# Patient Record
Sex: MALE | Race: WHITE | NOT HISPANIC OR LATINO | Employment: OTHER | URBAN - METROPOLITAN AREA
[De-identification: names, ages, dates, MRNs, and addresses within clinical notes are randomized per-mention and may not be internally consistent; named-entity substitution may affect disease eponyms.]

---

## 2017-02-07 ENCOUNTER — OFFICE VISIT - RIVER FALLS (OUTPATIENT)
Dept: FAMILY MEDICINE | Facility: CLINIC | Age: 72
End: 2017-02-07

## 2017-02-07 ASSESSMENT — MIFFLIN-ST. JEOR: SCORE: 1663.4

## 2017-08-31 ENCOUNTER — OFFICE VISIT - RIVER FALLS (OUTPATIENT)
Dept: FAMILY MEDICINE | Facility: CLINIC | Age: 72
End: 2017-08-31

## 2017-08-31 ASSESSMENT — MIFFLIN-ST. JEOR: SCORE: 1622.58

## 2017-09-06 ENCOUNTER — AMBULATORY - RIVER FALLS (OUTPATIENT)
Dept: FAMILY MEDICINE | Facility: CLINIC | Age: 72
End: 2017-09-06

## 2017-09-07 LAB — HBA1C MFR BLD: 6.4 %

## 2017-09-12 ENCOUNTER — OFFICE VISIT - RIVER FALLS (OUTPATIENT)
Dept: FAMILY MEDICINE | Facility: CLINIC | Age: 72
End: 2017-09-12

## 2017-09-12 ASSESSMENT — MIFFLIN-ST. JEOR: SCORE: 1602.62

## 2017-12-01 ENCOUNTER — OFFICE VISIT - RIVER FALLS (OUTPATIENT)
Dept: FAMILY MEDICINE | Facility: CLINIC | Age: 72
End: 2017-12-01

## 2017-12-01 ASSESSMENT — MIFFLIN-ST. JEOR: SCORE: 1615.32

## 2018-01-02 ENCOUNTER — OFFICE VISIT - RIVER FALLS (OUTPATIENT)
Dept: FAMILY MEDICINE | Facility: CLINIC | Age: 73
End: 2018-01-02

## 2018-01-02 ASSESSMENT — MIFFLIN-ST. JEOR: SCORE: 1608.97

## 2018-03-19 ENCOUNTER — OFFICE VISIT - RIVER FALLS (OUTPATIENT)
Dept: FAMILY MEDICINE | Facility: CLINIC | Age: 73
End: 2018-03-19

## 2018-03-19 ASSESSMENT — MIFFLIN-ST. JEOR: SCORE: 1615.32

## 2018-03-27 LAB
CHOLEST SERPL-MCNC: 109 MG/DL
CREAT SERPL-MCNC: 0.8 MG/DL
HBA1C MFR BLD: 6.6 %
HDLC SERPL-MCNC: 42 MG/DL
LDLC SERPL CALC-MCNC: 44 MG/DL
TRIGL SERPL-MCNC: 114 MG/DL

## 2018-09-26 ENCOUNTER — OFFICE VISIT - RIVER FALLS (OUTPATIENT)
Dept: FAMILY MEDICINE | Facility: CLINIC | Age: 73
End: 2018-09-26

## 2018-09-26 ASSESSMENT — MIFFLIN-ST. JEOR: SCORE: 1570.87

## 2018-09-27 LAB — HBA1C MFR BLD: 6.1 %

## 2019-01-24 ENCOUNTER — OFFICE VISIT - RIVER FALLS (OUTPATIENT)
Dept: FAMILY MEDICINE | Facility: CLINIC | Age: 74
End: 2019-01-24

## 2019-01-24 ASSESSMENT — MIFFLIN-ST. JEOR: SCORE: 1563.61

## 2019-02-26 ENCOUNTER — OFFICE VISIT - RIVER FALLS (OUTPATIENT)
Dept: FAMILY MEDICINE | Facility: CLINIC | Age: 74
End: 2019-02-26

## 2019-02-26 ASSESSMENT — MIFFLIN-ST. JEOR: SCORE: 1554.54

## 2019-02-27 LAB
A/G RATIO - HISTORICAL: 1.8 (ref 1–2.5)
ALBUMIN SERPL-MCNC: 4 GM/DL (ref 3.6–5.1)
ALP SERPL-CCNC: 71 UNIT/L (ref 40–115)
ALT SERPL W P-5'-P-CCNC: 71 UNIT/L (ref 9–46)
AST SERPL W P-5'-P-CCNC: 52 UNIT/L (ref 10–35)
BILIRUB SERPL-MCNC: 0.6 MG/DL (ref 0.2–1.2)
BUN SERPL-MCNC: 10 MG/DL (ref 7–25)
BUN/CREAT RATIO - HISTORICAL: ABNORMAL (ref 6–22)
CALCIUM SERPL-MCNC: 9.6 MG/DL (ref 8.6–10.3)
CHLORIDE BLD-SCNC: 102 MMOL/L (ref 98–110)
CO2 SERPL-SCNC: 34 MMOL/L (ref 20–32)
CREAT SERPL-MCNC: 0.82 MG/DL (ref 0.7–1.18)
EGFRCR SERPLBLD CKD-EPI 2021: 87 ML/MIN/1.73M2
ERYTHROCYTE [DISTWIDTH] IN BLOOD BY AUTOMATED COUNT: 12.9 % (ref 11–15)
GLOBULIN: 2.2 (ref 1.9–3.7)
GLUCOSE BLD-MCNC: 113 MG/DL (ref 65–99)
HCT VFR BLD AUTO: 36.9 % (ref 38.5–50)
HGB BLD-MCNC: 12.7 GM/DL (ref 13.2–17.1)
LIPASE SERPL-CCNC: 171 UNIT/L (ref 7–60)
MCH RBC QN AUTO: 29.7 PG (ref 27–33)
MCHC RBC AUTO-ENTMCNC: 34.4 GM/DL (ref 32–36)
MCV RBC AUTO: 86.2 FL (ref 80–100)
PLATELET # BLD AUTO: 217 10*3/UL (ref 140–400)
PMV BLD: 9.8 FL (ref 7.5–12.5)
POTASSIUM BLD-SCNC: 3.7 MMOL/L (ref 3.5–5.3)
PROT SERPL-MCNC: 6.2 GM/DL (ref 6.1–8.1)
RBC # BLD AUTO: 4.28 10*6/UL (ref 4.2–5.8)
SODIUM SERPL-SCNC: 142 MMOL/L (ref 135–146)
WBC # BLD AUTO: 6.7 10*3/UL (ref 3.8–10.8)

## 2019-03-07 ENCOUNTER — OFFICE VISIT - RIVER FALLS (OUTPATIENT)
Dept: FAMILY MEDICINE | Facility: CLINIC | Age: 74
End: 2019-03-07

## 2019-03-07 ASSESSMENT — MIFFLIN-ST. JEOR: SCORE: 1574.49

## 2019-03-08 LAB
A/G RATIO - HISTORICAL: 2.2 (ref 1–2.5)
ALBUMIN SERPL-MCNC: 4.2 GM/DL (ref 3.6–5.1)
ALP SERPL-CCNC: 66 UNIT/L (ref 40–115)
ALT SERPL W P-5'-P-CCNC: 25 UNIT/L (ref 9–46)
AST SERPL W P-5'-P-CCNC: 20 UNIT/L (ref 10–35)
BILIRUB SERPL-MCNC: 0.7 MG/DL (ref 0.2–1.2)
BUN SERPL-MCNC: 13 MG/DL (ref 7–25)
BUN/CREAT RATIO - HISTORICAL: ABNORMAL (ref 6–22)
CALCIUM SERPL-MCNC: 9.5 MG/DL (ref 8.6–10.3)
CHLORIDE BLD-SCNC: 103 MMOL/L (ref 98–110)
CO2 SERPL-SCNC: 30 MMOL/L (ref 20–32)
CREAT SERPL-MCNC: 0.88 MG/DL (ref 0.7–1.18)
EGFRCR SERPLBLD CKD-EPI 2021: 85 ML/MIN/1.73M2
GLOBULIN: 1.9 (ref 1.9–3.7)
GLUCOSE BLD-MCNC: 151 MG/DL (ref 65–99)
LIPASE SERPL-CCNC: 46 UNIT/L (ref 7–60)
POTASSIUM BLD-SCNC: 3.8 MMOL/L (ref 3.5–5.3)
PROT SERPL-MCNC: 6.1 GM/DL (ref 6.1–8.1)
PSA SERPL-MCNC: 1.2 NG/ML
SODIUM SERPL-SCNC: 139 MMOL/L (ref 135–146)

## 2019-04-19 ENCOUNTER — AMBULATORY - RIVER FALLS (OUTPATIENT)
Dept: FAMILY MEDICINE | Facility: CLINIC | Age: 74
End: 2019-04-19

## 2019-04-20 LAB
CHOLEST SERPL-MCNC: 120 MG/DL
CHOLEST/HDLC SERPL: 2.6 {RATIO}
ERYTHROCYTE [DISTWIDTH] IN BLOOD BY AUTOMATED COUNT: 13.1 % (ref 11–15)
HBA1C MFR BLD: 6.2 %
HCT VFR BLD AUTO: 38.4 % (ref 38.5–50)
HDLC SERPL-MCNC: 46 MG/DL
HGB BLD-MCNC: 13.3 GM/DL (ref 13.2–17.1)
LDLC SERPL CALC-MCNC: 60 MG/DL
MCH RBC QN AUTO: 29.8 PG (ref 27–33)
MCHC RBC AUTO-ENTMCNC: 34.6 GM/DL (ref 32–36)
MCV RBC AUTO: 85.9 FL (ref 80–100)
MICROALBUMIN UR-MCNC: 1 MG/DL
NONHDLC SERPL-MCNC: 74 MG/DL
PLATELET # BLD AUTO: 168 10*3/UL (ref 140–400)
PMV BLD: 9.8 FL (ref 7.5–12.5)
RBC # BLD AUTO: 4.47 10*6/UL (ref 4.2–5.8)
TRIGL SERPL-MCNC: 48 MG/DL
WBC # BLD AUTO: 5 10*3/UL (ref 3.8–10.8)

## 2019-04-26 ENCOUNTER — OFFICE VISIT - RIVER FALLS (OUTPATIENT)
Dept: FAMILY MEDICINE | Facility: CLINIC | Age: 74
End: 2019-04-26

## 2019-04-26 ASSESSMENT — MIFFLIN-ST. JEOR: SCORE: 1574.49

## 2019-06-24 ENCOUNTER — COMMUNICATION - RIVER FALLS (OUTPATIENT)
Dept: FAMILY MEDICINE | Facility: CLINIC | Age: 74
End: 2019-06-24

## 2019-06-28 ENCOUNTER — OFFICE VISIT - RIVER FALLS (OUTPATIENT)
Dept: FAMILY MEDICINE | Facility: CLINIC | Age: 74
End: 2019-06-28

## 2019-06-28 ASSESSMENT — MIFFLIN-ST. JEOR: SCORE: 1531.86

## 2019-07-03 ENCOUNTER — COMMUNICATION - RIVER FALLS (OUTPATIENT)
Dept: FAMILY MEDICINE | Facility: CLINIC | Age: 74
End: 2019-07-03

## 2019-08-10 ENCOUNTER — OFFICE VISIT - RIVER FALLS (OUTPATIENT)
Dept: FAMILY MEDICINE | Facility: CLINIC | Age: 74
End: 2019-08-10

## 2019-10-11 ENCOUNTER — OFFICE VISIT - RIVER FALLS (OUTPATIENT)
Dept: FAMILY MEDICINE | Facility: CLINIC | Age: 74
End: 2019-10-11

## 2019-10-11 ASSESSMENT — MIFFLIN-ST. JEOR: SCORE: 1536.39

## 2019-10-12 LAB — HBA1C MFR BLD: 6 %

## 2019-10-14 ENCOUNTER — COMMUNICATION - RIVER FALLS (OUTPATIENT)
Dept: FAMILY MEDICINE | Facility: CLINIC | Age: 74
End: 2019-10-14

## 2020-01-30 ENCOUNTER — OFFICE VISIT - RIVER FALLS (OUTPATIENT)
Dept: FAMILY MEDICINE | Facility: CLINIC | Age: 75
End: 2020-01-30

## 2020-01-30 ASSESSMENT — MIFFLIN-ST. JEOR: SCORE: 1550

## 2020-03-24 ENCOUNTER — OFFICE VISIT - RIVER FALLS (OUTPATIENT)
Dept: FAMILY MEDICINE | Facility: CLINIC | Age: 75
End: 2020-03-24

## 2020-05-21 ENCOUNTER — AMBULATORY - RIVER FALLS (OUTPATIENT)
Dept: FAMILY MEDICINE | Facility: CLINIC | Age: 75
End: 2020-05-21

## 2020-05-21 ASSESSMENT — MIFFLIN-ST. JEOR: SCORE: 1578.12

## 2020-05-22 ENCOUNTER — COMMUNICATION - RIVER FALLS (OUTPATIENT)
Dept: FAMILY MEDICINE | Facility: CLINIC | Age: 75
End: 2020-05-22

## 2020-05-22 LAB
A/G RATIO - HISTORICAL: 2.2 (ref 1–2.5)
ALBUMIN SERPL-MCNC: 4.4 GM/DL (ref 3.6–5.1)
ALP SERPL-CCNC: 70 UNIT/L (ref 35–144)
ALT SERPL W P-5'-P-CCNC: 13 UNIT/L (ref 9–46)
AST SERPL W P-5'-P-CCNC: 18 UNIT/L (ref 10–35)
BILIRUB SERPL-MCNC: 0.7 MG/DL (ref 0.2–1.2)
BUN SERPL-MCNC: 15 MG/DL (ref 7–25)
BUN/CREAT RATIO - HISTORICAL: NORMAL (ref 6–22)
CALCIUM SERPL-MCNC: 9.4 MG/DL (ref 8.6–10.3)
CHLORIDE BLD-SCNC: 103 MMOL/L (ref 98–110)
CHOLEST SERPL-MCNC: 117 MG/DL
CHOLEST/HDLC SERPL: 2.6 {RATIO}
CO2 SERPL-SCNC: 28 MMOL/L (ref 20–32)
CREAT SERPL-MCNC: 0.86 MG/DL (ref 0.7–1.18)
EGFRCR SERPLBLD CKD-EPI 2021: 85 ML/MIN/1.73M2
ERYTHROCYTE [DISTWIDTH] IN BLOOD BY AUTOMATED COUNT: 12.6 % (ref 11–15)
GLOBULIN: 2 (ref 1.9–3.7)
GLUCOSE BLD-MCNC: 93 MG/DL (ref 65–99)
HBA1C MFR BLD: 6 %
HCT VFR BLD AUTO: 39.4 % (ref 38.5–50)
HDLC SERPL-MCNC: 45 MG/DL
HGB BLD-MCNC: 13.1 GM/DL (ref 13.2–17.1)
LDLC SERPL CALC-MCNC: 57 MG/DL
MCH RBC QN AUTO: 29.5 PG (ref 27–33)
MCHC RBC AUTO-ENTMCNC: 33.2 GM/DL (ref 32–36)
MCV RBC AUTO: 88.7 FL (ref 80–100)
MICROALBUMIN UR-MCNC: 0.8 MG/DL
NONHDLC SERPL-MCNC: 72 MG/DL
PLATELET # BLD AUTO: 183 10*3/UL (ref 140–400)
PMV BLD: 10.1 FL (ref 7.5–12.5)
POTASSIUM BLD-SCNC: 4.3 MMOL/L (ref 3.5–5.3)
PROT SERPL-MCNC: 6.4 GM/DL (ref 6.1–8.1)
PSA SERPL-MCNC: 1.2 NG/ML
RBC # BLD AUTO: 4.44 10*6/UL (ref 4.2–5.8)
SODIUM SERPL-SCNC: 140 MMOL/L (ref 135–146)
TRIGL SERPL-MCNC: 70 MG/DL
WBC # BLD AUTO: 7.5 10*3/UL (ref 3.8–10.8)

## 2020-05-28 ENCOUNTER — OFFICE VISIT - RIVER FALLS (OUTPATIENT)
Dept: FAMILY MEDICINE | Facility: CLINIC | Age: 75
End: 2020-05-28

## 2020-09-25 ENCOUNTER — COMMUNICATION - RIVER FALLS (OUTPATIENT)
Dept: FAMILY MEDICINE | Facility: CLINIC | Age: 75
End: 2020-09-25

## 2020-10-01 ENCOUNTER — COMMUNICATION - RIVER FALLS (OUTPATIENT)
Dept: FAMILY MEDICINE | Facility: CLINIC | Age: 75
End: 2020-10-01

## 2020-10-06 ENCOUNTER — AMBULATORY - RIVER FALLS (OUTPATIENT)
Dept: FAMILY MEDICINE | Facility: CLINIC | Age: 75
End: 2020-10-06

## 2020-10-23 ENCOUNTER — OFFICE VISIT - RIVER FALLS (OUTPATIENT)
Dept: FAMILY MEDICINE | Facility: CLINIC | Age: 75
End: 2020-10-23

## 2020-10-23 ENCOUNTER — TRANSFERRED RECORDS (OUTPATIENT)
Dept: MULTI SPECIALTY CLINIC | Facility: CLINIC | Age: 75
End: 2020-10-23

## 2020-10-24 LAB — HBA1C MFR BLD: 6.3 %

## 2020-10-25 ENCOUNTER — COMMUNICATION - RIVER FALLS (OUTPATIENT)
Dept: FAMILY MEDICINE | Facility: CLINIC | Age: 75
End: 2020-10-25

## 2020-11-27 ENCOUNTER — OFFICE VISIT - RIVER FALLS (OUTPATIENT)
Dept: FAMILY MEDICINE | Facility: CLINIC | Age: 75
End: 2020-11-27

## 2020-11-27 ENCOUNTER — AMBULATORY - RIVER FALLS (OUTPATIENT)
Dept: FAMILY MEDICINE | Facility: CLINIC | Age: 75
End: 2020-11-27

## 2020-11-30 LAB — SARS-COV-2 RNA SPEC QL NAA+PROBE: NOT DETECTED

## 2021-01-28 ENCOUNTER — COMMUNICATION - RIVER FALLS (OUTPATIENT)
Dept: FAMILY MEDICINE | Facility: CLINIC | Age: 76
End: 2021-01-28
Payer: COMMERCIAL

## 2021-02-02 ENCOUNTER — RECORDS - HEALTHEAST (OUTPATIENT)
Dept: ADMINISTRATIVE | Facility: OTHER | Age: 76
End: 2021-02-02

## 2021-02-02 ENCOUNTER — OFFICE VISIT - HEALTHEAST (OUTPATIENT)
Dept: CARDIOLOGY | Facility: CLINIC | Age: 76
End: 2021-02-02

## 2021-02-02 DIAGNOSIS — I10 BENIGN ESSENTIAL HYPERTENSION: ICD-10-CM

## 2021-02-02 DIAGNOSIS — I25.118 CORONARY ARTERY DISEASE OF NATIVE ARTERY OF NATIVE HEART WITH STABLE ANGINA PECTORIS (H): ICD-10-CM

## 2021-02-02 DIAGNOSIS — I35.0 NONRHEUMATIC AORTIC VALVE STENOSIS: ICD-10-CM

## 2021-02-02 DIAGNOSIS — Z95.1 S/P CABG (CORONARY ARTERY BYPASS GRAFT): ICD-10-CM

## 2021-02-02 ASSESSMENT — MIFFLIN-ST. JEOR: SCORE: 1643.45

## 2021-02-11 ENCOUNTER — HOSPITAL ENCOUNTER (OUTPATIENT)
Dept: CARDIOLOGY | Facility: CLINIC | Age: 76
Discharge: HOME | End: 2021-02-11
Attending: INTERNAL MEDICINE

## 2021-02-11 DIAGNOSIS — I35.0 NONRHEUMATIC AORTIC VALVE STENOSIS: ICD-10-CM

## 2021-02-11 DIAGNOSIS — I25.118 CORONARY ARTERY DISEASE OF NATIVE ARTERY OF NATIVE HEART WITH STABLE ANGINA PECTORIS (H): ICD-10-CM

## 2021-02-11 LAB
AORTIC ROOT: 3.3 CM
AORTIC VALVE MEAN VELOCITY: 228 CM/S
ASCENDING AORTA: 3.3 CM
AV DIMENSIONLESS INDEX VTI: 0.4
AV MEAN GRADIENT: 24 MMHG
AV PEAK GRADIENT: 38.4 MMHG
AV VALVE AREA: 1.5 CM2
AV VELOCITY RATIO: 0.4
BSA FOR ECHO PROCEDURE: 2.12 M2
CV BLOOD PRESSURE: ABNORMAL MMHG
CV ECHO HEIGHT: 68 IN
CV ECHO WEIGHT: 207 LBS
DOP CALC AO PEAK VEL: 310 CM/S
DOP CALC AO VTI: 67.3 CM
DOP CALC LVOT AREA: 3.8 CM2
DOP CALC LVOT DIAMETER: 2.2 CM
DOP CALC LVOT PEAK VEL: 116 CM/S
DOP CALC LVOT STROKE VOLUME: 100.7 CM3
DOP CALCLVOT PEAK VEL VTI: 26.5 CM
EJECTION FRACTION: 70 % (ref 55–75)
FRACTIONAL SHORTENING: 39.8 % (ref 28–44)
INTERVENTRICULAR SEPTUM IN END DIASTOLE: 1.1 CM (ref 0.6–1)
IVS/PW RATIO: 1
LA AREA 1: 24.9 CM2
LA AREA 2: 26.8 CM2
LEFT ATRIUM LENGTH: 6.2 CM
LEFT ATRIUM SIZE: 4.6 CM
LEFT ATRIUM VOLUME INDEX: 43.2 ML/M2
LEFT ATRIUM VOLUME: 91.5 ML
LEFT VENTRICLE CARDIAC INDEX: 3.1 L/MIN/M2
LEFT VENTRICLE CARDIAC OUTPUT: 6.6 L/MIN
LEFT VENTRICLE DIASTOLIC VOLUME INDEX: 37.3 CM3/M2 (ref 34–74)
LEFT VENTRICLE DIASTOLIC VOLUME: 79.1 CM3 (ref 62–150)
LEFT VENTRICLE HEART RATE: 66 BPM
LEFT VENTRICLE MASS INDEX: 91.9 G/M2
LEFT VENTRICLE SYSTOLIC VOLUME INDEX: 11.3 CM3/M2 (ref 11–31)
LEFT VENTRICLE SYSTOLIC VOLUME: 23.9 CM3 (ref 21–61)
LEFT VENTRICULAR INTERNAL DIMENSION IN DIASTOLE: 4.85 CM (ref 4.2–5.8)
LEFT VENTRICULAR INTERNAL DIMENSION IN SYSTOLE: 2.92 CM (ref 2.5–4)
LEFT VENTRICULAR MASS: 194.7 G
LEFT VENTRICULAR OUTFLOW TRACT MEAN GRADIENT: 3 MMHG
LEFT VENTRICULAR OUTFLOW TRACT MEAN VELOCITY: 86.8 CM/S
LEFT VENTRICULAR OUTFLOW TRACT PEAK GRADIENT: 5 MMHG
LEFT VENTRICULAR POSTERIOR WALL IN END DIASTOLE: 1.08 CM (ref 0.6–1)
LV STROKE VOLUME INDEX: 47.5 ML/M2
MITRAL VALVE DECELERATION SLOPE: 2060 MM/S2
MITRAL VALVE E/A RATIO: 0.9
MITRAL VALVE PRESSURE HALF-TIME: 81 MS
MV AVERAGE E/E' RATIO: 5.3 CM/S
MV DECELERATION TIME: 278 MS
MV E'TISSUE VEL-LAT: 14.2 CM/S
MV E'TISSUE VEL-MED: 7.45 CM/S
MV LATERAL E/E' RATIO: 4
MV MEDIAL E/E' RATIO: 7.7
MV PEAK A VELOCITY: 61.6 CM/S
MV PEAK E VELOCITY: 57.2 CM/S
MV VALVE AREA PRESSURE 1/2 METHOD: 2.7 CM2
NUC REST DIASTOLIC VOLUME INDEX: 3312 LBS
NUC REST SYSTOLIC VOLUME INDEX: 68 IN
TRICUSPID VALVE ANULAR PLANE SYSTOLIC EXCURSION: 1.8 CM

## 2021-02-11 ASSESSMENT — MIFFLIN-ST. JEOR: SCORE: 1643.45

## 2021-02-15 ENCOUNTER — COMMUNICATION - HEALTHEAST (OUTPATIENT)
Dept: CARDIOLOGY | Facility: CLINIC | Age: 76
End: 2021-02-15

## 2021-02-15 DIAGNOSIS — I25.10 CAD (CORONARY ARTERY DISEASE): ICD-10-CM

## 2021-02-18 ENCOUNTER — COMMUNICATION - HEALTHEAST (OUTPATIENT)
Dept: CARDIOLOGY | Facility: CLINIC | Age: 76
End: 2021-02-18

## 2021-02-18 DIAGNOSIS — I25.118 CORONARY ARTERY DISEASE OF NATIVE ARTERY OF NATIVE HEART WITH STABLE ANGINA PECTORIS (H): ICD-10-CM

## 2021-02-18 DIAGNOSIS — Z95.1 S/P CABG (CORONARY ARTERY BYPASS GRAFT): ICD-10-CM

## 2021-02-25 ENCOUNTER — RECORDS - HEALTHEAST (OUTPATIENT)
Dept: ADMINISTRATIVE | Facility: OTHER | Age: 76
End: 2021-02-25

## 2021-02-25 ENCOUNTER — AMBULATORY - RIVER FALLS (OUTPATIENT)
Dept: FAMILY MEDICINE | Facility: CLINIC | Age: 76
End: 2021-02-25
Payer: COMMERCIAL

## 2021-02-26 ENCOUNTER — SURGERY - HEALTHEAST (OUTPATIENT)
Dept: CARDIOLOGY | Facility: CLINIC | Age: 76
End: 2021-02-26

## 2021-02-26 DIAGNOSIS — I25.118 CORONARY ARTERY DISEASE OF NATIVE ARTERY OF NATIVE HEART WITH STABLE ANGINA PECTORIS (H): ICD-10-CM

## 2021-03-01 ENCOUNTER — SURGERY - HEALTHEAST (OUTPATIENT)
Dept: CARDIOLOGY | Facility: HOSPITAL | Age: 76
End: 2021-03-01

## 2021-03-01 LAB — SARS-COV-2 RNA RESP QL NAA+PROBE: NEGATIVE

## 2021-03-01 ASSESSMENT — MIFFLIN-ST. JEOR: SCORE: 1603.08

## 2021-03-09 ENCOUNTER — OFFICE VISIT - RIVER FALLS (OUTPATIENT)
Dept: FAMILY MEDICINE | Facility: CLINIC | Age: 76
End: 2021-03-09
Payer: COMMERCIAL

## 2021-03-10 LAB
APTT PPP: 27 S (ref 23–32)
BASOPHILS # BLD MANUAL: 225 10*3/UL (ref 0–200)
BASOPHILS NFR BLD MANUAL: 3 %
DAT POLY-SP REAG RBC QL: NEGATIVE
EOSINOPHIL # BLD MANUAL: 450 10*3/UL (ref 15–500)
EOSINOPHIL NFR BLD MANUAL: 6 %
ERYTHROCYTE [DISTWIDTH] IN BLOOD BY AUTOMATED COUNT: 14.3 % (ref 11–15)
FERRITIN SERPL-MCNC: 8 NG/ML (ref 24–380)
HAPTOGLOB SERPL-MCNC: 338 MG/DL (ref 43–212)
HCT VFR BLD AUTO: 29.6 % (ref 38.5–50)
HGB BLD-MCNC: 9.1 GM/DL (ref 13.2–17.1)
INR PPP: 1
IRON SATURATION: 8 (ref 20–48)
IRON SERPL-MCNC: 28 MCG/DL (ref 50–180)
LDH SERPL L TO P-CCNC: 140 UNIT/L (ref 120–250)
LYMPHOCYTES # BLD MANUAL: 675 10*3/UL (ref 850–3900)
LYMPHOCYTES NFR BLD MANUAL: 9 %
MCH RBC QN AUTO: 22.9 PG (ref 27–33)
MCHC RBC AUTO-ENTMCNC: 30.7 GM/DL (ref 32–36)
MCV RBC AUTO: 74.6 FL (ref 80–100)
MONOCYTES # BLD MANUAL: 375 10*3/UL (ref 200–950)
MONOCYTES NFR BLD MANUAL: 5 %
NEUTROPHILS # BLD MANUAL: 5775 10*3/UL (ref 1500–7800)
NEUTROPHILS NFR BLD MANUAL: 77 %
PLATELET # BLD AUTO: 295 10*3/UL (ref 140–400)
PMV BLD: 10.1 FL (ref 7.5–12.5)
PROTHROMBIN TIME: 10.6 S (ref 9–11.5)
RBC # BLD AUTO: 3.97 10*6/UL (ref 4.2–5.8)
TIBC - QUEST: 354 (ref 250–425)
WBC # BLD AUTO: 7.5 10*3/UL (ref 3.8–10.8)

## 2021-03-16 ENCOUNTER — AMBULATORY - HEALTHEAST (OUTPATIENT)
Dept: OTHER | Facility: CLINIC | Age: 76
End: 2021-03-16

## 2021-03-16 ENCOUNTER — DOCUMENTATION ONLY (OUTPATIENT)
Dept: OTHER | Facility: CLINIC | Age: 76
End: 2021-03-16

## 2021-03-17 ENCOUNTER — COMMUNICATION - RIVER FALLS (OUTPATIENT)
Dept: FAMILY MEDICINE | Facility: CLINIC | Age: 76
End: 2021-03-17
Payer: COMMERCIAL

## 2021-03-19 ENCOUNTER — OFFICE VISIT - RIVER FALLS (OUTPATIENT)
Dept: FAMILY MEDICINE | Facility: CLINIC | Age: 76
End: 2021-03-19
Payer: COMMERCIAL

## 2021-03-26 ENCOUNTER — RECORDS - HEALTHEAST (OUTPATIENT)
Dept: ADMINISTRATIVE | Facility: OTHER | Age: 76
End: 2021-03-26

## 2021-03-26 ENCOUNTER — COMMUNICATION - RIVER FALLS (OUTPATIENT)
Dept: FAMILY MEDICINE | Facility: CLINIC | Age: 76
End: 2021-03-26
Payer: COMMERCIAL

## 2021-03-26 ENCOUNTER — OFFICE VISIT - RIVER FALLS (OUTPATIENT)
Dept: FAMILY MEDICINE | Facility: CLINIC | Age: 76
End: 2021-03-26
Payer: COMMERCIAL

## 2021-03-31 ENCOUNTER — COMMUNICATION - RIVER FALLS (OUTPATIENT)
Dept: FAMILY MEDICINE | Facility: CLINIC | Age: 76
End: 2021-03-31
Payer: COMMERCIAL

## 2021-04-01 ENCOUNTER — COMMUNICATION - RIVER FALLS (OUTPATIENT)
Dept: FAMILY MEDICINE | Facility: CLINIC | Age: 76
End: 2021-04-01
Payer: COMMERCIAL

## 2021-04-02 ENCOUNTER — RECORDS - HEALTHEAST (OUTPATIENT)
Dept: ADMINISTRATIVE | Facility: OTHER | Age: 76
End: 2021-04-02

## 2021-04-02 ENCOUNTER — OFFICE VISIT - RIVER FALLS (OUTPATIENT)
Dept: FAMILY MEDICINE | Facility: CLINIC | Age: 76
End: 2021-04-02
Payer: COMMERCIAL

## 2021-04-06 ENCOUNTER — RECORDS - HEALTHEAST (OUTPATIENT)
Dept: RADIOLOGY | Facility: CLINIC | Age: 76
End: 2021-04-06

## 2021-04-08 ENCOUNTER — OFFICE VISIT - RIVER FALLS (OUTPATIENT)
Dept: FAMILY MEDICINE | Facility: CLINIC | Age: 76
End: 2021-04-08
Payer: COMMERCIAL

## 2021-04-09 LAB
HCT VFR BLD AUTO: 29.6 %
HGB BLD-MCNC: 9.2 G/DL
PLATELET # BLD AUTO: 335 X10^3/UL
WBC # BLD AUTO: 8.88 X10^3/UL

## 2021-04-12 ENCOUNTER — COMMUNICATION - RIVER FALLS (OUTPATIENT)
Dept: FAMILY MEDICINE | Facility: CLINIC | Age: 76
End: 2021-04-12
Payer: COMMERCIAL

## 2021-04-14 ENCOUNTER — COMMUNICATION - RIVER FALLS (OUTPATIENT)
Dept: FAMILY MEDICINE | Facility: CLINIC | Age: 76
End: 2021-04-14
Payer: COMMERCIAL

## 2021-04-19 ENCOUNTER — OFFICE VISIT - RIVER FALLS (OUTPATIENT)
Dept: FAMILY MEDICINE | Facility: CLINIC | Age: 76
End: 2021-04-19
Payer: COMMERCIAL

## 2021-04-29 ENCOUNTER — COMMUNICATION - RIVER FALLS (OUTPATIENT)
Dept: FAMILY MEDICINE | Facility: CLINIC | Age: 76
End: 2021-04-29
Payer: COMMERCIAL

## 2021-05-06 ENCOUNTER — OFFICE VISIT - RIVER FALLS (OUTPATIENT)
Dept: FAMILY MEDICINE | Facility: CLINIC | Age: 76
End: 2021-05-06
Payer: COMMERCIAL

## 2021-05-06 LAB
% MYELOCYTES: ABNORMAL %
ABSOLUTE BLASTS: ABNORMAL
ABSOLUTE METAMYELOCYTES: ABNORMAL
ABSOLUTE MYELOCYTES: ABNORMAL
ABSOLUTE PROMYELOCYTES: ABNORMAL
BASOPHILS # BLD MANUAL: 20 10*3/UL (ref 0–200)
BASOPHILS NFR BLD MANUAL: 0.1 %
BLASTS (HISTORICAL): ABNORMAL %
EOSINOPHIL # BLD MANUAL: 39 10*3/UL (ref 15–500)
EOSINOPHIL NFR BLD MANUAL: 0.2 %
ERYTHROCYTE [DISTWIDTH] IN BLOOD BY AUTOMATED COUNT: 23.1 % (ref 11–15)
HCT VFR BLD AUTO: 22 % (ref 38.5–50)
HGB BLD-MCNC: 7 GM/DL (ref 13.2–17.1)
LYMPHOCYTES # BLD MANUAL: 588 10*3/UL (ref 850–3900)
LYMPHOCYTES NFR BLD MANUAL: 3 %
Lab: ABNORMAL
Lab: ABNORMAL %
Lab: ABNORMAL (ref 0–750)
MCH RBC QN AUTO: 23 PG (ref 27–33)
MCHC RBC AUTO-ENTMCNC: 31.8 GM/DL (ref 32–36)
MCV RBC AUTO: 72.4 FL (ref 80–100)
METAMYELOCYTES (HISTORICAL): ABNORMAL %
MONOCYTES # BLD MANUAL: 804 10*3/UL (ref 200–950)
MONOCYTES NFR BLD MANUAL: 4.1 %
NEUTROPHILS # BLD MANUAL: ABNORMAL 10*3/UL (ref 1500–7800)
NEUTROPHILS NFR BLD MANUAL: 92.6 %
NRBC # BLD AUTO: ABNORMAL 10*3/UL
PLATELET # BLD AUTO: 288 10*3/UL (ref 140–400)
PMV BLD: 9.6 FL (ref 7.5–12.5)
PROMYELOCYTES: ABNORMAL %
RBC # BLD AUTO: 3.04 10*6/UL (ref 4.2–5.8)
VARIANT LYMPHS BLD QL SMEAR: ABNORMAL % (ref 0–10)
WBC # BLD AUTO: 19.6 10*3/UL (ref 3.8–10.8)

## 2021-05-14 ENCOUNTER — OFFICE VISIT - RIVER FALLS (OUTPATIENT)
Dept: FAMILY MEDICINE | Facility: CLINIC | Age: 76
End: 2021-05-14
Payer: COMMERCIAL

## 2021-05-19 ENCOUNTER — OFFICE VISIT - RIVER FALLS (OUTPATIENT)
Dept: FAMILY MEDICINE | Facility: CLINIC | Age: 76
End: 2021-05-19
Payer: COMMERCIAL

## 2021-05-20 LAB
BASOPHILS # BLD MANUAL: 9 10*3/UL (ref 0–200)
BASOPHILS NFR BLD MANUAL: 0.1 %
EOSINOPHIL # BLD MANUAL: 0 10*3/UL (ref 15–500)
EOSINOPHIL NFR BLD MANUAL: 0 %
ERYTHROCYTE [DISTWIDTH] IN BLOOD BY AUTOMATED COUNT: 18.7 % (ref 11–15)
HCT VFR BLD AUTO: 27.9 % (ref 38.5–50)
HGB BLD-MCNC: 8.7 GM/DL (ref 13.2–17.1)
LYMPHOCYTES # BLD MANUAL: 322 10*3/UL (ref 850–3900)
LYMPHOCYTES NFR BLD MANUAL: 3.5 %
MCH RBC QN AUTO: 24.6 PG (ref 27–33)
MCHC RBC AUTO-ENTMCNC: 31.2 GM/DL (ref 32–36)
MCV RBC AUTO: 79 FL (ref 80–100)
MONOCYTES # BLD MANUAL: 193 10*3/UL (ref 200–950)
MONOCYTES NFR BLD MANUAL: 2.1 %
NEUTROPHILS # BLD MANUAL: 8676 10*3/UL (ref 1500–7800)
NEUTROPHILS NFR BLD MANUAL: 94.3 %
PLATELET # BLD AUTO: 307 10*3/UL (ref 140–400)
PMV BLD: 10.1 FL (ref 7.5–12.5)
RBC # BLD AUTO: 3.53 10*6/UL (ref 4.2–5.8)
WBC # BLD AUTO: 9.2 10*3/UL (ref 3.8–10.8)

## 2021-05-21 ENCOUNTER — COMMUNICATION - RIVER FALLS (OUTPATIENT)
Dept: FAMILY MEDICINE | Facility: CLINIC | Age: 76
End: 2021-05-21
Payer: COMMERCIAL

## 2021-06-04 ENCOUNTER — COMMUNICATION - RIVER FALLS (OUTPATIENT)
Dept: FAMILY MEDICINE | Facility: CLINIC | Age: 76
End: 2021-06-04
Payer: COMMERCIAL

## 2021-06-04 ENCOUNTER — OFFICE VISIT - RIVER FALLS (OUTPATIENT)
Dept: FAMILY MEDICINE | Facility: CLINIC | Age: 76
End: 2021-06-04
Payer: COMMERCIAL

## 2021-06-05 VITALS — WEIGHT: 198.1 LBS | HEIGHT: 68 IN | BODY MASS INDEX: 30.02 KG/M2

## 2021-06-05 VITALS
BODY MASS INDEX: 31.37 KG/M2 | SYSTOLIC BLOOD PRESSURE: 134 MMHG | DIASTOLIC BLOOD PRESSURE: 62 MMHG | WEIGHT: 207 LBS | HEIGHT: 68 IN | HEART RATE: 72 BPM | RESPIRATION RATE: 14 BRPM

## 2021-06-05 VITALS — BODY MASS INDEX: 31.37 KG/M2 | HEIGHT: 68 IN | WEIGHT: 207 LBS

## 2021-06-08 ENCOUNTER — OFFICE VISIT - RIVER FALLS (OUTPATIENT)
Dept: FAMILY MEDICINE | Facility: CLINIC | Age: 76
End: 2021-06-08
Payer: COMMERCIAL

## 2021-06-09 LAB
BASOPHILS # BLD MANUAL: 8 10*3/UL (ref 0–200)
BASOPHILS NFR BLD MANUAL: 0.1 %
EOSINOPHIL # BLD MANUAL: 32 10*3/UL (ref 15–500)
EOSINOPHIL NFR BLD MANUAL: 0.4 %
ERYTHROCYTE [DISTWIDTH] IN BLOOD BY AUTOMATED COUNT: 21.8 % (ref 11–15)
HCT VFR BLD AUTO: 27.2 % (ref 38.5–50)
HGB BLD-MCNC: 8.4 GM/DL (ref 13.2–17.1)
LYMPHOCYTES # BLD MANUAL: 411 10*3/UL (ref 850–3900)
LYMPHOCYTES NFR BLD MANUAL: 5.2 %
MCH RBC QN AUTO: 23.3 PG (ref 27–33)
MCHC RBC AUTO-ENTMCNC: 30.9 GM/DL (ref 32–36)
MCV RBC AUTO: 75.6 FL (ref 80–100)
MONOCYTES # BLD MANUAL: 277 10*3/UL (ref 200–950)
MONOCYTES NFR BLD MANUAL: 3.5 %
NEUTROPHILS # BLD MANUAL: 7173 10*3/UL (ref 1500–7800)
NEUTROPHILS NFR BLD MANUAL: 90.8 %
PLATELET # BLD AUTO: 246 10*3/UL (ref 140–400)
PMV BLD: 8.9 FL (ref 7.5–12.5)
RBC # BLD AUTO: 3.6 10*6/UL (ref 4.2–5.8)
WBC # BLD AUTO: 7.9 10*3/UL (ref 3.8–10.8)

## 2021-06-15 NOTE — TELEPHONE ENCOUNTER
Prashant Clifton  [address] [place] [id number]  [phone] (home)     Procedure cardiologist:  Dr. Vieira or Dr. Ambrocio  PCP:  Dexter Silva MD  H&P completed by:  2/2/21, Dr. Arndt  Admit date 3/1/21  Arrival time:  0630  Anticoagulation: None  CPAP: Sleep Apnea noted in chart, denies use of CPAP  Previous PCI: 2002/2003 @ [facility]   Bypass Grafts: Yes  Renal Issues: No  Diabetic?: Yes  Device?: No  Type:  n/a    Angiogram Teaching    Reason for Visit:  Telephone call to discuss pre-procedure education in preparation for: Cors poss PCI    Procedure Prep:  Primary Cardiologist note dated: 2/2/21, Dr. Arndt  EKG results obtained, dated: am of procedure  Hemogram results obtained: am of procedure  Basic Metabolic Panel results obtained: am of procedure  Lipid Profile results obtained: am of procedure  COVID-19 test results obtained: 2/23/21, @ [facility] in [place], PENDING    Patient Education  Patient states understanding of procedure and agrees to proceed.    Pre-procedure instructions  Patient instructed to be NPO after midnight.  Patient instructed to shower the evening before or the morning of the procedure.  Patient instructed to arrange for transportation home following procedure from a responsible family member of friend. No driving for at least 24 hours.  Patient instructed to have a responsible adult with them for 24 hours post-procedure.  Post-procedure follow up process.  Conscious sedation discussed.    Pre-procedure medication instructions  Patient instructed on antiplatelet medication.  Continue medications as scheduled, with a small amount of water on the day of the procedure unless indicated.  Patient instructed to take 324 mg of Aspirin am of procedure: Yes  Other medication: instructed to HOLD Metformin a.m. of the procedure.    *PATIENTS RECORDS AVAILABLE IN [employer] UNLESS OTHERWISE INDICATED*      Patient Active Problem List   Diagnosis     Nonrheumatic aortic valve  stenosis     Coronary artery disease of native artery of native heart with stable angina pectoris (H)     S/P CABG (coronary artery bypass graft)     Benign essential hypertension       Current Outpatient Medications   Medication Sig Dispense Refill     aspirin 81 MG EC tablet Take 81 mg by mouth daily.       atorvastatin (LIPITOR) 40 MG tablet Take 40 mg by mouth at bedtime.       doxazosin (CARDURA) 2 MG tablet Take 2 mg by mouth daily.        FLUoxetine (PROZAC) 40 MG capsule Take 40 mg by mouth daily.        isosorbide mononitrate (IMDUR) 60 MG 24 hr tablet Take 1 tablet (60 mg total) by mouth daily. 90 tablet 2     lisinopriL (PRINIVIL,ZESTRIL) 10 MG tablet Take 10 mg by mouth daily.       metFORMIN (GLUCOPHAGE) 500 MG tablet Take 500 mg by mouth 2 (two) times a day with meals.       nitroglycerin (NITROSTAT) 0.4 MG SL tablet Place 0.4 mg under the tongue as needed for chest pain.       No current facility-administered medications for this visit.        No Known Allergies      Kate Crum RN

## 2021-06-15 NOTE — TELEPHONE ENCOUNTER
----- Message from Shukri Arndt MD sent at 2/18/2021  9:48 AM CST -----  Kate,    Echocardiogram shows mild to moderate aortic stenosis.  How is he doing on the isosorbide mononitrate?  If he still having symptoms, I would have a low threshold for coronary/graft, possible PCI.    Thanks,    Shukri      Results discussed with patient. He notes no improvement in symptoms after addition of Imdur. Patient agrees that it is time to move forward with Cors poss PCI.   Scheduling process reviewed.  Case request placed. -ejb

## 2021-06-16 PROBLEM — I10 BENIGN ESSENTIAL HYPERTENSION: Status: ACTIVE | Noted: 2021-02-02

## 2021-06-16 PROBLEM — Z95.1 S/P CABG (CORONARY ARTERY BYPASS GRAFT): Status: ACTIVE | Noted: 2021-02-02

## 2021-06-16 PROBLEM — H90.3 SENSORINEURAL HEARING LOSS (SNHL) OF BOTH EARS: Status: ACTIVE | Noted: 2021-04-05

## 2021-06-16 PROBLEM — E78.5 OTHER AND UNSPECIFIED HYPERLIPIDEMIA: Status: ACTIVE | Noted: 2021-04-05

## 2021-06-16 PROBLEM — E11.9 TYPE 2 DIABETES MELLITUS WITHOUT COMPLICATION (H): Status: ACTIVE | Noted: 2021-04-05

## 2021-06-16 PROBLEM — I25.118 CORONARY ARTERY DISEASE OF NATIVE ARTERY OF NATIVE HEART WITH STABLE ANGINA PECTORIS (H): Status: ACTIVE | Noted: 2021-02-02

## 2021-06-16 PROBLEM — I35.0 NONRHEUMATIC AORTIC VALVE STENOSIS: Status: ACTIVE | Noted: 2021-02-02

## 2021-06-17 ENCOUNTER — OFFICE VISIT - RIVER FALLS (OUTPATIENT)
Dept: FAMILY MEDICINE | Facility: CLINIC | Age: 76
End: 2021-06-17
Payer: COMMERCIAL

## 2021-06-18 NOTE — PATIENT INSTRUCTIONS - HE
Patient Instructions by Shukri Arndt MD at 2/2/2021  1:50 PM     Author: Shukri Arndt MD Service: -- Author Type: Physician    Filed: 2/2/2021  2:35 PM Encounter Date: 2/2/2021 Status: Addendum    : Shukri Arndt MD (Physician)    Related Notes: Original Note by Shukri Arndt MD (Physician) filed at 2/2/2021  2:33 PM                     Prashant Clifton,    It was a pleasure to see you today at the [facility].     My recommendations after this visit include:    1.  We will obtain an echocardiogram to evaluate the aortic valve.  There was moderate narrowing of the aortic valve 1 year ago and this could be the cause of your increased symptoms.    2.  Please increase your isosorbide mononitrate to 60 mg a day.  That will be 2 tablets.    3.  We will call you with the results of the echocardiogram and further recommendations.        Shukri Arndt

## 2021-06-22 ENCOUNTER — OFFICE VISIT - RIVER FALLS (OUTPATIENT)
Dept: FAMILY MEDICINE | Facility: CLINIC | Age: 76
End: 2021-06-22
Payer: COMMERCIAL

## 2021-06-30 NOTE — PROGRESS NOTES
Progress Notes by Shukri Arndt MD at 2/2/2021  1:50 PM     Author: Shukri Arndt MD Service: -- Author Type: Physician    Filed: 2/2/2021  2:42 PM Encounter Date: 2/2/2021 Status: Signed    : Shukri Arndt MD (Physician)               Assessment/Recommendations   Patient with known coronary artery disease, type 2 diabetes, hypertension, and aortic stenosis.  He has symptoms over the last couple of months of a burning sensation in his chest when he overexerts himself.  He does not get this with walking but if he climbs stairs or pushes it particularly in the cold weather he will get this burning discomfort.  Rest will take it away and nitroglycerin will help as well.  He has never had it at rest.    I am concerned that he has either worsening coronary artery disease, worsening aortic stenosis or possibly both.  We would like to start with an echocardiogram to make sure he does not have severe aortic stenosis.  Either way, I believe he will need an angiogram and we will see what the results of the echo are and likely set that up shortly thereafter.    I have asked him to increase his isosorbide mononitrate to 60 mg a day to see if this would be of any benefit from a symptom standpoint and I have also asked him to back off a little bit on his exercise so that he is not bringing on this burning discomfort.    He has been counseled as well to come into the hospital if he has discomfort which is not promptly relieved with nitroglycerin or occurs at rest.    Thank you for allowing us to participate in his care.       History of Present Illness/Subjective    Mr. Prashant Clifton is a 75 y.o. male with known coronary artery disease with bypass surgery in 2002 by Dr. Dex Ernandez.  Patient has had a small heart attack thereafter and he has had 2 or 3 episodes where he has had worsening symptoms and had stents placed.  The procedures were done at .  In the last 2 to 3 months he has noticed  a burning discomfort in his mid chest.  The discomfort comes on predictably if he climbs a couple of flights of stairs, or pushes it a little harder while he is walking, oral walks out in cold weather.  If he rests and stops the discomfort goes away promptly.  Sometimes he will take a nitroglycerin if the discomfort is more prominent and this will help take the discomfort away in 2 minutes.  He never gets this discomfort at rest.  He also feels a little short of breath when he gets this discomfort.  It is right in the middle of his chest and does not radiate and is not severe but moderate in nature.  He denies orthopnea, paroxysmal nocturnal dyspnea but does have peripheral edema which he judges to be mild and stable.  He has not had any palpitations, syncope or near syncopal episodes.    An echocardiogram 1 year ago showed normal left ventricular systolic function but moderate aortic stenosis with a mean gradient of 30 mmHg.  This was done just over 1 year ago.    The patient is a type II diabetic, quit smoking 46 years ago, takes our atorvastatin for his cholesterol.  His father had a fatal cerebrovascular accident at age 64.  The patient has been treated for hypertension.    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  He is  and his spouse is with him at the time of our evaluation.  They have a daughter and his son neither of whom have coronary artery disease or any heart problems.  The patient was [place] in [place] [facility], spent some time in [place] but moved out to [facility] when they had small children.    Below is the last cardiology note from 1 year ago.    1. Coronary artery disease status post 3-vessel bypass surgery in 2003 with a LIMA to the LAD, a vein graft to the OM and a vein graft to the diagonal. The vein graft to the diagonal subsequently occluded. His last angiogram was in 2017 and this showed a patent LIMA to the LAD,  patent vein graft to the OM, but severe stenosis of the distal RCA at the bifurcation leading to placement of Synergy drug-eluting stents there with a mini-crush technique.       Physical Examination Review of Systems   Vitals:    02/02/21 1353   BP: 134/62   Pulse: 72   Resp: 14     Body mass index is 31.47 kg/m .  Wt Readings from Last 3 Encounters:   02/02/21 207 lb (93.9 kg)     General Appearance:   Alert, cooperative and in no acute distress.   ENT/Mouth: Patient wearing a mask.      EYES:  no scleral icterus, normal conjunctivae   Neck: JVP normal. No Hepatojugular reflux. Thyroid not visualized.   Chest/Lungs:   Lungs are clear to auscultation, equal chest wall expansion.   Cardiovascular:   S1, S2 with 3/6 systolic murmur , no clicks or rubs. Brachial, radial  pulses are intact and symetric.  Murmur radiates to the carotids bilaterally.  Good carotid pulsations bilaterally.   Abdomen:  Nontender. BS+.    Extremities: No cyanosis, clubbing in bilateral pretibial mildly pitting edema   Skin: no xanthelasma, warm.    Neurologic: normal arm movement bilateral, no tremors     Psychiatric: Appropriate affect.      General: Weight Gain  Eyes: WNL  Ears/Nose/Throat: WNL  Lungs: Shortness of Breath  Heart: Chest Pain, Shortness of Breath with activity  Stomach: WNL  Bladder: Frequent Urination at Night  Muscle/Joints: WNL  Skin: WNL  Nervous System: Dizziness, Loss of Balance  Mental Health: Depression     Blood: WNL       Medical History  Surgical History Family History Social History   No past medical history on file. No past surgical history on file. No family history on file. Social History     Socioeconomic History   ? Marital status:      Spouse name: Not on file   ? Number of children: Not on file   ? Years of education: Not on file   ? Highest education level: Not on file   Occupational History   ? Not on file   Social Needs   ? Financial resource strain: Not on file   ? Food insecurity     Worry: Not  on file     Inability: Not on file   ? Transportation needs     Medical: Not on file     Non-medical: Not on file   Tobacco Use   ? Smoking status: Former Smoker   ? Smokeless tobacco: Never Used   Substance and Sexual Activity   ? Alcohol use: Not on file   ? Drug use: Never   ? Sexual activity: Not on file   Lifestyle   ? Physical activity     Days per week: Not on file     Minutes per session: Not on file   ? Stress: Not on file   Relationships   ? Social connections     Talks on phone: Not on file     Gets together: Not on file     Attends Anabaptist service: Not on file     Active member of club or organization: Not on file     Attends meetings of clubs or organizations: Not on file     Relationship status: Not on file   ? Intimate partner violence     Fear of current or ex partner: Not on file     Emotionally abused: Not on file     Physically abused: Not on file     Forced sexual activity: Not on file   Other Topics Concern   ? Not on file   Social History Narrative   ? Not on file          Medications  Allergies   Current Outpatient Medications   Medication Sig Dispense Refill   ? aspirin 81 MG EC tablet Take 81 mg by mouth daily.     ? atorvastatin (LIPITOR) 40 MG tablet Take 40 mg by mouth at bedtime.     ? doxazosin (CARDURA) 2 MG tablet Take 2 mg by mouth daily.      ? FLUoxetine (PROZAC) 40 MG capsule Take 40 mg by mouth daily.      ? isosorbide mononitrate (IMDUR) 30 MG 24 hr tablet Take 30 mg by mouth daily.     ? lisinopriL (PRINIVIL,ZESTRIL) 10 MG tablet Take 10 mg by mouth daily.     ? metFORMIN (GLUCOPHAGE) 500 MG tablet Take 500 mg by mouth 2 (two) times a day with meals.     ? nitroglycerin (NITROSTAT) 0.4 MG SL tablet Place 0.4 mg under the tongue as needed for chest pain.       No current facility-administered medications for this visit.     No Known Allergies      Lab Results    Chemistry/lipid CBC Cardiac Enzymes/BNP/TSH/INR   No results found for: CHOL, HDL, LDLCALC, TRIG, CREATININE, BUN,  K, NA, CL, CO2 No results found for: WBC, HGB, HCT, MCV, PLT No results found for: CKTOTAL, CKMB, CKMBINDEX, TROPONINI, BNP, TSH, INR

## 2021-07-08 ENCOUNTER — OFFICE VISIT - RIVER FALLS (OUTPATIENT)
Dept: FAMILY MEDICINE | Facility: CLINIC | Age: 76
End: 2021-07-08
Payer: COMMERCIAL

## 2021-07-09 ENCOUNTER — COMMUNICATION - RIVER FALLS (OUTPATIENT)
Dept: FAMILY MEDICINE | Facility: CLINIC | Age: 76
End: 2021-07-09
Payer: COMMERCIAL

## 2021-07-09 LAB
BUN SERPL-MCNC: 13 MG/DL (ref 7–25)
BUN/CREAT RATIO - HISTORICAL: ABNORMAL (ref 6–22)
CALCIUM SERPL-MCNC: 8.9 MG/DL (ref 8.6–10.3)
CHLORIDE BLD-SCNC: 103 MMOL/L (ref 98–110)
CO2 SERPL-SCNC: 29 MMOL/L (ref 20–32)
CREAT SERPL-MCNC: 0.73 MG/DL (ref 0.7–1.18)
EGFRCR SERPLBLD CKD-EPI 2021: 90 ML/MIN/1.73M2
GLUCOSE BLD-MCNC: 109 MG/DL (ref 65–99)
HCT VFR BLD AUTO: 26.1 %
HGB BLD-MCNC: 7.7 G/DL
PLATELET # BLD AUTO: 233 X10^3/UL
POTASSIUM BLD-SCNC: 4 MMOL/L (ref 3.5–5.3)
SODIUM SERPL-SCNC: 140 MMOL/L (ref 135–146)
WBC # BLD AUTO: 8.47 X10^3/UL

## 2021-07-13 ENCOUNTER — AMBULATORY - RIVER FALLS (OUTPATIENT)
Dept: FAMILY MEDICINE | Facility: CLINIC | Age: 76
End: 2021-07-13
Payer: COMMERCIAL

## 2021-07-15 LAB
HCT VFR BLD AUTO: 26.4 %
HGB BLD-MCNC: 7.7 G/DL
PLATELET # BLD AUTO: 270 X10^3/UL
WBC # BLD AUTO: 8.07 X10^3/UL

## 2021-07-16 ENCOUNTER — COMMUNICATION - RIVER FALLS (OUTPATIENT)
Dept: FAMILY MEDICINE | Facility: CLINIC | Age: 76
End: 2021-07-16
Payer: COMMERCIAL

## 2021-07-20 ENCOUNTER — OFFICE VISIT - RIVER FALLS (OUTPATIENT)
Dept: FAMILY MEDICINE | Facility: CLINIC | Age: 76
End: 2021-07-20
Payer: COMMERCIAL

## 2021-07-21 ENCOUNTER — COMMUNICATION - RIVER FALLS (OUTPATIENT)
Dept: FAMILY MEDICINE | Facility: CLINIC | Age: 76
End: 2021-07-21
Payer: COMMERCIAL

## 2021-07-21 LAB
CHOLEST SERPL-MCNC: 103 MG/DL
CHOLEST/HDLC SERPL: 3.8 {RATIO}
CREAT UR-MCNC: 157 MG/DL (ref 20–320)
HBA1C MFR BLD: 5.5 %
HDLC SERPL-MCNC: 27 MG/DL
LDLC SERPL CALC-MCNC: 60 MG/DL
MICROALBUMIN UR-MCNC: 1.5 MG/DL
MICROALBUMIN/CREAT UR: 10 MG/G{CREAT}
NONHDLC SERPL-MCNC: 76 MG/DL
TRIGL SERPL-MCNC: 81 MG/DL

## 2021-07-22 LAB — HGB BLD-MCNC: 8 G/DL

## 2021-08-20 ENCOUNTER — OFFICE VISIT - RIVER FALLS (OUTPATIENT)
Dept: FAMILY MEDICINE | Facility: CLINIC | Age: 76
End: 2021-08-20
Payer: COMMERCIAL

## 2021-09-03 ENCOUNTER — COMMUNICATION - RIVER FALLS (OUTPATIENT)
Dept: FAMILY MEDICINE | Facility: CLINIC | Age: 76
End: 2021-09-03
Payer: COMMERCIAL

## 2021-09-07 ENCOUNTER — AMBULATORY - RIVER FALLS (OUTPATIENT)
Dept: FAMILY MEDICINE | Facility: CLINIC | Age: 76
End: 2021-09-07
Payer: COMMERCIAL

## 2021-09-08 LAB — FECAL FAT, QUALITATIVE: NORMAL

## 2021-09-09 ENCOUNTER — COMMUNICATION - RIVER FALLS (OUTPATIENT)
Dept: FAMILY MEDICINE | Facility: CLINIC | Age: 76
End: 2021-09-09
Payer: COMMERCIAL

## 2021-09-17 ENCOUNTER — COMMUNICATION - RIVER FALLS (OUTPATIENT)
Dept: FAMILY MEDICINE | Facility: CLINIC | Age: 76
End: 2021-09-17
Payer: COMMERCIAL

## 2021-09-22 ENCOUNTER — COMMUNICATION - RIVER FALLS (OUTPATIENT)
Dept: FAMILY MEDICINE | Facility: CLINIC | Age: 76
End: 2021-09-22
Payer: COMMERCIAL

## 2021-09-24 ENCOUNTER — OFFICE VISIT - RIVER FALLS (OUTPATIENT)
Dept: FAMILY MEDICINE | Facility: CLINIC | Age: 76
End: 2021-09-24
Payer: COMMERCIAL

## 2021-09-25 ENCOUNTER — COMMUNICATION - RIVER FALLS (OUTPATIENT)
Dept: FAMILY MEDICINE | Facility: CLINIC | Age: 76
End: 2021-09-25
Payer: COMMERCIAL

## 2021-09-25 LAB
BASOPHILS # BLD MANUAL: 8 10*3/UL (ref 0–200)
BASOPHILS NFR BLD MANUAL: 0.1 %
EOSINOPHIL # BLD MANUAL: 8 10*3/UL (ref 15–500)
EOSINOPHIL NFR BLD MANUAL: 0.1 %
ERYTHROCYTE [DISTWIDTH] IN BLOOD BY AUTOMATED COUNT: 25.9 % (ref 11–15)
FERRITIN SERPL-MCNC: 52 NG/ML (ref 24–380)
HCT VFR BLD AUTO: 34.3 % (ref 38.5–50)
HGB BLD-MCNC: 10.1 GM/DL (ref 13.2–17.1)
LYMPHOCYTES # BLD MANUAL: 304 10*3/UL (ref 850–3900)
LYMPHOCYTES NFR BLD MANUAL: 3.9 %
MCH RBC QN AUTO: 23 PG (ref 27–33)
MCHC RBC AUTO-ENTMCNC: 29.4 GM/DL (ref 32–36)
MCV RBC AUTO: 78 FL (ref 80–100)
MONOCYTES # BLD MANUAL: 109 10*3/UL (ref 200–950)
MONOCYTES NFR BLD MANUAL: 1.4 %
NEUTROPHILS # BLD MANUAL: 7371 10*3/UL (ref 1500–7800)
NEUTROPHILS NFR BLD MANUAL: 94.5 %
PLATELET # BLD AUTO: 158 10*3/UL (ref 140–400)
PMV BLD: ABNORMAL FL (ref 7.5–12.5)
RBC # BLD AUTO: 4.4 10*6/UL (ref 4.2–5.8)
WBC # BLD AUTO: 7.8 10*3/UL (ref 3.8–10.8)

## 2021-10-26 ENCOUNTER — COMMUNICATION - RIVER FALLS (OUTPATIENT)
Dept: FAMILY MEDICINE | Facility: CLINIC | Age: 76
End: 2021-10-26
Payer: COMMERCIAL

## 2021-10-26 ENCOUNTER — OFFICE VISIT - RIVER FALLS (OUTPATIENT)
Dept: FAMILY MEDICINE | Facility: CLINIC | Age: 76
End: 2021-10-26
Payer: COMMERCIAL

## 2021-10-26 ENCOUNTER — AMBULATORY - RIVER FALLS (OUTPATIENT)
Dept: FAMILY MEDICINE | Facility: CLINIC | Age: 76
End: 2021-10-26
Payer: COMMERCIAL

## 2021-10-26 ENCOUNTER — LAB REQUISITION (OUTPATIENT)
Dept: LAB | Facility: CLINIC | Age: 76
End: 2021-10-26
Payer: MEDICARE

## 2021-10-26 DIAGNOSIS — U07.1 COVID-19: ICD-10-CM

## 2021-10-26 PROCEDURE — U0005 INFEC AGEN DETEC AMPLI PROBE: HCPCS | Mod: ORL | Performed by: PHYSICIAN ASSISTANT

## 2021-10-27 LAB — SARS-COV-2 RNA RESP QL NAA+PROBE: POSITIVE

## 2021-10-28 ENCOUNTER — COMMUNICATION - RIVER FALLS (OUTPATIENT)
Dept: FAMILY MEDICINE | Facility: CLINIC | Age: 76
End: 2021-10-28
Payer: COMMERCIAL

## 2021-10-28 LAB — SARS-COV-2 RNA RESP QL NAA+PROBE: POSITIVE

## 2021-11-03 ENCOUNTER — COMMUNICATION - RIVER FALLS (OUTPATIENT)
Dept: FAMILY MEDICINE | Facility: CLINIC | Age: 76
End: 2021-11-03
Payer: COMMERCIAL

## 2021-11-10 ENCOUNTER — AMBULATORY - RIVER FALLS (OUTPATIENT)
Dept: FAMILY MEDICINE | Facility: CLINIC | Age: 76
End: 2021-11-10
Payer: COMMERCIAL

## 2021-11-11 ENCOUNTER — COMMUNICATION - RIVER FALLS (OUTPATIENT)
Dept: FAMILY MEDICINE | Facility: CLINIC | Age: 76
End: 2021-11-11
Payer: COMMERCIAL

## 2021-11-11 LAB
FERRITIN SERPL-MCNC: 40 NG/ML (ref 24–380)
HGB BLD-MCNC: 10.1 GM/DL (ref 13.2–17.1)

## 2021-11-16 ENCOUNTER — COMMUNICATION - RIVER FALLS (OUTPATIENT)
Dept: FAMILY MEDICINE | Facility: CLINIC | Age: 76
End: 2021-11-16
Payer: COMMERCIAL

## 2021-11-17 ENCOUNTER — OFFICE VISIT - RIVER FALLS (OUTPATIENT)
Dept: FAMILY MEDICINE | Facility: CLINIC | Age: 76
End: 2021-11-17
Payer: COMMERCIAL

## 2022-02-04 ENCOUNTER — COMMUNICATION - RIVER FALLS (OUTPATIENT)
Dept: FAMILY MEDICINE | Facility: CLINIC | Age: 77
End: 2022-02-04
Payer: COMMERCIAL

## 2022-02-11 VITALS
WEIGHT: 196.4 LBS | SYSTOLIC BLOOD PRESSURE: 108 MMHG | OXYGEN SATURATION: 94 % | TEMPERATURE: 97.2 F | BODY MASS INDEX: 30.83 KG/M2 | HEIGHT: 67 IN | HEART RATE: 68 BPM | HEIGHT: 67 IN | HEART RATE: 72 BPM | DIASTOLIC BLOOD PRESSURE: 68 MMHG | WEIGHT: 196.4 LBS | SYSTOLIC BLOOD PRESSURE: 126 MMHG | SYSTOLIC BLOOD PRESSURE: 112 MMHG | HEART RATE: 70 BPM | BODY MASS INDEX: 30.13 KG/M2 | DIASTOLIC BLOOD PRESSURE: 60 MMHG | TEMPERATURE: 95.9 F | HEIGHT: 67 IN | WEIGHT: 192 LBS | BODY MASS INDEX: 30.83 KG/M2 | DIASTOLIC BLOOD PRESSURE: 60 MMHG

## 2022-02-11 VITALS
DIASTOLIC BLOOD PRESSURE: 60 MMHG | WEIGHT: 216 LBS | TEMPERATURE: 97.2 F | HEART RATE: 56 BPM | HEIGHT: 67 IN | BODY MASS INDEX: 33.9 KG/M2 | SYSTOLIC BLOOD PRESSURE: 128 MMHG

## 2022-02-12 VITALS
SYSTOLIC BLOOD PRESSURE: 128 MMHG | HEART RATE: 55 BPM | SYSTOLIC BLOOD PRESSURE: 132 MMHG | DIASTOLIC BLOOD PRESSURE: 64 MMHG | HEART RATE: 64 BPM | BODY MASS INDEX: 29.35 KG/M2 | TEMPERATURE: 97.2 F | WEIGHT: 187.4 LBS | BODY MASS INDEX: 29.35 KG/M2 | HEIGHT: 67 IN | DIASTOLIC BLOOD PRESSURE: 70 MMHG | OXYGEN SATURATION: 96 % | TEMPERATURE: 97.9 F | WEIGHT: 187 LBS

## 2022-02-12 VITALS
BODY MASS INDEX: 29.51 KG/M2 | SYSTOLIC BLOOD PRESSURE: 128 MMHG | TEMPERATURE: 97.6 F | WEIGHT: 188 LBS | HEIGHT: 67 IN | HEART RATE: 72 BPM | DIASTOLIC BLOOD PRESSURE: 74 MMHG

## 2022-02-12 VITALS
SYSTOLIC BLOOD PRESSURE: 120 MMHG | SYSTOLIC BLOOD PRESSURE: 118 MMHG | SYSTOLIC BLOOD PRESSURE: 96 MMHG | OXYGEN SATURATION: 90 % | SYSTOLIC BLOOD PRESSURE: 138 MMHG | OXYGEN SATURATION: 86 % | DIASTOLIC BLOOD PRESSURE: 58 MMHG | RESPIRATION RATE: 20 BRPM | RESPIRATION RATE: 20 BRPM | DIASTOLIC BLOOD PRESSURE: 60 MMHG | BODY MASS INDEX: 30.41 KG/M2 | HEART RATE: 83 BPM | DIASTOLIC BLOOD PRESSURE: 50 MMHG | TEMPERATURE: 98.3 F | SYSTOLIC BLOOD PRESSURE: 116 MMHG | HEART RATE: 73 BPM | TEMPERATURE: 98.8 F | OXYGEN SATURATION: 92 % | OXYGEN SATURATION: 88 % | WEIGHT: 198 LBS | BODY MASS INDEX: 28.13 KG/M2 | BODY MASS INDEX: 30.11 KG/M2 | TEMPERATURE: 99.1 F | OXYGEN SATURATION: 95 % | DIASTOLIC BLOOD PRESSURE: 56 MMHG | TEMPERATURE: 99.1 F | HEART RATE: 92 BPM | DIASTOLIC BLOOD PRESSURE: 52 MMHG | WEIGHT: 200 LBS | SYSTOLIC BLOOD PRESSURE: 116 MMHG | RESPIRATION RATE: 20 BRPM | WEIGHT: 185 LBS | DIASTOLIC BLOOD PRESSURE: 62 MMHG | HEART RATE: 84 BPM | TEMPERATURE: 99 F | HEART RATE: 98 BPM | HEART RATE: 10 BPM | TEMPERATURE: 98.6 F | RESPIRATION RATE: 20 BRPM | BODY MASS INDEX: 27.52 KG/M2 | OXYGEN SATURATION: 89 % | WEIGHT: 181 LBS

## 2022-02-12 VITALS
DIASTOLIC BLOOD PRESSURE: 68 MMHG | HEART RATE: 80 BPM | HEART RATE: 80 BPM | SYSTOLIC BLOOD PRESSURE: 132 MMHG | SYSTOLIC BLOOD PRESSURE: 128 MMHG | WEIGHT: 178 LBS | WEIGHT: 180 LBS | BODY MASS INDEX: 27.06 KG/M2 | BODY MASS INDEX: 27.37 KG/M2 | SYSTOLIC BLOOD PRESSURE: 126 MMHG | OXYGEN SATURATION: 96 % | OXYGEN SATURATION: 97 % | OXYGEN SATURATION: 93 % | DIASTOLIC BLOOD PRESSURE: 52 MMHG | DIASTOLIC BLOOD PRESSURE: 58 MMHG | HEART RATE: 62 BPM | TEMPERATURE: 98.3 F | HEART RATE: 83 BPM | DIASTOLIC BLOOD PRESSURE: 68 MMHG | HEART RATE: 98 BPM | SYSTOLIC BLOOD PRESSURE: 110 MMHG | OXYGEN SATURATION: 97 % | DIASTOLIC BLOOD PRESSURE: 64 MMHG | SYSTOLIC BLOOD PRESSURE: 134 MMHG

## 2022-02-12 VITALS
WEIGHT: 195.6 LBS | SYSTOLIC BLOOD PRESSURE: 108 MMHG | BODY MASS INDEX: 30.7 KG/M2 | HEART RATE: 64 BPM | HEIGHT: 67 IN | DIASTOLIC BLOOD PRESSURE: 60 MMHG

## 2022-02-12 VITALS
TEMPERATURE: 98.2 F | SYSTOLIC BLOOD PRESSURE: 124 MMHG | DIASTOLIC BLOOD PRESSURE: 58 MMHG | BODY MASS INDEX: 26.99 KG/M2 | WEIGHT: 177.5 LBS | OXYGEN SATURATION: 92 % | HEART RATE: 75 BPM

## 2022-02-12 VITALS
HEIGHT: 67 IN | HEART RATE: 67 BPM | SYSTOLIC BLOOD PRESSURE: 138 MMHG | BODY MASS INDEX: 30.89 KG/M2 | WEIGHT: 197.2 LBS | DIASTOLIC BLOOD PRESSURE: 75 MMHG | HEIGHT: 67 IN | BODY MASS INDEX: 30.95 KG/M2 | OXYGEN SATURATION: 95 %

## 2022-02-12 VITALS
BODY MASS INDEX: 32.02 KG/M2 | HEART RATE: 77 BPM | OXYGEN SATURATION: 96 % | HEIGHT: 67 IN | SYSTOLIC BLOOD PRESSURE: 128 MMHG | WEIGHT: 204 LBS | DIASTOLIC BLOOD PRESSURE: 64 MMHG

## 2022-02-12 VITALS
WEIGHT: 205.4 LBS | SYSTOLIC BLOOD PRESSURE: 134 MMHG | WEIGHT: 202.6 LBS | TEMPERATURE: 97.1 F | HEIGHT: 67 IN | DIASTOLIC BLOOD PRESSURE: 68 MMHG | DIASTOLIC BLOOD PRESSURE: 66 MMHG | HEART RATE: 60 BPM | SYSTOLIC BLOOD PRESSURE: 120 MMHG | HEART RATE: 64 BPM | BODY MASS INDEX: 32.24 KG/M2 | TEMPERATURE: 98.4 F | BODY MASS INDEX: 31.8 KG/M2 | HEIGHT: 67 IN

## 2022-02-12 VITALS
TEMPERATURE: 97.7 F | DIASTOLIC BLOOD PRESSURE: 80 MMHG | BODY MASS INDEX: 31.48 KG/M2 | SYSTOLIC BLOOD PRESSURE: 145 MMHG | HEART RATE: 61 BPM | WEIGHT: 201 LBS

## 2022-02-12 VITALS
HEIGHT: 67 IN | HEART RATE: 80 BPM | BODY MASS INDEX: 32.24 KG/M2 | DIASTOLIC BLOOD PRESSURE: 52 MMHG | WEIGHT: 205.4 LBS | TEMPERATURE: 97.3 F | SYSTOLIC BLOOD PRESSURE: 100 MMHG

## 2022-02-12 VITALS
HEIGHT: 67 IN | DIASTOLIC BLOOD PRESSURE: 68 MMHG | WEIGHT: 191 LBS | HEART RATE: 67 BPM | SYSTOLIC BLOOD PRESSURE: 119 MMHG | BODY MASS INDEX: 29.98 KG/M2

## 2022-02-12 VITALS
SYSTOLIC BLOOD PRESSURE: 132 MMHG | DIASTOLIC BLOOD PRESSURE: 60 MMHG | BODY MASS INDEX: 26.78 KG/M2 | HEART RATE: 96 BPM | WEIGHT: 176.1 LBS

## 2022-02-12 VITALS
DIASTOLIC BLOOD PRESSURE: 60 MMHG | BODY MASS INDEX: 30.45 KG/M2 | SYSTOLIC BLOOD PRESSURE: 110 MMHG | WEIGHT: 194 LBS | HEART RATE: 66 BPM | HEIGHT: 67 IN

## 2022-02-12 VITALS
DIASTOLIC BLOOD PRESSURE: 81 MMHG | WEIGHT: 207 LBS | HEIGHT: 67 IN | BODY MASS INDEX: 32.49 KG/M2 | SYSTOLIC BLOOD PRESSURE: 129 MMHG | HEART RATE: 59 BPM

## 2022-02-15 ENCOUNTER — OFFICE VISIT - RIVER FALLS (OUTPATIENT)
Dept: FAMILY MEDICINE | Facility: CLINIC | Age: 77
End: 2022-02-15
Payer: COMMERCIAL

## 2022-02-16 NOTE — NURSING NOTE
Comprehensive Intake Entered On:  4/8/2021 1:31 PM CDT    Performed On:  4/8/2021 1:20 PM CDT by Margo Carrasco CMA               Summary   Chief Complaint :   Follow up pneumonia. Dizziness worsening.    Advance Directive :   Yes   Menstrual Status :   N/A   Weight Estimated :   198 lb(Converted to: 198 lb 0 oz, 90 kg)    Height/Length Estimated :   67 in(Converted to: 5 ft 7 in, 170.18 cm)    Body Mass Index Estimated :   31.01 kg/m2   BSA Estimated :   2.06 m2   Systolic Blood Pressure :   96 mmHg   Diastolic Blood Pressure :   50 mmHg (LOW)    Mean Arterial Pressure :   65 mmHg   Peripheral Pulse Rate :   73 bpm   BP Site :   Right arm   BP Method :   Manual   Temperature Tympanic :   99.0 DegF(Converted to: 37.2 DegC)    Oxygen Saturation :   90 % (LOW)    Margo Carrasco CMA - 4/8/2021 1:20 PM CDT   Health Status   Allergies Verified? :   Yes   Medication History Verified? :   Yes   Immunizations Current :   Yes   Medical History Verified? :   Yes   Pre-Visit Planning Status :   Completed   Tobacco Use? :   Former smoker   Margo Carrasco CMA - 4/8/2021 1:20 PM CDT   Consents   Consent for Immunization Exchange :   Consent Granted   Consent for Immunizations to Providers :   Consent Granted   Margo Carrasco CMA - 4/8/2021 1:20 PM CDT   Meds / Allergies   (As Of: 4/8/2021 1:31:39 PM CDT)   Allergies (Active)   No Known Medication Allergies  Estimated Onset Date:   Unspecified ; Created By:   Deana French CMA; Reaction Status:   Active ; Category:   Drug ; Substance:   No Known Medication Allergies ; Type:   Allergy ; Updated By:   Deana French CMA; Reviewed Date:   4/8/2021 1:31 PM CDT        Medication List   (As Of: 4/8/2021 1:31:39 PM CDT)   Prescription/Discharge Order    acetaminophen-hydrocodone  :   acetaminophen-hydrocodone ; Status:   Prescribed ; Ordered As Mnemonic:   Norco 5 mg-325 mg oral tablet ; Simple Display Line:   1 tab(s), PO, q4hr, PRN: for pain, 24 tab(s), 0 Refill(s) ; Ordering Provider:    Dexter Silva MD; Catalog Code:   acetaminophen-hydrocodone ; Order Dt/Tm:   3/24/2020 1:06:36 PM CDT          atorvastatin  :   atorvastatin ; Status:   Prescribed ; Ordered As Mnemonic:   atorvastatin 40 mg oral tablet ; Simple Display Line:   See Instructions, TAKE 1 TABLET BY MOUTH  DAILY, 90 tab(s), 3 Refill(s) ; Ordering Provider:   Dexter Silva MD; Catalog Code:   atorvastatin ; Order Dt/Tm:   5/28/2020 9:41:31 AM CDT          atorvastatin  :   atorvastatin ; Status:   Prescribed ; Ordered As Mnemonic:   atorvastatin 40 mg oral tablet ; Simple Display Line:   1 tab(s), Oral, daily, 90 tab(s), 0 Refill(s) ; Ordering Provider:   Dexter Silva MD; Catalog Code:   atorvastatin ; Order Dt/Tm:   3/9/2020 8:21:22 AM CDT          doxazosin  :   doxazosin ; Status:   Prescribed ; Ordered As Mnemonic:   doxazosin 2 mg oral tablet ; Simple Display Line:   See Instructions, TAKE 1 TABLET BY MOUTH  DAILY, 90 tab(s), 3 Refill(s) ; Ordering Provider:   Dexter Silva MD; Catalog Code:   doxazosin ; Order Dt/Tm:   5/28/2020 9:41:32 AM CDT          doxazosin  :   doxazosin ; Status:   Prescribed ; Ordered As Mnemonic:   doxazosin 2 mg oral tablet ; Simple Display Line:   1 tab(s), Oral, daily, 90 tab(s), 0 Refill(s) ; Ordering Provider:   Dexter Silva MD; Catalog Code:   doxazosin ; Order Dt/Tm:   3/9/2020 8:21:47 AM CDT          FLUoxetine  :   FLUoxetine ; Status:   Prescribed ; Ordered As Mnemonic:   FLUoxetine 40 mg oral capsule ; Simple Display Line:   See Instructions, TAKE 1 CAPSULE BY MOUTH  DAILY, 90 cap(s), 3 Refill(s) ; Ordering Provider:   Dexter Silva MD; Catalog Code:   FLUoxetine ; Order Dt/Tm:   5/28/2020 9:41:33 AM CDT          FLUoxetine  :   FLUoxetine ; Status:   Prescribed ; Ordered As Mnemonic:   FLUoxetine 40 mg oral capsule ; Simple Display Line:   1 cap(s), Oral, daily, 90 cap(s), 0 Refill(s) ; Ordering Provider:   Dexter Silva MD; Catalog  Code:   FLUoxetine ; Order Dt/Tm:   3/9/2020 8:22:03 AM CDT          isosorbide mononitrate  :   isosorbide mononitrate ; Status:   Prescribed ; Ordered As Mnemonic:   isosorbide mononitrate 30 mg oral tablet, extended release ; Simple Display Line:   2 tab(s), Oral, qam, 180 tab(s), 3 Refill(s) ; Ordering Provider:   Dexter Silva MD; Catalog Code:   isosorbide mononitrate ; Order Dt/Tm:   5/28/2020 9:43:22 AM CDT          lisinopril  :   lisinopril ; Status:   Prescribed ; Ordered As Mnemonic:   lisinopril 10 mg oral tablet ; Simple Display Line:   10 mg, 1 tab(s), Oral, daily, 90 tab(s), 3 Refill(s) ; Ordering Provider:   Dexter Silva MD; Catalog Code:   lisinopril ; Order Dt/Tm:   10/23/2020 9:21:34 AM CDT          metFORMIN  :   metFORMIN ; Status:   Prescribed ; Ordered As Mnemonic:   MetFORMIN (Eqv-Glucophage XR) 500 mg oral tablet, extended release ; Simple Display Line:   See Instructions, TAKE 2 TABLETS BY MOUTH  TWICE DAILY, 360 tab(s), 3 Refill(s) ; Ordering Provider:   eDxter Silva MD; Catalog Code:   metFORMIN ; Order Dt/Tm:   5/28/2020 9:41:34 AM CDT          Miscellaneous Prescription  :   Miscellaneous Prescription ; Status:   Prescribed ; Ordered As Mnemonic:   FREESTYLE LITE      JUAN C ; Simple Display Line:   1 EA, id, daily, 50 EA ; Ordering Provider:   Dexter Silva MD; Catalog Code:   Miscellaneous Prescription ; Order Dt/Tm:   4/9/2010 10:09:35 AM CDT          Miscellaneous Rx Supply  :   Miscellaneous Rx Supply ; Status:   Prescribed ; Ordered As Mnemonic:   CPAP 13 ; Simple Display Line:   See Instructions, Use every night. Have a download in the sleep center in one month., 1 EA ; Ordering Provider:   Michel Fields MD; Catalog Code:   Miscellaneous Rx Supply ; Order Dt/Tm:   1/17/2014 10:40:19 AM CST          nitroglycerin  :   nitroglycerin ; Status:   Prescribed ; Ordered As Mnemonic:   nitroglycerin 0.4 mg sublingual tablet ; Simple Display Line:   0.4  mg, 1 tab(s), SL, q5min, If chest pain not relieved in 5 minutes after first dose, seek immediate medical attention, PRN: for chest pain, 100 tab(s), 3 Refill(s) ; Ordering Provider:   Dexter Silva MD; Catalog Code:   nitroglycerin ; Order Dt/Tm:   10/23/2020 9:05:52 AM CDT            Home Meds    ascorbic acid  :   ascorbic acid ; Status:   Documented ; Ordered As Mnemonic:   Vitamin C ; Simple Display Line:   daily, 0 Refill(s) ; Catalog Code:   ascorbic acid ; Order Dt/Tm:   10/23/2020 8:48:26 AM CDT          aspirin  :   aspirin ; Status:   Documented ; Ordered As Mnemonic:   aspirin ; Simple Display Line:   81mg tab po daily, 0 Refill(s) ; Catalog Code:   aspirin ; Order Dt/Tm:   3/19/2021 2:43:15 PM CDT          Miscellaneous Prescription  :   Miscellaneous Prescription ; Status:   Documented ; Ordered As Mnemonic:   Iron ; Simple Display Line:   325mg daily, 0 Refill(s) ; Catalog Code:   Miscellaneous Prescription ; Order Dt/Tm:   3/26/2021 10:27:51 AM CDT          Miscellaneous Prescription  :   Miscellaneous Prescription ; Status:   Documented ; Ordered As Mnemonic:   Oxygen Air Delivery System ; Simple Display Line:   2 Liters. Length of need: 3 months, 0 Refill(s) ; Catalog Code:   Miscellaneous Prescription ; Order Dt/Tm:   3/26/2021 9:39:48 AM CDT          omega-3 polyunsaturated fatty acids  :   omega-3 polyunsaturated fatty acids ; Status:   Documented ; Ordered As Mnemonic:   Fish Oil oral capsule ; Simple Display Line:   po, daily, 0 Refill(s) ; Catalog Code:   omega-3 polyunsaturated fatty acids ; Order Dt/Tm:   7/19/2016 8:35:56 AM CDT            ID Risk Screen   Recent Travel History :   No recent travel   Family Member Travel History :   No recent travel   Other Exposure to Infectious Disease :   Unknown   COVID-19 Testing Status :   No positive COVID-19 test   Margo Carrasco CMA - 4/8/2021 1:20 PM CDT

## 2022-02-16 NOTE — NURSING NOTE
Comprehensive Intake Entered On:  1/30/2020 10:48 AM CST    Performed On:  1/30/2020 10:44 AM CST by Margo Lisa               Summary   Chief Complaint :   Echo f/u.    Advance Directive :   Yes   Menstrual Status :   N/A   Weight Measured :   191 lb(Converted to: 191 lb 0 oz, 86.64 kg)    Height Measured :   67 in(Converted to: 5 ft 7 in, 170.18 cm)    Body Mass Index :   29.91 kg/m2 (HI)    Body Surface Area :   2.02 m2   Systolic Blood Pressure :   119 mmHg   Diastolic Blood Pressure :   68 mmHg   Mean Arterial Pressure :   85 mmHg   Peripheral Pulse Rate :   67 bpm   Margo Lisa - 1/30/2020 10:44 AM CST   Health Status   Allergies Verified? :   Yes   Medication History Verified? :   Yes   Immunizations Current :   Yes   Medical History Verified? :   Yes   Pre-Visit Planning Status :   Completed   Tobacco Use? :   Former smoker   Margo Lisa - 1/30/2020 10:44 AM CST   Meds / Allergies   (As Of: 1/30/2020 10:48:03 AM CST)   Allergies (Active)   No Known Medication Allergies  Estimated Onset Date:   Unspecified ; Created By:   Deana French CMA; Reaction Status:   Active ; Category:   Drug ; Substance:   No Known Medication Allergies ; Type:   Allergy ; Updated By:   Deana French CMA; Reviewed Date:   1/30/2020 10:47 AM CST        Medication List   (As Of: 1/30/2020 10:48:03 AM CST)   Prescription/Discharge Order    Miscellaneous Prescription  :   Miscellaneous Prescription ; Status:   Prescribed ; Ordered As Mnemonic:   FREESTYLE LITE      JUAN C ; Simple Display Line:   1 EA, id, daily, 50 EA ; Ordering Provider:   Shira BURK [place]; Catalog Code:   Miscellaneous Prescription ; Order Dt/Tm:   4/9/2010 10:09:35 AM CDT          Miscellaneous Rx Supply  :   Miscellaneous Rx Supply ; Status:   Prescribed ; Ordered As Mnemonic:   CPAP 13 ; Simple Display Line:   See Instructions, Use every night. Have a download in the sleep center in one month., 1 EA ; Ordering Provider:   Diamond  Michel BURK; Catalog Code:   Miscellaneous Rx Supply ; Order Dt/Tm:   1/17/2014 10:40:19 AM CST          nitroglycerin  :   nitroglycerin ; Status:   Prescribed ; Ordered As Mnemonic:   nitroglycerin 0.4 mg sublingual tablet ; Simple Display Line:   0.4 mg, 1 tab(s), SL, q5min, If chest pain not relieved in 5 minutes after first dose, seek immediate medical attention, PRN: for chest pain, 100 tab(s), 3 Refill(s) ; Ordering Provider:   Dexter Silva MD; Catalog Code:   nitroglycerin ; Order Dt/Tm:   3/11/2019 7:55:41 AM CDT          metFORMIN  :   metFORMIN ; Status:   Prescribed ; Ordered As Mnemonic:   metFORMIN 500 mg oral tablet, extended release ; Simple Display Line:   1,000 mg, 2 tab(s), po, bid, 360 tab(s), 3 Refill(s) ; Ordering Provider:   Dexter Silva MD; Catalog Code:   metFORMIN ; Order Dt/Tm:   3/11/2019 7:56:18 AM CDT          doxazosin  :   doxazosin ; Status:   Prescribed ; Ordered As Mnemonic:   doxazosin 2 mg oral tablet ; Simple Display Line:   2 mg, 1 tab(s), po, daily, 90 tab(s), 3 Refill(s) ; Ordering Provider:   Dexter Silva MD; Catalog Code:   doxazosin ; Order Dt/Tm:   3/11/2019 7:56:39 AM CDT          FLUoxetine  :   FLUoxetine ; Status:   Prescribed ; Ordered As Mnemonic:   FLUoxetine 40 mg oral capsule ; Simple Display Line:   40 mg, 1 cap(s), po, daily, 90 cap(s), 3 Refill(s) ; Ordering Provider:   Dexter Silva MD; Catalog Code:   FLUoxetine ; Order Dt/Tm:   3/11/2019 7:57:08 AM CDT          isosorbide mononitrate  :   isosorbide mononitrate ; Status:   Prescribed ; Ordered As Mnemonic:   isosorbide mononitrate 30 mg oral tablet, extended release ; Simple Display Line:   60 mg, 2 tab(s), po, qam, for 90 day(s), 180 tab(s), 3 Refill(s) ; Ordering Provider:   Dexter Silva MD; Catalog Code:   isosorbide mononitrate ; Order Dt/Tm:   3/11/2019 7:57:32 AM CDT          atorvastatin  :   atorvastatin ; Status:   Prescribed ; Ordered As Mnemonic:    atorvastatin 40 mg oral tablet ; Simple Display Line:   40 mg, 1 tab(s), po, daily, 90 tab(s), 3 Refill(s) ; Ordering Provider:   Dexter Silva MD; Catalog Code:   atorvastatin ; Order Dt/Tm:   3/11/2019 7:57:53 AM CDT          acetaminophen-hydrocodone  :   acetaminophen-hydrocodone ; Status:   Prescribed ; Ordered As Mnemonic:   Norco 5 mg-325 mg oral tablet ; Simple Display Line:   1 tab(s), PO, q4hr, PRN: for pain, 24 tab(s), 0 Refill(s) ; Ordering Provider:   Noah Bernal PA-C; Catalog Code:   acetaminophen-hydrocodone ; Order Dt/Tm:   3/11/2019 8:02:30 AM CDT          lisinopril  :   lisinopril ; Status:   Prescribed ; Ordered As Mnemonic:   lisinopril 5 mg oral tablet ; Simple Display Line:   5 mg, 1 tab(s), Oral, daily, 90 tab(s), 3 Refill(s) ; Ordering Provider:   Dexter Silva MD; Catalog Code:   lisinopril ; Order Dt/Tm:   6/28/2019 9:42:08 AM CDT            Home Meds    omega-3 polyunsaturated fatty acids  :   omega-3 polyunsaturated fatty acids ; Status:   Documented ; Ordered As Mnemonic:   Fish Oil oral capsule ; Simple Display Line:   po, daily, 0 Refill(s) ; Catalog Code:   omega-3 polyunsaturated fatty acids ; Order Dt/Tm:   7/19/2016 8:35:56 AM CDT

## 2022-02-16 NOTE — NURSING NOTE
Seen for COVID testing at [facility] per [facility]    Fax Result to: _    O2 Sat = _ 95%  (Children under 12 do not require O2 sat)    Specimen sent to:  _ [facility]    PUI form faxed to _ [facility].

## 2022-02-16 NOTE — NURSING NOTE
Comprehensive Intake Entered On:  5/19/2021 1:14 PM CDT    Performed On:  5/19/2021 1:03 PM CDT by Margo Carrasco CMA               Summary   Chief Complaint :   Follow up anemia and pneumonia.   Advance Directive :   Yes   Menstrual Status :   N/A   Weight Measured :   178 lb(Converted to: 178 lb 0 oz, 80.739 kg)    Height/Length Estimated :   67 in(Converted to: 5 ft 7 in, 170.18 cm)    Systolic Blood Pressure :   132 mmHg (HI)    Diastolic Blood Pressure :   68 mmHg   Mean Arterial Pressure :   89 mmHg   Peripheral Pulse Rate :   98 bpm   BP Site :   Right arm   BP Method :   Manual   Oxygen Saturation :   96 %   Margo Carrasco CMA - 5/19/2021 1:03 PM CDT   Health Status   Allergies Verified? :   Yes   Medication History Verified? :   Yes   Immunizations Current :   Yes   Medical History Verified? :   Yes   Pre-Visit Planning Status :   Completed   Tobacco Use? :   Former smoker   Margo Carrasco CMA - 5/19/2021 1:03 PM CDT   Consents   Consent for Immunization Exchange :   Consent Granted   Consent for Immunizations to Providers :   Consent Granted   Margo Carrasco CMA - 5/19/2021 1:03 PM CDT   Meds / Allergies   (As Of: 5/19/2021 1:14:22 PM CDT)   Allergies (Active)   No Known Medication Allergies  Estimated Onset Date:   Unspecified ; Created By:   Deana French CMA; Reaction Status:   Active ; Category:   Drug ; Substance:   No Known Medication Allergies ; Type:   Allergy ; Updated By:   Deana French CMA; Reviewed Date:   5/19/2021 1:09 PM CDT        Medication List   (As Of: 5/19/2021 1:14:22 PM CDT)   Prescription/Discharge Order    acetaminophen-hydrocodone  :   acetaminophen-hydrocodone ; Status:   Prescribed ; Ordered As Mnemonic:   Norco 5 mg-325 mg oral tablet ; Simple Display Line:   1 tab(s), PO, q4hr, PRN: for pain, 24 tab(s), 0 Refill(s) ; Ordering Provider:   Dexter Silva MD; Catalog Code:   acetaminophen-hydrocodone ; Order Dt/Tm:   3/24/2020 1:06:36 PM CDT          apixaban  :   apixaban  ; Status:   Prescribed ; Ordered As Mnemonic:   Eliquis 5 mg oral tablet ; Simple Display Line:   5 mg, 1 tab(s), Oral, bid, for 90 day(s), 180 tab(s), 0 Refill(s) ; Ordering Provider:   Dexter Silva MD; Catalog Code:   apixaban ; Order Dt/Tm:   5/14/2021 10:50:13 AM CDT          atorvastatin  :   atorvastatin ; Status:   Prescribed ; Ordered As Mnemonic:   atorvastatin 40 mg oral tablet ; Simple Display Line:   40 mg, 1 tab(s), Oral, daily, 90 tab(s), 0 Refill(s) ; Ordering Provider:   Dexter Silva MD; Catalog Code:   atorvastatin ; Order Dt/Tm:   4/29/2021 7:07:58 AM CDT          doxazosin  :   doxazosin ; Status:   Prescribed ; Ordered As Mnemonic:   doxazosin 2 mg oral tablet ; Simple Display Line:   2 mg, 1 tab(s), Oral, daily, 90 tab(s), 0 Refill(s) ; Ordering Provider:   Dexter Silva MD; Catalog Code:   doxazosin ; Order Dt/Tm:   4/29/2021 7:08:22 AM CDT          FLUoxetine  :   FLUoxetine ; Status:   Prescribed ; Ordered As Mnemonic:   FLUoxetine 40 mg oral capsule ; Simple Display Line:   40 mg, 1 cap(s), Oral, daily, 90 cap(s), 0 Refill(s) ; Ordering Provider:   Dexter Silva MD; Catalog Code:   FLUoxetine ; Order Dt/Tm:   4/29/2021 7:08:50 AM CDT          isosorbide mononitrate  :   isosorbide mononitrate ; Status:   Prescribed ; Ordered As Mnemonic:   isosorbide mononitrate 30 mg oral tablet, extended release ; Simple Display Line:   2 tab(s), Oral, qam, 180 tab(s), 3 Refill(s) ; Ordering Provider:   Dexter Silva MD; Catalog Code:   isosorbide mononitrate ; Order Dt/Tm:   5/28/2020 9:43:22 AM CDT          lisinopril  :   lisinopril ; Status:   Prescribed ; Ordered As Mnemonic:   lisinopril 10 mg oral tablet ; Simple Display Line:   10 mg, 1 tab(s), Oral, daily, 90 tab(s), 3 Refill(s) ; Ordering Provider:   Dexter Silva MD; Catalog Code:   lisinopril ; Order Dt/Tm:   10/23/2020 9:21:34 AM CDT          metFORMIN  :   metFORMIN ; Status:   Prescribed ;  Ordered As Mnemonic:   MetFORMIN (Eqv-Glucophage XR) 500 mg oral tablet, extended release ; Simple Display Line:   1,000 mg, 2 tab(s), Oral, bid, 360 tab(s), 0 Refill(s) ; Ordering Provider:   Dexter Silva MD; Catalog Code:   metFORMIN ; Order Dt/Tm:   4/29/2021 7:09:14 AM CDT          Miscellaneous Prescription  :   Miscellaneous Prescription ; Status:   Prescribed ; Ordered As Mnemonic:   FREESTYLE LITE      JUAN C ; Simple Display Line:   1 EA, id, daily, 50 EA ; Ordering Provider:   Dexter Silva MD; Catalog Code:   Miscellaneous Prescription ; Order Dt/Tm:   4/9/2010 10:09:35 AM CDT          Miscellaneous Rx Supply  :   Miscellaneous Rx Supply ; Status:   Prescribed ; Ordered As Mnemonic:   CPAP 13 ; Simple Display Line:   See Instructions, Use every night. Have a download in the sleep center in one month., 1 EA ; Ordering Provider:   Michel Fields MD; Catalog Code:   Miscellaneous Rx Supply ; Order Dt/Tm:   1/17/2014 10:40:19 AM CST          nitroglycerin  :   nitroglycerin ; Status:   Prescribed ; Ordered As Mnemonic:   nitroglycerin 0.4 mg sublingual tablet ; Simple Display Line:   0.4 mg, 1 tab(s), SL, q5min, If chest pain not relieved in 5 minutes after first dose, seek immediate medical attention, PRN: for chest pain, 100 tab(s), 3 Refill(s) ; Ordering Provider:   Dexter Silva MD; Catalog Code:   nitroglycerin ; Order Dt/Tm:   10/23/2020 9:05:52 AM CDT          predniSONE  :   predniSONE ; Status:   Prescribed ; Ordered As Mnemonic:   predniSONE 10 mg oral tablet ; Simple Display Line:   30 mg, 3 tab(s), Oral, daily, for 21 day(s), 63 tab(s), 0 Refill(s) ; Ordering Provider:   Dexter Silva MD; Catalog Code:   predniSONE ; Order Dt/Tm:   5/14/2021 10:52:32 AM CDT            Home Meds    ascorbic acid  :   ascorbic acid ; Status:   Documented ; Ordered As Mnemonic:   Vitamin C ; Simple Display Line:   daily, 0 Refill(s) ; Catalog Code:   ascorbic acid ; Order Dt/Tm:    10/23/2020 8:48:26 AM CDT          aspirin  :   aspirin ; Status:   Documented ; Ordered As Mnemonic:   aspirin ; Simple Display Line:   81mg tab po daily, 0 Refill(s) ; Catalog Code:   aspirin ; Order Dt/Tm:   3/19/2021 2:43:15 PM CDT          fluconazole  :   fluconazole ; Status:   Documented ; Ordered As Mnemonic:   fluconazole 200 mg oral tablet ; Simple Display Line:   200 mg, 1 tab(s), Oral, daily, 0 Refill(s) ; Catalog Code:   fluconazole ; Order Dt/Tm:   5/14/2021 10:23:42 AM CDT          insulin isophane (NPH)  :   insulin isophane (NPH) ; Status:   Documented ; Ordered As Mnemonic:   insulin isophane (NPH) 100 units/mL human recombinant subcutaneous suspension ; Simple Display Line:   30 units, Subcutaneous, daily, 0 Refill(s) ; Catalog Code:   insulin isophane (NPH) ; Order Dt/Tm:   5/14/2021 10:24:40 AM CDT          Miscellaneous Prescription  :   Miscellaneous Prescription ; Status:   Documented ; Ordered As Mnemonic:   Iron ; Simple Display Line:   325mg daily, 0 Refill(s) ; Catalog Code:   Miscellaneous Prescription ; Order Dt/Tm:   3/26/2021 10:27:51 AM CDT          Miscellaneous Prescription  :   Miscellaneous Prescription ; Status:   Documented ; Ordered As Mnemonic:   Oxygen Air Delivery System ; Simple Display Line:   2 Liters. Length of need: 3 months, 0 Refill(s) ; Catalog Code:   Miscellaneous Prescription ; Order Dt/Tm:   3/26/2021 9:39:48 AM CDT          omega-3 polyunsaturated fatty acids  :   omega-3 polyunsaturated fatty acids ; Status:   Documented ; Ordered As Mnemonic:   Fish Oil oral capsule ; Simple Display Line:   po, daily, 0 Refill(s) ; Catalog Code:   omega-3 polyunsaturated fatty acids ; Order Dt/Tm:   7/19/2016 8:35:56 AM CDT          pantoprazole  :   pantoprazole ; Status:   Documented ; Ordered As Mnemonic:   pantoprazole 40 mg oral delayed release tablet ; Simple Display Line:   40 mg, 1 tab(s), Oral, daily, 90 tab(s), 0 Refill(s) ; Catalog Code:   pantoprazole ; Order  Dt/Tm:   5/14/2021 10:23:42 AM CDT          predniSONE  :   predniSONE ; Status:   Documented ; Ordered As Mnemonic:   predniSONE 20 mg oral tablet ; Simple Display Line:   40 mg, 2 tab(s), 0 Refill(s) ; Catalog Code:   predniSONE ; Order Dt/Tm:   5/6/2021 9:51:12 AM CDT          sulfamethoxazole-trimethoprim  :   sulfamethoxazole-trimethoprim ; Status:   Documented ; Ordered As Mnemonic:   sulfamethoxazole-trimethoprim 400 mg-80 mg oral tablet ; Simple Display Line:   80 mg, Oral, daily, 0 Refill(s) ; Catalog Code:   sulfamethoxazole-trimethoprim ; Order Dt/Tm:   5/6/2021 9:50:14 AM CDT            ID Risk Screen   Recent Travel History :   No recent travel   Family Member Travel History :   No recent travel   Other Exposure to Infectious Disease :   Unknown   COVID-19 Testing Status :   No positive COVID-19 test   Margo Carrasco CMA - 5/19/2021 1:03 PM CDT

## 2022-02-16 NOTE — TELEPHONE ENCOUNTER
---------------------  From: Shira BURK [place]   To: PIOTR CORREIA    Sent: 7/9/2021 10:21:58 AM CDT  Subject: General Message     These are acceptable, please keep scheduled follow up appointments.    Lucio Silva MD      Results:  Date Result Name Ind Value Ref Range   7/8/2021 3:30 PM Sodium Level  140 mmol/L (135 - 146)   7/8/2021 3:30 PM Potassium Level  4.0 mmol/L (3.5 - 5.3)   7/8/2021 3:30 PM Chloride Level  103 mmol/L (98 - 110)   7/8/2021 3:30 PM CO2 Level  29 mmol/L (20 - 32)   7/8/2021 3:30 PM Glucose Level ((H)) 109 mg/dL (65 - 99)   7/8/2021 3:30 PM BUN  13 mg/dL (7 - 25)   7/8/2021 3:30 PM Creatinine Level  0.73 mg/dL (0.70 - 1.18)   7/8/2021 3:30 PM BUN/Creat Ratio  NOT APPLICABLE (6 - 22)   7/8/2021 3:30 PM eGFR  90 mL/min/1.73m2 (> OR = 60 - )   7/8/2021 3:30 PM eGFR African American  104 mL/min/1.73m2 (> OR = 60 - )   7/8/2021 3:30 PM Calcium Level  8.9 mg/dL (8.6 - 10.3)

## 2022-02-16 NOTE — NURSING NOTE
Medicare Visit Entered On:  10/11/2019 9:24 AM CDT    Performed On:  10/11/2019 9:08 AM CDT by Rubi Wallace MA               Summary   Chief Complaint :   AWV   Advance Directive :   Yes   Menstrual Status :   N/A   Weight Measured :   188 lb(Converted to: 188 lb 0 oz, 85.28 kg)    Height Measured :   67 in(Converted to: 5 ft 7 in, 170.18 cm)    Body Mass Index :   29.44 kg/m2 (HI)    Body Surface Area :   2.01 m2   Systolic Blood Pressure :   128 mmHg   Diastolic Blood Pressure :   74 mmHg   Mean Arterial Pressure :   92 mmHg   Peripheral Pulse Rate :   72 bpm   BP Site :   Left arm   Pulse Site :   Radial artery   BP Method :   Manual   HR Method :   Manual   Temperature Tympanic :   97.6 DegF(Converted to: 36.4 DegC)  (LOW)    Rubi Wallace MA - 10/11/2019 9:08 AM CDT   Health Status   Allergies Verified? :   Yes   Medication History Verified? :   Yes   Immunizations Current :   Yes   Medical History Verified? :   Yes   Pre-Visit Planning Status :   Completed   Tobacco Use? :   Former smoker   Rubi Wallace MA - 10/11/2019 9:08 AM CDT   Consents   Consent for Immunization Exchange :   Consent Granted   Consent for Immunizations to Providers :   Consent Granted   Rubi Wallace MA - 10/11/2019 9:08 AM CDT   Past Medical History   Past Medical History   (As Of: 10/11/2019 9:24:55 AM CDT)   Hyperlipemia  Name of Problem:   Hyperlipemia ; Recorder:   Vane Aly; Confirmation:   Confirmed ; Classification:   Medical ; Code:   965891929 ; Contributor System:   PowerChart ; Last Updated:   1/5/2016 11:47 AM CST ; Life Cycle Date:   3/23/2010 ; Life Cycle Status:   Active ; Vocabulary:   SNOMED CT          DM Type 2 (Diabetes Mellitus, Type 2)  Name of Problem:   DM Type 2 (Diabetes Mellitus, Type 2) ; Recorder:   Vane Aly; Confirmation:   Confirmed ; Classification:   Medical ; Code:   250.00 ; Contributor System:   PowerChart ; Last Updated:   2/28/2014 7:26 PM CST ; Life Cycle Date:   3/23/2010 ; Life  Cycle Status:   Active ; Vocabulary:   ICD-9-CM          Coronary Artery Disease  Name of Problem:   Coronary Artery Disease ; Recorder:   Vane Aly; Confirmation:   Confirmed ; Classification:   Medical ; Code:   414.00 ; Contributor System:   PowerChart ; Last Updated:   2/28/2014 7:26 PM CST ; Life Cycle Date:   3/23/2010 ; Life Cycle Status:   Active ; Vocabulary:   ICD-9-CM          BPH (benign prostatic hyperplasia)  Name of Problem:   BPH (benign prostatic hyperplasia) ; Recorder:   Vane Aly; Confirmation:   Confirmed ; Classification:   Medical ; Code:   326408584 ; Contributor System:   PowerChart ; Last Updated:   3/2/2015 11:37 AM CST ; Life Cycle Date:   3/23/2010 ; Life Cycle Status:   Active ; Vocabulary:   SNOMED CT          Depression, Recurrent  Name of Problem:   Depression, Recurrent ; Recorder:   Vane Aly; Confirmation:   Confirmed ; Classification:   Medical ; Code:   398361283 ; Contributor System:   PowerChart ; Last Updated:   1/5/2016 11:47 AM CST ; Life Cycle Date:   3/23/2010 ; Life Cycle Status:   Active ; Vocabulary:   SNOMED CT          ACUTE MYOCARDIAL INFARCTION  Name of Problem:   ACUTE MYOCARDIAL INFARCTION ; Recorder:   Amrita Cardenas CMA; Confirmation:   Confirmed ; Classification:   Medical ; Code:   410 ; Contributor System:   PowerChart ; Last Updated:   2/28/2014 7:26 PM CST ; Life Cycle Date:   2007 ; Life Cycle Status:   Resolved ; Vocabulary:   ICD-9-CM ; Resolved Date:    2007 ; Resolved Age:   61 years          Sleep Apnea  Name of Problem:   Sleep Apnea ; Onset Date:   6/28/2005 ; Recorder:   Isabella Delgado; Confirmation:   Confirmed ; Classification:   Medical ; Code:   780.57 ; Contributor System:   PowerChart ; Last Updated:   2/28/2014 7:26 PM CST ; Life Cycle Date:   12/9/2010 ; Life Cycle Status:   Active ; Vocabulary:   ICD-9-CM ; Comments:      10/18/2013 3:10 PM - Michel Fields MD  AHI 7.7 and REM AHI 22 in 2005 11/25/2013 1:54 PM - Diamond BURK,  Michel  On 11/10/2013 AHI 18.3 with oximetry susanne 77%. Good/optimal CPAP titration to 12 with 6.5 minutes supine REM          Obese  Name of Problem:   Obese ; Recorder:   SYSTEM; Confirmation:   Probable ; Classification:   Medical ; Code:   278.00 ; Contributor System:   PowerChart ; Last Updated:   9/28/2018 2:13 PM CDT ; Life Cycle Status:   Active ; Vocabulary:   ICD-9-CM          Back pain, chronic  Name of Problem:   Back pain, chronic ; Recorder:   Dexter Silva MD; Confirmation:   Confirmed ; Classification:   Medical ; Code:   955320689 ; Contributor System:   PowerChart ; Last Updated:   9/28/2018 2:11 PM CDT ; Life Cycle Status:   Active ; Responsible Provider:   Dexter Silva MD; Vocabulary:   SNOMED CT          Hypertension  Name of Problem:   Hypertension ; Recorder:   Gisele Arevalo; Confirmation:   Confirmed ; Classification:   Medical ; Code:   401.9 ; Contributor System:   PowerChart ; Last Updated:   2/28/2014 7:26 PM CST ; Life Cycle Date:   10/16/2013 ; Life Cycle Status:   Active ; Vocabulary:   ICD-9-CM            Meds / Allergies   (As Of: 10/11/2019 9:24:55 AM CDT)   Allergies (Active)   No Known Medication Allergies  Estimated Onset Date:   Unspecified ; Created By:   Deana French CMA; Reaction Status:   Active ; Category:   Drug ; Substance:   No Known Medication Allergies ; Type:   Allergy ; Updated By:   Deana French CMA; Reviewed Date:   6/28/2019 9:44 AM CDT        Medication List   (As Of: 10/11/2019 9:24:55 AM CDT)   Prescription/Discharge Order    lisinopril  :   lisinopril ; Status:   Prescribed ; Ordered As Mnemonic:   lisinopril 5 mg oral tablet ; Simple Display Line:   5 mg, 1 tab(s), Oral, daily, 90 tab(s), 3 Refill(s) ; Ordering Provider:   Dexter Silva MD; Catalog Code:   lisinopril ; Order Dt/Tm:   6/28/2019 9:42:08 AM CDT          acetaminophen-hydrocodone  :   acetaminophen-hydrocodone ; Status:   Prescribed ; Ordered As Mnemonic:   Norco 5 mg-325  mg oral tablet ; Simple Display Line:   1 tab(s), PO, q4hr, PRN: for pain, 24 tab(s), 0 Refill(s) ; Ordering Provider:   Noah Bernal PA-C; Catalog Code:   acetaminophen-hydrocodone ; Order Dt/Tm:   3/11/2019 8:02:30 AM CDT          atorvastatin  :   atorvastatin ; Status:   Prescribed ; Ordered As Mnemonic:   atorvastatin 40 mg oral tablet ; Simple Display Line:   40 mg, 1 tab(s), po, daily, 90 tab(s), 3 Refill(s) ; Ordering Provider:   Dexter Silva MD; Catalog Code:   atorvastatin ; Order Dt/Tm:   3/11/2019 7:57:53 AM CDT          isosorbide mononitrate  :   isosorbide mononitrate ; Status:   Prescribed ; Ordered As Mnemonic:   isosorbide mononitrate 30 mg oral tablet, extended release ; Simple Display Line:   60 mg, 2 tab(s), po, qam, for 90 day(s), 180 tab(s), 3 Refill(s) ; Ordering Provider:   Dexter Silva MD; Catalog Code:   isosorbide mononitrate ; Order Dt/Tm:   3/11/2019 7:57:32 AM CDT          FLUoxetine  :   FLUoxetine ; Status:   Prescribed ; Ordered As Mnemonic:   FLUoxetine 40 mg oral capsule ; Simple Display Line:   40 mg, 1 cap(s), po, daily, 90 cap(s), 3 Refill(s) ; Ordering Provider:   Dexter Silva MD; Catalog Code:   FLUoxetine ; Order Dt/Tm:   3/11/2019 7:57:08 AM CDT          doxazosin  :   doxazosin ; Status:   Prescribed ; Ordered As Mnemonic:   doxazosin 2 mg oral tablet ; Simple Display Line:   2 mg, 1 tab(s), po, daily, 90 tab(s), 3 Refill(s) ; Ordering Provider:   Dexter Silva MD; Catalog Code:   doxazosin ; Order Dt/Tm:   3/11/2019 7:56:39 AM CDT          metFORMIN  :   metFORMIN ; Status:   Prescribed ; Ordered As Mnemonic:   metFORMIN 500 mg oral tablet, extended release ; Simple Display Line:   1,000 mg, 2 tab(s), po, bid, 360 tab(s), 3 Refill(s) ; Ordering Provider:   Dexter Silva MD; Catalog Code:   metFORMIN ; Order Dt/Tm:   3/11/2019 7:56:18 AM CDT          nitroglycerin  :   nitroglycerin ; Status:   Prescribed ; Ordered As Mnemonic:    nitroglycerin 0.4 mg sublingual tablet ; Simple Display Line:   0.4 mg, 1 tab(s), SL, q5min, If chest pain not relieved in 5 minutes after first dose, seek immediate medical attention, PRN: for chest pain, 100 tab(s), 3 Refill(s) ; Ordering Provider:   Dexter Silva MD; Catalog Code:   nitroglycerin ; Order Dt/Tm:   3/11/2019 7:55:41 AM CDT          Miscellaneous Rx Supply  :   Miscellaneous Rx Supply ; Status:   Prescribed ; Ordered As Mnemonic:   CPAP 13 ; Simple Display Line:   See Instructions, Use every night. Have a download in the sleep center in one month., 1 EA ; Ordering Provider:   Michel Fields MD; Catalog Code:   Miscellaneous Rx Supply ; Order Dt/Tm:   1/17/2014 10:40:19 AM CST          Miscellaneous Prescription  :   Miscellaneous Prescription ; Status:   Prescribed ; Ordered As Mnemonic:   FREESTYLE LITE      JUAN C ; Simple Display Line:   1 EA, id, daily, 50 EA ; Ordering Provider:   Dexter Silva MD; Catalog Code:   Miscellaneous Prescription ; Order Dt/Tm:   4/9/2010 10:09:35 AM CDT            Home Meds    aspirin  :   aspirin ; Status:   Processing ; Ordered As Mnemonic:   aspirin 81 mg oral delayed release tablet ; Action Display:   Complete ; Catalog Code:   aspirin ; Order Dt/Tm:   10/11/2019 9:23:32 AM CDT          omega-3 polyunsaturated fatty acids  :   omega-3 polyunsaturated fatty acids ; Status:   Documented ; Ordered As Mnemonic:   Fish Oil oral capsule ; Simple Display Line:   po, daily, 0 Refill(s) ; Catalog Code:   omega-3 polyunsaturated fatty acids ; Order Dt/Tm:   7/19/2016 8:35:56 AM CDT            Geriatric Depression Screening   Geriatric Depression Satisfied Life :   Yes   Geriatric Depression Dropped Activities :   No   Geriatric Depression Life Empty :   No   Geriatric Depression Bored :   No   Geriatric Depression Good Spirits :   Yes   Geriatric Depression Afraid Bad Things :   No   Geriatric Depression Feel Happy :   Yes   Geriatric Depression Feel  Helpless :   No   Geriatric Depression Prefer to Stay Home :   No   Geriatric Depression Memory Problems :   No   Geriatric Depression Wonderful Be Alive :   Yes   Geriatric Depression Feel Worthless :   No   Geriatric Depression Situation Hopeless :   No   Geriatric Depression Others Better Off :   No   Geriatric Depression Full of Energy :   Yes   Geriatric Depression Total Score :   0    Rubi Wallace MA - 10/11/2019 9:08 AM CDT   Hearing and Vision Screening   Hearing Screen Comments :   Hearing aids    Visual Acuity Evaluated Left Eye :   20/25   Visual Acuity Evaluated Right Eye :   20/25   Vision Test Type :   Automated   Rubi Wallace MA - 10/11/2019 9:08 AM CDT   Home Safety Screen   Emergency Numbers Kept/Updated :   Yes   Aware of Smoking Dangers :   Yes   Smoke Alarms/Fire Extinguisher Available :   Yes   Household Members Fire Safety Knowledge :   Yes   Floor Rugs Removed or Fastened :   Yes   Mats in Bathtub/Shower :   Yes   Stairway Rails or Banisters :   Yes   Outdoor Clutter Safety :   Yes   Indoor Clutter Safety :   Yes   Electrical Cord Safety :   Yes   Rubi Wallace MA - 10/11/2019 9:08 AM CDT   Valdez Fall Risk   History of Fall in Last 3 Months Valdez :   No   Rubi Wallace MA - 10/11/2019 9:08 AM CDT   Functional Assessment   Focused Functional Assessment Grid   Bathing :   Independent (2)   Dressing :   Independent (2)   Toileting :   Independent (2)   Transferring Bed or Chair :   Independent (2)   Feeding :   Independent (2)   Rubi Wallace MA - 10/11/2019 9:08 AM CDT   Capable of Shopping :   Yes   Capable of Walking :   Yes   Capable of Housekeeping :   Yes   Capable of Managing Medications :   Yes   Capable of Handling Finances :   Yes   Rubi Wallace MA - 10/11/2019 9:08 AM CDT

## 2022-02-16 NOTE — PROGRESS NOTES
Patient:   PIOTR CORREIA            MRN: [id number]            FIN: [id number]               Age:   75 years     Sex:  Male     :  1945   Associated Diagnoses:   Anemia   Author:   Dexter Silva MD      Chief Complaint   Fatigue, lethargy, anemia      Interval History   Anemia   The course is not improving.  The effect on daily activities is change in activity level.        Review of Systems   Constitutional:  Fatigue.    Respiratory:  Negative.    Cardiovascular:  Negative.       Health Status   Allergies:    Allergic Reactions (Selected)  No Known Medication Allergies   Medications:  (Selected)   Prescriptions  Prescribed  CPAP 13: CPAP 13, See Instructions, Instructions: Use every night. Have a download in the sleep center in one month., Supply, # 1 EA, 0 Refill(s), Type: Maintenance  FLUoxetine 40 mg oral capsule: = 1 cap(s) ( 40 mg ), Oral, daily, # 90 cap(s), 0 Refill(s), Type: Maintenance, Pharmacy: [facility], 1 cap(s) Oral daily, 67, in, 20 8:57:00 CDT, Height Measured, 198, lb, 21 9:34:00 CDT, Weight Measured  FREESTYLE LITE      JUAN C: 1 EA, id, daily, # 50 EA, 11 Refill(s), [id number]  MetFORMIN (Eqv-Glucophage XR) 500 mg oral tablet, extended release: = 2 tab(s) ( 1,000 mg ), Oral, bid, # 360 tab(s), 0 Refill(s), Type: Maintenance, [facility], 2 tab(s) Oral bid, 67, in, 20 8:57:00 CDT, Height Measured, 198, lb, 21 9:34:00 CDT, Weight Measured  Norco 5 mg-325 mg oral tablet: 1 tab(s), PO, q4hr, PRN: for pain, # 24 tab(s), 0 Refill(s), Type: Maintenance, Pharmacy: [facility], 1 tab(s) Oral q4 hrs,PRN:for pain  Pen Needles: Pen Needles, See Instructions, Instructions: Use as directed with insulin, Supply, # 100 EA, 0 Refill(s), Type: Maintenance, Pharmacy: [facility] #[id number], Use as directed with insulin, 67, in, 20 8:57:00 CDT, Height Measured, 180, lb, 0...  atorvastatin 40 mg oral tablet: =  1 tab(s) ( 40 mg ), Oral, daily, # 90 tab(s), 0 Refill(s), Type: Maintenance, Pharmacy: [facility], 1 tab(s) Oral daily, 67, in, 05/28/20 8:57:00 CDT, Height Measured, 198, lb, 04/02/21 9:34:00 CDT, Weight Measured  doxazosin 2 mg oral tablet: = 1 tab(s) ( 2 mg ), Oral, daily, # 90 tab(s), 0 Refill(s), Type: Maintenance, [facility]: [facility], 1 tab(s) Oral daily, 67, in, 05/28/20 8:57:00 CDT, Height Measured, 198, lb, 04/02/21 9:34:00 CDT, Weight Measured  insulin isophane (NPH) 100 units/mL human recombinant subcutaneous suspension: ( 4 units ), Subcutaneous, daily, # 3 pen_needle, 1 Refill(s), Type: Maintenance, Pharmacy: [facility] #[id number], 4 units Subcutaneous daily, 67, in, 05/28/20 8:57:00 CDT, Height Measured, 180, lb, 07/08/21 14:57:00 CDT, Weight Measured  isosorbide mononitrate 30 mg oral tablet, extended release: = 2 tab(s), Oral, qam, # 180 tab(s), 3 Refill(s), Type: Maintenance, Pharmacy: [facility], Due for appt in April, 2 tab(s) Oral qam, 67, in, 05/28/20 8:57:00 CDT, Height Measured, 197.2, lb, 05/21/20 9:22:00 CDT, Weight Measured  nitroglycerin 0.4 mg sublingual tablet: = 1 tab(s) ( 0.4 mg ), SL, q5min, Instructions: If chest pain not relieved in 5 minutes after first dose, seek immediate medical attention, PRN: for chest pain, # 100 tab(s), 3 Refill(s), Type: Maintenance, Pharmacy: [facility], This is a 3...  Documented Medications  Documented  Blood Builder: Blood Builder, Instructions: 1 tab po daily, 0 Refill(s), Type: Maintenance  Eliquis 5 mg oral tablet: ( 5 mg ), Oral, bid, # 60 tab(s), 0 Refill(s), Type: Maintenance  Fish Oil oral capsule: po, daily, 0 Refill(s), Type: Maintenance  Iron 100 Plus oral tablet: See Instructions, Instructions: 65mg bid Oral daily, 0 Refill(s), Type: Maintenance  Iron: Iron, Instructions: 325mg daily, 0 Refill(s), Type: Maintenance  Vitamin C: daily, 0 Refill(s), Type: Maintenance  amoxicillin 500 mg oral  capsule: See Instructions, Instructions: Take 4 caps 1 hour prior to the procedure, 0 Refill(s), Type: Maintenance  aspirin: Instructions: 81mg tab po daily, 0 Refill(s), Type: Maintenance  pantoprazole 40 mg oral delayed release tablet: See Instructions, Instructions: 0.5 tab(s) Oral daily, 0 Refill(s), Type: Maintenance   Problem list:    All Problems (Selected)  Obstructive sleep apnea (adult) (pediatric) / SNOMED CT 794432749 / Confirmed  Back pain, chronic / SNOMED CT 645102022 / Confirmed  Type 2 diabetes mellitus without complications / SNOMED CT 247441177 / Confirmed  Obesity, unspecified / SNOMED CT 4199930014 / Probable  History of DVT in adulthood / SNOMED CT 9629419799 / Confirmed  Atherosclerotic heart disease of native coronary artery without angina pectoris / SNOMED CT 5126064710 / Confirmed  Depression, Recurrent / SNOMED CT 606368437 / Confirmed  BPH (benign prostatic hyperplasia) / SNOMED CT 111895598 / Confirmed  Hyperlipemia / SNOMED CT 308324382 / Confirmed  Anemia / SNOMED CT 269328247 / Confirmed  Essential (primary) hypertension / SNOMED CT 70632543 / Confirmed      Histories   Past Medical History:    Active  Obstructive sleep apnea (adult) (pediatric) (SNOMED CT 966964180): Onset on 6/28/2005 at 59 years.  Comments:  10/18/2013 CDT 3:10 PM CDT Michel Tubbs MD  AHI 7.7 and REM AHI 22 in 2005 11/25/2013 CST 1:54 PM CST Michel Tubbs MD  On 11/10/2013 AHI 18.3 with oximetry susanne 77%. Good/optimal CPAP titration to 12 with 6.5 minutes supine REM  Hyperlipemia (SNOMED CT 371238878)  Type 2 diabetes mellitus without complications (SNOMED CT 897311023)  Atherosclerotic heart disease of native coronary artery without angina pectoris (SNOMED CT 8760873644)  BPH (benign prostatic hyperplasia) (SNOMED CT 442972535)  Depression, Recurrent (SNOMED CT 889288746)  Obesity, unspecified (SNOMED CT 7072984531)  Back pain, chronic (SNOMED CT 254194616)  Essential (primary) hypertension (SNOMED CT  17612777)  Resolved  Inpatient stay (SNOMED CT 574592563): Onset on 3/20/2021 at 75 years.  Resolved on 3/22/2021 at 75 years.  Comments:  3/25/2021 CDT 10:53 AM CDT - Isabella Delgado  [facility], [place] - Pneumonia of both lungs due to infectious organism.  ACUTE MYOCARDIAL INFARCTION (ICD-9-):  Resolved in  at 61 years.   Family History:    Hypertension  Father ()  Heart disease  Father ()  Alcohol abuse  Mother ()  Stroke  Father ()  Liver disease  Mother ()     Procedure history:    Angiogram (SNOMED CT 036759219) in 2017 at 72 Years.  Colonoscopy (SNOMED CT 059677403) performed by Shukri Arndt on 2013 at 68 Years.  Comments:  2013 1:13 PM CST - Leonora WILLS, Germaine  Sedation: MAC  Diverticulosis in the sigmoid colon, otherwise normal.  Repeat in 10 years.  Angioplasty (SNOMED CT 6374914) in the month of 2007 at 61 Years.  CABG - Coronary artery bypass graft (SNOMED CT 660395238) in the month of 2004 at 58 Years.  Colonoscopy (SNOMED CT 984117310) performed by Aashish Connolly MD on 2002 at 56 Years.  Comments:  2010 7:05 AM CST - Isabella Delgado  Repeat in 10 years.  ORIF right hand in  at 56 Years.  Flexible sigmoidoscopy (SNOMED CT 83216885) performed by Castillo Anaya MD on 1995 at 50 Years.  Comments:  10/16/2013 9:17 AM CDT - Gisele Arevalo  Negative.  ORIF left shoulder in  at 30 Years.   Social History:        Electronic Cigarette/Vaping Assessment            Electronic Cigarette Use: Former use, quit more than 90 days ago.      Alcohol Assessment: Past            Quit       Tobacco Assessment: Past            Quit             Former smoker, quit more than 30 days ago      Substance Abuse Assessment: Denies Substance Abuse            Never      Employment and Education Assessment            Retired, Work/School description: .  Highest education level: High school.      Home and Environment  Assessment            Marital status: .  Spouse/Partner name: Brianda.  Living situation: Home/Independent.               Injuries/Abuse/Neglect in household: No.  Feels unsafe at home: No.  Family/Friends available for support:               Yes.      Nutrition and Health Assessment            Type of diet: Regular.  Wants to lose weight: Yes.  Sleeping concerns: No.  Feels highly stressed: No.      Exercise and Physical Activity Assessment: Occasional exercise            Exercise frequency: 1-2 times/week.  Exercise type: Walking.      Sexual Assessment            Sexually active: No.  Identifies as male, History of STD: No.  History of sexual abuse: No.      Other Assessment            Hearing impairment.        Physical Examination   Vital Signs Stable per flow sheet .   Respiratory:  Lungs are clear to auscultation.    Cardiovascular:  Normal rate, Regular rhythm.       Review / Management   Results review:  Hgb 8.0.       Impression and Plan   Diagnosis     Anemia (SWG31-BA D64.9).     Course:  Not improving.    Orders     Not improving as expected.  Will be seeing hematology at [facility]..

## 2022-02-16 NOTE — TELEPHONE ENCOUNTER
---------------------  From: Francoise Goldberg CMA (Phone Messages Pool ([id number]_[facility]))   To: [place] Message Pool ([id number]);     Sent: 4/1/2021 9:51:54 AM CDT  Subject: FW: Turner           ---------------------  From: JIMENEZ CORREIA on behalf of PIOTR CORREIA  To: [facility]  Sent: 03/31/2021 07:31 p.m. CDT  Subject: Turner  Can Dr Pizarro address the ongoing pneumonia symptoms as well as the anemia?---------------------  From: Janae Zhang ([facility] Message Pool ([id number]))   To: Michel Fields MD;     Sent: 4/1/2021 10:06:06 AM CDT  Subject: FRANSISCO: Turner---------------------  From: Michel Fields MD   To: [facility] Message Pool ([id number]_[place] [facility] [place]);     Sent: 4/1/2021 11:06:00 AM CDT  Subject: RE: Turner     Sure, if given enough time.---------------------  From: Janae Zhang (MEETii[facility] Message Pool ([id number]_[facility]))   To: PIOTR CORREIA    Sent: 4/1/2021 11:09:33 AM CDT  Subject: FW: Bill

## 2022-02-16 NOTE — NURSING NOTE
Comprehensive Intake Entered On:  5/21/2020 9:23 AM CDT    Performed On:  5/21/2020 9:22 AM CDT by Schoenike , Andrea               Summary   Chief Complaint :   Lab draw and vitals   Advance Directive :   Yes   Menstrual Status :   N/A   Weight Measured :   197.2 lb(Converted to: 197 lb 3 oz, 89.45 kg)    Height Measured :   67 in(Converted to: 5 ft 7 in, 170.18 cm)    Body Mass Index :   30.88 kg/m2 (HI)    Body Surface Area :   2.05 m2   Systolic Blood Pressure :   138 mmHg   Diastolic Blood Pressure :   75 mmHg   Mean Arterial Pressure :   96 mmHg   Peripheral Pulse Rate :   67 bpm   BP Site :   Left arm   Pulse Site :   Radial artery   BP Method :   Electronic   HR Method :   Electronic   Oxygen Saturation :   95 %   Schoenike , Andrea - 5/21/2020 9:22 AM CDT   ID Risk Screen   Recent Travel History :   No recent travel   Family Member Travel History :   No recent travel   Other Exposure to Infectious Disease :   Unknown   Schoenike , Andrea - 5/21/2020 9:22 AM CDT

## 2022-02-16 NOTE — PROGRESS NOTES
Patient:   PIOTR CORREIA            MRN: [id number]            FIN: [id number]               Age:   74 years     Sex:  Male     :  1945   Associated Diagnoses:   None   Author:   Dexter Silva MD       -   Today's date:  2020 4:35:00 PM .    FAX [phone]       -   To whom it may concern:        This patient is currently under my care.     Please excuse him/ her from work, until further notice for medical reasons.  This includes the [employer].  I do expect him to be able to return but date not set yet..  Follow up as needed   Please contact me if you have any questions or concerns.      -   Sincerely,             Dexter Silva MD  [address]  [address] [phone]

## 2022-02-16 NOTE — TELEPHONE ENCOUNTER
---------------------  From: Kathe Stack CMA (Phone Messages Pool ([id number]_[facility]))   To: T Message Pool ([id number]_[facility]);     Sent: 9/25/2020 4:19:06 PM CDT  Subject: COMSUMERMESSAGE: Volunteer Excuse           ---------------------  From: JIMENEZ CORREIA on behalf of PIOTR CORREIA  To: [facility]  Sent: 09/25/2020 04:15 p.m. CDT  Subject: Volunteer Excuse  Chrisry I wasn t  clear. The organization is [employer] 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 He has since been asked to refrain from participating because of his At-Risk health status. [employer] is now saying he can come back to work with them. Considering the virus around here, Turner doesn t think he should return yet. By sending your opinion that he is still at risk due to his age, heart, diabetes, etc., [employer] will not drop him from their program.    His contact, Dillon, would appreciate your note before October 1. If possible, please fax it to him at:  [employer], Attention Dillon. Fax [phone]---------------------  From: Margo Carrasco CMA (T Message Pool ([id number]))   To: Dexter Silva MD;     Sent: 9/25/2020 4:22:53 PM CDT  Subject: FW: COMSUMERMESSAGE: Volunteer Excuse---------------------  From: Dexter Silva MD   To: Front Row Message Pool ([id number]_[facility]);     Sent: 9/25/2020 4:37:41 PM CDT  Subject: RE: COMSUMERMESSAGE: Volunteer Excuse     Kimber faxed---------------------  From: Margo Carrasco CMA ([facility] Message Pool ([id number]_[facility]))   To: PIOTR CORREIA    Sent: 9/25/2020 5:01:51 PM CDT  Subject: FW: COMSUMERMESSAGE: Volunteer Excuse

## 2022-02-16 NOTE — PROGRESS NOTES
Patient:   PIOTR CORREIA            MRN: [id number]            FIN: [id number]               Age:   75 years     Sex:  Male     :  1945   Associated Diagnoses:   Right middle lobe pneumonia   Author:   Dexter Silva MD      Chief Complaint   2021 9:34 AM CDT     Follow up pneumonia.     Chief complaint and symptoms noted above confirmed with patient.      History of Present Illness             The patient presents for follow-up evaluation of pneumonia symptom(s).  The quality of the patient's pneumonia since the last visit is described as being unchanged from previous visit.  The symptom(s) of pneumonia are constant.  Exacerbating factors consist of none.  Relieving factors consist of medication, oxygen and rest.  Associated symptoms consist of chest pain.        Review of Systems   Constitutional:  Weakness, Fatigue, No fever, No chills, No sweats.    Respiratory:  Cough.    Cardiovascular:  Negative.       Health Status   Allergies:    Allergic Reactions (Selected)  No Known Medication Allergies   Medications:  (Selected)   Prescriptions  Prescribed  CPAP 13: CPAP 13, See Instructions, Instructions: Use every night. Have a download in the sleep center in one month., Supply, # 1 EA, 0 Refill(s), Type: Maintenance  FLUoxetine 40 mg oral capsule: = 1 cap(s), Oral, daily, # 90 cap(s), 0 Refill(s), Type: Maintenance, Pharmacy: [facility], Due for appt in April  FLUoxetine 40 mg oral capsule: See Instructions, Instructions: TAKE 1 CAPSULE BY MOUTH  DAILY, # 90 cap(s), 3 Refill(s), Type: Maintenance, Pharmacy: [facility], TAKE 1 CAPSULE BY MOUTH  DAILY, 67, in, 20 8:57:00 CDT, Height Measured, 197.2, lb, 20 9:22:00 CDT...  FREESTYLE LITE      JUAN C: 1 EA, id, daily, # 50 EA, 11 Refill(s), Pharmacy: [id number]  MetFORMIN (Eqv-Glucophage XR) 500 mg oral tablet, extended release: See Instructions, Instructions: TAKE 2 TABLETS BY MOUTH  TWICE DAILY, # 360 tab(s),  3 Refill(s), Type: Maintenance, Pharmacy: [facility], TAKE 2 TABLETS BY MOUTH  TWICE DAILY, 67, in, 05/28/20 8:57:00 CDT, Height Measured, 197.2, lb, 05/21/2...  Norco 5 mg-325 mg oral tablet: 1 tab(s), PO, q4hr, PRN: for pain, # 24 tab(s), 0 Refill(s), Type: Maintenance, Pharmacy: [facility], 1 tab(s) Oral q4 hrs,PRN:for pain  atorvastatin 40 mg oral tablet: = 1 tab(s), Oral, daily, # 90 tab(s), 0 Refill(s), Type: Maintenance, Pharmacy: [facility], Due for appt in April  atorvastatin 40 mg oral tablet: See Instructions, Instructions: TAKE 1 TABLET BY MOUTH  DAILY, # 90 tab(s), 3 Refill(s), Type: Maintenance, Pharmacy: [facility], TAKE 1 TABLET BY MOUTH  DAILY, 67, in, 05/28/20 8:57:00 CDT, Height Measured, 197.2, lb, 05/21/20 9:22:00 CDT,...  doxazosin 2 mg oral tablet: = 1 tab(s), Oral, daily, # 90 tab(s), 0 Refill(s), Type: Maintenance, Pharmacy: [facility], Due for appt in April  doxazosin 2 mg oral tablet: See Instructions, Instructions: TAKE 1 TABLET BY MOUTH  DAILY, # 90 tab(s), 3 Refill(s), Type: Maintenance, Pharmacy: [facility], TAKE 1 TABLET BY MOUTH  DAILY, 67, in, 05/28/20 8:57:00 CDT, Height Measured, 197.2, lb, 05/21/20 9:22:00 CDT,...  isosorbide mononitrate 30 mg oral tablet, extended release: = 2 tab(s), Oral, qam, # 180 tab(s), 3 Refill(s), Type: Maintenance, Pharmacy: [facility], Due for appt in April, 2 tab(s) Oral qam, 67, in, 05/28/20 8:57:00 CDT, Height Measured, 197.2, lb, 05/21/20 9:22:00 CDT, Weight Measured  lisinopril 10 mg oral tablet: = 1 tab(s) ( 10 mg ), Oral, daily, # 90 tab(s), 3 Refill(s), Type: Maintenance, Pharmacy: [facility], 1 tab(s) Oral daily, 67, in, 05/28/20 8:57:00 CDT, Height Measured, 201, lb, 10/23/20 8:44:00 CDT, Weight Measured  nitroglycerin 0.4 mg sublingual tablet: = 1 tab(s) ( 0.4 mg ), SL, q5min, Instructions: If chest pain not relieved in 5 minutes after first dose, seek immediate  medical attention, PRN: for chest pain, # 100 tab(s), 3 Refill(s), Type: Maintenance, Pharmacy: [facility], This is a 3...  Documented Medications  Documented  Fish Oil oral capsule: po, daily, 0 Refill(s), Type: Maintenance  Iron: Iron, Instructions: 325mg daily, 0 Refill(s), Type: Maintenance  Oxygen Air Delivery System: Oxygen Air Delivery System, Instructions: 2 Liters. Length of need: 3 months, 0 Refill(s), Type: Maintenance  Vitamin C: daily, 0 Refill(s), Type: Maintenance  aspirin: Instructions: 81mg tab po daily, 0 Refill(s), Type: Maintenance   Problem list:    All Problems (Selected)  Obstructive sleep apnea (adult) (pediatric) / SNOMED CT 943096318 / Confirmed  Back pain, chronic / SNOMED CT 751704226 / Confirmed  Type 2 diabetes mellitus without complications / SNOMED CT 063648962 / Confirmed  Obesity, unspecified / SNOMED CT 0155205804 / Probable  Atherosclerotic heart disease of native coronary artery without angina pectoris / SNOMED CT 1754511262 / Confirmed  Depression, Recurrent / SNOMED CT 665082329 / Confirmed  BPH (benign prostatic hyperplasia) / SNOMED CT 172664122 / Confirmed  Hyperlipemia / SNOMED CT 776278490 / Confirmed  Essential (primary) hypertension / SNOMED CT 20055823 / Confirmed      Histories   Past Medical History:    Active  Obstructive sleep apnea (adult) (pediatric) (SNOMED CT 649952135): Onset on 6/28/2005 at 59 years.  Comments:  10/18/2013 CDT 3:10 PM CDT Michel Tubbs MD  AHI 7.7 and REM AHI 22 in 2005 11/25/2013 CST 1:54 PM CST Michel Tubbs MD  On 11/10/2013 AHI 18.3 with oximetry susanne 77%. Good/optimal CPAP titration to 12 with 6.5 minutes supine REM  Hyperlipemia (SNOMED CT 299992556)  Type 2 diabetes mellitus without complications (SNOMED CT 530776494)  Atherosclerotic heart disease of native coronary artery without angina pectoris (SNOMED CT 8476075888)  BPH (benign prostatic hyperplasia) (SNOMED CT 921506974)  Depression, Recurrent (SNOMED CT  379890736)  Obesity, unspecified (SNOMED CT 2480410949)  Back pain, chronic (SNOMED CT 192314077)  Essential (primary) hypertension (SNOMED CT 00464021)  Resolved  Inpatient stay (SNOMED CT 861336981): Onset on 3/20/2021 at 75 years.  Resolved on 3/22/2021 at 75 years.  Comments:  3/25/2021 CDT 10:53 AM CDT - Isabella Delgado  [facility], [place] - Pneumonia of both lungs due to infectious organism.  ACUTE MYOCARDIAL INFARCTION (ICD-9-):  Resolved in  at 61 years.   Family History:    Hypertension  Father ()  Heart disease  Father ()  Alcohol abuse  Mother ()  Stroke  Father ()  Liver disease  Mother ()     Procedure history:    Angiogram (SNOMED CT 418512933) in 2017 at 72 Years.  Colonoscopy (SNOMED CT 211017980) performed by Shukri Arndt on 2013 at 68 Years.  Comments:  2013 1:13 PM CST - Leonora WILLS, Germaine  Sedation: MAC  Diverticulosis in the sigmoid colon, otherwise normal.  Repeat in 10 years.  Angioplasty (SNOMED CT 9300389) in the month of 2007 at 61 Years.  CABG - Coronary artery bypass graft (SNOMED CT 393706221) in the month of 2004 at 58 Years.  Colonoscopy (SNOMED CT 586297553) performed by Aashish Connolly MD on 2002 at 56 Years.  Comments:  2010 7:05 AM CST - Isabella Delgado  Repeat in 10 years.  ORIF right hand in  at 56 Years.  Flexible sigmoidoscopy (SNOMED CT 20367657) performed by Castillo Anaya MD on 1995 at 50 Years.  Comments:  10/16/2013 9:17 AM CDT - Gisele Arevalo  Negative.  ORIF left shoulder in  at 30 Years.      Physical Examination   Vital Signs   2021 9:34 AM CDT Temperature Tympanic 98.6 DegF    Peripheral Pulse Rate 92 bpm    Respiratory Rate 20 br/min    Systolic Blood Pressure 116 mmHg    Diastolic Blood Pressure 58 mmHg  LOW    Mean Arterial Pressure 77 mmHg    BP Site Right arm    BP Method Manual    Oxygen Saturation 88 %  LOW      Measurements from flowsheet : Measurements   2021  9:34 AM CDT Height/Length Estimated 67 in    Weight Measured - Standard 198 lb      General:  Alert and oriented, No acute distress.    HENT:  Normocephalic, Tympanic membranes are clear.    Neck:  Supple.    Respiratory:  Lungs are clear to auscultation.    Cardiovascular:  Normal rate, Regular rhythm.    Gastrointestinal:  Soft, Non-tender, Non-distended.       Review / Management   Results review:  WBC 8.88   HGB 9.2.    Radiology results   X-ray, Pneumonia appears to be improving   Documentation reviewed:       Case discussed with: Michel Fields MD, agrees with plan.       Impression and Plan   Diagnosis     Right middle lobe pneumonia ([id number] J18.9).     Course:  Progressing as expected.    Orders     Orders   Requests (Consults / Referrals):  Referral (Request) (Order): 4/2/2021 10:55 AM CDT, Referred to: Pulmonology, Referred to: [facility], Reason for referral: F/U pneumonia, not improving, Right middle lobe pneumonia.

## 2022-02-16 NOTE — NURSING NOTE
Comprehensive Intake Entered On:  7/8/2021 3:11 PM CDT    Performed On:  7/8/2021 2:57 PM CDT by Margo Carrasco CMA               Summary   Chief Complaint :   TAVR follow up. Weak and fatigued.   Advance Directive :   Yes   Menstrual Status :   N/A   Weight Measured :   180 lb(Converted to: 180 lb 0 oz, 81.647 kg)    Height/Length Estimated :   67 in(Converted to: 5 ft 7 in, 170.18 cm)    Systolic Blood Pressure :   110 mmHg   Diastolic Blood Pressure :   52 mmHg (LOW)    Mean Arterial Pressure :   71 mmHg   Peripheral Pulse Rate :   83 bpm   Oxygen Saturation :   97 %   Margo Carrasco CMA - 7/8/2021 2:57 PM CDT   Health Status   Allergies Verified? :   Yes   Medication History Verified? :   Yes   Immunizations Current :   Yes   Medical History Verified? :   Yes   Pre-Visit Planning Status :   Completed   Tobacco Use? :   Former smoker   Margo Carrasco CMA - 7/8/2021 2:57 PM CDT   Consents   Consent for Immunization Exchange :   Consent Granted   Consent for Immunizations to Providers :   Consent Granted   Margo Carrasco CMA - 7/8/2021 2:57 PM CDT   Meds / Allergies   (As Of: 7/8/2021 3:11:58 PM CDT)   Allergies (Active)   No Known Medication Allergies  Estimated Onset Date:   Unspecified ; Created By:   Deana French CMA; Reaction Status:   Active ; Category:   Drug ; Substance:   No Known Medication Allergies ; Type:   Allergy ; Updated By:   Deana French CMA; Reviewed Date:   7/8/2021 3:01 PM CDT        Medication List   (As Of: 7/8/2021 3:11:58 PM CDT)   Prescription/Discharge Order    acetaminophen-hydrocodone  :   acetaminophen-hydrocodone ; Status:   Prescribed ; Ordered As Mnemonic:   Norco 5 mg-325 mg oral tablet ; Simple Display Line:   1 tab(s), PO, q4hr, PRN: for pain, 24 tab(s), 0 Refill(s) ; Ordering Provider:   Dexter Silva MD; Catalog Code:   acetaminophen-hydrocodone ; Order Dt/Tm:   3/24/2020 1:06:36 PM CDT          atorvastatin  :   atorvastatin ; Status:   Prescribed ; Ordered As  Mnemonic:   atorvastatin 40 mg oral tablet ; Simple Display Line:   40 mg, 1 tab(s), Oral, daily, 90 tab(s), 0 Refill(s) ; Ordering Provider:   Dexter Silva MD; Catalog Code:   atorvastatin ; Order Dt/Tm:   4/29/2021 7:07:58 AM CDT          doxazosin  :   doxazosin ; Status:   Prescribed ; Ordered As Mnemonic:   doxazosin 2 mg oral tablet ; Simple Display Line:   2 mg, 1 tab(s), Oral, daily, 90 tab(s), 0 Refill(s) ; Ordering Provider:   Dexter Silva MD; Catalog Code:   doxazosin ; Order Dt/Tm:   4/29/2021 7:08:22 AM CDT          FLUoxetine  :   FLUoxetine ; Status:   Prescribed ; Ordered As Mnemonic:   FLUoxetine 40 mg oral capsule ; Simple Display Line:   40 mg, 1 cap(s), Oral, daily, 90 cap(s), 0 Refill(s) ; Ordering Provider:   Dexter Silva MD; Catalog Code:   FLUoxetine ; Order Dt/Tm:   4/29/2021 7:08:50 AM CDT          isosorbide mononitrate  :   isosorbide mononitrate ; Status:   Prescribed ; Ordered As Mnemonic:   isosorbide mononitrate 30 mg oral tablet, extended release ; Simple Display Line:   2 tab(s), Oral, qam, 180 tab(s), 3 Refill(s) ; Ordering Provider:   Dexter Silva MD; Catalog Code:   isosorbide mononitrate ; Order Dt/Tm:   5/28/2020 9:43:22 AM CDT          metFORMIN  :   metFORMIN ; Status:   Prescribed ; Ordered As Mnemonic:   MetFORMIN (Eqv-Glucophage XR) 500 mg oral tablet, extended release ; Simple Display Line:   1,000 mg, 2 tab(s), Oral, bid, 360 tab(s), 0 Refill(s) ; Ordering Provider:   Dexter Silva MD; Catalog Code:   metFORMIN ; Order Dt/Tm:   4/29/2021 7:09:14 AM CDT          Miscellaneous Prescription  :   Miscellaneous Prescription ; Status:   Prescribed ; Ordered As Mnemonic:   FREESTYLE LITE      JUAN C ; Simple Display Line:   1 EA, id, daily, 50 EA ; Ordering Provider:   Dexter Silva MD; Catalog Code:   Miscellaneous Prescription ; Order Dt/Tm:   4/9/2010 10:09:35 AM CDT          Miscellaneous Rx Supply  :   Miscellaneous Rx  Supply ; Status:   Prescribed ; Ordered As Mnemonic:   CPAP 13 ; Simple Display Line:   See Instructions, Use every night. Have a download in the sleep center in one month., 1 EA ; Ordering Provider:   Michel Fields MD; Catalog Code:   Miscellaneous Rx Supply ; Order Dt/Tm:   1/17/2014 10:40:19 AM CST          nitroglycerin  :   nitroglycerin ; Status:   Prescribed ; Ordered As Mnemonic:   nitroglycerin 0.4 mg sublingual tablet ; Simple Display Line:   0.4 mg, 1 tab(s), SL, q5min, If chest pain not relieved in 5 minutes after first dose, seek immediate medical attention, PRN: for chest pain, 100 tab(s), 3 Refill(s) ; Ordering Provider:   Dexter Silva MD; Catalog Code:   nitroglycerin ; Order Dt/Tm:   10/23/2020 9:05:52 AM CDT            Home Meds    amoxicillin  :   amoxicillin ; Status:   Documented ; Ordered As Mnemonic:   amoxicillin 500 mg oral capsule ; Simple Display Line:   See Instructions, Take 4 caps 1 hour prior to the procedure, 0 Refill(s) ; Catalog Code:   amoxicillin ; Order Dt/Tm:   7/8/2021 3:08:06 PM CDT          apixaban  :   apixaban ; Status:   Documented ; Ordered As Mnemonic:   Eliquis 5 mg oral tablet ; Simple Display Line:   5 mg, Oral, bid, 60 tab(s), 0 Refill(s) ; Catalog Code:   apixaban ; Order Dt/Tm:   7/8/2021 3:03:53 PM CDT          ascorbic acid  :   ascorbic acid ; Status:   Documented ; Ordered As Mnemonic:   Vitamin C ; Simple Display Line:   daily, 0 Refill(s) ; Catalog Code:   ascorbic acid ; Order Dt/Tm:   10/23/2020 8:48:26 AM CDT          aspirin  :   aspirin ; Status:   Documented ; Ordered As Mnemonic:   aspirin ; Simple Display Line:   81mg tab po daily, 0 Refill(s) ; Catalog Code:   aspirin ; Order Dt/Tm:   3/19/2021 2:43:15 PM CDT          insulin isophane (NPH)  :   insulin isophane (NPH) ; Status:   Documented ; Ordered As Mnemonic:   insulin isophane (NPH) 100 units/mL human recombinant subcutaneous suspension ; Simple Display Line:   4 units, Subcutaneous,  daily, 0 Refill(s) ; Catalog Code:   insulin isophane (NPH) ; Order Dt/Tm:   5/14/2021 10:24:40 AM CDT          Miscellaneous Prescription  :   Miscellaneous Prescription ; Status:   Documented ; Ordered As Mnemonic:   Iron ; Simple Display Line:   325mg daily, 0 Refill(s) ; Catalog Code:   Miscellaneous Prescription ; Order Dt/Tm:   3/26/2021 10:27:51 AM CDT          multivitamin with iron  :   multivitamin with iron ; Status:   Documented ; Ordered As Mnemonic:   Iron 100 Plus oral tablet ; Simple Display Line:   See Instructions, 65mg bid Oral daily, 0 Refill(s) ; Catalog Code:   multivitamin with iron ; Order Dt/Tm:   7/8/2021 3:06:10 PM CDT          omega-3 polyunsaturated fatty acids  :   omega-3 polyunsaturated fatty acids ; Status:   Documented ; Ordered As Mnemonic:   Fish Oil oral capsule ; Simple Display Line:   po, daily, 0 Refill(s) ; Catalog Code:   omega-3 polyunsaturated fatty acids ; Order Dt/Tm:   7/19/2016 8:35:56 AM CDT          pantoprazole  :   pantoprazole ; Status:   Documented ; Ordered As Mnemonic:   pantoprazole 40 mg oral delayed release tablet ; Simple Display Line:   See Instructions, 0.5 tab(s) Oral daily, 0 Refill(s) ; Catalog Code:   pantoprazole ; Order Dt/Tm:   5/14/2021 10:23:42 AM CDT          predniSONE  :   predniSONE ; Status:   Documented ; Ordered As Mnemonic:   predniSONE 10 mg oral tablet ; Simple Display Line:   See Instructions, 5mg po daily, 0 Refill(s) ; Catalog Code:   predniSONE ; Order Dt/Tm:   7/8/2021 3:07:23 PM CDT          sulfamethoxazole-trimethoprim  :   sulfamethoxazole-trimethoprim ; Status:   Documented ; Ordered As Mnemonic:   sulfamethoxazole-trimethoprim 400 mg-80 mg oral tablet ; Simple Display Line:   1 tab(s), Oral, daily, 0 Refill(s) ; Catalog Code:   sulfamethoxazole-trimethoprim ; Order Dt/Tm:   7/8/2021 3:10:01 PM CDT

## 2022-02-16 NOTE — NURSING NOTE
Comprehensive Intake Entered On:  3/19/2021 2:47 PM CDT    Performed On:  3/19/2021 2:38 PM CDT by Margo Carrasco CMA               Summary   Chief Complaint :   Cough with SOB and burning in the lungs.   Advance Directive :   Yes   Menstrual Status :   N/A   Weight Measured :   200 lb(Converted to: 200 lb 0 oz, 90.718 kg)    Height/Length Estimated :   67 in(Converted to: 5 ft 7 in, 170.18 cm)    Systolic Blood Pressure :   116 mmHg   Diastolic Blood Pressure :   60 mmHg   Mean Arterial Pressure :   79 mmHg   Peripheral Pulse Rate :   83 bpm   BP Site :   Right arm   BP Method :   Manual   Temperature Tympanic :   99.1 DegF(Converted to: 37.3 DegC)    Respiratory Rate :   20 br/min   Oxygen Saturation :   86 % (LOW)    Margo Carrasco CMA - 3/19/2021 2:38 PM CDT   Health Status   Allergies Verified? :   Yes   Medication History Verified? :   Yes   Immunizations Current :   Yes   Medical History Verified? :   Yes   Pre-Visit Planning Status :   Completed   Tobacco Use? :   Former smoker   Margo Carrasco CMA - 3/19/2021 2:38 PM CDT   Consents   Consent for Immunization Exchange :   Consent Granted   Consent for Immunizations to Providers :   Consent Granted   Margo Carrasco CMA - 3/19/2021 2:38 PM CDT   Meds / Allergies   (As Of: 3/19/2021 2:47:59 PM CDT)   Allergies (Active)   No Known Medication Allergies  Estimated Onset Date:   Unspecified ; Created By:   Deana French CMA; Reaction Status:   Active ; Category:   Drug ; Substance:   No Known Medication Allergies ; Type:   Allergy ; Updated By:   Deana French CMA; Reviewed Date:   3/19/2021 2:47 PM CDT        Medication List   (As Of: 3/19/2021 2:47:59 PM CDT)   Prescription/Discharge Order    acetaminophen-hydrocodone  :   acetaminophen-hydrocodone ; Status:   Prescribed ; Ordered As Mnemonic:   Norco 5 mg-325 mg oral tablet ; Simple Display Line:   1 tab(s), PO, q4hr, PRN: for pain, 24 tab(s), 0 Refill(s) ; Ordering Provider:   Dexter Silva MD; Catalog  Code:   acetaminophen-hydrocodone ; Order Dt/Tm:   3/24/2020 1:06:36 PM CDT          atorvastatin  :   atorvastatin ; Status:   Prescribed ; Ordered As Mnemonic:   atorvastatin 40 mg oral tablet ; Simple Display Line:   See Instructions, TAKE 1 TABLET BY MOUTH  DAILY, 90 tab(s), 3 Refill(s) ; Ordering Provider:   Dexter Silva MD; Catalog Code:   atorvastatin ; Order Dt/Tm:   5/28/2020 9:41:31 AM CDT          atorvastatin  :   atorvastatin ; Status:   Prescribed ; Ordered As Mnemonic:   atorvastatin 40 mg oral tablet ; Simple Display Line:   1 tab(s), Oral, daily, 90 tab(s), 0 Refill(s) ; Ordering Provider:   Dexter Silva MD; Catalog Code:   atorvastatin ; Order Dt/Tm:   3/9/2020 8:21:22 AM CDT          doxazosin  :   doxazosin ; Status:   Prescribed ; Ordered As Mnemonic:   doxazosin 2 mg oral tablet ; Simple Display Line:   See Instructions, TAKE 1 TABLET BY MOUTH  DAILY, 90 tab(s), 3 Refill(s) ; Ordering Provider:   Dexter Silva MD; Catalog Code:   doxazosin ; Order Dt/Tm:   5/28/2020 9:41:32 AM CDT          doxazosin  :   doxazosin ; Status:   Prescribed ; Ordered As Mnemonic:   doxazosin 2 mg oral tablet ; Simple Display Line:   1 tab(s), Oral, daily, 90 tab(s), 0 Refill(s) ; Ordering Provider:   Dexter Silva MD; Catalog Code:   doxazosin ; Order Dt/Tm:   3/9/2020 8:21:47 AM CDT          FLUoxetine  :   FLUoxetine ; Status:   Prescribed ; Ordered As Mnemonic:   FLUoxetine 40 mg oral capsule ; Simple Display Line:   See Instructions, TAKE 1 CAPSULE BY MOUTH  DAILY, 90 cap(s), 3 Refill(s) ; Ordering Provider:   Dexter Silva MD; Catalog Code:   FLUoxetine ; Order Dt/Tm:   5/28/2020 9:41:33 AM CDT          FLUoxetine  :   FLUoxetine ; Status:   Prescribed ; Ordered As Mnemonic:   FLUoxetine 40 mg oral capsule ; Simple Display Line:   1 cap(s), Oral, daily, 90 cap(s), 0 Refill(s) ; Ordering Provider:   Dexter Silva MD; Catalog Code:   FLUoxetine ; Order Dt/Tm:    3/9/2020 8:22:03 AM CDT          isosorbide mononitrate  :   isosorbide mononitrate ; Status:   Prescribed ; Ordered As Mnemonic:   isosorbide mononitrate 30 mg oral tablet, extended release ; Simple Display Line:   2 tab(s), Oral, qam, 180 tab(s), 3 Refill(s) ; Ordering Provider:   Dexter Silva MD; Catalog Code:   isosorbide mononitrate ; Order Dt/Tm:   5/28/2020 9:43:22 AM CDT          lisinopril  :   lisinopril ; Status:   Prescribed ; Ordered As Mnemonic:   lisinopril 10 mg oral tablet ; Simple Display Line:   10 mg, 1 tab(s), Oral, daily, 90 tab(s), 3 Refill(s) ; Ordering Provider:   Dexter Silva MD; Catalog Code:   lisinopril ; Order Dt/Tm:   10/23/2020 9:21:34 AM CDT          metFORMIN  :   metFORMIN ; Status:   Prescribed ; Ordered As Mnemonic:   MetFORMIN (Eqv-Glucophage XR) 500 mg oral tablet, extended release ; Simple Display Line:   See Instructions, TAKE 2 TABLETS BY MOUTH  TWICE DAILY, 360 tab(s), 3 Refill(s) ; Ordering Provider:   Dexter Silva MD; Catalog Code:   metFORMIN ; Order Dt/Tm:   5/28/2020 9:41:34 AM CDT          Miscellaneous Prescription  :   Miscellaneous Prescription ; Status:   Prescribed ; Ordered As Mnemonic:   FREESTYLE LITE      JUAN C ; Simple Display Line:   1 EA, id, daily, 50 EA ; Ordering Provider:   Dexter Silva MD; Catalog Code:   Miscellaneous Prescription ; Order Dt/Tm:   4/9/2010 10:09:35 AM CDT          Miscellaneous Rx Supply  :   Miscellaneous Rx Supply ; Status:   Prescribed ; Ordered As Mnemonic:   CPAP 13 ; Simple Display Line:   See Instructions, Use every night. Have a download in the sleep center in one month., 1 EA ; Ordering Provider:   Michel Fields MD; Catalog Code:   Miscellaneous Rx Supply ; Order Dt/Tm:   1/17/2014 10:40:19 AM CST          nitroglycerin  :   nitroglycerin ; Status:   Prescribed ; Ordered As Mnemonic:   nitroglycerin 0.4 mg sublingual tablet ; Simple Display Line:   0.4 mg, 1 tab(s), SL, q5min, If chest  pain not relieved in 5 minutes after first dose, seek immediate medical attention, PRN: for chest pain, 100 tab(s), 3 Refill(s) ; Ordering Provider:   Dexter Silva MD; Catalog Code:   nitroglycerin ; Order Dt/Tm:   10/23/2020 9:05:52 AM CDT            Home Meds    ascorbic acid  :   ascorbic acid ; Status:   Documented ; Ordered As Mnemonic:   Vitamin C ; Simple Display Line:   daily, 0 Refill(s) ; Catalog Code:   ascorbic acid ; Order Dt/Tm:   10/23/2020 8:48:26 AM CDT          aspirin  :   aspirin ; Status:   Documented ; Ordered As Mnemonic:   aspirin ; Simple Display Line:   81mg tab po daily, 0 Refill(s) ; Catalog Code:   aspirin ; Order Dt/Tm:   3/19/2021 2:43:15 PM CDT          omega-3 polyunsaturated fatty acids  :   omega-3 polyunsaturated fatty acids ; Status:   Documented ; Ordered As Mnemonic:   Fish Oil oral capsule ; Simple Display Line:   po, daily, 0 Refill(s) ; Catalog Code:   omega-3 polyunsaturated fatty acids ; Order Dt/Tm:   7/19/2016 8:35:56 AM CDT            ID Risk Screen   Recent Travel History :   No recent travel   Family Member Travel History :   No recent travel   Other Exposure to Infectious Disease :   Unknown   COVID-19 Testing Status :   No positive COVID-19 test   Margo Carrasco CMA - 3/19/2021 2:38 PM CDT

## 2022-02-16 NOTE — TELEPHONE ENCOUNTER
---------------------  From: Francoise Goldberg CMA (Phone Messages Pool ([id number]_[facility]))   To: T Message Pool ([id number]_[facility]);     Sent: 8/20/2021 11:58:13 AM CDT  Subject: anemia     Phone message    PCP: [facility]     Person calling:  Pt wifeMaranda Lamar  Phone number: [phone]  Time message left: 2410  Return call time: _    Reason: Pt wife Brianda called wondering if there was an order placed so she can bring Bill in to have his hemoglobin level checked again  due to him feeling really fatigued, sleeping a lot and with his history of anemia she would like it checked. Please advise on lab order or if pt needs apt? Pt wife stated that pt has apt next week with [facility]. There is a return to clinic from 7/20/21 but reason why is not indicated please advise and pt wife would like call back today.     LOV: 7/20/21 DM and anemia with [facility]      Transferred to: [facility] [place]            KRYSTIAN Goldberg CMA---------------------  From: Margo Carrasco CMA ([facility] Message Pool ([id number]))   To: Dexter Silva MD;     Sent: 8/20/2021 12:54:11 PM CDT  Subject: FW: anemia---------------------  From: Dexter Silva MD   To: [facility] Message Pool ([id number]);     Sent: 8/20/2021 2:45:12 PM CDT  Subject: RE: anemia     Work him in this pm with a stat hgb.---------------------  From: Margo Carrasco CMA ([facility]T Message Pool ([id number]))   To: Dexter Silva MD;     Sent: 8/20/2021 2:57:57 PM CDT  Subject: RE: anemia     Patient will be here in 10 minutes. Added to schedule and stat CBC ordered.

## 2022-02-16 NOTE — PROGRESS NOTES
Called pt about his negative COVID results. Pt is still asymptomatic. Results were sent to Dr. Shukri Arndt @ [phone] for his upcoming surgery on 3/1/2021. Confirmation received and pt had no questions at this time.

## 2022-02-16 NOTE — PROGRESS NOTES
Patient:   PIOTR CORREIA            MRN: [id number]            FIN: [id number]               Age:   76 years     Sex:  Male     :  1945   Associated Diagnoses:   Anemia   Author:   Dexter Silva MD      Chief Complaint   2021 2:42 PM CST   FU labs.     Fatigue, lethargy, anemia      Interval History   Anemia   The course is not improving.  The effect on daily activities is no change in activity level.        Review of Systems   Constitutional:  No fever, No fatigue, No decreased activity.    Respiratory:  Negative.    Cardiovascular:  Negative.       Health Status   Allergies:    Allergic Reactions (Selected)  No Known Medication Allergies   Medications:  (Selected)   Prescriptions  Prescribed  CPAP 13: CPAP 13, See Instructions, Instructions: Use every night. Have a download in the sleep center in one month., Supply, # 1 EA, 0 Refill(s), Type: Maintenance  Eliquis 5 mg oral tablet: = 1 tab(s) ( 5 mg ), Oral, bid, # 180 tab(s), 0 Refill(s), Type: Maintenance, Pharmacy: [facility], 1 tab(s) Oral bid, 67, in, 20 8:57:00 CDT, Height Measured, 180, lb, 21 14:57:00 CDT, Weight Measured  FLUoxetine 40 mg oral capsule: = 1 cap(s) ( 40 mg ), Oral, daily, # 90 cap(s), 3 Refill(s), Type: Maintenance, Pharmacy: [facility], Do not fill until patient calls, 1 cap(s) Oral daily, 67, in, 20 8:57:00 CDT, Height Measured, 176.1, lb, 21 13:20:00 CDT, Weig...  FREESTYLE LITE      JUAN C: 1 EA, id, daily, # 50 EA, 11 Refill(s), Pharmacy: [id number]  MetFORMIN (Eqv-Glucophage XR) 500 mg oral tablet, extended release: = 2 tab(s) ( 1,000 mg ), Oral, bid, # 360 tab(s), 3 Refill(s), Type: Maintenance, Pharmacy: [facility], Do not fill until patient calls, 2 tab(s) Oral bid, 67, in, 20 8:57:00 CDT, Height Measured, 176.1, lb, 21 13:20:00 CDT, Shlomo...  Norco 5 mg-325 mg oral tablet: 1 tab(s), PO, q4hr, PRN: for pain, # 24 tab(s), 0 Refill(s),  Type: Maintenance, Pharmacy: [facility], 1 tab(s) Oral q4 hrs,PRN:for pain  Pen Needles: Pen Needles, See Instructions, Instructions: Use as directed with insulin, Supply, # 100 EA, 0 Refill(s), Type: Maintenance, Pharmacy: [facility] #[id number], Use as directed with insulin, 67, in, 05/28/20 8:57:00 CDT, Height Measured, 180, lb, 0...  Venofer 20 mg/mL intravenous solution: ( 200 mg ), IV, 2x/wk, # 50 mL, 0 Refill(s), Type: Acute  atorvastatin 40 mg oral tablet: = 1 tab(s) ( 40 mg ), Oral, daily, # 90 tab(s), 3 Refill(s), Type: Maintenance, Pharmacy: [facility], Do not fill until patient calls, 1 tab(s) Oral daily, 67, in, 05/28/20 8:57:00 CDT, Height Measured, 176.1, lb, 09/24/21 13:20:00 CDT, Weig...  doxazosin 2 mg oral tablet: = 1 tab(s) ( 2 mg ), Oral, daily, # 90 tab(s), 3 Refill(s), Type: Maintenance, Pharmacy: [facility], Do not fill until patient calls, 1 tab(s) Oral daily, 67, in, 05/28/20 8:57:00 CDT, Height Measured, 176.1, lb, 09/24/21 13:20:00 CDT, Weigh...  insulin isophane (NPH) 100 units/mL human recombinant subcutaneous suspension: ( 4 units ), Subcutaneous, daily, # 3 pen_needle, 1 Refill(s), Type: Maintenance, Pharmacy: [facility] #[id number], 4 units Subcutaneous daily, 67, in, 05/28/20 8:57:00 CDT, Height Measured, 180, lb, 07/08/21 14:57:00 CDT, Weight Measured  isosorbide mononitrate 30 mg oral tablet, extended release: = 2 tab(s), Oral, qam, # 180 tab(s), 3 Refill(s), Type: Maintenance, Pharmacy: [facility], Due for appt in April, 2 tab(s) Oral qam, 67, in, 05/28/20 8:57:00 CDT, Height Measured, 176.1, lb, 09/24/21 13:20:00 CDT, Weight Measured  nitroglycerin 0.4 mg sublingual tablet: = 1 tab(s) ( 0.4 mg ), SL, q5min, Instructions: If chest pain not relieved in 5 minutes after first dose, seek immediate medical attention, PRN: for chest pain, # 100 tab(s), 3 Refill(s), Type: Maintenance, Pharmacy: [facility], This is a  3...  Documented Medications  Documented  Blood Builder: Blood Builder, Instructions: 1 tab po daily, 0 Refill(s), Type: Maintenance  Fish Oil oral capsule: po, daily, 0 Refill(s), Type: Maintenance  Iron 100 Plus oral tablet: See Instructions, Instructions: 65mg tid Oral daily, 0 Refill(s), Type: Maintenance  Iron: Iron, Instructions: 325mg daily, 0 Refill(s), Type: Maintenance  Vitamin C: daily, 0 Refill(s), Type: Maintenance  amoxicillin 500 mg oral capsule: See Instructions, Instructions: Take 4 caps 1 hour prior to the procedure, 0 Refill(s), Type: Maintenance  aspirin: Instructions: 81mg tab po daily, 0 Refill(s), Type: Maintenance  pantoprazole 40 mg oral delayed release tablet: = 1 tab(s) ( 40 mg ), 0 Refill(s), Type: Maintenance  predniSONE 20 mg oral tablet: See Instructions, Instructions: 1.5 tab(s) Oral daily 10 day(s), 0 Refill(s), Type: Maintenance  sulfamethoxazole-trimethoprim 400 mg-80 mg oral tablet: 1 tab(s), Oral, daily, 0 Refill(s), Type: Maintenance   Problem list:    All Problems (Selected)  Obstructive sleep apnea (adult) (pediatric) / SNOMED CT 762078710 / Confirmed  Back pain, chronic / SNOMED CT 628258699 / Confirmed  Type 2 diabetes mellitus without complications / SNOMED CT 881954812 / Confirmed  Obesity, unspecified / SNOMED CT 5526438211 / Probable  History of DVT in adulthood / SNOMED CT 4680535257 / Confirmed  Atherosclerotic heart disease of native coronary artery without angina pectoris / SNOMED CT 3650939662 / Confirmed  Depression, Recurrent / SNOMED CT 839500573 / Confirmed  BPH (benign prostatic hyperplasia) / SNOMED CT 420484995 / Confirmed  Hyperlipemia / SNOMED CT 386722346 / Confirmed  Anemia / SNOMED CT 369501865 / Confirmed  Essential (primary) hypertension / SNOMED CT 59207778 / Confirmed      Histories   Past Medical History:    Active  Obstructive sleep apnea (adult) (pediatric) (SNOMED CT 726400972): Onset on 6/28/2005 at 59 years.  Comments:  10/18/2013 CDT 3:10 PM  CDT - Michel Fields MD  AHI 7.7 and REM AHI 22 in 2013 CST 1:54 PM CST - Michel Fields MD  On 11/10/2013 AHI 18.3 with oximetry susanne 77%. 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  Resolved on 3/22/2021 at 75 years.  Comments:  3/25/2021 CDT 10:53 AM CDT - Isabella Delgado  [facility], [place] - Pneumonia of both lungs due to infectious organism.  ACUTE MYOCARDIAL INFARCTION (ICD-9-):  Resolved in  at 61 years.   Family History:    Hypertension  Father ()  Heart disease  Father ()  Alcohol abuse  Mother ()  Stroke  Father ()  Liver disease  Mother ()     Procedure history:    Angiogram (SNOMED CT 464567772) in 2017 at 72 Years.  Colonoscopy (SNOMED CT 816830370) performed by Shukri Arndt on 2013 at 68 Years.  Comments:  2013 1:13 PM CST - Leonora WILLS, Germaine  Sedation: MAC  Diverticulosis in the sigmoid colon, otherwise normal.  Repeat in 10 years.  Angioplasty (SNOMED CT 6795015) in the month of 2007 at 61 Years.  CABG - Coronary artery bypass graft x 3 (SNOMED CT 931637705) in the month of 2004 at 58 Years.  Colonoscopy (SNOMED CT 700875481) performed by Aashish Connolly MD on 2002 at 56 Years.  Comments:  2010 7:05 AM JOSEPHINE - Isabella Delgado  Repeat in 10 years.  ORIF right hand in  at 56 Years.  Flexible sigmoidoscopy (SNOMED CT  68064656) performed by Castillo Anaya MD on 11/13/1995 at 50 Years.  Comments:  10/16/2013 9:17 AM CDT - Gisele Arevalo  Negative.  ORIF left shoulder in 1975 at 30 Years.   Social History:        Electronic Cigarette/Vaping Assessment            Electronic Cigarette Use: Former use, quit more than 90 days ago.      Alcohol Assessment: Past            Quit 1974      Tobacco Assessment: Past            Quit 1972            Former smoker, quit more than 30 days ago      Substance Abuse Assessment: Denies Substance Abuse            Never      Employment and Education Assessment            Retired, Work/School description: .  Highest education level: High school.      Home and Environment Assessment            Marital status: .  Spouse/Partner name: Brianda.  Living situation: Home/Independent.               Injuries/Abuse/Neglect in household: No.  Feels unsafe at home: No.  Family/Friends available for support:               Yes.      Nutrition and Health Assessment            Type of diet: Regular.  Wants to lose weight: Yes.  Sleeping concerns: No.  Feels highly stressed: No.      Exercise and Physical Activity Assessment: Occasional exercise            Exercise frequency: 1-2 times/week.  Exercise type: Walking.      Sexual Assessment            Sexually active: No.  Identifies as male, History of STD: No.  History of sexual abuse: No.      Other Assessment            Hearing impairment.        Physical Examination   Vital Signs   11/17/2021 2:42 PM CST Temperature Tympanic 98.2 DegF    Peripheral Pulse Rate 75 bpm    HR Method Electronic    Systolic Blood Pressure 124 mmHg    Diastolic Blood Pressure 58 mmHg  LOW    Mean Arterial Pressure 80 mmHg    BP Site Right arm    BP Method Manual    Oxygen Saturation 92 %  LOW      Measurements from flowsheet : Measurements   11/17/2021 2:42 PM CST Height/Length Estimated 67 in    Weight Measured - Standard 177.5 lb      Vital Signs Stable per flow sheet .    Respiratory:  Lungs are clear to auscultation.    Cardiovascular:  Normal rate, Regular rhythm.       Review / Management   Results review:  Hgb 8.7 down from 10.1.       Impression and Plan   Diagnosis     Anemia ([id number] D64.9).     Course:  Not improving.    Orders     Orders (Selected)   Prescriptions  Prescribed  Venofer 20 mg/mL intravenous solution: ( 200 mg ), IV, 2x/wk, # 50 mL, 0 Refill(s), Type: Acute.     [facility] hematology recommended another iron infusion so we will send to  for infusion.

## 2022-02-16 NOTE — TELEPHONE ENCOUNTER
---------------------  From: Shira BURK [place]   To: PIOTR CORREIA    Sent: 11/11/2021 10:19:03 AM CST  Subject: General Message     These are stable, please keep scheduled follow up appointments.    Lucio Silva MD      Results:  Date Result Name Ind Value Ref Range   11/10/2021 2:16 PM Hgb ((L)) 10.1 gm/dL (13.2 - 17.1)   11/10/2021 2:16 PM Ferritin  40 ng/mL (24 - 380)  
Detail Level: Detailed
Hide Additional Notes?: No

## 2022-02-16 NOTE — TELEPHONE ENCOUNTER
---------------------  From: Aimee Euabnks RN   To: [facility] Message Pool ([id number]_[facility]);     Sent: 1/28/2021 6:53:41 PM CST  Subject: Cardio referral     Call to patient. He did send a portal message today. Patient is exertional chest pain intermittently.  It has has been relieved by Nitro when this happens. He will go to the ER if he has pain that is not relieved by Nitro. No chest pain today or SOB. He agrees to go to ER if he needs to for CP. Message sent to [facility]. He is asking for a cardio referral.---------------------  From: Margo Carrasco CMA ([facility] Message Pool ([id number]_[facility]))   To: Shira BURK [place];     Sent: 1/29/2021 9:20:59 AM CST  Subject: FW: Cardio referral

## 2022-02-16 NOTE — NURSING NOTE
Comprehensive Intake Entered On:  10/26/2021 8:46 AM CDT    Performed On:  10/26/2021 8:39 AM CDT by Lucio Crandall LPN               Summary   Chief Complaint :   COUGH, FATIGUE, LOSS OF TASTE, OTHER SYMPTOMS   Advance Directive :   Yes   Menstrual Status :   N/A   Height/Length Estimated :   67 in(Converted to: 5 ft 7 in, 170.18 cm)    Lucio Crandall LPN - 10/26/2021 8:39 AM CDT   Consents   Consent for Immunization Exchange :   Consent Granted   Consent for Immunizations to Providers :   Consent Granted   Lucio Crandall LPN - 10/26/2021 8:39 AM CDT   Meds / Allergies   (As Of: 10/26/2021 8:46:32 AM CDT)   Allergies (Active)   No Known Medication Allergies  Estimated Onset Date:   Unspecified ; Created By:   Deana French CMA; Reaction Status:   Active ; Category:   Drug ; Substance:   No Known Medication Allergies ; Type:   Allergy ; Updated By:   Deana French CMA; Reviewed Date:   10/26/2021 8:43 AM CDT        Medication List   (As Of: 10/26/2021 8:46:32 AM CDT)   Prescription/Discharge Order    acetaminophen-hydrocodone  :   acetaminophen-hydrocodone ; Status:   Prescribed ; Ordered As Mnemonic:   Norco 5 mg-325 mg oral tablet ; Simple Display Line:   1 tab(s), PO, q4hr, PRN: for pain, 24 tab(s), 0 Refill(s) ; Ordering Provider:   Dexter Silva MD; Catalog Code:   acetaminophen-hydrocodone ; Order Dt/Tm:   3/24/2020 1:06:36 PM CDT          apixaban  :   apixaban ; Status:   Prescribed ; Ordered As Mnemonic:   Eliquis 5 mg oral tablet ; Simple Display Line:   5 mg, 1 tab(s), Oral, bid, 180 tab(s), 0 Refill(s) ; Ordering Provider:   Dexter Silva MD; Catalog Code:   apixaban ; Order Dt/Tm:   9/22/2021 1:22:16 PM CDT          atorvastatin  :   atorvastatin ; Status:   Prescribed ; Ordered As Mnemonic:   atorvastatin 40 mg oral tablet ; Simple Display Line:   40 mg, 1 tab(s), Oral, daily, 90 tab(s), 3 Refill(s) ; Ordering Provider:   Dexter Silva MD; Catalog Code:   atorvastatin ;  Order Dt/Tm:   9/24/2021 1:42:05 PM CDT          doxazosin  :   doxazosin ; Status:   Prescribed ; Ordered As Mnemonic:   doxazosin 2 mg oral tablet ; Simple Display Line:   2 mg, 1 tab(s), Oral, daily, 90 tab(s), 3 Refill(s) ; Ordering Provider:   Dexter Silva MD; Catalog Code:   doxazosin ; Order Dt/Tm:   9/24/2021 1:42:06 PM CDT          FLUoxetine  :   FLUoxetine ; Status:   Prescribed ; Ordered As Mnemonic:   FLUoxetine 40 mg oral capsule ; Simple Display Line:   40 mg, 1 cap(s), Oral, daily, 90 cap(s), 3 Refill(s) ; Ordering Provider:   Dexter Silva MD; Catalog Code:   FLUoxetine ; Order Dt/Tm:   9/24/2021 1:42:05 PM CDT          insulin isophane (NPH)  :   insulin isophane (NPH) ; Status:   Prescribed ; Ordered As Mnemonic:   insulin isophane (NPH) 100 units/mL human recombinant subcutaneous suspension ; Simple Display Line:   4 units, Subcutaneous, daily, 3 pen_needle, 1 Refill(s) ; Ordering Provider:   Dexter Silva MD; Catalog Code:   insulin isophane (NPH) ; Order Dt/Tm:   7/8/2021 3:55:05 PM CDT          isosorbide mononitrate  :   isosorbide mononitrate ; Status:   Prescribed ; Ordered As Mnemonic:   isosorbide mononitrate 30 mg oral tablet, extended release ; Simple Display Line:   2 tab(s), Oral, qam, 180 tab(s), 3 Refill(s) ; Ordering Provider:   Dexter Silva MD; Catalog Code:   isosorbide mononitrate ; Order Dt/Tm:   9/24/2021 1:45:52 PM CDT          metFORMIN  :   metFORMIN ; Status:   Prescribed ; Ordered As Mnemonic:   MetFORMIN (Eqv-Glucophage XR) 500 mg oral tablet, extended release ; Simple Display Line:   1,000 mg, 2 tab(s), Oral, bid, 360 tab(s), 3 Refill(s) ; Ordering Provider:   Dexter Silva MD; Catalog Code:   metFORMIN ; Order Dt/Tm:   9/24/2021 1:42:04 PM CDT          Miscellaneous Prescription  :   Miscellaneous Prescription ; Status:   Prescribed ; Ordered As Mnemonic:   FREESTYLE LITE      JUAN C ; Simple Display Line:   1 EA, id, daily, 50 EA  ; Ordering Provider:   Dexter Silva MD; Catalog Code:   Miscellaneous Prescription ; Order Dt/Tm:   4/9/2010 10:09:35 AM CDT          Miscellaneous Rx Supply  :   Miscellaneous Rx Supply ; Status:   Prescribed ; Ordered As Mnemonic:   CPAP 13 ; Simple Display Line:   See Instructions, Use every night. Have a download in the sleep center in one month., 1 EA ; Ordering Provider:   Michel Fields MD; Catalog Code:   Miscellaneous Rx Supply ; Order Dt/Tm:   1/17/2014 10:40:19 AM CST          Miscellaneous Rx Supply  :   Miscellaneous Rx Supply ; Status:   Prescribed ; Ordered As Mnemonic:   Pen Needles ; Simple Display Line:   See Instructions, Use as directed with insulin, 100 EA, 0 Refill(s) ; Ordering Provider:   Dexter Silva MD; Catalog Code:   Miscellaneous Rx Supply ; Order Dt/Tm:   7/28/2021 2:47:25 PM CDT          nitroglycerin  :   nitroglycerin ; Status:   Prescribed ; Ordered As Mnemonic:   nitroglycerin 0.4 mg sublingual tablet ; Simple Display Line:   0.4 mg, 1 tab(s), SL, q5min, If chest pain not relieved in 5 minutes after first dose, seek immediate medical attention, PRN: for chest pain, 100 tab(s), 3 Refill(s) ; Ordering Provider:   Dexter Silva MD; Catalog Code:   nitroglycerin ; Order Dt/Tm:   9/24/2021 1:42:06 PM CDT            Home Meds    amoxicillin  :   amoxicillin ; Status:   Documented ; Ordered As Mnemonic:   amoxicillin 500 mg oral capsule ; Simple Display Line:   See Instructions, Take 4 caps 1 hour prior to the procedure, 0 Refill(s) ; Catalog Code:   amoxicillin ; Order Dt/Tm:   7/8/2021 3:08:06 PM CDT          ascorbic acid  :   ascorbic acid ; Status:   Documented ; Ordered As Mnemonic:   Vitamin C ; Simple Display Line:   daily, 0 Refill(s) ; Catalog Code:   ascorbic acid ; Order Dt/Tm:   10/23/2020 8:48:26 AM CDT          aspirin  :   aspirin ; Status:   Documented ; Ordered As Mnemonic:   aspirin ; Simple Display Line:   81mg tab po daily, 0 Refill(s) ;  Catalog Code:   aspirin ; Order Dt/Tm:   3/19/2021 2:43:15 PM CDT          Miscellaneous Prescription  :   Miscellaneous Prescription ; Status:   Documented ; Ordered As Mnemonic:   Blood Builder ; Simple Display Line:   1 tab po daily, 0 Refill(s) ; Catalog Code:   Miscellaneous Prescription ; Order Dt/Tm:   7/20/2021 10:46:35 AM CDT          Miscellaneous Prescription  :   Miscellaneous Prescription ; Status:   Documented ; Ordered As Mnemonic:   Iron ; Simple Display Line:   325mg daily, 0 Refill(s) ; Catalog Code:   Miscellaneous Prescription ; Order Dt/Tm:   3/26/2021 10:27:51 AM CDT          multivitamin with iron  :   multivitamin with iron ; Status:   Documented ; Ordered As Mnemonic:   Iron 100 Plus oral tablet ; Simple Display Line:   See Instructions, 65mg tid Oral daily, 0 Refill(s) ; Catalog Code:   multivitamin with iron ; Order Dt/Tm:   7/8/2021 3:06:10 PM CDT          omega-3 polyunsaturated fatty acids  :   omega-3 polyunsaturated fatty acids ; Status:   Documented ; Ordered As Mnemonic:   Fish Oil oral capsule ; Simple Display Line:   po, daily, 0 Refill(s) ; Catalog Code:   omega-3 polyunsaturated fatty acids ; Order Dt/Tm:   7/19/2016 8:35:56 AM CDT          pantoprazole  :   pantoprazole ; Status:   Documented ; Ordered As Mnemonic:   pantoprazole 40 mg oral delayed release tablet ; Simple Display Line:   40 mg, 1 tab(s), 0 Refill(s) ; Catalog Code:   pantoprazole ; Order Dt/Tm:   5/14/2021 10:23:42 AM CDT          predniSONE  :   predniSONE ; Status:   Documented ; Ordered As Mnemonic:   predniSONE 20 mg oral tablet ; Simple Display Line:   See Instructions, 1.5 tab(s) Oral daily 10 day(s), 0 Refill(s) ; Catalog Code:   predniSONE ; Order Dt/Tm:   9/2/2021 9:19:30 AM CDT          sulfamethoxazole-trimethoprim  :   sulfamethoxazole-trimethoprim ; Status:   Documented ; Ordered As Mnemonic:   sulfamethoxazole-trimethoprim 400 mg-80 mg oral tablet ; Simple Display Line:   1 tab(s), Oral, daily, 0  Refill(s) ; Catalog Code:   sulfamethoxazole-trimethoprim ; Order Dt/Tm:   9/24/2021 1:29:38 PM CDT            Social History   Social History   (As Of: 10/26/2021 8:46:32 AM CDT)   Alcohol:  Past      Quit 1974   (Last Updated: 4/13/2020 2:45:59 PM CDT by Isabella Delgado)          Tobacco:  Past      Quit 1972   (Last Updated: 4/13/2020 2:45:42 PM CDT by Isabella Delgado)   Former smoker, quit more than 30 days ago   (Last Updated: 5/6/2021 9:43:02 AM CDT by Margo Carrasco CMA)          Electronic Cigarette/Vaping:        Electronic Cigarette Use: Former use, quit more than 90 days ago.   (Last Updated: 11/27/2020 12:41:16 PM CST by Margo Carrasco CMA)          Substance Abuse:  Denies Substance Abuse      Never   (Last Updated: 9/28/2018 2:13:39 PM CDT by Isabella Delgado)          Employment/School:        Retired, Work/School description: .  Highest education level: High school.   (Last Updated: 3/25/2021 2:18:18 PM CDT by Isabella Delgaod)          Home/Environment:        Marital status: .  Spouse/Partner name: Brianda.  Living situation: Home/Independent.  Injuries/Abuse/Neglect in household: No.  Feels unsafe at home: No.  Family/Friends available for support: Yes.   (Last Updated: 4/13/2020 2:46:58 PM CDT by Isabella Delgado)          Nutrition/Health:        Type of diet: Regular.  Wants to lose weight: Yes.  Sleeping concerns: No.  Feels highly stressed: No.   (Last Updated: 4/13/2020 2:46:17 PM CDT by Isabella Delgado)          Exercise:  Occasional exercise      Exercise frequency: 1-2 times/week.  Exercise type: Walking.   (Last Updated: 4/13/2020 2:46:41 PM CDT by Isabella Delgado)          Sexual:        Sexually active: No.  Identifies as male, History of STD: No.  History of sexual abuse: No.   (Last Updated: 4/13/2020 2:48:58 PM CDT by Isabella Delgado)          Other:        Hearing impairment.   (Last Updated: 4/13/2020 2:47:18 PM CDT by Isabella Delgado)

## 2022-02-16 NOTE — TELEPHONE ENCOUNTER
Entered by Noah Bernal PA-C on November 23, 2021 8:06:11 AM CST  ---------------------  From: Noah Bernal PA-C   To: [facility]    Sent: 11/23/2021 8:06:11 AM CST  Subject: Medication Management     ** Submitted: **  Complete:apixaban (Eliquis 5 mg oral tablet)   Signed by Noah Bernal PA-C  11/23/2021 2:06:00 PM [facility] [[email or web address]]    ** Approved with modifications: **  apixaban (Eliquis 5 MG Oral Tablet)  TAKE 1 TABLET BY MOUTH  TWICE DAILY  Qty:  180 tab(s)        Days Supply:  90        Refills:  3          Substitutions Allowed     Route To Pharmacy - [facility]   Note from Pharmacy:  Requesting 1 year supply  Signed by Noah Bernal PA-C            ------------------------------------------  From: [facility]  To: Shira BURK [place]  Sent: November 23, 2021 3:33:36 AM CST  Subject: Medication Management  Due: November 16, 2021 3:37:48 PM CST     ** On Hold Pending Signature **     Drug: apixaban (Eliquis 5 mg oral tablet), TAKE 1 TABLET BY MOUTH TWICE DAILY  Quantity: 180 tab(s)  Days Supply: 90  Refills: 0  Substitutions Allowed  Notes from Pharmacy: - First Attempt Ref: [id number]     Dispensed Drug: apixaban (Eliquis 5 mg oral tablet), TAKE 1 TABLET BY MOUTH TWICE DAILY  Quantity: 180 tab(s)  Days Supply: 90  Refills: 3  Substitutions Allowed  Notes from Pharmacy: Requesting 1 year supply  ------------------------------------------

## 2022-02-16 NOTE — NURSING NOTE
Diabetes Eye Testing Entered On:  8/19/2020 8:16 AM CDT    Performed On:  8/17/2020 8:15 AM CDT by Vianney Khan               Diabetes Eye Testing   Retinopathy Present TR :   Vianney Brown - 8/19/2020 8:15 AM CDT

## 2022-02-16 NOTE — PROGRESS NOTES
Patient:   PIOTR CORREIA            MRN: [id number]            FIN: [id number]               Age:   75 years     Sex:  Male     :  1945   Associated Diagnoses:   Anemia; Pneumonia   Author:   Dexter Silva MD      Visit Information      Date of Service: 2021 10:10 am  Performing Location: [facility]  Encounter#: [id number]      Chief Complaint   2021 10:17 AM CDT   Pneumonia and anemia follow up.     Chief complaint and symptoms noted above confirmed with patient.      Subjective   Chief complaint 2021 10:17 AM CDT   Pneumonia and anemia follow up.  .        Health Status   Allergies:    Allergic Reactions (Selected)  No Known Medication Allergies   Medications:  (Selected)   Prescriptions  Prescribed  CPAP 13: CPAP 13, See Instructions, Instructions: Use every night. Have a download in the sleep center in one month., Supply, # 1 EA, 0 Refill(s), Type: Maintenance  FLUoxetine 40 mg oral capsule: = 1 cap(s) ( 40 mg ), Oral, daily, # 90 cap(s), 0 Refill(s), Type: Maintenance, Pharmacy: [facility], 1 cap(s) Oral daily, 67, in, 20 8:57:00 CDT, Height Measured, 198, lb, 21 9:34:00 CDT, Weight Measured  FREESTYLE LITE      JUAN C: 1 EA, id, daily, # 50 EA, 11 Refill(s), Pharmacy: [id number]  MetFORMIN (Eqv-Glucophage XR) 500 mg oral tablet, extended release: = 2 tab(s) ( 1,000 mg ), Oral, bid, # 360 tab(s), 0 Refill(s), Type: Maintenance, Pharmacy: [facility], 2 tab(s) Oral bid, 67, in, 20 8:57:00 CDT, Height Measured, 198, lb, 21 9:34:00 CDT, Weight Measured  Norco 5 mg-325 mg oral tablet: 1 tab(s), PO, q4hr, PRN: for pain, # 24 tab(s), 0 Refill(s), Type: Maintenance, Pharmacy: [facility], 1 tab(s) Oral q4 hrs,PRN:for pain  atorvastatin 40 mg oral tablet: = 1 tab(s) ( 40 mg ), Oral, daily, # 90 tab(s), 0 Refill(s), Type: Maintenance, Pharmacy: [facility], 1 tab(s) Oral daily, 67, in, 20 8:57:00  CDT, Height Measured, 198, lb, 04/02/21 9:34:00 CDT, Weight Measured  doxazosin 2 mg oral tablet: = 1 tab(s) ( 2 mg ), Oral, daily, # 90 tab(s), 0 Refill(s), Type: Maintenance, [facility], 1 tab(s) Oral daily, 67, in, 05/28/20 8:57:00 CDT, Height Measured, 198, lb, 04/02/21 9:34:00 CDT, Weight Measured  isosorbide mononitrate 30 mg oral tablet, extended release: = 2 tab(s), Oral, qam, # 180 tab(s), 3 Refill(s), Type: Maintenance, Pharmacy: [facility], Due for appt in April, 2 tab(s) Oral qam, 67, in, 05/28/20 8:57:00 CDT, Height Measured, 197.2, lb, 05/21/20 9:22:00 CDT, Weight Measured  lisinopril 10 mg oral tablet: = 1 tab(s) ( 10 mg ), Oral, daily, # 90 tab(s), 3 Refill(s), Type: Maintenance, Pharmacy: [facility], 1 tab(s) Oral daily, 67, in, 05/28/20 8:57:00 CDT, Height Measured, 201, lb, 10/23/20 8:44:00 CDT, Weight Measured  nitroglycerin 0.4 mg sublingual tablet: = 1 tab(s) ( 0.4 mg ), SL, q5min, Instructions: If chest pain not relieved in 5 minutes after first dose, seek immediate medical attention, PRN: for chest pain, # 100 tab(s), 3 Refill(s), Type: Maintenance, Pharmacy: [facility], This is a 3...  Documented Medications  Documented  Eliquis 5 mg oral tablet: ( 5 mg ), Oral, bid, # 60 tab(s), 0 Refill(s), Type: Maintenance  Fish Oil oral capsule: po, daily, 0 Refill(s), Type: Maintenance  Iron: Iron, Instructions: 325mg daily, 0 Refill(s), Type: Maintenance  Oxygen Air Delivery System: Oxygen Air Delivery System, Instructions: 2 Liters. Length of need: 3 months, 0 Refill(s), Type: Maintenance  Vitamin C: daily, 0 Refill(s), Type: Maintenance  aspirin: Instructions: 81mg tab po daily, 0 Refill(s), Type: Maintenance  fluconazole 200 mg oral tablet: = 1 tab(s) ( 200 mg ), Oral, daily, 0 Refill(s), Type: Maintenance  insulin isophane (NPH) 100 units/mL human recombinant subcutaneous suspension: ( 30 units ), Subcutaneous, daily, 0 Refill(s), Type:  Maintenance  pantoprazole 40 mg oral delayed release tablet: = 1 tab(s) ( 40 mg ), Oral, daily, # 90 tab(s), 0 Refill(s), Type: Maintenance  predniSONE 20 mg oral tablet: = 2 tab(s) ( 40 mg ), 0 Refill(s), Type: Maintenance  sulfamethoxazole-trimethoprim 400 mg-80 mg oral tablet: ( 80 mg ), Oral, daily, 0 Refill(s), Type: Maintenance   Problem list:    All Problems (Selected)  Obstructive sleep apnea (adult) (pediatric) / SNOMED CT 288645013 / Confirmed  Back pain, chronic / SNOMED CT 955056637 / Confirmed  Type 2 diabetes mellitus without complications / SNOMED CT 688791762 / Confirmed  Obesity, unspecified / SNOMED CT 7984640366 / Probable  Atherosclerotic heart disease of native coronary artery without angina pectoris / SNOMED CT 5914634018 / Confirmed  Depression, Recurrent / SNOMED CT 062121587 / Confirmed  BPH (benign prostatic hyperplasia) / SNOMED CT 206234080 / Confirmed  Hyperlipemia / SNOMED CT 389538016 / Confirmed  Essential (primary) hypertension / SNOMED CT 79522607 / Confirmed      Objective   Vital Signs   5/14/2021 10:17 AM CDT Temperature Tympanic 98.3 DegF    Peripheral Pulse Rate 84 bpm    Systolic Blood Pressure 120 mmHg    Diastolic Blood Pressure 56 mmHg  LOW    Mean Arterial Pressure 77 mmHg    BP Site Right arm    BP Method Manual    Oxygen Saturation 95 %      Measurements from flowsheet : Measurements   5/14/2021 10:17 AM CDT Height/Length Estimated 67 in    Weight Measured - Standard 181 lb      General:  Alert and oriented, No acute distress.    HENT:  Normocephalic.    Neck:  Supple, Non-tender.    Respiratory:  Lungs are clear to auscultation, Respirations are non-labored.    Cardiovascular:  Normal rate, Regular rhythm, No murmur.       Results Review   Results review   Lab results   5/6/2021 10:23 AM CDT WBC 19.6  HI (Modified)    RBC 3.04  LOW (Modified)    Hgb 7.0 gm/dL  LOW (Modified)    Hct 22.0 %  LOW (Modified)    MCV 72.4 fL  LOW (Modified)    MCH 23.0 pg  LOW (Modified)     MCHC 31.8 gm/dL  LOW (Modified)    RDW 23.1 %  HI (Modified)    Platelet 288 (Modified)    MPV 9.6 fL (Modified)    Lymphs 3.0 % (Modified)    Abs Lymphs 588  LOW (Modified)    React Lymphocytes See comment % (Modified)    Neutrophils 92.6 % (Modified)    Abs Neutrophils 18,150  HI (Modified)    Monocytes 4.1 % (Modified)    Abs Monocytes 804 (Modified)    Eosinophils 0.2 % (Modified)    Abs Eosinophils 39 (Modified)    Basophils 0.1 % (Modified)    Abs Basophils 20 (Modified)    Band Neutrophils => Indicates changed result(s) or information. % (Modified)    Abs Band Neutrophils See comment (Modified)    Metamyelocytes => Indicates changed result(s) or information. % (Modified)    Abs Metamyelocytes See comment (Modified)    Myelocytes => Indicates changed result(s) or information. % (Modified)    Abs Myelocytes See comment (Modified)    Promyelocytes => Indicates changed result(s) or information. % (Modified)    Abs Promyelocytes See comment (Modified)    Blasts => Indicates changed result(s) or information. % (Modified)    Abs Blasts See comment (Modified)    NRBC See comment (Modified)    Abs NRBC See comment (Modified)    Diff Comment See comment (Modified)   4/2/2021 10:11 AM CDT WBC TR 8.88 x10^3/uL     Hgb TR 9.2 g/dL     Hct TR 29.6 %     Platelet  x10^3/uL    3/9/2021 11:38 AM CST  unit/L     Iron Level 28 mcg/dL  LOW     TIBC 354     Iron Saturation 8  LOW     Ferritin 8 ng/mL  LOW     Haptoglobin 338 mg/dL  HI     WBC 7.5     RBC 3.97  LOW     Hgb 9.1 gm/dL  LOW     Hct 29.6 %  LOW     MCV 74.6 fL  LOW     MCH 22.9 pg  LOW     MCHC 30.7 gm/dL  LOW     RDW 14.3 %     Platelet 295     MPV 10.1 fL     Lymphs 9.0 %     Abs Lymphs 675  LOW     Neutrophils 77.0 %     Abs Neutrophils 5,775     Monocytes 5.0 %     Abs Monocytes 375     Eosinophils 6.0 %     Abs Eosinophils 450     Basophils 3.0 %     Abs Basophils 225  HI     Plt Est ADEQUATE     CBC Morph See comment     PT 10.6     INR 1.0     PTT  27     Direct Tia NEGATIVE    2/25/2021 2:00 PM CST Coronavirus SARS-CoV-2 (COVID-19) TR Negative       D/C Summary from Joshua reviewed      Impression and Plan   Assessment and Plan:          Diagnosis: Anemia (MPU93-QT D64.9), Pneumonia (QFN76-ZC J18.9).         Course: Improving.    Orders     Orders   Pharmacy:  Eliquis 5 mg oral tablet (Prescribe): = 1 tab(s) ( 5 mg ), Oral, bid, x 90 day(s), # 180 tab(s), 0 Refill(s), Type: Maintenance, Pharmacy: [facility], 1 tab(s) Oral bid,x90 day(s), 67, in, 5/28/2020 8:57 AM CDT, Height Measured, 181, lb, 5/14/2021 10:17 AM CDT, Weight Measured  Eliquis 5 mg oral tablet (Discontinue).     .

## 2022-02-16 NOTE — TELEPHONE ENCOUNTER
---------------------  From: Fannie Miller LPN (Phone Messages Pool ([id number]_[facility]))   Sent: 7/28/2021 2:51:18 PM CDT  Subject: pen needles     Phone Message    PCP:   CHT      Time of Call:  2:45pm       Person Calling:  pt wife Brianda    Note:   Brianda calling stating they just spoke with [facility] and they have sent over a Rx request for pen needles.  She says they would like Rx to be filled before today while they are in town.    Rx sent in for pen needles    Last office visit and reason:  7/20/21 Diabetes Type 2, anemia

## 2022-02-16 NOTE — NURSING NOTE
Comprehensive Intake Entered On:  6/22/2021 10:17 AM CDT    Performed On:  6/22/2021 10:11 AM CDT by Margo Carrasco CMA               Summary   Chief Complaint :   Anemia follow up.    Advance Directive :   Yes   Menstrual Status :   N/A   Height/Length Estimated :   67 in(Converted to: 5 ft 7 in, 170.18 cm)    Systolic Blood Pressure :   128 mmHg   Diastolic Blood Pressure :   64 mmHg   Mean Arterial Pressure :   85 mmHg   Peripheral Pulse Rate :   80 bpm   Margo Carrasco CMA - 6/22/2021 10:11 AM CDT   Health Status   Allergies Verified? :   Yes   Medication History Verified? :   Yes   Immunizations Current :   Yes   Medical History Verified? :   Yes   Pre-Visit Planning Status :   Completed   Tobacco Use? :   Heavy tobacco smoker   Margo Carrasco CMA - 6/22/2021 10:11 AM CDT   Consents   Consent for Immunization Exchange :   Consent Granted   Consent for Immunizations to Providers :   Consent Granted   Margo Carrasco CMA - 6/22/2021 10:11 AM CDT   Meds / Allergies   (As Of: 6/22/2021 10:17:03 AM CDT)   Allergies (Active)   No Known Medication Allergies  Estimated Onset Date:   Unspecified ; Created By:   Deana French CMA; Reaction Status:   Active ; Category:   Drug ; Substance:   No Known Medication Allergies ; Type:   Allergy ; Updated By:   Deana French CMA; Reviewed Date:   6/22/2021 10:12 AM CDT        Medication List   (As Of: 6/22/2021 10:17:03 AM CDT)   Prescription/Discharge Order    acetaminophen-hydrocodone  :   acetaminophen-hydrocodone ; Status:   Prescribed ; Ordered As Mnemonic:   Norco 5 mg-325 mg oral tablet ; Simple Display Line:   1 tab(s), PO, q4hr, PRN: for pain, 24 tab(s), 0 Refill(s) ; Ordering Provider:   Dexter Silva MD; Catalog Code:   acetaminophen-hydrocodone ; Order Dt/Tm:   3/24/2020 1:06:36 PM CDT          apixaban  :   apixaban ; Status:   Prescribed ; Ordered As Mnemonic:   Eliquis 5 mg oral tablet ; Simple Display Line:   5 mg, 1 tab(s), Oral, bid, for 90 day(s), 180  tab(s), 0 Refill(s) ; Ordering Provider:   Dexter Silva MD; Catalog Code:   apixaban ; Order Dt/Tm:   5/14/2021 10:50:13 AM CDT          atorvastatin  :   atorvastatin ; Status:   Prescribed ; Ordered As Mnemonic:   atorvastatin 40 mg oral tablet ; Simple Display Line:   40 mg, 1 tab(s), Oral, daily, 90 tab(s), 0 Refill(s) ; Ordering Provider:   Dexter Silva MD; Catalog Code:   atorvastatin ; Order Dt/Tm:   4/29/2021 7:07:58 AM CDT          doxazosin  :   doxazosin ; Status:   Prescribed ; Ordered As Mnemonic:   doxazosin 2 mg oral tablet ; Simple Display Line:   2 mg, 1 tab(s), Oral, daily, 90 tab(s), 0 Refill(s) ; Ordering Provider:   Dexter Silva MD; Catalog Code:   doxazosin ; Order Dt/Tm:   4/29/2021 7:08:22 AM CDT          FLUoxetine  :   FLUoxetine ; Status:   Prescribed ; Ordered As Mnemonic:   FLUoxetine 40 mg oral capsule ; Simple Display Line:   40 mg, 1 cap(s), Oral, daily, 90 cap(s), 0 Refill(s) ; Ordering Provider:   Dexter Silva MD; Catalog Code:   FLUoxetine ; Order Dt/Tm:   4/29/2021 7:08:50 AM CDT          isosorbide mononitrate  :   isosorbide mononitrate ; Status:   Prescribed ; Ordered As Mnemonic:   isosorbide mononitrate 30 mg oral tablet, extended release ; Simple Display Line:   2 tab(s), Oral, qam, 180 tab(s), 3 Refill(s) ; Ordering Provider:   Dexter Silva MD; Catalog Code:   isosorbide mononitrate ; Order Dt/Tm:   5/28/2020 9:43:22 AM CDT          lisinopril  :   lisinopril ; Status:   Prescribed ; Ordered As Mnemonic:   lisinopril 10 mg oral tablet ; Simple Display Line:   10 mg, 1 tab(s), Oral, daily, 90 tab(s), 3 Refill(s) ; Ordering Provider:   Dexter Silva MD; Catalog Code:   lisinopril ; Order Dt/Tm:   10/23/2020 9:21:34 AM CDT          metFORMIN  :   metFORMIN ; Status:   Prescribed ; Ordered As Mnemonic:   MetFORMIN (Eqv-Glucophage XR) 500 mg oral tablet, extended release ; Simple Display Line:   1,000 mg, 2 tab(s), Oral, bid,  360 tab(s), 0 Refill(s) ; Ordering Provider:   Dexter Silva MD; Catalog Code:   metFORMIN ; Order Dt/Tm:   4/29/2021 7:09:14 AM CDT          Miscellaneous Prescription  :   Miscellaneous Prescription ; Status:   Prescribed ; Ordered As Mnemonic:   FREESTYLE LITE      JUAN C ; Simple Display Line:   1 EA, id, daily, 50 EA ; Ordering Provider:   Dexter Silva MD; Catalog Code:   Miscellaneous Prescription ; Order Dt/Tm:   4/9/2010 10:09:35 AM CDT          Miscellaneous Rx Supply  :   Miscellaneous Rx Supply ; Status:   Prescribed ; Ordered As Mnemonic:   CPAP 13 ; Simple Display Line:   See Instructions, Use every night. Have a download in the sleep center in one month., 1 EA ; Ordering Provider:   Michel Fields MD; Catalog Code:   Miscellaneous Rx Supply ; Order Dt/Tm:   1/17/2014 10:40:19 AM CST          Miscellaneous Rx Supply  :   Miscellaneous Rx Supply ; Status:   Prescribed ; Ordered As Mnemonic:   Oxygen ; Simple Display Line:   See Instructions, D/C Oxygen, 1 unknown unit, 0 Refill(s) ; Ordering Provider:   Dexter Silva MD; Catalog Code:   Miscellaneous Rx Supply ; Order Dt/Tm:   5/21/2021 10:04:46 AM CDT          nitroglycerin  :   nitroglycerin ; Status:   Prescribed ; Ordered As Mnemonic:   nitroglycerin 0.4 mg sublingual tablet ; Simple Display Line:   0.4 mg, 1 tab(s), SL, q5min, If chest pain not relieved in 5 minutes after first dose, seek immediate medical attention, PRN: for chest pain, 100 tab(s), 3 Refill(s) ; Ordering Provider:   Dexter Silva MD; Catalog Code:   nitroglycerin ; Order Dt/Tm:   10/23/2020 9:05:52 AM CDT          predniSONE  :   predniSONE ; Status:   Prescribed ; Ordered As Mnemonic:   predniSONE 10 mg oral tablet ; Simple Display Line:   30 mg, 3 tab(s), Oral, daily, for 21 day(s), 63 tab(s), 0 Refill(s) ; Ordering Provider:   Dexter Silva MD; Catalog Code:   predniSONE ; Order Dt/Tm:   5/14/2021 10:52:32 AM CDT            Home Meds     ascorbic acid  :   ascorbic acid ; Status:   Documented ; Ordered As Mnemonic:   Vitamin C ; Simple Display Line:   daily, 0 Refill(s) ; Catalog Code:   ascorbic acid ; Order Dt/Tm:   10/23/2020 8:48:26 AM CDT          aspirin  :   aspirin ; Status:   Documented ; Ordered As Mnemonic:   aspirin ; Simple Display Line:   81mg tab po daily, 0 Refill(s) ; Catalog Code:   aspirin ; Order Dt/Tm:   3/19/2021 2:43:15 PM CDT          fluconazole  :   fluconazole ; Status:   Documented ; Ordered As Mnemonic:   fluconazole 200 mg oral tablet ; Simple Display Line:   200 mg, 1 tab(s), Oral, daily, 0 Refill(s) ; Catalog Code:   fluconazole ; Order Dt/Tm:   5/14/2021 10:23:42 AM CDT          insulin isophane (NPH)  :   insulin isophane (NPH) ; Status:   Documented ; Ordered As Mnemonic:   insulin isophane (NPH) 100 units/mL human recombinant subcutaneous suspension ; Simple Display Line:   6 units, Subcutaneous, daily, 0 Refill(s) ; Catalog Code:   insulin isophane (NPH) ; Order Dt/Tm:   5/14/2021 10:24:40 AM CDT          Miscellaneous Prescription  :   Miscellaneous Prescription ; Status:   Documented ; Ordered As Mnemonic:   Iron ; Simple Display Line:   325mg daily, 0 Refill(s) ; Catalog Code:   Miscellaneous Prescription ; Order Dt/Tm:   3/26/2021 10:27:51 AM CDT          Miscellaneous Prescription  :   Miscellaneous Prescription ; Status:   Documented ; Ordered As Mnemonic:   Oxygen Air Delivery System ; Simple Display Line:   2 Liters. Length of need: 3 months, 0 Refill(s) ; Catalog Code:   Miscellaneous Prescription ; Order Dt/Tm:   3/26/2021 9:39:48 AM CDT          omega-3 polyunsaturated fatty acids  :   omega-3 polyunsaturated fatty acids ; Status:   Documented ; Ordered As Mnemonic:   Fish Oil oral capsule ; Simple Display Line:   po, daily, 0 Refill(s) ; Catalog Code:   omega-3 polyunsaturated fatty acids ; Order Dt/Tm:   7/19/2016 8:35:56 AM CDT          pantoprazole  :   pantoprazole ; Status:   Documented ;  Ordered As Mnemonic:   pantoprazole 40 mg oral delayed release tablet ; Simple Display Line:   40 mg, 1 tab(s), Oral, daily, 90 tab(s), 0 Refill(s) ; Catalog Code:   pantoprazole ; Order Dt/Tm:   5/14/2021 10:23:42 AM CDT          predniSONE  :   predniSONE ; Status:   Deleted ; Ordered As Mnemonic:   predniSONE 20 mg oral tablet ; Simple Display Line:   40 mg, 2 tab(s), 0 Refill(s) ; Catalog Code:   predniSONE ; Order Dt/Tm:   5/6/2021 9:51:12 AM CDT          sulfamethoxazole-trimethoprim  :   sulfamethoxazole-trimethoprim ; Status:   Documented ; Ordered As Mnemonic:   sulfamethoxazole-trimethoprim 400 mg-80 mg oral tablet ; Simple Display Line:   80 mg, Oral, daily, 0 Refill(s) ; Catalog Code:   sulfamethoxazole-trimethoprim ; Order Dt/Tm:   5/6/2021 9:50:14 AM CDT

## 2022-02-16 NOTE — TELEPHONE ENCOUNTER
---------------------  From: Josette Aburto CMA   To: PIOTR CORREIA    Sent: 4/12/2021 4:51:18 PM CDT  Subject: Portal Message     Dr. Shira Sandoval would like to plan on seeing you this Thursday or Friday. Please call or send a portal message to schedule an appointment with Dr Silva.  Hope this helps,    Josette Aburto CMA

## 2022-02-16 NOTE — NURSING NOTE
Comprehensive Intake Entered On:  3/9/2021 11:09 AM CST    Performed On:  3/9/2021 11:05 AM CST by Margo Carrasco CMA               Summary   Chief Complaint :   Follow up Angiogram. Anemia workup per Cardio   Advance Directive :   Yes   Menstrual Status :   N/A   Height/Length Estimated :   67 in(Converted to: 5 ft 7 in, 170.18 cm)    Margo Carrasco CMA - 3/9/2021 11:05 AM CST   Health Status   Allergies Verified? :   Yes   Medication History Verified? :   Yes   Immunizations Current :   Yes   Medical History Verified? :   Yes   Pre-Visit Planning Status :   Completed   Tobacco Use? :   Former smoker   Margo Carrasco CMA - 3/9/2021 11:05 AM CST   Consents   Consent for Immunization Exchange :   Consent Granted   Consent for Immunizations to Providers :   Consent Granted   Margo Carrasco CMA - 3/9/2021 11:05 AM CST   Meds / Allergies   (As Of: 3/9/2021 11:09:37 AM CST)   Allergies (Active)   No Known Medication Allergies  Estimated Onset Date:   Unspecified ; Created By:   Deana French CMA; Reaction Status:   Active ; Category:   Drug ; Substance:   No Known Medication Allergies ; Type:   Allergy ; Updated By:   Deana French CMA; Reviewed Date:   3/9/2021 11:09 AM CST        Medication List   (As Of: 3/9/2021 11:09:37 AM CST)   Prescription/Discharge Order    acetaminophen-hydrocodone  :   acetaminophen-hydrocodone ; Status:   Prescribed ; Ordered As Mnemonic:   Norco 5 mg-325 mg oral tablet ; Simple Display Line:   1 tab(s), PO, q4hr, PRN: for pain, 24 tab(s), 0 Refill(s) ; Ordering Provider:   Dexter Silva MD; Catalog Code:   acetaminophen-hydrocodone ; Order Dt/Tm:   3/24/2020 1:06:36 PM CDT          atorvastatin  :   atorvastatin ; Status:   Prescribed ; Ordered As Mnemonic:   atorvastatin 40 mg oral tablet ; Simple Display Line:   See Instructions, TAKE 1 TABLET BY MOUTH  DAILY, 90 tab(s), 3 Refill(s) ; Ordering Provider:   Dexter Silva MD; Catalog Code:   atorvastatin ; Order Dt/Tm:    5/28/2020 9:41:31 AM CDT          atorvastatin  :   atorvastatin ; Status:   Prescribed ; Ordered As Mnemonic:   atorvastatin 40 mg oral tablet ; Simple Display Line:   1 tab(s), Oral, daily, 90 tab(s), 0 Refill(s) ; Ordering Provider:   Dexter Silva MD; Catalog Code:   atorvastatin ; Order Dt/Tm:   3/9/2020 8:21:22 AM CDT          doxazosin  :   doxazosin ; Status:   Prescribed ; Ordered As Mnemonic:   doxazosin 2 mg oral tablet ; Simple Display Line:   See Instructions, TAKE 1 TABLET BY MOUTH  DAILY, 90 tab(s), 3 Refill(s) ; Ordering Provider:   Dexter Silva MD; Catalog Code:   doxazosin ; Order Dt/Tm:   5/28/2020 9:41:32 AM CDT          doxazosin  :   doxazosin ; Status:   Prescribed ; Ordered As Mnemonic:   doxazosin 2 mg oral tablet ; Simple Display Line:   1 tab(s), Oral, daily, 90 tab(s), 0 Refill(s) ; Ordering Provider:   Dexter Silva MD; Catalog Code:   doxazosin ; Order Dt/Tm:   3/9/2020 8:21:47 AM CDT          FLUoxetine  :   FLUoxetine ; Status:   Prescribed ; Ordered As Mnemonic:   FLUoxetine 40 mg oral capsule ; Simple Display Line:   See Instructions, TAKE 1 CAPSULE BY MOUTH  DAILY, 90 cap(s), 3 Refill(s) ; Ordering Provider:   Dexter Silva MD; Catalog Code:   FLUoxetine ; Order Dt/Tm:   5/28/2020 9:41:33 AM CDT          FLUoxetine  :   FLUoxetine ; Status:   Prescribed ; Ordered As Mnemonic:   FLUoxetine 40 mg oral capsule ; Simple Display Line:   1 cap(s), Oral, daily, 90 cap(s), 0 Refill(s) ; Ordering Provider:   Dexter Silva MD; Catalog Code:   FLUoxetine ; Order Dt/Tm:   3/9/2020 8:22:03 AM CDT          isosorbide mononitrate  :   isosorbide mononitrate ; Status:   Prescribed ; Ordered As Mnemonic:   isosorbide mononitrate 30 mg oral tablet, extended release ; Simple Display Line:   2 tab(s), Oral, qam, 180 tab(s), 3 Refill(s) ; Ordering Provider:   Dexter Silva MD; Catalog Code:   isosorbide mononitrate ; Order Dt/Tm:   5/28/2020 9:43:22 AM  CDT          lisinopril  :   lisinopril ; Status:   Prescribed ; Ordered As Mnemonic:   lisinopril 10 mg oral tablet ; Simple Display Line:   10 mg, 1 tab(s), Oral, daily, 90 tab(s), 3 Refill(s) ; Ordering Provider:   Dexter Silva MD; Catalog Code:   lisinopril ; Order Dt/Tm:   10/23/2020 9:21:34 AM CDT          metFORMIN  :   metFORMIN ; Status:   Prescribed ; Ordered As Mnemonic:   MetFORMIN (Eqv-Glucophage XR) 500 mg oral tablet, extended release ; Simple Display Line:   See Instructions, TAKE 2 TABLETS BY MOUTH  TWICE DAILY, 360 tab(s), 3 Refill(s) ; Ordering Provider:   Dexter Silva MD; Catalog Code:   metFORMIN ; Order Dt/Tm:   5/28/2020 9:41:34 AM CDT          Miscellaneous Prescription  :   Miscellaneous Prescription ; Status:   Prescribed ; Ordered As Mnemonic:   FREESTYLE LITE      JUAN C ; Simple Display Line:   1 EA, id, daily, 50 EA ; Ordering Provider:   Dexter Silva MD; Catalog Code:   Miscellaneous Prescription ; Order Dt/Tm:   4/9/2010 10:09:35 AM CDT          Miscellaneous Rx Supply  :   Miscellaneous Rx Supply ; Status:   Prescribed ; Ordered As Mnemonic:   CPAP 13 ; Simple Display Line:   See Instructions, Use every night. Have a download in the sleep center in one month., 1 EA ; Ordering Provider:   Michel Fields MD; Catalog Code:   Miscellaneous Rx Supply ; Order Dt/Tm:   1/17/2014 10:40:19 AM CST          nitroglycerin  :   nitroglycerin ; Status:   Prescribed ; Ordered As Mnemonic:   nitroglycerin 0.4 mg sublingual tablet ; Simple Display Line:   0.4 mg, 1 tab(s), SL, q5min, If chest pain not relieved in 5 minutes after first dose, seek immediate medical attention, PRN: for chest pain, 100 tab(s), 3 Refill(s) ; Ordering Provider:   Dexter Silva MD; Catalog Code:   nitroglycerin ; Order Dt/Tm:   10/23/2020 9:05:52 AM CDT            Home Meds    ascorbic acid  :   ascorbic acid ; Status:   Documented ; Ordered As Mnemonic:   Vitamin C ; Simple Display Line:    daily, 0 Refill(s) ; Catalog Code:   ascorbic acid ; Order Dt/Tm:   10/23/2020 8:48:26 AM CDT          omega-3 polyunsaturated fatty acids  :   omega-3 polyunsaturated fatty acids ; Status:   Documented ; Ordered As Mnemonic:   Fish Oil oral capsule ; Simple Display Line:   po, daily, 0 Refill(s) ; Catalog Code:   omega-3 polyunsaturated fatty acids ; Order Dt/Tm:   7/19/2016 8:35:56 AM CDT            ID Risk Screen   Recent Travel History :   No recent travel   Family Member Travel History :   No recent travel   Other Exposure to Infectious Disease :   Unknown   COVID-19 Testing Status :   No positive COVID-19 test   Margo Carrasco CMA - 3/9/2021 11:05 AM CST

## 2022-02-16 NOTE — NURSING NOTE
Hearing Screening Entered On:  10/23/2020 8:52 AM CDT    Performed On:  10/23/2020 8:52 AM CDT by Margo Carrasco CMA               Additional Hearing Screen   Hearing Screen Comments :   Hearing aides   Margo Carrasco CMA - 10/23/2020 8:52 AM CDT

## 2022-02-16 NOTE — TELEPHONE ENCOUNTER
Left message for him to call. Want to give him neg C-19 results.    Terrie has been contacted about negative covid results and will continue to quarantine the full 14 days due to contact with a positive covid result

## 2022-02-16 NOTE — PROGRESS NOTES
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    Participants in room during visit:  Brianda and Bill   Location of patient:  Home  Location of provider:  Clinic (Clinic office or Home office)  Video Start Time:  920  Video End Time:   940    Today's visit was conducted via video conference due to the COVID-19 pandemic.  The patient's consent to proceed with a video visit has been obtained and documented.      Chief Complaint   2020 8:57 AM CDT    DM/HTN med check, Medicaiton refills, Reveiw labs; Verbal permission for video visit        History of Present Illness   Patient is a 74 year old m who is being evaluated via a billable video visit.             The patient presents for follow-up evaluation of diabetes.  The quality of the patient's diabetes is described as being unchanged from previous visit.  Exacerbating factors consist of none.  Relieving factors consist of increased activity and medication.  Glucose results: within target range.  Hemoglobin A1c results: > 6% and within target range.               The patient presents for follow-up evaluation of hypertension.  The quality of hypertension symptom(s) since the patient's last visit is described as being unchanged.  The severity of the hypertension symptom(s) since the last visit is moderate.   Since the patient's last visit, the timing/course of hypertension symptom(s) is constant.  Exacerbating factors consist of none.  Relieving factors consist of medication.        Review of Systems   Constitutional:  Negative.    Eye:  Negative.    Ear/Nose/Mouth/Throat:  Negative.    Respiratory:  Negative.    Cardiovascular:       Chest pain: Stable angina.  Has never used nitro..    Gastrointestinal:  Negative.    Genitourinary:  Negative.    Immunologic:  Negative.    Musculoskeletal:  Negative.    Integumentary:  Negative.    Neurologic:  Negative.    Psychiatric:  Negative.       Health Status   Allergies:    Allergic Reactions (Selected)  No Known Medication Allergies   Medications:  (Selected)   Prescriptions  Prescribed  CPAP 13: CPAP 13, See Instructions, Instructions: Use every night. Have a download in the sleep center in one month., Supply, # 1 EA, 0 Refill(s), Type: Maintenance  FLUoxetine 40 mg oral capsule: = 1 cap(s), Oral, daily, # 90 cap(s), 0 Refill(s), Type: Maintenance, [facility]: [facility], Due for appt in April  FLUoxetine 40 mg oral capsule: See Instructions, Instructions: TAKE 1 CAPSULE BY MOUTH  DAILY, # 90 cap(s), 3 Refill(s), Type: Maintenance, Pharmacy: [facility], TAKE 1 CAPSULE BY MOUTH  DAILY, 67, in, 05/28/20 8:57:00 CDT, Height Measured, 197.2, lb, 05/21/20 9:22:00 CDT...  FREESTYLE LITE      JUAN C: 1 EA, id, daily, # 50 EA, 11 Refill(s), Pharmacy: [id number]  MetFORMIN (Eqv-Glucophage XR) 500 mg oral tablet, extended release: See Instructions, Instructions: TAKE 2 TABLETS BY MOUTH  TWICE DAILY, # 360 tab(s), 3 Refill(s), Type: Maintenance, Pharmacy: [facility], TAKE 2 TABLETS BY MOUTH  TWICE DAILY, 67, in, 05/28/20 8:57:00 CDT, Height Measured, 197.2, lb, 05/21/2...  Norco 5 mg-325 mg oral tablet: 1 tab(s), PO, q4hr, PRN: for pain, # 24 tab(s), 0 Refill(s), Type: Maintenance, Pharmacy: [facility], 1 tab(s) Oral q4 hrs,PRN:for  pain  atorvastatin 40 mg oral tablet: = 1 tab(s), Oral, daily, # 90 tab(s), 0 Refill(s), Type: Maintenance, Pharmacy: [facility], Due for appt in April  atorvastatin 40 mg oral tablet: See Instructions, Instructions: TAKE 1 TABLET BY MOUTH  DAILY, # 90 tab(s), 3 Refill(s), Type: Maintenance, Pharmacy: [facility], TAKE 1 TABLET BY MOUTH  DAILY, 67, in, 05/28/20 8:57:00 CDT, Height Measured, 197.2, lb, 05/21/20 9:22:00 CDT,...  doxazosin 2 mg oral tablet: = 1 tab(s), Oral, daily, # 90 tab(s), 0 Refill(s), Type: Maintenance, Pharmacy: [facility], Due for appt in April  doxazosin 2 mg oral tablet: See Instructions, Instructions: TAKE 1 TABLET BY MOUTH  DAILY, # 90 tab(s), 3 Refill(s), Type: Maintenance, Pharmacy: [facility], TAKE 1 TABLET BY MOUTH  DAILY, 67, in, 05/28/20 8:57:00 CDT, Height Measured, 197.2, lb, 05/21/20 9:22:00 CDT,...  isosorbide mononitrate 30 mg oral tablet, extended release: = 2 tab(s), Oral, qam, # 180 tab(s), 3 Refill(s), Type: Maintenance, Pharmacy: [facility], Due for appt in April, 2 tab(s) Oral qam, 67, in, 05/28/20 8:57:00 CDT, Height Measured, 197.2, lb, 05/21/20 9:22:00 CDT, Weight Measured  lisinopril 5 mg oral tablet: = 1 tab(s) ( 5 mg ), Oral, daily, # 90 tab(s), 3 Refill(s), Type: Maintenance, Pharmacy: [facility], 1 tab(s) Oral daily  lisinopril 5 mg oral tablet: See Instructions, Instructions: TAKE 1 TABLET BY MOUTH  DAILY, # 90 tab(s), 3 Refill(s), Type: Maintenance, Pharmacy: [facility], TAKE 1 TABLET BY MOUTH  DAILY, 67, in, 05/28/20 8:57:00 CDT, Height Measured, 197.2, lb, 05/21/20 9:22:00 CDT,...  nitroglycerin 0.4 mg sublingual tablet: = 1 tab(s) ( 0.4 mg ), SL, q5min, Instructions: If chest pain not relieved in 5 minutes after first dose, seek immediate medical attention, PRN: for chest pain, # 100 tab(s), 3 Refill(s), Type: Maintenance, [facility], This is a 3...  Documented  Medications  Documented  Fish Oil oral capsule: po, daily, 0 Refill(s), Type: Maintenance   Problem list:    All Problems  Obstructive sleep apnea (adult) (pediatric) / SNOMED CT 425563280 / Confirmed  Back pain, chronic / SNOMED CT 910758785 / Confirmed  Type 2 diabetes mellitus without complications / SNOMED CT 955639160 / Confirmed  Atherosclerotic heart disease of native coronary artery without angina pectoris / SNOMED CT 5274021373 / Confirmed  Depression, Recurrent / SNOMED CT 797149740 / Confirmed  BPH (benign prostatic hyperplasia) / SNOMED CT 852493199 / Confirmed  Hyperlipemia / SNOMED CT 187087432 / Confirmed  Essential (primary) hypertension / SNOMED CT 18917920 / Confirmed      Histories   Past Medical History:    Active  Obstructive sleep apnea (adult) (pediatric) (SNOMED CT 041110644): Onset on 2005 at 59 years.  Comments:  10/18/2013 CDT 3:10 PM CDT - Michel Fields MD  AHI 7.7 and REM AHI 22 in 2013 CST 1:54 PM CST Michel Tubbs MD  On 11/10/2013 AHI 18.3 with oximetry susanne 77%. Good/optimal CPAP titration to 12 with 6.5 minutes supine REM  Hyperlipemia (SNOMED CT 706807154)  Type 2 diabetes mellitus without complications (SNOMED CT 842382904)  Atherosclerotic heart disease of native coronary artery without angina pectoris (SNOMED CT 3918705793)  BPH (benign prostatic hyperplasia) (SNOMED CT 526449811)  Depression, Recurrent (SNOMED CT 675225468)  Back pain, chronic (SNOMED CT 865652407)  Essential (primary) hypertension (SNOMED CT 82797588)  Resolved  ACUTE MYOCARDIAL INFARCTION (ICD-9-):  Resolved in  at 61 years.   Family History:    Stroke  Father ()     Procedure history:    Colonoscopy (SNOMED CT 767709489) performed by Shukri Arndt on 2013 at 68 Years.  Comments:  2013 1:13 PM CST Maranda Lea RN, Germaine  Sedation: MAC  Diverticulosis in the sigmoid colon, otherwise normal.  Repeat in 10 years.  Angioplasty (SNOMED CT 4735998) in the  month of 5/2007 at 61 Years.  CABG - Coronary artery bypass graft (SNOMED CT 060238947) in the month of 2/2004 at 58 Years.  Colonoscopy (SNOMED CT 554619826) performed by Aashish Connolly MD on 4/4/2002 at 56 Years.  Comments:  12/9/2010 7:05 AM CST - Isabella Delgado  Repeat in 10 years.  ORIF right hand in 2001 at 56 Years.  Flexible sigmoidoscopy (SNOMED CT 64982615) performed by Castillo Anaya MD on 11/13/1995 at 50 Years.  Comments:  10/16/2013 9:17 AM CDT - Gisele Arevalo  Negative.  ORIF left shoulder in 1975 at 30 Years.   Social History:        Alcohol Assessment: Past            Quit 1974      Tobacco Assessment: Past            Quit 1972      Substance Abuse Assessment: Denies Substance Abuse            Never      Employment and Education Assessment            Retired, Highest education level: High school.      Home and Environment Assessment            Marital status: .  Spouse/Partner name: Brianda.  Living situation: Home/Independent.               Injuries/Abuse/Neglect in household: No.  Feels unsafe at home: No.  Family/Friends available for support:               Yes.      Nutrition and Health Assessment            Type of diet: Regular.  Wants to lose weight: Yes.  Sleeping concerns: No.  Feels highly stressed: No.      Exercise and Physical Activity Assessment: Occasional exercise            Exercise frequency: 1-2 times/week.  Exercise type: Walking.      Sexual Assessment            Sexually active: No.  Identifies as male, History of STD: No.  History of sexual abuse: No.      Other Assessment            Hearing impairment.        Physical Examination   Measurements from flowsheet : Measurements   5/28/2020 8:57 AM CDT    Height Measured - Standard                67 in     General:  Alert and oriented, No acute distress.    Eye:  Pupils are equal, round and reactive to light, Normal conjunctiva.    HENT:  Oral mucosa is moist.    Neck:  Supple.    Respiratory:  Respirations are non-labored.     Psychiatric:  Cooperative, Appropriate mood & affect, Normal judgment.       Impression and Plan   Diagnosis     Type 2 diabetes mellitus without complications (SIS29-YT E11.9).     Depression, Recurrent (RSQ09-FN F33.1).     Atherosclerotic heart disease of native coronary artery without angina pectoris (WDG95-LO I25.10).     Hyperlipemia (QSU81-BK E78.00).     Stable angina (ACJ25-LV I20.8).     Course:  Improving, Progressing as expected.    Orders     Orders (Selected)   Prescriptions  Prescribed  FLUoxetine 40 mg oral capsule: See Instructions, Instructions: TAKE 1 CAPSULE BY MOUTH  DAILY, # 90 cap(s), 3 Refill(s), Type: Maintenance, [facility], TAKE 1 CAPSULE BY MOUTH  DAILY, 67, in, 05/28/20 8:57:00 CDT, Height Measured, 197.2, lb, 05/21/20 9:22:00 CDT...  MetFORMIN (Eqv-Glucophage XR) 500 mg oral tablet, extended release: See Instructions, Instructions: TAKE 2 TABLETS BY MOUTH  TWICE DAILY, # 360 tab(s), 3 Refill(s), Type: Maintenance, [facility], TAKE 2 TABLETS BY MOUTH  TWICE DAILY, 67, in, 05/28/20 8:57:00 CDT, Height Measured, 197.2, lb, 05/21/2...  atorvastatin 40 mg oral tablet: See Instructions, Instructions: TAKE 1 TABLET BY MOUTH  DAILY, # 90 tab(s), 3 Refill(s), Type: Maintenance, [facility], TAKE 1 TABLET BY MOUTH  DAILY, 67, in, 05/28/20 8:57:00 CDT, Height Measured, 197.2, lb, 05/21/20 9:22:00 CDT,...  doxazosin 2 mg oral tablet: See Instructions, Instructions: TAKE 1 TABLET BY MOUTH  DAILY, # 90 tab(s), 3 Refill(s), Type: Maintenance, Pharmacy: [facility], TAKE 1 TABLET BY MOUTH  DAILY, 67, in, 05/28/20 8:57:00 CDT, Height Measured, 197.2, lb, 05/21/20 9:22:00 CDT,...  isosorbide mononitrate 30 mg oral tablet, extended release: = 2 tab(s), Oral, qam, # 180 tab(s), 3 Refill(s), Type: Maintenance, [facility], Due for appt in April, 2 tab(s) Oral qam, 67, in, 05/28/20 8:57:00 CDT, Height Measured, 197.2,  lb, 05/21/20 9:22:00 CDT, Weight Measured  lisinopril 5 mg oral tablet: See Instructions, Instructions: TAKE 1 TABLET BY MOUTH  DAILY, # 90 tab(s), 3 Refill(s), Type: Maintenance, Pharmacy: [facility], TAKE 1 TABLET BY MOUTH  DAILY, 67, in, 05/28/20 8:57:00 CDT, Height Measured, 197.2, lb, 05/21/20 9:22:00 CDT,....     Orders     Orders (Selected)   Outpatient Orders  Ordered  Referral (Request): 05/28/20 9:49:00 CDT, Referred to: Cardiology, Referred to: Dr. Meri Win [facility], Reason for referral: Stable Angina, Stable angina  Return to Clinic (Request): RFV: A1C 1 week prior, Return in 6 months.     Diagnosis     Stable angina (OSQ19-MN I20.8).        Review / Management   Results review:  Lab results   5/21/2020 9:21 AM CDT Sodium Level 140 mmol/L    Potassium Level 4.3 mmol/L    Chloride Level 103 mmol/L    CO2 Level 28 mmol/L    Glucose Level 93 mg/dL    BUN 15 mg/dL    Creatinine Level 0.86 mg/dL    BUN/Creat Ratio NOT APPLICABLE    eGFR 85 mL/min/1.73m2    eGFR African American 98 mL/min/1.73m2    Calcium Level 9.4 mg/dL    Bilirubin Total 0.7 mg/dL    Alkaline Phosphatase 70 unit/L    AST/SGOT 18 unit/L    ALT/SGPT 13 unit/L    Protein Total 6.4 gm/dL    Albumin Level 4.4 gm/dL    Globulin 2.0    A/G Ratio 2.2    Hgb A1c 6.0  HI    Cholesterol 117 mg/dL    Non-HDL Cholesterol 72    HDL 45 mg/dL    Cholesterol/HDL Ratio 2.6    LDL 57    Triglyceride 70 mg/dL    PSA 1.2 ng/mL    U Microalbumin 0.8 mg/dL    Microalbumin Comment See comment    WBC 7.5    RBC 4.44    Hgb 13.1 gm/dL  LOW    Hct 39.4 %    MCV 88.7 fL    MCH 29.5 pg    MCHC 33.2 gm/dL    RDW 12.6 %    Platelet 183    MPV 10.1 fL   .

## 2022-02-16 NOTE — NURSING NOTE
Medicare Visit Entered On:  10/23/2020 8:52 AM CDT    Performed On:  10/23/2020 8:44 AM CDT by Margo Carrasco CMA               Summary   Chief Complaint :   AWV   Advance Directive :   Yes   Menstrual Status :   N/A   Weight Measured :   201 lb(Converted to: 201 lb 0 oz, 91.172 kg)    Height/Length Estimated :   67 in(Converted to: 5 ft 7 in, 170.18 cm)    Systolic Blood Pressure :   177 mmHg (HI)    Diastolic Blood Pressure :   78 mmHg   Mean Arterial Pressure :   111 mmHg   Peripheral Pulse Rate :   61 bpm   BP Site :   Right arm   BP Method :   Electronic   Temperature Tympanic :   97.7 DegF(Converted to: 36.5 DegC)  (LOW)    Margo Carrasco CMA - 10/23/2020 8:44 AM CDT   Health Status   Allergies Verified? :   Yes   Medication History Verified? :   Yes   Immunizations Current :   Yes   Medical History Verified? :   Yes   Pre-Visit Planning Status :   Completed   Tobacco Use? :   Former smoker   Margo Carrasco CMA - 10/23/2020 8:44 AM CDT   Consents   Consent for Immunization Exchange :   Consent Granted   Consent for Immunizations to Providers :   Consent Granted   Margo Carrasco CMA - 10/23/2020 8:44 AM CDT   Meds / Allergies   (As Of: 10/23/2020 8:52:19 AM CDT)   Allergies (Active)   No Known Medication Allergies  Estimated Onset Date:   Unspecified ; Created By:   Deana French CMA; Reaction Status:   Active ; Category:   Drug ; Substance:   No Known Medication Allergies ; Type:   Allergy ; Updated By:   Deana French CMA; Reviewed Date:   10/23/2020 8:47 AM CDT        Medication List   (As Of: 10/23/2020 8:52:19 AM CDT)   Prescription/Discharge Order    acetaminophen-hydrocodone  :   acetaminophen-hydrocodone ; Status:   Prescribed ; Ordered As Mnemonic:   Norco 5 mg-325 mg oral tablet ; Simple Display Line:   1 tab(s), PO, q4hr, PRN: for pain, 24 tab(s), 0 Refill(s) ; Ordering Provider:   Dexter Silva MD; Catalog Code:   acetaminophen-hydrocodone ; Order Dt/Tm:   3/24/2020 1:06:36 PM CDT           atorvastatin  :   atorvastatin ; Status:   Prescribed ; Ordered As Mnemonic:   atorvastatin 40 mg oral tablet ; Simple Display Line:   See Instructions, TAKE 1 TABLET BY MOUTH  DAILY, 90 tab(s), 3 Refill(s) ; Ordering Provider:   Dexter Silva MD; Catalog Code:   atorvastatin ; Order Dt/Tm:   5/28/2020 9:41:31 AM CDT          atorvastatin  :   atorvastatin ; Status:   Prescribed ; Ordered As Mnemonic:   atorvastatin 40 mg oral tablet ; Simple Display Line:   1 tab(s), Oral, daily, 90 tab(s), 0 Refill(s) ; Ordering Provider:   Dexter Silva MD; Catalog Code:   atorvastatin ; Order Dt/Tm:   3/9/2020 8:21:22 AM CDT          doxazosin  :   doxazosin ; Status:   Prescribed ; Ordered As Mnemonic:   doxazosin 2 mg oral tablet ; Simple Display Line:   See Instructions, TAKE 1 TABLET BY MOUTH  DAILY, 90 tab(s), 3 Refill(s) ; Ordering Provider:   Dexter Silva MD; Catalog Code:   doxazosin ; Order Dt/Tm:   5/28/2020 9:41:32 AM CDT          doxazosin  :   doxazosin ; Status:   Prescribed ; Ordered As Mnemonic:   doxazosin 2 mg oral tablet ; Simple Display Line:   1 tab(s), Oral, daily, 90 tab(s), 0 Refill(s) ; Ordering Provider:   Dexter Silva MD; Catalog Code:   doxazosin ; Order Dt/Tm:   3/9/2020 8:21:47 AM CDT          FLUoxetine  :   FLUoxetine ; Status:   Prescribed ; Ordered As Mnemonic:   FLUoxetine 40 mg oral capsule ; Simple Display Line:   See Instructions, TAKE 1 CAPSULE BY MOUTH  DAILY, 90 cap(s), 3 Refill(s) ; Ordering Provider:   Dexter Silva MD; Catalog Code:   FLUoxetine ; Order Dt/Tm:   5/28/2020 9:41:33 AM CDT          FLUoxetine  :   FLUoxetine ; Status:   Prescribed ; Ordered As Mnemonic:   FLUoxetine 40 mg oral capsule ; Simple Display Line:   1 cap(s), Oral, daily, 90 cap(s), 0 Refill(s) ; Ordering Provider:   Dexter Silva MD; Catalog Code:   FLUoxetine ; Order Dt/Tm:   3/9/2020 8:22:03 AM CDT          isosorbide mononitrate  :   isosorbide mononitrate  ; Status:   Prescribed ; Ordered As Mnemonic:   isosorbide mononitrate 30 mg oral tablet, extended release ; Simple Display Line:   2 tab(s), Oral, qam, 180 tab(s), 3 Refill(s) ; Ordering Provider:   Dexter Silva MD; Catalog Code:   isosorbide mononitrate ; Order Dt/Tm:   5/28/2020 9:43:22 AM CDT          lisinopril  :   lisinopril ; Status:   Prescribed ; Ordered As Mnemonic:   lisinopril 5 mg oral tablet ; Simple Display Line:   See Instructions, TAKE 1 TABLET BY MOUTH  DAILY, 90 tab(s), 3 Refill(s) ; Ordering Provider:   Dexter Silva MD; Catalog Code:   lisinopril ; Order Dt/Tm:   5/28/2020 9:41:35 AM CDT          lisinopril  :   lisinopril ; Status:   Prescribed ; Ordered As Mnemonic:   lisinopril 5 mg oral tablet ; Simple Display Line:   5 mg, 1 tab(s), Oral, daily, 90 tab(s), 3 Refill(s) ; Ordering Provider:   Dexter Silva MD; Catalog Code:   lisinopril ; Order Dt/Tm:   6/28/2019 9:42:08 AM CDT          metFORMIN  :   metFORMIN ; Status:   Prescribed ; Ordered As Mnemonic:   MetFORMIN (Eqv-Glucophage XR) 500 mg oral tablet, extended release ; Simple Display Line:   See Instructions, TAKE 2 TABLETS BY MOUTH  TWICE DAILY, 360 tab(s), 3 Refill(s) ; Ordering Provider:   Dexter Silva MD; Catalog Code:   metFORMIN ; Order Dt/Tm:   5/28/2020 9:41:34 AM CDT          Miscellaneous Prescription  :   Miscellaneous Prescription ; Status:   Prescribed ; Ordered As Mnemonic:   FREESTYLE LITE      JUAN C ; Simple Display Line:   1 EA, id, daily, 50 EA ; Ordering Provider:   Dexter Silva MD; Catalog Code:   Miscellaneous Prescription ; Order Dt/Tm:   4/9/2010 10:09:35 AM CDT          Miscellaneous Rx Supply  :   Miscellaneous Rx Supply ; Status:   Prescribed ; Ordered As Mnemonic:   CPAP 13 ; Simple Display Line:   See Instructions, Use every night. Have a download in the sleep center in one month., 1 EA ; Ordering Provider:   Michel Fields MD; Catalog Code:   Miscellaneous Rx  Supply ; Order Dt/Tm:   1/17/2014 10:40:19 AM CST          nitroglycerin  :   nitroglycerin ; Status:   Prescribed ; Ordered As Mnemonic:   nitroglycerin 0.4 mg sublingual tablet ; Simple Display Line:   0.4 mg, 1 tab(s), SL, q5min, If chest pain not relieved in 5 minutes after first dose, seek immediate medical attention, PRN: for chest pain, 100 tab(s), 3 Refill(s) ; Ordering Provider:   Dexter Silva MD; Catalog Code:   nitroglycerin ; Order Dt/Tm:   3/11/2019 7:55:41 AM CDT            Home Meds    ascorbic acid  :   ascorbic acid ; Status:   Documented ; Ordered As Mnemonic:   Vitamin C ; Simple Display Line:   daily, 0 Refill(s) ; Catalog Code:   ascorbic acid ; Order Dt/Tm:   10/23/2020 8:48:26 AM CDT          omega-3 polyunsaturated fatty acids  :   omega-3 polyunsaturated fatty acids ; Status:   Documented ; Ordered As Mnemonic:   Fish Oil oral capsule ; Simple Display Line:   po, daily, 0 Refill(s) ; Catalog Code:   omega-3 polyunsaturated fatty acids ; Order Dt/Tm:   7/19/2016 8:35:56 AM CDT            Geriatric Depression Screening   Geriatric Depression Satisfied Life :   Yes   Geriatric Depression Dropped Activities :   No   Geriatric Depression Life Empty :   No   Geriatric Depression Bored :   No   Geriatric Depression Good Spirits :   Yes   Geriatric Depression Afraid Bad Things :   No   Geriatric Depression Feel Happy :   Yes   Geriatric Depression Feel Helpless :   No   Geriatric Depression Prefer to Stay Home :   Yes   Geriatric Depression Memory Problems :   Yes   Geriatric Depression Wonderful Be Alive :   Yes   Geriatric Depression Feel Worthless :   No   Geriatric Depression Situation Hopeless :   No   Geriatric Depression Others Better Off :   No   Geriatric Depression Full of Energy :   No   Geriatric Depression Total Score :   3    Margo Carrasco CMA - 10/23/2020 8:44 AM CDT   Home Safety Screen   Emergency Numbers Kept/Updated :   Yes   Aware of Smoking Dangers :   Yes   Smoke  Alarms/Fire Extinguisher Available :   Yes   Household Members Fire Safety Knowledge :   Yes   Firearms Unloaded and Secure :   Yes   Floor Rugs Removed or Fastened :   Yes   Mats in Bathtub/Shower :   No   Stairway Rails or Banisters :   Yes   Outdoor Clutter Safety :   Yes   Indoor Clutter Safety :   Yes   Electrical Cord Safety :   Yes   Margo Carrasco CMA - 10/23/2020 8:44 AM CDT   Advance Directives   Advance Directive :   Yes   Margo Carrasco CMA - 10/23/2020 8:44 AM CDT   Valdez Fall Risk   History of Fall in Last 3 Months Valdez :   No   Luna BURKETT Margo - 10/23/2020 8:44 AM CDT   Functional Assessment   Focused Functional Assessment Grid   Bathing :   Independent (2)   Dressing :   Independent (2)   Toileting :   Independent (2)   Transferring Bed or Chair :   Independent (2)   Continence :   Independent (2)   Feeding :   Independent (2)   Margo Carrasco CMA - 10/23/2020 8:44 AM CDT   ADL Index Score :   12    Capable of Shopping :   Yes   Capable of Walking :   Yes   Capable of Housekeeping :   Yes   Capable of Managing Medications :   Yes   Capable of Handling Finances :   Yes   Margo Carrasco CMA - 10/23/2020 8:44 AM CDT

## 2022-02-16 NOTE — NURSING NOTE
Diabetes Eye Testing Entered On:  10/1/2021 11:33 AM CDT    Performed On:  9/21/2021 11:33 AM CDT by Dominique Gutierrez               Diabetes Eye Testing   Retinopathy Present TR :   No   Dilated Retinal Exam Date TR :   9/21/2021 CDT   Dominique Gutierrez - 10/1/2021 11:33 AM CDT

## 2022-02-16 NOTE — LETTER
(Inserted Image. Unable to display)     [address] [place] [address] [phone] (phone)  [phone] (fax)  PIOTR CORREIA    [facility] [place], [place] [id number]        Dear PIOTR,    Thank you for selecting our practice as your care provider. Below you will find the results and recent diagnostic testings outcomes we have reviewed.       These are acceptable, please keep scheduled follow up appointments.      Result Name Current Result Previous Result Reference Range   Sodium Level (mmol/L)  139 3/7/2019  142 2/26/2019 135 - 146   Potassium Level (mmol/L)  3.8 3/7/2019  3.7 2/26/2019 3.5 - 5.3   Chloride Level (mmol/L)  103 3/7/2019  102 2/26/2019 98 - 110   CO2 Level (mmol/L)  30 3/7/2019 ((H)) 34 2/26/2019 20 - 32   Glucose Level (mg/dL) ((H)) 151 3/7/2019 ((H)) 113 2/26/2019 65 - 99   BUN (mg/dL)  13 3/7/2019  10 2/26/2019 7 - 25   Creatinine Level (mg/dL)  0.88 3/7/2019  0.82 2/26/2019 0.70 - 1.18   eGFR (mL/min/1.73m2)  85 3/7/2019  87 2/26/2019 > OR = 60 -    eGFR  (mL/min/1.73m2)  98 3/7/2019  101 2/26/2019 > OR = 60 -    Calcium Level (mg/dL)  9.5 3/7/2019  9.6 2/26/2019 8.6 - 10.3   Bilirubin Total (mg/dL)  0.7 3/7/2019  0.6 2/26/2019 0.2 - 1.2   Alkaline Phosphatase (unit/L)  66 3/7/2019  71 2/26/2019 40 - 115   AST/SGOT (unit/L)  20 3/7/2019 ((H)) 52 2/26/2019 10 - 35   ALT/SGPT (unit/L)  25 3/7/2019 ((H)) 71 2/26/2019 9 - 46   Protein Total (gm/dL)  6.1 3/7/2019  6.2 2/26/2019 6.1 - 8.1   Albumin Level (gm/dL)  4.2 3/7/2019  4.0 2/26/2019 3.6 - 5.1   Globulin  1.9 3/7/2019  2.2 2/26/2019 1.9 - 3.7   A/G Ratio  2.2 3/7/2019  1.8 2/26/2019 1.0 - 2.5   Lipase Level (unit/L)  46 3/7/2019 ((H)) 171 2/26/2019 7 - 60       Please contact my practice at the number listed above if you have any questions or concerns.     Sincerely,        Dexter Silva MD, FAAFP

## 2022-02-16 NOTE — TELEPHONE ENCOUNTER
---------------------  From: Reina Reece RN (Phone Messages Pool ([id number]))   To: T Message Pool ([id number]);     Sent: 9/2/2021 9:12:47 AM CDT  Subject: pneumonia       PCP: CHT     Time of Call: 8;58am       Person Calling: Brianda  Phone number:  [phone]    Note:  Vm received from Brianda, stating pt is back on prednisone for recurrent pneumonia and requested direction on insulin dosing.    Please advise.     Last office visit and reason: 8/20/21 anemia CHT---------------------  From: Margo Carrasco CMA (T Message Pool ([id number]))   To: Dexter Silva MD;     Sent: 9/2/2021 9:20:05 AM CDT  Subject: FW: pneumonia     Prednisone imported to med list. Advise on insulin---------------------  From: Dexter Silva MD   To: T Message Pool ([id number]);     Sent: 9/2/2021 9:36:51 AM CDT  Subject: RE: pneumonia     will monitor sugars

## 2022-02-16 NOTE — NURSING NOTE
Comprehensive Intake Entered On:  5/14/2021 10:29 AM CDT    Performed On:  5/14/2021 10:17 AM CDT by Margo Carrasco CMA               Summary   Chief Complaint :   Pneumonia and anemia follow up.    Advance Directive :   Yes   Menstrual Status :   N/A   Weight Measured :   181 lb(Converted to: 181 lb 0 oz, 82.100 kg)    Height/Length Estimated :   67 in(Converted to: 5 ft 7 in, 170.18 cm)    Systolic Blood Pressure :   120 mmHg   Diastolic Blood Pressure :   56 mmHg (LOW)    Mean Arterial Pressure :   77 mmHg   Peripheral Pulse Rate :   84 bpm   BP Site :   Right arm   BP Method :   Manual   Temperature Tympanic :   98.3 DegF(Converted to: 36.8 DegC)    Oxygen Saturation :   95 %   Margo Carrasco CMA - 5/14/2021 10:17 AM CDT   Health Status   Allergies Verified? :   Yes   Medication History Verified? :   Yes   Immunizations Current :   Yes   Medical History Verified? :   Yes   Pre-Visit Planning Status :   Completed   Tobacco Use? :   Former smoker   Margo Carrasco CMA - 5/14/2021 10:17 AM CDT   Consents   Consent for Immunization Exchange :   Consent Granted   Consent for Immunizations to Providers :   Consent Granted   Margo Carrasco CMA - 5/14/2021 10:17 AM CDT   Meds / Allergies   (As Of: 5/14/2021 10:29:05 AM CDT)   Allergies (Active)   No Known Medication Allergies  Estimated Onset Date:   Unspecified ; Created By:   Deana French CMA; Reaction Status:   Active ; Category:   Drug ; Substance:   No Known Medication Allergies ; Type:   Allergy ; Updated By:   Deana French CMA; Reviewed Date:   5/14/2021 10:20 AM CDT        Medication List   (As Of: 5/14/2021 10:29:05 AM CDT)   Prescription/Discharge Order    acetaminophen-hydrocodone  :   acetaminophen-hydrocodone ; Status:   Prescribed ; Ordered As Mnemonic:   Norco 5 mg-325 mg oral tablet ; Simple Display Line:   1 tab(s), PO, q4hr, PRN: for pain, 24 tab(s), 0 Refill(s) ; Ordering Provider:   Dexter Silva MD; Catalog Code:   acetaminophen-hydrocodone ;  Order Dt/Tm:   3/24/2020 1:06:36 PM CDT          atorvastatin  :   atorvastatin ; Status:   Prescribed ; Ordered As Mnemonic:   atorvastatin 40 mg oral tablet ; Simple Display Line:   40 mg, 1 tab(s), Oral, daily, 90 tab(s), 0 Refill(s) ; Ordering Provider:   Dexter Silva MD; Catalog Code:   atorvastatin ; Order Dt/Tm:   4/29/2021 7:07:58 AM CDT          doxazosin  :   doxazosin ; Status:   Prescribed ; Ordered As Mnemonic:   doxazosin 2 mg oral tablet ; Simple Display Line:   2 mg, 1 tab(s), Oral, daily, 90 tab(s), 0 Refill(s) ; Ordering Provider:   Dexter Silva MD; Catalog Code:   doxazosin ; Order Dt/Tm:   4/29/2021 7:08:22 AM CDT          FLUoxetine  :   FLUoxetine ; Status:   Prescribed ; Ordered As Mnemonic:   FLUoxetine 40 mg oral capsule ; Simple Display Line:   40 mg, 1 cap(s), Oral, daily, 90 cap(s), 0 Refill(s) ; Ordering Provider:   Dexter Silva MD; Catalog Code:   FLUoxetine ; Order Dt/Tm:   4/29/2021 7:08:50 AM CDT          isosorbide mononitrate  :   isosorbide mononitrate ; Status:   Prescribed ; Ordered As Mnemonic:   isosorbide mononitrate 30 mg oral tablet, extended release ; Simple Display Line:   2 tab(s), Oral, qam, 180 tab(s), 3 Refill(s) ; Ordering Provider:   Dexter Silva MD; Catalog Code:   isosorbide mononitrate ; Order Dt/Tm:   5/28/2020 9:43:22 AM CDT          lisinopril  :   lisinopril ; Status:   Prescribed ; Ordered As Mnemonic:   lisinopril 10 mg oral tablet ; Simple Display Line:   10 mg, 1 tab(s), Oral, daily, 90 tab(s), 3 Refill(s) ; Ordering Provider:   Dexter Silva MD; Catalog Code:   lisinopril ; Order Dt/Tm:   10/23/2020 9:21:34 AM CDT          metFORMIN  :   metFORMIN ; Status:   Prescribed ; Ordered As Mnemonic:   MetFORMIN (Eqv-Glucophage XR) 500 mg oral tablet, extended release ; Simple Display Line:   1,000 mg, 2 tab(s), Oral, bid, 360 tab(s), 0 Refill(s) ; Ordering Provider:   Dexter Silva MD; Catalog Code:    metFORMIN ; Order Dt/Tm:   4/29/2021 7:09:14 AM CDT          Miscellaneous Prescription  :   Miscellaneous Prescription ; Status:   Prescribed ; Ordered As Mnemonic:   FREESTYLE LITE      JUAN C ; Simple Display Line:   1 EA, id, daily, 50 EA ; Ordering Provider:   Dexter Silva MD; Catalog Code:   Miscellaneous Prescription ; Order Dt/Tm:   4/9/2010 10:09:35 AM CDT          Miscellaneous Rx Supply  :   Miscellaneous Rx Supply ; Status:   Prescribed ; Ordered As Mnemonic:   CPAP 13 ; Simple Display Line:   See Instructions, Use every night. Have a download in the sleep center in one month., 1 EA ; Ordering Provider:   Michel Fields MD; Catalog Code:   Miscellaneous Rx Supply ; Order Dt/Tm:   1/17/2014 10:40:19 AM CST          nitroglycerin  :   nitroglycerin ; Status:   Prescribed ; Ordered As Mnemonic:   nitroglycerin 0.4 mg sublingual tablet ; Simple Display Line:   0.4 mg, 1 tab(s), SL, q5min, If chest pain not relieved in 5 minutes after first dose, seek immediate medical attention, PRN: for chest pain, 100 tab(s), 3 Refill(s) ; Ordering Provider:   Dexter Silva MD; Catalog Code:   nitroglycerin ; Order Dt/Tm:   10/23/2020 9:05:52 AM CDT            Home Meds    apixaban  :   apixaban ; Status:   Documented ; Ordered As Mnemonic:   Eliquis 5 mg oral tablet ; Simple Display Line:   5 mg, Oral, bid, 60 tab(s), 0 Refill(s) ; Catalog Code:   apixaban ; Order Dt/Tm:   5/6/2021 9:51:12 AM CDT          ascorbic acid  :   ascorbic acid ; Status:   Documented ; Ordered As Mnemonic:   Vitamin C ; Simple Display Line:   daily, 0 Refill(s) ; Catalog Code:   ascorbic acid ; Order Dt/Tm:   10/23/2020 8:48:26 AM CDT          aspirin  :   aspirin ; Status:   Documented ; Ordered As Mnemonic:   aspirin ; Simple Display Line:   81mg tab po daily, 0 Refill(s) ; Catalog Code:   aspirin ; Order Dt/Tm:   3/19/2021 2:43:15 PM CDT          fluconazole  :   fluconazole ; Status:   Documented ; Ordered As Mnemonic:    fluconazole 200 mg oral tablet ; Simple Display Line:   200 mg, 1 tab(s), Oral, daily, 0 Refill(s) ; Catalog Code:   fluconazole ; Order Dt/Tm:   5/14/2021 10:23:42 AM CDT          insulin isophane (NPH)  :   insulin isophane (NPH) ; Status:   Documented ; Ordered As Mnemonic:   insulin isophane (NPH) 100 units/mL human recombinant subcutaneous suspension ; Simple Display Line:   30 units, Subcutaneous, daily, 0 Refill(s) ; Catalog Code:   insulin isophane (NPH) ; Order Dt/Tm:   5/14/2021 10:24:40 AM CDT          Miscellaneous Prescription  :   Miscellaneous Prescription ; Status:   Documented ; Ordered As Mnemonic:   Iron ; Simple Display Line:   325mg daily, 0 Refill(s) ; Catalog Code:   Miscellaneous Prescription ; Order Dt/Tm:   3/26/2021 10:27:51 AM CDT          Miscellaneous Prescription  :   Miscellaneous Prescription ; Status:   Documented ; Ordered As Mnemonic:   Oxygen Air Delivery System ; Simple Display Line:   2 Liters. Length of need: 3 months, 0 Refill(s) ; Catalog Code:   Miscellaneous Prescription ; Order Dt/Tm:   3/26/2021 9:39:48 AM CDT          omega-3 polyunsaturated fatty acids  :   omega-3 polyunsaturated fatty acids ; Status:   Documented ; Ordered As Mnemonic:   Fish Oil oral capsule ; Simple Display Line:   po, daily, 0 Refill(s) ; Catalog Code:   omega-3 polyunsaturated fatty acids ; Order Dt/Tm:   7/19/2016 8:35:56 AM CDT          pantoprazole  :   pantoprazole ; Status:   Documented ; Ordered As Mnemonic:   pantoprazole 40 mg oral delayed release tablet ; Simple Display Line:   40 mg, 1 tab(s), Oral, daily, 90 tab(s), 0 Refill(s) ; Catalog Code:   pantoprazole ; Order Dt/Tm:   5/14/2021 10:23:42 AM CDT          predniSONE  :   predniSONE ; Status:   Documented ; Ordered As Mnemonic:   predniSONE 20 mg oral tablet ; Simple Display Line:   40 mg, 2 tab(s), 0 Refill(s) ; Catalog Code:   predniSONE ; Order Dt/Tm:   5/6/2021 9:51:12 AM CDT          sulfamethoxazole-trimethoprim  :    sulfamethoxazole-trimethoprim ; Status:   Documented ; Ordered As Mnemonic:   sulfamethoxazole-trimethoprim 400 mg-80 mg oral tablet ; Simple Display Line:   80 mg, Oral, daily, 0 Refill(s) ; Catalog Code:   sulfamethoxazole-trimethoprim ; Order Dt/Tm:   5/6/2021 9:50:14 AM CDT            ID Risk Screen   Recent Travel History :   No recent travel   Family Member Travel History :   No recent travel   Other Exposure to Infectious Disease :   Unknown   COVID-19 Testing Status :   No positive COVID-19 test   Margo Carrasco CMA - 5/14/2021 10:17 AM CDT

## 2022-02-16 NOTE — PROGRESS NOTES
Patient:   PIOTR CORREIA            MRN: [id number]            FIN: [id number]               Age:   75 years     Sex:  Male     :  1945   Associated Diagnoses:   Community acquired pneumonia; Anemia; PE (pulmonary thromboembolism)   Author:   Dexter Silva MD      Chief Complaint   2021 9:42 AM CDT     Hospital follow up, [facility]     Chief complaint and symptoms noted above confirmed with patient.      History of Present Illness             The patient presents for follow-up evaluation of pneumonia symptom(s).  The quality of the patient's pneumonia since the last visit is described as being unchanged from previous visit.  The symptom(s) of pneumonia are constant.  Exacerbating factors consist of allergen exposure and recent illness.  Relieving factors consist of medication, oxygen and sitting upright.  Associated symptoms consist of back pain, chest pain, dyspnea, fatigue, shortness of breath, Had PE on Eliquis, denies chills, denies fever, denies diaphoresis, denies hemoptysis and denies nasal congestion.        Review of Systems   Constitutional:  Negative.    Ear/Nose/Mouth/Throat:  Negative.    Respiratory:  Negative except as documented in history of present illness.    Cardiovascular:  Negative.    Gastrointestinal:  Negative.       Health Status   Allergies:    Allergic Reactions (Selected)  No Known Medication Allergies   Medications:  (Selected)   Prescriptions  Prescribed  CPAP 13: CPAP 13, See Instructions, Instructions: Use every night. Have a download in the sleep center in one month., Supply, # 1 EA, 0 Refill(s), Type: Maintenance  FLUoxetine 40 mg oral capsule: = 1 cap(s) ( 40 mg ), Oral, daily, # 90 cap(s), 0 Refill(s), Type: Maintenance, Pharmacy: [facility], 1 cap(s) Oral daily, 67, in, 20 8:57:00 CDT, Height Measured, 198, lb, 21 9:34:00 CDT, Weight Measured  FREESTYLE LITE      JUAN C: 1 EA, id, daily, # 50 EA, 11 Refill(s), Pharmacy: [id number]  MetFORMIN (Eqv-Glucophage XR) 500 mg oral tablet, extended release: = 2 tab(s) ( 1,000 mg ), Oral, bid, # 360 tab(s), 0 Refill(s), Type: Maintenance, Pharmacy: [facility], 2 tab(s) Oral bid, 67, in, 05/28/20 8:57:00 CDT, Height Measured, 198, lb, 04/02/21 9:34:00 CDT, Weight Measured  Norco 5 mg-325 mg oral tablet: 1 tab(s), PO, q4hr, PRN: for pain, # 24 tab(s), 0 Refill(s), Type: Maintenance, Pharmacy: [facility], 1 tab(s) Oral q4 hrs,PRN:for pain  atorvastatin 40 mg oral tablet: = 1 tab(s) ( 40 mg ), Oral, daily, # 90 tab(s), 0 Refill(s), Type: Maintenance, Pharmacy: [facility], 1 tab(s) Oral daily, 67, in, 05/28/20 8:57:00 CDT, Height Measured, 198, lb, 04/02/21 9:34:00 CDT, Weight Measured  doxazosin 2 mg oral tablet: = 1 tab(s) ( 2 mg ), Oral, daily, # 90 tab(s), 0 Refill(s), Type: Maintenance, Pharmacy: [facility], 1 tab(s) Oral daily, 67, in, 05/28/20 8:57:00 CDT, Height Measured, 198, lb, 04/02/21 9:34:00 CDT, Weight Measured  isosorbide mononitrate 30 mg oral tablet, extended release: = 2 tab(s), Oral, qam, # 180 tab(s), 3 Refill(s), Type: Maintenance, Pharmacy: [facility], Due for appt in April, 2 tab(s) Oral qam, 67, in, 05/28/20 8:57:00 CDT, Height Measured, 197.2, lb, 05/21/20 9:22:00 CDT, Weight Measured  lisinopril 10 mg oral tablet: = 1 tab(s) ( 10 mg ), Oral, daily, # 90 tab(s), 3 Refill(s), Type: Maintenance, Pharmacy: [facility], 1 tab(s) Oral daily, 67, in, 05/28/20 8:57:00 CDT, Height Measured, 201, lb, 10/23/20 8:44:00 CDT, Weight Measured  nitroglycerin 0.4 mg sublingual tablet: = 1 tab(s) ( 0.4 mg ), SL, q5min, Instructions: If chest pain not relieved in 5 minutes after first dose, seek immediate medical attention, PRN: for chest pain, # 100 tab(s), 3 Refill(s), Type: Maintenance, Pharmacy: [facility], This is a 3...  Documented Medications  Documented  Eliquis 5 mg oral tablet: ( 5 mg ), Oral, bid, # 60  tab(s), 0 Refill(s), Type: Maintenance  Fish Oil oral capsule: po, daily, 0 Refill(s), Type: Maintenance  Iron: Iron, Instructions: 325mg daily, 0 Refill(s), Type: Maintenance  Oxygen Air Delivery System: Oxygen Air Delivery System, Instructions: 2 Liters. Length of need: 3 months, 0 Refill(s), Type: Maintenance  Vitamin C: daily, 0 Refill(s), Type: Maintenance  aspirin: Instructions: 81mg tab po daily, 0 Refill(s), Type: Maintenance  predniSONE 20 mg oral tablet: = 2 tab(s) ( 40 mg ), 0 Refill(s), Type: Maintenance  sulfamethoxazole-trimethoprim 400 mg-80 mg oral tablet: ( 80 mg ), Oral, daily, 0 Refill(s), Type: Maintenance   Problem list:    All Problems (Selected)  Obstructive sleep apnea (adult) (pediatric) / SNOMED CT 091123860 / Confirmed  Back pain, chronic / SNOMED CT 856137850 / Confirmed  Type 2 diabetes mellitus without complications / SNOMED CT 575932208 / Confirmed  Obesity, unspecified / SNOMED CT 9131317819 / Probable  Atherosclerotic heart disease of native coronary artery without angina pectoris / SNOMED CT 8789682399 / Confirmed  Depression, Recurrent / SNOMED CT 631702752 / Confirmed  BPH (benign prostatic hyperplasia) / SNOMED CT 507729510 / Confirmed  Hyperlipemia / SNOMED CT 031742955 / Confirmed  Essential (primary) hypertension / SNOMED CT 76723271 / Confirmed      Histories   Past Medical History:    Active  Obstructive sleep apnea (adult) (pediatric) (SNOMED CT 164494564): Onset on 6/28/2005 at 59 years.  Comments:  10/18/2013 CDT 3:10 PM CDT Michel Tubbs MD  AHI 7.7 and REM AHI 22 in 2005 11/25/2013 CST 1:54 PM CST Michel Tubbs MD  On 11/10/2013 AHI 18.3 with oximetry susanne 77%. Good/optimal CPAP titration to 12 with 6.5 minutes supine REM  Hyperlipemia (SNOMED CT 211032415)  Type 2 diabetes mellitus without complications (SNOMED CT 301016922)  Atherosclerotic heart disease of native coronary artery without angina pectoris (SNOMED CT 3637486711)  BPH (benign prostatic  hyperplasia) (SNOMED CT 272268996)  Depression, Recurrent (SNOMED CT 987039377)  Obesity, unspecified (SNOMED CT 7824482781)  Back pain, chronic (SNOMED CT 549421612)  Essential (primary) hypertension (SNOMED CT 64938110)  Resolved  Inpatient stay (SNOMED CT 432622052): Onset on 3/20/2021 at 75 years.  Resolved on 3/22/2021 at 75 years.  Comments:  3/25/2021 CDT 10:53 AM CDT - Isabella Delgado  [facility], [place] - Pneumonia of both lungs due to infectious organism.  ACUTE MYOCARDIAL INFARCTION (ICD-9-):  Resolved in  at 61 years.   Family History:    Hypertension  Father ()  Heart disease  Father ()  Alcohol abuse  Mother ()  Stroke  Father ()  Liver disease  Mother ()     Procedure history:    Angiogram (SNOMED CT 514950035) in 2017 at 72 Years.  Colonoscopy (SNOMED CT 541473233) performed by Shukri Arndt on 2013 at 68 Years.  Comments:  2013 1:13 PM CST - Leonora WILLS, Germaine  Sedation: MAC  Diverticulosis in the sigmoid colon, otherwise normal.  Repeat in 10 years.  Angioplasty (SNOMED CT 6567539) in the month of 2007 at 61 Years.  CABG - Coronary artery bypass graft (SNOMED CT 055887848) in the month of 2004 at 58 Years.  Colonoscopy (SNOMED CT 693164814) performed by Aashish Connolly MD on 2002 at 56 Years.  Comments:  2010 7:05 AM CST - Isabella Delgado  Repeat in 10 years.  ORIF right hand in  at 56 Years.  Flexible sigmoidoscopy (SNOMED CT 44006264) performed by Castillo Anaya MD on 1995 at 50 Years.  Comments:  10/16/2013 9:17 AM CDT - Gisele Arevalo  Negative.  ORIF left shoulder in  at 30 Years.      Physical Examination   Vital Signs   2021 9:42 AM CDT Temperature Tympanic 98.8 DegF    Peripheral Pulse Rate 10 bpm  LOW    Respiratory Rate 20 br/min    Systolic Blood Pressure 118 mmHg    Diastolic Blood Pressure 52 mmHg  LOW    Mean Arterial Pressure 74 mmHg    BP Site Right arm    BP Method Manual    Oxygen  Saturation 92 %  LOW      Measurements from flowsheet : Measurements   5/6/2021 9:42 AM CDT Height/Length Estimated 67 in    Weight Measured - Standard 185 lb      Documented vital signs:       Pulse: 100  beats per min.    General:  Alert and oriented, No acute distress.    Eye:  Normal conjunctiva.    HENT:  Normocephalic, Tympanic membranes are clear.    Neck:  Supple, Non-tender.    Respiratory:  Respirations are non-labored, Breath sounds are equal.         Breath sounds: Bilateral, Diminished.    Cardiovascular:  Normal rate, Regular rhythm, No murmur.       Review / Management   Results review:  Pending.       Impression and Plan   Diagnosis     Community acquired pneumonia (WSN03-GO J18.9).     PE (pulmonary thromboembolism) (DXD20-OQ I26.99).     Anemia (EXE95-AO D64.9).     Course:  Improving, Progressing as expected.    Orders     Orders   Lab (Gen Lab  Reference Lab):  CBC (includes diff/plt)* ([facility]) (Order): Specimen Type: Blood, Collection Date: 5/6/2021 10:09 AM CDT.     Orders   Requests (Return to Office):  Return to Clinic (Request) (Order): Return in 2 weeks.        Professional Services   Counseling Summary:  This was a 40 minute visit with greater than 50% of that time spent counseling the patient, and coordination of care..    Counseling included abnormal lab results, differential diagnoses, treatment options, and risks and benefits.    The patient was interactive, attentive, asked questions, and verbalized understanding.    Family present included spouse.

## 2022-02-16 NOTE — TELEPHONE ENCOUNTER
Entered by Rubi Wallace MA on February 26, 2019 3:07:02 PM CST  ---------------------  From: Rubi Wallace MA   To: [facility]    Sent: 2/26/2019 3:07:02 PM CST  Subject: Medication Management     ** Not Approved: Refill not appropriate, Filled until march patient said he did not need refills yet. **  isosorbide mononitrate (ISOSORBIDE MONONITRATE  30MG  TAB  EXTENDED RELEASE)  TAKE 2 TABLETS BY MOUTH  EVERY MORNING  Qty:  180 tab(s)        Days Supply:  90        Refills:  0          Substitutions Allowed     Route To Pharmacy - [facility]   Signed by Rubi Wallace MA    ** Not Approved: Refill not appropriate, Filled until march patient said he did not need refills yet. **  FLUoxetine (FLUOXETINE  40MG  CAP)  TAKE 1 CAPSULE BY MOUTH  DAILY  Qty:  90 cap(s)        Days Supply:  90        Refills:  0          Substitutions Allowed     Route To Pharmacy - [facility]   Signed by Rubi Wallace MA    ** Not Approved: Refill not appropriate, Filled until march patient said he did not need refills yet. **  hydrochlorothiazide-lisinopril (LISINOPRIL-HCTZ 20-25MG TABLET)  TAKE 1 TABLET BY MOUTH  DAILY  Qty:  90 tab(s)        Days Supply:  90        Refills:  0          Substitutions Allowed     Route To Pharmacy - [facility]   Signed by Rubi Wallace MA    ** Not Approved: Refill not appropriate, Filled until march patient said he did not need refills yet. **  Freetext Med (METFORMIN ER 500MG TABLET)  TAKE 2 TABLETS BY MOUTH TWO TIMES DAILY  Qty:  360 tab(s)        Days Supply:  90        Refills:  0          Substitutions Allowed     Route To Pharmacy - [facility]   Signed by Rubi Wallace MA    ** Not Approved: Refill not appropriate, Filled until march patient said he did not need refills yet. **  doxazosin (DOXAZOSIN  2MG  TAB  MES)  TAKE 1 TABLET BY MOUTH  DAILY  Qty:  90 tab(s)        Days Supply:  90        Refills:  0          Substitutions Allowed      Route To Pharmacy - [facility]   Signed by Rubi Wallace MA            ------------------------------------------  From: [facility]  To: Dexter Silva MD  Sent: February 24, 2019 3:28:26 AM CST  Subject: Medication Management  Due: February 25, 2019 3:28:26 AM CST    ** On Hold Pending Signature **  Drug: isosorbide mononitrate (Imdur 30 mg oral tablet, extended release)  TAKE 2 TABLETS BY MOUTH  EVERY MORNING  Quantity: 180 tab(s)    Days Supply: 90        Refills: 0  Substitutions Allowed  Notes from Pharmacy: - First Attempt Ref: [id number]    Dispensed Drug: isosorbide mononitrate (isosorbide mononitrate 30 mg oral tablet, extended release)  TAKE 2 TABLETS BY MOUTH  EVERY MORNING  Quantity: 180 tab(s)    Days Supply: 90        Refills: 0  Substitutions Allowed  Notes from Pharmacy:     ** On Hold Pending Signature **  Drug: hydrochlorothiazide-lisinopril (hydrochlorothiazide-lisinopril 25 mg-20 mg oral tablet)  TAKE 1 TABLET BY MOUTH  DAILY  Quantity: 90 tab(s)     Days Supply: 90        Refills: 0  Substitutions Allowed  Notes from Pharmacy: - First Attempt Ref: [id number]    Dispensed Drug: hydrochlorothiazide-lisinopril (hydrochlorothiazide-lisinopril 25 mg-20 mg oral tablet)  TAKE 1 TABLET BY MOUTH  DAILY  Quantity: 90 tab(s)     Days Supply: 90        Refills: 0  Substitutions Allowed  Notes from Pharmacy:     ** On Hold Pending Signature **  Drug: metFORMIN (metFORMIN 500 mg oral tablet, extended release)  TAKE 2 TABLETS BY MOUTH TWO TIMES DAILY  Quantity: 360 tab(s)    Days Supply: 90        Refills: 0  Substitutions Allowed  Notes from Pharmacy: - First Attempt Ref: [id number]    Dispensed Drug: METFORMIN ER 500MG TABLET  TAKE 2 TABLETS BY MOUTH TWO TIMES DAILY  Quantity: 360 tab(s)    Days Supply: 90        Refills: 0  Substitutions Allowed  Notes from Pharmacy:     ** On Hold Pending Signature **  Drug: doxazosin (doxazosin 2 mg oral tablet)  TAKE 1 TABLET BY MOUTH   DAILY  Quantity: 90 tab(s)     Days Supply: 90        Refills: 0  Substitutions Allowed  Notes from Pharmacy: - First Attempt Ref: [id number]    Dispensed Drug: doxazosin (doxazosin 2 mg oral tablet)  TAKE 1 TABLET BY MOUTH  DAILY  Quantity: 90 tab(s)     Days Supply: 90        Refills: 0  Substitutions Allowed  Notes from Pharmacy:     ** On Hold Pending Signature **  Drug: FLUoxetine (FLUoxetine 40 mg oral capsule)  TAKE 1 CAPSULE BY MOUTH  DAILY  Quantity: 90 cap(s)     Days Supply: 90        Refills: 0  Substitutions Allowed  Notes from Pharmacy: - First Attempt Ref: [id number]    Dispensed Drug: FLUoxetine (FLUoxetine 40 mg oral capsule)  TAKE 1 CAPSULE BY MOUTH  DAILY  Quantity: 90 cap(s)     Days Supply: 90        Refills: 0  Substitutions Allowed  Notes from Pharmacy:   ------------------------------------------

## 2022-02-16 NOTE — TELEPHONE ENCOUNTER
---------------------  From: Kathe Stack CMA (Phone Messages [employer] ([id number] [email or web address])   To: Aj Esteban MD;     Sent: 7/12/2021 4:49:17 PM CDT  Subject: phone note- HGB check     Phone Message    PCP:    CHT      Time of Call:  4:44 pm       Person Calling:  Wife  Phone number:  [phone]    Note:  Patients wife called asking if they can get order for HGB recheck. Thinks it is continuing to drop. Last checked 7/8/21 - 7.7  Please place order and notify wife so they can schedule lab appointment for tomorrow.     Last office visit and reason:  7/8/21    Transferred to: _---------------------  From: Aj Esteban MD   To: Phone Messages Pool ([id number]_[place] [facility] [facility]);     Sent: 7/12/2021 4:54:22 PM CDT  Subject: RE: phone note- HGB check     ok for CBCTime: 4:58pm  Note: Called and LM on [employer] phone. Order is placed, call to schedule lab appt.

## 2022-02-16 NOTE — TELEPHONE ENCOUNTER
---------------------  From: Shira BURK [place]   To: PIOTR CORREIA    Sent: 2/27/2019 8:12:20 AM CST    These results show your pain is probably coming from your pancreas.  The next step is to do a CT of your abdomen.    Lucio Silva MD    Results:  Date Result Name Ind Value Ref Range   2/26/2019 2:52 PM Sodium Level  142 mmol/L (135 - 146)   2/26/2019 2:52 PM Potassium Level  3.7 mmol/L (3.5 - 5.3)   2/26/2019 2:52 PM Chloride Level  102 mmol/L (98 - 110)   2/26/2019 2:52 PM CO2 Level ((H)) 34 mmol/L (20 - 32)   2/26/2019 2:52 PM Glucose Level ((H)) 113 mg/dL (65 - 99)   2/26/2019 2:52 PM BUN  10 mg/dL (7 - 25)   2/26/2019 2:52 PM Creatinine Level  0.82 mg/dL (0.70 - 1.18)   2/26/2019 2:52 PM BUN/Creat Ratio  NOT APPLICABLE (6 - 22)   2/26/2019 2:52 PM eGFR  87 mL/min/1.73m2 (> OR = 60 - )   2/26/2019 2:52 PM eGFR African American  101 mL/min/1.73m2 (> OR = 60 - )   2/26/2019 2:52 PM Calcium Level  9.6 mg/dL (8.6 - 10.3)   2/26/2019 2:52 PM Bilirubin Total  0.6 mg/dL (0.2 - 1.2)   2/26/2019 2:52 PM Alkaline Phosphatase  71 unit/L (40 - 115)   2/26/2019 2:52 PM AST/SGOT ((H)) 52 unit/L (10 - 35)   2/26/2019 2:52 PM ALT/SGPT ((H)) 71 unit/L (9 - 46)   2/26/2019 2:52 PM Protein Total  6.2 gm/dL (6.1 - 8.1)   2/26/2019 2:52 PM Albumin Level  4.0 gm/dL (3.6 - 5.1)   2/26/2019 2:52 PM Globulin  2.2 (1.9 - 3.7)   2/26/2019 2:52 PM A/G Ratio  1.8 (1.0 - 2.5)   2/26/2019 2:52 PM Lipase Level ((H)) 171 unit/L (7 - 60)   2/26/2019 2:52 PM WBC  6.7 (3.8 - 10.8)   2/26/2019 2:52 PM RBC  4.28 (4.20 - 5.80)   2/26/2019 2:52 PM Hgb ((L)) 12.7 gm/dL (13.2 - 17.1)   2/26/2019 2:52 PM Hct ((L)) 36.9 % (38.5 - 50.0)   2/26/2019 2:52 PM MCV  86.2 fL (80.0 - 100.0)   2/26/2019 2:52 PM MCH  29.7 pg (27.0 - 33.0)   2/26/2019 2:52 PM MCHC  34.4 gm/dL (32.0 - 36.0)   2/26/2019 2:52 PM RDW  12.9 % (11.0 - 15.0)   2/26/2019 2:52 PM Platelet  217 (140 - 400)   2/26/2019 2:52 PM MPV  9.8 fL (7.5 - 12.5)

## 2022-02-16 NOTE — LETTER
(Inserted Image. Unable to display)   [address] [place] [address] [phone] (phone)  [phone] (fax)  PIOTR CORREIA    [facility] [place] [address]        Dear PIOTR,    Thank you for selecting our practice as your care provider. Below you will find the results and recent diagnostic testings outcomes we have reviewed.        These are acceptable, please keep scheduled follow up appointments.      Result Name Current Result Reference Range   LDH (unit/L)  140 3/9/2021 120 - 250       Please contact my practice at the number listed above if you have any questions or concerns.     Sincerely,        Dexter Silva MD, FAAFP      What do your labs mean? Below is a glossary of commonly ordered labs:  LDL - Bad Cholesterol HDL - Good Cholesterol AST/ALT - Liver Function  PSA -  Prostate  TSH - Thyroid  Hgb (Hemoglobin) - Red Blood Cells  Cr/Creatinine/Microalbumin/ BUN/eGFR - Kidney Function HgbA1c - Diabetes Test

## 2022-02-16 NOTE — TELEPHONE ENCOUNTER
---------------------  From: Shira BURK [place]   To: PIOTR CORREIA    Sent: 7/21/2021 8:43:02 PM CDT  Subject: General Message     These results look good.  Now we just have to get the hemoglobin above 10!    Lucio Silva MD      Results:  Date Result Name Ind Value Ref Range   7/20/2021 10:57 AM Hgb A1c  5.5 ( - <5.7)   7/20/2021 10:57 AM Cholesterol  103 mg/dL ( - <200)   7/20/2021 10:57 AM Non-HDL Cholesterol  76 ( - <130)   7/20/2021 10:57 AM HDL ((L)) 27 mg/dL (> OR = 40 - )   7/20/2021 10:57 AM Cholesterol/HDL Ratio  3.8 ( - <5.0)   7/20/2021 10:57 AM LDL  60    7/20/2021 10:57 AM Triglyceride  81 mg/dL ( - <150)   7/20/2021 10:57 AM U Creatinine  157 mg/dL (20 - 320)   7/20/2021 10:57 AM U Microalbumin  1.5 mg/dL (See Note: - )   7/20/2021 10:57 AM Ur Microalbumin/Creatinine Ratio  10 ( - <30)

## 2022-02-16 NOTE — TELEPHONE ENCOUNTER
---------------------  From: Angela RANGELFannie (Phone Messages Pool ([id number]_[facility]))   To: T Message Pool ([id number]);     Sent: 11/9/2021 9:53:29 AM CST  Subject: lab orders     Phone Message    PCP:   CHT      Time of Call:  9:32am       Person Calling:  pt's wife  Phone number:  [phone] OK to LM     Returned call at: 9:48am    Note:   Pt's wife LM requesting lab order.   Returned call and she says they need orders to get hgb and ferritin checked.  Pt's wife informed CHT will be back tomorrow and  message will be forwarded.    Last office visit and reason:  10/26/21 telephone encounter---------------------  From: Margo Carrasco CMA (T Message Pool ([id number]))   To: Dexter Silva MD;     Sent: 11/9/2021 10:10:38 AM CST  Subject: FW: lab orders  ** Submitted: **  Order:Ferritin* ([facility])  Details:  Specimen Type: Serum, *Est. Collection Date: 11/9/2021 due within 1 month(s), Future Order         Signed by Dexter Silva MD  11/9/2021 5:20:00 PM UT    ** Submitted: **  Order:Hemoglobin* ([facility])  Details:  Specimen Type: Blood, *Est. Collection Date: 11/9/2021 due within 1 month(s), Future Order         Signed by Dexter Silva MD  11/9/2021 5:20:00 PM UTC---------------------  From: Dexter Silva MD   To: [facility] Message Pool ([id number]_[facility]);     Sent: 11/9/2021 11:22:09 AM CST  Subject: RE: lab orders     OK---------------------  From: Margo Carrasco CMA (T Message Pool ([id number]))   To: Appointment Pool ([id number]_[place]);     Sent: 11/9/2021 11:50:38 AM CST  Subject: FW: lab orders     Please call to schedule non-fasting lab only appt. ThanksPT SCHED

## 2022-02-16 NOTE — PROGRESS NOTES
Patient:   PIOTR CORREIA            MRN: [id number]            FIN: [id number]               Age:   75 years     Sex:  Male     :  1945   Associated Diagnoses:   Anemia   Author:   Dexter Silva MD      Chief Complaint   3/9/2021 11:05 AM CST    Follow up Angiogram. Anemia workup per Cardio     Chief complaint and symptoms noted above confirmed with patient.      Interval History   Anemia   The course is worsening.  The effect on daily activities is change in activity level.  Associated symptoms characterized by fatigue and shortness of breath.        Review of Systems   Respiratory:  Negative except as documented in history of present illness.    Cardiovascular:  Negative except as documented in history of present illness.       Health Status   Allergies:    Allergic Reactions (Selected)  No Known Medication Allergies   Medications:  (Selected)   Prescriptions  Prescribed  CPAP 13: CPAP 13, See Instructions, Instructions: Use every night. Have a download in the sleep center in one month., Supply, # 1 EA, 0 Refill(s), Type: Maintenance  FLUoxetine 40 mg oral capsule: = 1 cap(s), Oral, daily, # 90 cap(s), 0 Refill(s), Type: Maintenance, [facility], Due for appt in April  FLUoxetine 40 mg oral capsule: See Instructions, Instructions: TAKE 1 CAPSULE BY MOUTH  DAILY, # 90 cap(s), 3 Refill(s), Type: Maintenance, [facility], TAKE 1 CAPSULE BY MOUTH  DAILY, 67, in, 20 8:57:00 CDT, Height Measured, 197.2, lb, 20 9:22:00 CDT...  FREESTYLE LITE      JUAN C: 1 EA, id, daily, # 50 EA, 11 Refill(s), Pharmacy: [id number]  MetFORMIN (Eqv-Glucophage XR) 500 mg oral tablet, extended release: See Instructions, Instructions: TAKE 2 TABLETS BY MOUTH  TWICE DAILY, # 360 tab(s), 3 Refill(s), Type: Maintenance, [facility], TAKE 2 TABLETS BY MOUTH  TWICE DAILY, 67, in, 20 8:57:00 CDT, Height Measured, 197.2, lb, ...  Norco 5 mg-325  mg oral tablet: 1 tab(s), PO, q4hr, PRN: for pain, # 24 tab(s), 0 Refill(s), Type: Maintenance, Pharmacy: [facility], 1 tab(s) Oral q4 hrs,PRN:for pain  atorvastatin 40 mg oral tablet: = 1 tab(s), Oral, daily, # 90 tab(s), 0 Refill(s), Type: Maintenance, Pharmacy: [facility], Due for appt in April  atorvastatin 40 mg oral tablet: See Instructions, Instructions: TAKE 1 TABLET BY MOUTH  DAILY, # 90 tab(s), 3 Refill(s), Type: Maintenance, Pharmacy: [facility], TAKE 1 TABLET BY MOUTH  DAILY, 67, in, 05/28/20 8:57:00 CDT, Height Measured, 197.2, lb, 05/21/20 9:22:00 CDT,...  doxazosin 2 mg oral tablet: = 1 tab(s), Oral, daily, # 90 tab(s), 0 Refill(s), Type: Maintenance, Pharmacy: [facility], Due for appt in April  doxazosin 2 mg oral tablet: See Instructions, Instructions: TAKE 1 TABLET BY MOUTH  DAILY, # 90 tab(s), 3 Refill(s), Type: Maintenance, Pharmacy: [facility], TAKE 1 TABLET BY MOUTH  DAILY, 67, in, 05/28/20 8:57:00 CDT, Height Measured, 197.2, lb, 05/21/20 9:22:00 CDT,...  isosorbide mononitrate 30 mg oral tablet, extended release: = 2 tab(s), Oral, qam, # 180 tab(s), 3 Refill(s), Type: Maintenance, Pharmacy: [facility], Due for appt in April, 2 tab(s) Oral qam, 67, in, 05/28/20 8:57:00 CDT, Height Measured, 197.2, lb, 05/21/20 9:22:00 CDT, Weight Measured  lisinopril 10 mg oral tablet: = 1 tab(s) ( 10 mg ), Oral, daily, # 90 tab(s), 3 Refill(s), Type: Maintenance, [facility], 1 tab(s) Oral daily, 67, in, 05/28/20 8:57:00 CDT, Height Measured, 201, lb, 10/23/20 8:44:00 CDT, Weight Measured  nitroglycerin 0.4 mg sublingual tablet: = 1 tab(s) ( 0.4 mg ), SL, q5min, Instructions: If chest pain not relieved in 5 minutes after first dose, seek immediate medical attention, PRN: for chest pain, # 100 tab(s), 3 Refill(s), Type: Maintenance, [facility]: [facility], This is a 3...  Documented Medications  Documented  Fish Oil oral  capsule: po, daily, 0 Refill(s), Type: Maintenance  Vitamin C: daily, 0 Refill(s), Type: Maintenance   Problem list:    All Problems (Selected)  Obstructive sleep apnea (adult) (pediatric) / SNOMED CT 387575462 / Confirmed  Back pain, chronic / SNOMED CT 055032662 / Confirmed  Type 2 diabetes mellitus without complications / SNOMED CT 658855480 / Confirmed  Atherosclerotic heart disease of native coronary artery without angina pectoris / SNOMED CT 6968922289 / Confirmed  Depression, Recurrent / SNOMED CT 623303849 / Confirmed  BPH (benign prostatic hyperplasia) / SNOMED CT 743802534 / Confirmed  Hyperlipemia / SNOMED CT 840467942 / Confirmed  Essential (primary) hypertension / SNOMED CT 24000927 / Confirmed      Histories   Past Medical History:    Active  Obstructive sleep apnea (adult) (pediatric) (SNOMED CT 602918171): Onset on 2005 at 59 years.  Comments:  10/18/2013 CDT 3:10 PM CDT - Michel Fields MD  AHI 7.7 and REM AHI 22 in 2013 CST 1:54 PM CST - Michel Fields MD  On 11/10/2013 AHI 18.3 with oximetry susanne 77%. Good/optimal CPAP titration to 12 with 6.5 minutes supine REM  Hyperlipemia (SNOMED CT 592564306)  Type 2 diabetes mellitus without complications (SNOMED CT 999358896)  Atherosclerotic heart disease of native coronary artery without angina pectoris (SNOMED CT 8464251597)  BPH (benign prostatic hyperplasia) (SNOMED CT 635473723)  Depression, Recurrent (SNOMED CT 703562659)  Back pain, chronic (SNOMED CT 294437358)  Essential (primary) hypertension (SNOMED CT 55668386)  Resolved  ACUTE MYOCARDIAL INFARCTION (ICD-9-):  Resolved in  at 61 years.   Family History:    Hypertension  Father ()  Heart disease  Father ()  Alcohol abuse  Mother ()  Stroke  Father ()  Liver disease  Mother ()     Procedure history:    Colonoscopy (SNOMED CT 040656975) performed by Shukri Arndt on 2013 at 68 Years.  Comments:  2013 1:13 PM  CST - Leonora WILLS, Germaine  Sedation: MAC  Diverticulosis in the sigmoid colon, otherwise normal.  Repeat in 10 years.  Angioplasty (SNOMED CT 0788201) in the month of 5/2007 at 61 Years.  CABG - Coronary artery bypass graft (SNOMED CT 390410378) in the month of 2/2004 at 58 Years.  Colonoscopy (SNOMED CT 332270611) performed by Aashish Connolly MD on 4/4/2002 at 56 Years.  Comments:  12/9/2010 7:05 AM CST - Isabella Delgado  Repeat in 10 years.  ORIF right hand in 2001 at 56 Years.  Flexible sigmoidoscopy (SNOMED CT 93897728) performed by Castillo Anaya MD on 11/13/1995 at 50 Years.  Comments:  10/16/2013 9:17 AM CDT - Gisele Arevalo  Negative.  ORIF left shoulder in 1975 at 30 Years.   Social History:        Electronic Cigarette/Vaping Assessment            Electronic Cigarette Use: Former use, quit more than 90 days ago.      Alcohol Assessment: Past            Quit 1974      Tobacco Assessment: Past            Quit 1972      Substance Abuse Assessment: Denies Substance Abuse            Never      Employment and Education Assessment            Retired, Highest education level: High school.      Home and Environment Assessment            Marital status: .  Spouse/Partner name: Brianda.  Living situation: Home/Independent.               Injuries/Abuse/Neglect in household: No.  Feels unsafe at home: No.  Family/Friends available for support:               Yes.      Nutrition and Health Assessment            Type of diet: Regular.  Wants to lose weight: Yes.  Sleeping concerns: No.  Feels highly stressed: No.      Exercise and Physical Activity Assessment: Occasional exercise            Exercise frequency: 1-2 times/week.  Exercise type: Walking.      Sexual Assessment            Sexually active: No.  Identifies as male, History of STD: No.  History of sexual abuse: No.      Other Assessment            Hearing impairment.        Physical Examination   Measurements from flowsheet : Measurements   3/9/2021 11:05 AM JOSEPHINE     Height/Length Estimated   67 in     HENT:  Normocephalic, Tympanic membranes are clear.    Neck:  Supple, Non-tender.    Respiratory:  Lungs are clear to auscultation, Respirations are non-labored.    Cardiovascular:  Normal rate, Regular rhythm.    Gastrointestinal:  Soft, Non-tender, Non-distended.       Review / Management   Results review:  Hgb 8.8, microcytic.       Impression and Plan   Diagnosis     Anemia ([id number] D64.9).     Course:  Worsening.    Orders     Orders (Selected)   Outpatient Orders  Ordered (In Transit)  Cbc (includes diff/plt) with smear review* ([facility]): Specimen Type: Blood, Collection Date: 03/09/21 11:19:00 CST  Direct Antiglobulin Test (Rajat)* ([facility]): Specimen Type: Blood, Collection Date: 03/09/21 11:19:00 CST  Ferritin* ([facility]): Specimen Type: Serum, Collection Date: 03/09/21 11:19:00 CST  Haptoglobin* ([facility]): Specimen Type: Serum, Collection Date: 03/09/21 11:19:00 CST  Iron, Total and Total Iron Binding Capacity* ([facility]): Specimen Type: Serum, Collection Date: 03/09/21 11:19:00 CST  Lactate Dehydrogenase (LD)* ([facility]): Specimen Type: Serum, Collection Date: 03/09/21 11:19:00 CST  Prothrombin w/ INR,Partial Thromboplastin Times* ([facility]): Specimen Type: Blood, Collection Date: 03/09/21 11:19:00 CST.     Will rule out blood loss vs decreased production and nutrient deficiencies.

## 2022-02-16 NOTE — LETTER
(Inserted Image. Unable to display)     [address] [place] [address] [phone] (phone)  [phone] (fax)  PIOTR CORREIA    [address] [place], [place] [id number]        Dear PIOTR,    Thank you for selecting our practice as your care provider. Below you will find the results and recent diagnostic testings outcomes we have reviewed.       These results look good, keep up the good work!  Please keep scheduled follow up appointments.        Result Name Current Result Previous Result Reference Range   Sodium Level (mmol/L)  140 5/21/2020  139 3/7/2019   142 2/26/2019 135 - 146   Potassium Level (mmol/L)  4.3 5/21/2020  3.8 3/7/2019   3.7 2/26/2019 3.5 - 5.3   Chloride Level (mmol/L)  103 5/21/2020  103 3/7/2019   102 2/26/2019 98 - 110   CO2 Level (mmol/L)  28 5/21/2020  30 3/7/2019  ((H)) 34 2/26/2019 20 - 32   Glucose Level (mg/dL)  93 5/21/2020 ((H)) 151 3/7/2019  ((H)) 113 2/26/2019 65 - 99   BUN (mg/dL)  15 5/21/2020  13 3/7/2019   10 2/26/2019 7 - 25   Creatinine Level (mg/dL)  0.86 5/21/2020  0.88 3/7/2019   0.82 2/26/2019 0.70 - 1.18   eGFR (mL/min/1.73m2)  85 5/21/2020  85 3/7/2019   87 2/26/2019 > OR = 60 -    Calcium Level (mg/dL)  9.4 5/21/2020  9.5 3/7/2019   9.6 2/26/2019 8.6 - 10.3   Bilirubin Total (mg/dL)  0.7 5/21/2020  0.7 3/7/2019   0.6 2/26/2019 0.2 - 1.2   Alkaline Phosphatase (unit/L)  70 5/21/2020  66 3/7/2019   71 2/26/2019 35 - 144   AST/SGOT (unit/L)  18 5/21/2020  20 3/7/2019  ((H)) 52 2/26/2019 10 - 35   ALT/SGPT (unit/L)  13 5/21/2020  25 3/7/2019  ((H)) 71 2/26/2019 9 - 46   Protein Total (gm/dL)  6.4 5/21/2020  6.1 3/7/2019   6.2 2/26/2019 6.1 - 8.1   Albumin Level (gm/dL)  4.4 5/21/2020  4.2 3/7/2019   4.0 2/26/2019 3.6 - 5.1   Globulin  2.0 5/21/2020  1.9 3/7/2019   2.2 2/26/2019 1.9 - 3.7   A/G Ratio  2.2 5/21/2020  2.2 3/7/2019   1.8 2/26/2019 1.0 - 2.5   Hgb A1c ((H)) 6.0 5/21/2020 ((H)) 6.0 10/11/2019  ((H)) 6.2 4/19/2019  ((H)) 6.1 9/26/2018  - <5.7    Cholesterol (mg/dL)  117 5/21/2020  120 4/19/2019  - <200   Non-HDL Cholesterol  72 5/21/2020  74 4/19/2019  - <130   HDL (mg/dL)  45 5/21/2020  46 4/19/2019 > OR = 40 -    Cholesterol/HDL Ratio  2.6 5/21/2020  2.6 4/19/2019  - <5.0   LDL  57 5/21/2020  60 4/19/2019    Triglyceride (mg/dL)  70 5/21/2020  48 4/19/2019  - <150   PSA (ng/mL)  1.2 5/21/2020  1.2 3/7/2019  - < OR = 4.0   U Microalbumin (mg/dL)  0.8 5/21/2020  1.0 4/19/2019 See Note: -    Microalbumin Comment  See comment 5/21/2020  See comment 4/19/2019    WBC  7.5 5/21/2020  5.0 4/19/2019   6.7 2/26/2019 3.8 - 10.8   RBC  4.44 5/21/2020  4.47 4/19/2019   4.28 2/26/2019 4.20 - 5.80   Hgb (gm/dL) ((L)) 13.1 5/21/2020  13.3 4/19/2019  ((L)) 12.7 2/26/2019 13.2 - 17.1   Hct (%)  39.4 5/21/2020 ((L)) 38.4 4/19/2019  ((L)) 36.9 2/26/2019 38.5 - 50.0   MCV (fL)  88.7 5/21/2020  85.9 4/19/2019   86.2 2/26/2019 80.0 - 100.0   MCH (pg)  29.5 5/21/2020  29.8 4/19/2019   29.7 2/26/2019 27.0 - 33.0   MCHC (gm/dL)  33.2 5/21/2020  34.6 4/19/2019   34.4 2/26/2019 32.0 - 36.0   RDW (%)  12.6 5/21/2020  13.1 4/19/2019   12.9 2/26/2019 11.0 - 15.0   Platelet  183 5/21/2020  168 4/19/2019   217 2/26/2019 140 - 400   MPV (fL)  10.1 5/21/2020  9.8 4/19/2019   9.8 2/26/2019 7.5 - 12.5       Please contact my practice at the number listed above if you have any questions or concerns.     Sincerely,        Dexter Silva MD, FAAFP

## 2022-02-16 NOTE — PROGRESS NOTES
Patient:   PIOTR CORREIA            MRN: [id number]            FIN: [id number]               Age:   71 years     Sex:  Male     :  1945   Associated Diagnoses:   DM Type 2 (Diabetes Mellitus, Type 2); Hypertension; Coronary Artery Disease; Back pain, chronic   Author:   Dexter Silva MD      Report Summary   DiagnosisOrders  Orders (Selected)   Prescriptions  Prescribed  metFORMIN 500 mg oral tablet, extended release: 2 tab(s) ( 1,000 mg ), po, bid, # 360 tab(s), 3 Refill(s), Type: Maintenance, Pharmacy: [facility], 2 tab(s) po bid.     Visit Information      Date of Service: 2017 02:51 pm  Performing Location: [facility]  Encounter#: [id number]      Primary Care Provider (PCP):  Dexter Silva MD    NPI# [id number]      Referring Provider:  Dexter Silva MD    NPI# [id number]   Visit type:  Scheduled follow-up.    Source of history:  Self.    History limitation:  None.       Chief Complaint   2017 2:55 PM CDT    AWV; DM/HTN med check, review labs, foot exam normal     Chief complaint and symptoms noted above confirmed with patient.      History of Present Illness             The patient presents for follow-up evaluation of diabetes.  The quality of the patient's diabetes is described as being unchanged from previous visit.  Associated symptoms consist of none.  Compliance problems: none.  Glucose results: within target range.  Hemoglobin A1c results: > 6% and within target range.               The patient presents for follow-up evaluation of hypertension.  The quality of hypertension symptom(s) since the patient's last visit is described as being unchanged.  The severity of the hypertension symptom(s) since the last visit is moderate.  Since the patient's last visit, the timing/course of hypertension symptom(s) is constant.  Exacerbating factors consist of none.  Relieving factors consist of medication.        Review of Systems   Constitutional:   Negative.    Respiratory:  Negative.    Cardiovascular:  Negative.    Gastrointestinal:  Negative.             Health Status   Allergies:    Allergic Reactions (Selected)  No Known Medication Allergies   Medications:  (Selected)   Prescriptions  Prescribed  CPAP 13: CPAP 13, See Instructions, Instructions: Use every night. Have a download in the sleep center in one month., Supply, # 1 EA, 0 Refill(s), Type: Maintenance  FLUoxetine 40 mg oral capsule: 1 cap(s) ( 40 mg ), po, daily, # 90 cap(s), 3 Refill(s), Type: Maintenance, Pharmacy: [facility], Pt due for visit, 1 cap(s) po daily  FREESTYLE LITE      JUAN C: 1 EA, id, daily, # 50 EA, 11 Refill(s), Pharmacy: [id number]  Imdur 30 mg oral tablet, extended release: 2 tab(s) ( 60 mg ), PO, qAM, # 180 tab(s), 3 Refill(s), Type: Maintenance, Pharmacy: [facility], This is an updated prescription, 2 tab(s) po qam  Norco 5 mg-325 mg oral tablet: 1 tab(s), PO, q4hr, PRN: for pain, # 24 tab(s), 0 Refill(s), Type: Maintenance, Pharmacy: [facility], 1 tab(s) po q4 hrs,PRN:for pain  aspirin 325 mg oral tablet: 1 tab(s) ( 325 mg ), PO, Daily, # 90 tab(s), 3 Refill(s), Type: Maintenance, Pharmacy: [facility], 1 tab(s) po daily  atorvastatin 40 mg oral tablet: 1 tab(s) ( 40 mg ), PO, Daily, # 90 tab(s), 3 Refill(s), Type: Maintenance, Pharmacy: [facility], 1 tab(s) po daily  doxazosin 2 mg oral tablet: 1 tab(s) ( 2 mg ), po, daily, # 90 tab(s), 3 Refill(s), Type: Maintenance, Pharmacy: [facility], Pt due for visit, 1 tab(s) po daily  hydroCHLOROthiazide-lisinopril 25 mg-20 mg oral tablet: 1 tab(s), po, daily, # 90 tab(s), 3 Refill(s), Type: Maintenance, [facility]: [facility], pt due for visit, 1 tab(s) po daily  metFORMIN 500 mg oral tablet, extended release: 2 tab(s) ( 1,000 mg ), po, bid, # 360 tab(s), 3 Refill(s), Type: Maintenance, Pharmacy: [facility], 2 tab(s) po bid  nitroglycerin 0.4 mg  sublingual tablet: 1 tab(s) ( 0.4 mg ), SL, q5min, Instructions: If chest pain not relieved in 5 minutes after first dose, seek immediate medical attention, PRN: for chest pain, # 10 tab(s), 3 Refill(s), Type: Maintenance, Pharmacy: [facility], 1 tab(s) sl q5...  Documented Medications  Documented  Fish Oil oral capsule: po, daily, 0 Refill(s), Type: Maintenance   Problem list:    All Problems  BPH (benign prostatic hyperplasia) / SNOMED CT 894685161 / Confirmed  Back pain, chronic / SNOMED CT 228465834 / Confirmed  Coronary Artery Disease / ICD-9-.00 / Confirmed  DM Type 2 (Diabetes Mellitus, Type 2) / ICD-9-.00 / Confirmed  Hypertension / ICD-9-.9 / Confirmed  Obese / ICD-9-.00 / Probable  Hyperlipemia / SNOMED CT 869964896 / Confirmed  Depression, Recurrent / SNOMED CT 680741287 / Confirmed  Sleep Apnea / ICD-9-.57 / Confirmed  Resolved: ACUTE MYOCARDIAL INFARCTION / ICD-9-      Histories   Past Medical History:    Active  Sleep Apnea (ICD-9-.57): Onset on 6/28/2005 at 59 years.  Comments:  10/18/2013 CDT 3:10 PM CDT - Michel Fields MD  AHI 7.7 and REM AHI 22 in 2005 11/25/2013 CST 1:54 PM CST Michel Tubbs MD  On 11/10/2013 AHI 18.3 with oximetry susanne 77%. Good/optimal CPAP titration to 12 with 6.5 minutes supine REM  Hyperlipemia (SNOMED CT 638550932)  DM Type 2 (Diabetes Mellitus, Type 2) (ICD-9-.00)  Coronary Artery Disease (ICD-9-.00)  BPH (benign prostatic hyperplasia) (SNOMED CT 866888596)  Depression, Recurrent (SNOMED CT 127259980)  Hypertension (ICD-9-.9)  Resolved  ACUTE MYOCARDIAL INFARCTION (ICD-9-):  Resolved in 2007 at 61 years.   Family History:    Stroke  Father     Procedure history:    Colonoscopy (SNOMED CT 534623221) performed by Shukri Arndt on 11/8/2013 at 68 Years.  Comments:  11/12/2013 1:13 PM - Leonora WILLS, Germaine  Sedation: MAC  Diverticulosis in the sigmoid colon, otherwise normal.  Repeat in  10 years.  Angioplasty (SNOMED CT 7621326) in the month of 5/2007 at 61 Years.  CABG - Coronary artery bypass graft (SNOMED CT 406152694) in the month of 2/2004 at 58 Years.  Colonoscopy (SNOMED CT 375540735) performed by Aashish Connolly MD on 4/4/2002 at 56 Years.  Comments:  12/9/2010 7:05 AM - Isabella Delgado  Repeat in 10 years.  ORIF right hand in 2001 at 56 Years.  Flexible sigmoidoscopy (SNOMED CT 93044739) performed by Castillo Anaya MD on 11/13/1995 at 50 Years.  Comments:  10/16/2013 9:17 AM - Gisele Arevalo  Negative.  ORIF left shoulder in 1975 at 30 Years.   Social History:        Alcohol Assessment: Denies Alcohol Use      Tobacco Assessment: Past      Exercise and Physical Activity Assessment: Occasional exercise        Physical Examination   Vital Signs   9/12/2017 2:55 PM CDT Temperature Temporal 98.4 DegF    Peripheral Pulse Rate 60 bpm    Pulse Site Radial artery    HR Method Manual    Systolic Blood Pressure 120 mmHg    Diastolic Blood Pressure 68 mmHg    Mean Arterial Pressure 85 mmHg    BP Site Left arm    BP Method Manual      Measurements from flowsheet : Measurements   9/12/2017 2:55 PM CDT Height Measured - Standard 67 in    Weight Measured - Standard 202.6 lb    BSA 2.08 m2    Body Mass Index 31.73 kg/m2      General:  Alert and oriented, No acute distress.    Eye:  Normal conjunctiva.    HENT:  Normocephalic, Tympanic membranes are clear.    Neck:  Supple, Non-tender, No carotid bruit.    Respiratory:  Lungs are clear to auscultation, Respirations are non-labored, Breath sounds are equal.    Cardiovascular:  Normal rate, Regular rhythm, No murmur, No gallop.    Gastrointestinal:  Soft, Non-tender, Non-distended, Normal bowel sounds.    Integumentary:  Warm, Dry, Pink.    Feet:  Normal by visual exam, Normal pulses, Sensation intact, By monofilament exam.    Neurologic:  Alert, Oriented, No focal deficits.    Psychiatric:  Cooperative, Appropriate mood & affect.       Review / Management   Results  review:  Lab results: 9/6/2017 11:41 AM CDT    Hgb A1c                   6.4  HI  .       Impression and Plan   Diagnosis     DM Type 2 (Diabetes Mellitus, Type 2) (CNQ00-ZS E11.9).     Orders     Orders (Selected)   Prescriptions  Prescribed  metFORMIN 500 mg oral tablet, extended release: 2 tab(s) ( 1,000 mg ), po, bid, # 360 tab(s), 3 Refill(s), Type: Maintenance, Pharmacy: [facility], 2 tab(s) po bid.     Diagnosis     Hypertension (AMN77-RI I10).     Coronary Artery Disease (SSH10-DR I25.10).     Course:  Progressing as expected.    Plan:       Diet: Low cholesterol, Low fat, Sodium restricted.    Patient Instructions:       Counseled: Patient, Regarding diagnosis, Regarding treatment, Regarding medications, Diet, Activity, Verbalized understanding.    Orders     Orders   Pharmacy:  nitroglycerin 0.4 mg sublingual tablet (Prescribe): 1 tab(s) ( 0.4 mg ), SL, q5min, Instructions: If chest pain not relieved in 5 minutes after first dose, seek immediate medical attention, PRN: for chest pain, # 25 tab(s), 3 Refill(s), Type: Maintenance, Pharmacy: [facility], 1 tab(s) sl q5 min,PRN:for chest pain,Instr:If chest pain not relieved in 5 minutes after first dose, seek immediate medical attention  nitroglycerin 0.4 mg sublingual tablet (Modify): 1 tab(s) ( 0.4 mg ), SL, q5min, Instructions: If chest pain not relieved in 5 minutes after first dose, seek immediate medical attention, PRN: for chest pain, # 10 tab(s), 3 Refill(s), Type: Hard Stop, Pharmacy: [facility].     Orders (Selected)   Prescriptions  Prescribed  Imdur 30 mg oral tablet, extended release: 2 tab(s) ( 60 mg ), PO, qAM, # 180 tab(s), 3 Refill(s), Type: Maintenance, Pharmacy: [facility], This is an updated prescription, 2 tab(s) po qam  atorvastatin 40 mg oral tablet: 1 tab(s) ( 40 mg ), PO, Daily, # 90 tab(s), 3 Refill(s), Type: Maintenance, Pharmacy: [facility], 1 tab(s) po daily  doxazosin 2 mg oral  tablet: 1 tab(s) ( 2 mg ), po, daily, # 90 tab(s), 3 Refill(s), Type: Maintenance, Pharmacy: [facility], Pt due for visit, 1 tab(s) po daily  hydroCHLOROthiazide-lisinopril 25 mg-20 mg oral tablet: 1 tab(s), po, daily, # 90 tab(s), 3 Refill(s), Type: Maintenance, Pharmacy: [facility], pt due for visit, 1 tab(s) po daily.     Diagnosis     Back pain, chronic (KIF70-EB G89.29).     Plan:  [place] Prescription Drug Monitoring Program checked and reviewed.    .    Orders     Orders   Pharmacy:  [place] 5 mg-325 mg oral tablet (Prescribe): 1 tab(s), PO, q4hr, PRN: for pain, # 24 tab(s), 0 Refill(s), Type: Maintenance, Pharmacy: [facility], 1 tab(s) po q4 hrs,PRN:for pain  Norco 5 mg-325 mg oral tablet (Modify): 1 tab(s), PO, q4hr, PRN: for pain, # 24 tab(s), 0 Refill(s), Type: Hard Stop, Pharmacy: [facility].

## 2022-02-16 NOTE — NURSING NOTE
Comprehensive Intake Entered On:  3/7/2019 1:19 PM CST    Performed On:  3/7/2019 1:15 PM CST by Ruib Wallace MA               Summary   Chief Complaint :   Follow up CT   Advance Directive :   Yes   Menstrual Status :   N/A   Weight Measured :   196.4 lb(Converted to: 196 lb 6 oz, 89.09 kg)    Height Measured :   67 in(Converted to: 5 ft 7 in, 170.18 cm)    Body Mass Index :   30.76 kg/m2 (HI)    Body Surface Area :   2.05 m2   Systolic Blood Pressure :   112 mmHg   Diastolic Blood Pressure :   60 mmHg   Mean Arterial Pressure :   77 mmHg   Peripheral Pulse Rate :   68 bpm   BP Site :   Right arm   Pulse Site :   Radial artery   BP Method :   Manual   HR Method :   Manual   Rubi Wallace MA - 3/7/2019 1:15 PM CST   Health Status   Allergies Verified? :   Yes   Medication History Verified? :   Yes   Immunizations Current :   Yes   Medical History Verified? :   Yes   Pre-Visit Planning Status :   Completed   Tobacco Use? :   Former smoker   Rubi Wallace MA - 3/7/2019 1:15 PM CST   Consents   Consent for Immunization Exchange :   Consent Granted   Consent for Immunizations to Providers :   Consent Granted   Rubi Wallace MA - 3/7/2019 1:15 PM CST   Meds / Allergies   (As Of: 3/7/2019 1:19:50 PM CST)   Allergies (Active)   No Known Medication Allergies  Estimated Onset Date:   Unspecified ; Created By:   Deana French CMA; Reaction Status:   Active ; Category:   Drug ; Substance:   No Known Medication Allergies ; Type:   Allergy ; Updated By:   Deana French CMA; Reviewed Date:   3/7/2019 1:18 PM CST        Medication List   (As Of: 3/7/2019 1:19:50 PM CST)   Prescription/Discharge Order    acetaminophen-hydrocodone  :   acetaminophen-hydrocodone ; Status:   Prescribed ; Ordered As Mnemonic:   Norco 5 mg-325 mg oral tablet ; Simple Display Line:   1 tab(s), PO, q4hr, PRN: for pain, 24 tab(s), 0 Refill(s) ; Ordering Provider:   Dexter Silva MD; Catalog Code:   acetaminophen-hydrocodone ; Order Dt/Tm:    3/19/2018 9:40:16 AM          isosorbide mononitrate  :   isosorbide mononitrate ; Status:   Prescribed ; Ordered As Mnemonic:   isosorbide mononitrate 30 mg oral tablet, extended release ; Simple Display Line:   60 mg, 2 tab(s), po, qam, for 90 day(s), 180 tab(s), 3 Refill(s) ; Ordering Provider:   Dexter Silva MD; Catalog Code:   isosorbide mononitrate ; Order Dt/Tm:   3/19/2018 9:39:38 AM          metFORMIN  :   metFORMIN ; Status:   Prescribed ; Ordered As Mnemonic:   metFORMIN 500 mg oral tablet, extended release ; Simple Display Line:   1,000 mg, 2 tab(s), po, bid, 360 tab(s), 3 Refill(s) ; Ordering Provider:   Dexter Silva MD; Catalog Code:   metFORMIN ; Order Dt/Tm:   3/19/2018 9:37:42 AM          hydrochlorothiazide-lisinopril  :   hydrochlorothiazide-lisinopril ; Status:   Prescribed ; Ordered As Mnemonic:   hydrochlorothiazide-lisinopril 25 mg-20 mg oral tablet ; Simple Display Line:   1 tab(s), po, daily, 90 tab(s), 3 Refill(s) ; Ordering Provider:   Dexter Silva MD; Catalog Code:   hydrochlorothiazide-lisinopril ; Order Dt/Tm:   3/19/2018 9:37:41 AM          atorvastatin  :   atorvastatin ; Status:   Prescribed ; Ordered As Mnemonic:   atorvastatin 40 mg oral tablet ; Simple Display Line:   40 mg, 1 tab(s), po, daily, 90 tab(s), 3 Refill(s) ; Ordering Provider:   Dexter Silva MD; Catalog Code:   atorvastatin ; Order Dt/Tm:   3/19/2018 9:37:39 AM          FLUoxetine  :   FLUoxetine ; Status:   Prescribed ; Ordered As Mnemonic:   FLUoxetine 40 mg oral capsule ; Simple Display Line:   40 mg, 1 cap(s), po, daily, 90 cap(s), 3 Refill(s) ; Ordering Provider:   Dexter Silva MD; Catalog Code:   FLUoxetine ; Order Dt/Tm:   3/19/2018 9:37:36 AM          doxazosin  :   doxazosin ; Status:   Prescribed ; Ordered As Mnemonic:   doxazosin 2 mg oral tablet ; Simple Display Line:   2 mg, 1 tab(s), po, daily, 90 tab(s), 3 Refill(s) ; Ordering Provider:   Shira BURK,  Dexter; Catalog Code:   doxazosin ; Order Dt/Tm:   3/19/2018 9:37:34 AM          nitroglycerin  :   nitroglycerin ; Status:   Prescribed ; Ordered As Mnemonic:   nitroglycerin 0.4 mg sublingual tablet ; Simple Display Line:   0.4 mg, 1 tab(s), SL, q5min, If chest pain not relieved in 5 minutes after first dose, seek immediate medical attention, PRN: for chest pain, 25 tab(s), 3 Refill(s) ; Ordering Provider:   Dexter Silva MD; Catalog Code:   nitroglycerin ; Order Dt/Tm:   9/12/2017 3:26:47 PM          Miscellaneous Rx Supply  :   Miscellaneous Rx Supply ; Status:   Prescribed ; Ordered As Mnemonic:   CPAP 13 ; Simple Display Line:   See Instructions, Use every night. Have a download in the sleep center in one month., 1 EA ; Ordering Provider:   Michel Fields MD; Catalog Code:   Miscellaneous Rx Supply ; Order Dt/Tm:   1/17/2014 10:40:19 AM          Miscellaneous Prescription  :   Miscellaneous Prescription ; Status:   Prescribed ; Ordered As Mnemonic:   FREESTYLE LITE      JUAN C ; Simple Display Line:   1 EA, id, daily, 50 EA ; Ordering Provider:   Dexter Silva MD; Catalog Code:   Miscellaneous Prescription ; Order Dt/Tm:   4/9/2010 10:09:35 AM            Home Meds    aspirin  :   aspirin ; Status:   Documented ; Ordered As Mnemonic:   aspirin 81 mg oral delayed release tablet ; Simple Display Line:   81 mg, 1 tab(s), Oral, daily, 0 Refill(s) ; Catalog Code:   aspirin ; Order Dt/Tm:   1/24/2019 10:33:49 AM          omega-3 polyunsaturated fatty acids  :   omega-3 polyunsaturated fatty acids ; Status:   Documented ; Ordered As Mnemonic:   Fish Oil oral capsule ; Simple Display Line:   po, daily, 0 Refill(s) ; Catalog Code:   omega-3 polyunsaturated fatty acids ; Order Dt/Tm:   7/19/2016 8:35:56 AM

## 2022-02-16 NOTE — TELEPHONE ENCOUNTER
---------------------  From: Charley Plaza RN (Phone Messages Pool ([id number]_[place] [facility])   To: Zoe Pappas;     Sent: 9/17/2021 4:15:23 PM CDT  Subject: CONSUMER MESSAGE  FW: Bill's test results           ---------------------  From: JIMENEZ CORREIA on behalf of PIOTR CORREIA  To: [facility]  Sent: 09/17/2021 04:03 p.m. CDT  Subject: Bill s test results  Dr. suárez, please be sure the results of Bill s fecal occult test are sent to Dr. Krish Goldberg at [facility]. Fax number [phone]. Call if this isn t clear. Thanks.---------------------  From: Zoe Pappas   To: Phone Messages Pool ([id number]_[place] [email or web address]);     Sent: 9/19/2021 12:25:21 PM CDT  Subject: RE: CONSUMER MESSAGE  FW: Bill's test results     Lab Report 9/7/2021 was faxed to [facility]/Attn Dr. Krish Goldberg @ [phone] on 9/19/2021 @ 12:21pm

## 2022-02-16 NOTE — TELEPHONE ENCOUNTER
---------------------  From: Nelli Hunter RN   Sent: 2/12/2021 12:04:28 PM CST  Subject: Phone Message     Phone Message    PCP:   DIMPLE      Time of Call:  1135       Person Calling:  Pt's wife  Phone number:  [phone]    Returned call at: 1158    Note:   Patient's wife called stating that Prashant had seen Dr. Arndt last week and his Isosorbide had been increased. She is wanted to know if prescription with increase would be sent in. Returned call and informed her that if Dr. Arndt changed his medication that they should contact his office for a new Rx to be sent. She expressed understanding - will contact them for prescription.     Last office visit and reason:  _

## 2022-02-16 NOTE — TELEPHONE ENCOUNTER
---------------------  From: Nelli Hunter RN (Phone Messages Pool ([id number]_[facility]))   To: [facility] Message Pool ([id number]_[facility]);     Sent: 10/1/2020 4:18:05 PM CDT  Subject: FW: Cardiologist Referral           ---------------------  From: JIMENEZ CORREIA on behalf of PIOTR CORREIA  To: [facility]  Sent: 10/01/2020 04:13 p.m. CDT  Subject: RE: Cardiologist Referral  Please give this message to Dr. Silva when he comes in Monday. Thanks.  ---------------------  From: Nelli Hunter RN (Phone Messages Pool ([id number]_[facility]))  To: PIOTR CORREIA  Sent: 10/1/2020 4:01:28 PM CDT  Subject: RE: Cardiologist Referral       [facility]       Dr. Silva is out of clinic until Monday 10-5. I can send your message on to another provider to address if that is OK?  Let me know what you would prefer.       Thanks,       Nelli LACY RN            ---------------------  From: JIMENEZ CORREIA on behalf of PIOTR CORREIA  To: [facility]  Sent: 10/01/2020 03:53 p.m. CDT  Subject: Cardiologist Referral  Dr. Silva: Turner has not heard from Meri Win in response to your referral of May 29, 2020. Can your office follow up or should I call?  Turner continues to experience shortness of breath from any exertion.  This occurs when we are taking a leisurely walk and climbing stairs. He hasn t taken nitro for it; he just rests until the discomfort dissipates. Nevertheless, it worries us.  Turner said the cardiologist noted some valve problem. Could this be caused by that?---------------------  From: Margo Carrasco CMA ([facility] Message Pool ([id number]_[facility]))   To: Dexter Silva MD;     Sent: 10/6/2020 9:08:25 AM CDT  Subject: FW: Cardiologist Referral---------------------  From: Dexter Silva MD   To: [facility] Message Pool ([id number]_[facility]); Referral Coordinators Pool (32224_[facility]);     Sent: 10/6/2020 9:29:33 AM CDT  Subject: RE: Cardiologist Referral      I have already made the referral.  If beronica won't see him, let's switch him to  [email or web address].---------------------  From: Margo Carrasco CMA ([facility] Message Pool ([id number]_[facility]))   To: PIOTR CORREIA    Sent: 10/6/2020 9:30:31 AM CDT  Subject: FW: Cardiologist Referral---------------------  From: Deysi Morales (Referral Coordinators Pool ([id number]_[facility]))   To: Dexter Silva MD;     Sent: 10/6/2020 11:12:51 AM CDT  Subject: RE: Cardiologist Referral     I will work on.Patient plans to call [facility] and follow up on appt.

## 2022-02-16 NOTE — PROGRESS NOTES
Patient:   PIOTR CORREIA            MRN: [id number]            FIN: [id number]               Age:   71 years     Sex:  Male     :  1945   Associated Diagnoses:   Depression, Recurrent; DM Type 2 (Diabetes Mellitus, Type 2); Hyperlipemia; Hypertension; Back pain, chronic   Author:   Dexter Silva MD      Report Summary   DiagnosisCourse:  Well controlled. Orders  Orders   Pharmacy:  aspirin 325 mg oral tablet (Prescribe): 1 tab(s) ( 325 mg ), PO, Daily, # 90 tab(s), 3 Refill(s), Type: Maintenance, Pharmacy: [facility], 1 tab(s) po daily  metFORMIN 1000 mg oral tablet, extended release (Prescribe): 1 tab(s) ( 1,000 mg ), po, bid, # 180 tab(s), 3 Refill(s), Type: Maintenance, Pharmacy: [facility], 1 tab(s) po bid  FLUoxetine 40 mg oral capsule (Prescribe): 1 cap(s) ( 40 mg ), po, daily, # 90 cap(s), 3 Refill(s), Type: Maintenance, Pharmacy: [facility], Pt due for visit, 1 cap(s) po daily  FLUoxetine 40 mg oral capsule (Modify): 1 cap(s) ( 40 mg ), po, daily, # 90 cap(s), 3 Refill(s), Type: Hard Stop, Pharmacy: [facility], Pt due for visit  aspirin 325 mg oral tablet (Modify): 1 tab(s) ( 325 mg ), PO, Daily, x 90 day(s), # 90 tab(s), 3 Refill(s), Type: Hard Stop  metFORMIN 1000 mg oral tablet, extended release (Modify): 1 tab(s) ( 1,000 mg ), po, bid, # 180 tab(s), 3 Refill(s), Type: Hard Stop, Pharmacy: [facility].  Orders   Requests (Lab):  Hemoglobin A1c (Request) (Order): DM Type 2 (Diabetes Mellitus, Type 2)  Urine Microalbumin (Request) (Order): DM Type 2 (Diabetes Mellitus, Type 2).  Diagnosis  Back pain, chronic (AXP10-SJ G89.29).  Orders  Orders   Pharmacy:  [place] 5 mg-325 mg oral tablet (Prescribe): 1 tab(s), PO, q4hr, PRN: for pain, # 24 tab(s), 0 Refill(s), Type: Maintenance, Pharmacy: [facility], 1 tab(s) po q4 hrs,PRN:for pain  Norco 5 mg-325 mg oral tablet (Modify): 1 tab(s), PO, q4hr, PRN: for pain, # 24 tab(s), 0 Refill(s),  Type: Hard Stop.  Orders   Requests (Return to Office):  Return to Clinic (Request) (Order): RFV: A1C 1 week prior, Return in 6 months.     Visit Information      Date of Service: 02/07/2017 09:01 am  Performing Location: [facility]  Encounter#: [id number]      Primary Care Provider (PCP):  Dexter Silva MD# [id number]      Referring Provider:  No referring provider recorded for selected visit.   Visit type:  Scheduled follow-up.    Source of history:  Self.    History limitation:  None.       Chief Complaint   2/7/2017 9:03 AM CST     DM/HTN/CAD med check, due for fasting labs, foot exam normal; fell and hit head this morning, some dizziness - denies headache, nausea     Chief complaint and symptoms noted above confirmed with patient.      History of Present Illness             The patient presents for follow-up evaluation of diabetes.  The quality of the patient's diabetes is described as being unchanged from previous visit.  Relieving factors consist of increased activity and medication.  Associated symptoms consist of none.  Compliance problems: none.  Glucose results: within target range.  Hemoglobin A1c results: > 6% and within target range.               The patient presents for follow-up evaluation of hypertension.  The quality of hypertension symptom(s) since the patient's last visit is described as being unchanged.  The severity of the hypertension symptom(s) since the last visit is moderate.  Since the patient's last visit, the timing/course of hypertension symptom(s) is constant.  Exacerbating factors consist of none.  Relieving factors consist of medication.        Review of Systems   Constitutional:  Negative.    Respiratory:  Negative.    Cardiovascular:  Negative.    Gastrointestinal:  Negative.       Health Status   Allergies:    Allergic Reactions (Selected)  No Known Medication Allergies   Medications:  (Selected)   Prescriptions  Prescribed  CPAP 13: CPAP  13, See Instructions, Instructions: Use every night. Have a download in the sleep center in one month., Supply, # 1 EA, 0 Refill(s), Type: Maintenance  FLUoxetine 40 mg oral capsule: 1 cap(s) ( 40 mg ), po, daily, # 90 cap(s), 3 Refill(s), Type: Maintenance, Pharmacy: [facility], Pt due for visit, 1 cap(s) po daily  FREESTYLE LITE      JUAN C: 1 EA, id, daily, # 50 EA, 11 Refill(s), Pharmacy: [id number]  Imdur 30 mg oral tablet, extended release: 2 tab(s) ( 60 mg ), PO, qAM, # 180 tab(s), 3 Refill(s), Type: Maintenance, Pharmacy: [facility], This is an updated prescription, 2 tab(s) po qam  Norco 5 mg-325 mg oral tablet: 1 tab(s), PO, q4hr, PRN: for pain, # 24 tab(s), 0 Refill(s), Type: Maintenance  aspirin 325 mg oral tablet: 1 tab(s) ( 325 mg ), PO, Daily, # 90 tab(s), 3 Refill(s), Type: Maintenance  atorvastatin 40 mg oral tablet: 1 tab(s) ( 40 mg ), PO, Daily, # 90 tab(s), 3 Refill(s), Type: Maintenance, Pharmacy: [facility], 1 tab(s) po daily  doxazosin 2 mg oral tablet: 1 tab(s) ( 2 mg ), po, daily, # 90 tab(s), 3 Refill(s), Type: Maintenance, Pharmacy: [facility], Pt due for visit, 1 tab(s) po daily  hydrochlorothiazide-lisinopril 25 mg-20 mg oral tablet: 1 tab(s), po, daily, # 90 tab(s), 3 Refill(s), Type: Maintenance, Pharmacy: [facility], pt due for visit, 1 tab(s) po daily  metFORMIN 1000 mg oral tablet, extended release: 1 tab(s) ( 1,000 mg ), po, bid, # 180 tab(s), 3 Refill(s), Type: Maintenance, Pharmacy: [facility], 1 tab(s) po bid  nitroglycerin 0.4 mg sublingual tablet: 1 tab(s) ( 0.4 mg ), SL, q5min, Instructions: If chest pain not relieved in 5 minutes after first dose, seek immediate medical attention, PRN: for chest pain, # 25 tab(s), 1 Refill(s), Type: Maintenance, Pharmacy: [facility], 1 tab(s) sl q5...  Documented Medications  Documented  Fish Oil oral capsule: po, daily, 0 Refill(s), Type: Maintenance   Problem  list:    All Problems  BPH (benign prostatic hyperplasia) / SNOMED CT 998955463 / Confirmed  Back pain, chronic / SNOMED CT 643529912 / Confirmed  Coronary Artery Disease / ICD-9-.00 / Confirmed  DM Type 2 (Diabetes Mellitus, Type 2) / ICD-9-.00 / Confirmed  Hypertension / ICD-9-.9 / Confirmed  Obese / ICD-9-.00 / Probable  Hyperlipemia / SNOMED CT 255473070 / Confirmed  Depression, Recurrent / SNOMED CT 948455376 / Confirmed  Sleep Apnea / ICD-9-.57 / Confirmed      Histories   Past Medical History:    Active  Sleep Apnea (ICD-9-.57): Onset on 6/28/2005 at 59 years.  Comments:  10/18/2013 CDT 3:10 PM CDT - Michel Fields MD  AHI 7.7 and REM AHI 22 in 2005 11/25/2013 CST 1:54 PM CST - Michel Fields MD  On 11/10/2013 AHI 18.3 with oximetry susanne 77%. Good/optimal CPAP titration to 12 with 6.5 minutes supine REM  Hyperlipemia (SNOMED CT 564851557)  DM Type 2 (Diabetes Mellitus, Type 2) (ICD-9-.00)  Coronary Artery Disease (ICD-9-.00)  BPH (benign prostatic hyperplasia) (SNOMED CT 713832997)  Depression, Recurrent (SNOMED CT 697265994)  Hypertension (ICD-9-.9)  Resolved  ACUTE MYOCARDIAL INFARCTION (ICD-9-):  Resolved in 2007 at 61 years.   Family History:    Stroke  Father     Procedure history:    Colonoscopy (SNOMED CT 199770551) performed by Shukri Arndt on 11/8/2013 at 68 Years.  Comments:  11/12/2013 1:13 PM - Germaine Lea RN  Sedation: MAC  Diverticulosis in the sigmoid colon, otherwise normal.  Repeat in 10 years.  Angioplasty (SNOMED CT 8299587) in the month of 5/2007 at 61 Years.  CABG - Coronary artery bypass graft (SNOMED CT 224290016) in the month of 2/2004 at 58 Years.  Colonoscopy (SNOMED CT 516565450) performed by Aashish Connolly MD on 4/4/2002 at 56 Years.  Comments:  12/9/2010 7:05 AM - Isabella Delgado  Repeat in 10 years.  ORIF right hand in 2001 at 56 Years.  Flexible sigmoidoscopy (SNOMED CT 64140772) performed by Castillo Anaya MD  on 11/13/1995 at 50 Years.  Comments:  10/16/2013 9:17 AM - Gisele Arevalo  Negative.  ORIF left shoulder in 1975 at 30 Years.   Social History:        Alcohol Assessment: Denies Alcohol Use      Tobacco Assessment: Past      Exercise and Physical Activity Assessment: Occasional exercise        Physical Examination   Vital Signs   2/7/2017 9:03 AM CST Temperature Tympanic 97.2 DegF  LOW    Peripheral Pulse Rate 56 bpm  LOW    Pulse Site Radial artery    HR Method Manual    Systolic Blood Pressure 128 mmHg    Diastolic Blood Pressure 60 mmHg    Mean Arterial Pressure 83 mmHg    BP Site Left arm    BP Method Manual      Measurements from flowsheet : Measurements   2/7/2017 9:03 AM CST Height Measured - Standard 67 in    Weight Measured - Standard 216 lb    BSA 2.15 m2    Body Mass Index 33.83 kg/m2      General:  Alert and oriented.    Eye:  Normal conjunctiva.    HENT:  Normocephalic.    Neck:  Supple, Non-tender, No carotid bruit.    Respiratory:  Lungs are clear to auscultation, Respirations are non-labored, Breath sounds are equal.    Cardiovascular:  Normal rate, Regular rhythm, No murmur.    Gastrointestinal:  Soft, Non-tender, Non-distended, Normal bowel sounds.    Feet:  Normal by visual exam, Normal pulses, Sensation intact, By monofilament exam.    Neurologic:  Alert, Oriented.    Psychiatric:  Cooperative, Appropriate mood & affect.       Review / Management   Results review      Impression and Plan   Diagnosis     Depression, Recurrent (BRN28-SS F33.1).     DM Type 2 (Diabetes Mellitus, Type 2) (RMH40-LC E11.9).     Course:  Well controlled.    Orders     Orders   Pharmacy:  aspirin 325 mg oral tablet (Prescribe): 1 tab(s) ( 325 mg ), PO, Daily, # 90 tab(s), 3 Refill(s), Type: Maintenance, Pharmacy: [facility], 1 tab(s) po daily  metFORMIN 1000 mg oral tablet, extended release (Prescribe): 1 tab(s) ( 1,000 mg ), po, bid, # 180 tab(s), 3 Refill(s), Type: Maintenance, Pharmacy: [facility], 1  tab(s) po bid  FLUoxetine 40 mg oral capsule (Prescribe): 1 cap(s) ( 40 mg ), po, daily, # 90 cap(s), 3 Refill(s), Type: Maintenance, Pharmacy: [facility], Pt due for visit, 1 cap(s) po daily  FLUoxetine 40 mg oral capsule (Modify): 1 cap(s) ( 40 mg ), po, daily, # 90 cap(s), 3 Refill(s), Type: Hard Stop, Pharmacy: [facility], Pt due for visit  aspirin 325 mg oral tablet (Modify): 1 tab(s) ( 325 mg ), PO, Daily, x 90 day(s), # 90 tab(s), 3 Refill(s), Type: Hard Stop  metFORMIN 1000 mg oral tablet, extended release (Modify): 1 tab(s) ( 1,000 mg ), po, bid, # 180 tab(s), 3 Refill(s), Type: Hard Stop, Pharmacy: [facility].     Orders   Requests (Lab):  Hemoglobin A1c (Request) (Order): DM Type 2 (Diabetes Mellitus, Type 2)  Urine Microalbumin (Request) (Order): DM Type 2 (Diabetes Mellitus, Type 2).     Diagnosis     Hyperlipemia (YQF66-SH E78.0).     Hypertension (CYY55-BG I10).     Orders     Orders   Pharmacy:  Imdur 30 mg oral tablet, extended release (Prescribe): 2 tab(s) ( 60 mg ), PO, qAM, # 180 tab(s), 3 Refill(s), Type: Maintenance, Pharmacy: [facility], This is an updated prescription, 2 tab(s) po qam  nitroglycerin 0.4 mg sublingual tablet (Prescribe): 1 tab(s) ( 0.4 mg ), SL, q5min, Instructions: If chest pain not relieved in 5 minutes after first dose, seek immediate medical attention, PRN: for chest pain, # 25,920 tab(s), 3 Refill(s), Type: Maintenance, Pharmacy: [facility], 1 tab(s) sl q5 min,PRN:for chest pain,Instr:If chest pain not relieved in 5 minutes after first dose, seek immediate medical attention  hydroCHLOROthiazide-lisinopril 25 mg-20 mg oral tablet (Prescribe): 1 tab(s), po, daily, # 90 tab(s), 3 Refill(s), Type: Maintenance, Pharmacy: [facility], pt due for visit, 1 tab(s) po daily  doxazosin 2 mg oral tablet (Prescribe): 1 tab(s) ( 2 mg ), po, daily, # 90 tab(s), 3 Refill(s), Type: Maintenance, Pharmacy: [facility], Pt  due for visit, 1 tab(s) po daily  atorvastatin 40 mg oral tablet (Prescribe): 1 tab(s) ( 40 mg ), PO, Daily, # 90 tab(s), 3 Refill(s), Type: Maintenance, Pharmacy: [facility], 1 tab(s) po daily  Imdur 30 mg oral tablet, extended release (Modify): 2 tab(s) ( 60 mg ), PO, qAM, # 180 tab(s), 3 Refill(s), Type: Hard Stop, Pharmacy: [facility], This is an updated prescription  nitroglycerin 0.4 mg sublingual tablet (Modify): 1 tab(s) ( 0.4 mg ), SL, q5min, Instructions: If chest pain not relieved in 5 minutes after first dose, seek immediate medical attention, PRN: for chest pain, # 25 tab(s), 1 Refill(s), Type: Hard Stop, Pharmacy: [facility]  atorvastatin 40 mg oral tablet (Modify): 1 tab(s) ( 40 mg ), PO, Daily, # 90 tab(s), 3 Refill(s), Type: Hard Stop, Pharmacy: [facility]  hydrochlorothiazide-lisinopril 25 mg-20 mg oral tablet (Modify): 1 tab(s), po, daily, # 90 tab(s), 3 Refill(s), Type: Hard Stop, Pharmacy: [facility], pt due for visit  doxazosin 2 mg oral tablet (Modify): 1 tab(s) ( 2 mg ), po, daily, # 90 tab(s), 3 Refill(s), Type: Hard Stop, Pharmacy: [facility], Pt due for visit.     Orders   Requests (Lab):  CBC w/o Diff (Request) (Order): Hyperlipemia  Hypertension  Comprehensive Metabolic Panel (Request) (Order): Hyperlipemia  Hypertension  LDL Direct (Request) (Order): Hyperlipemia  Hypertension.     Diagnosis     Back pain, chronic (SVL60-AJ G89.29).     Orders     Orders   Pharmacy:  [place] 5 mg-325 mg oral tablet (Prescribe): 1 tab(s), PO, q4hr, PRN: for pain, # 24 tab(s), 0 Refill(s), Type: Maintenance, Pharmacy: [facility], 1 tab(s) po q4 hrs,PRN:for pain  Norco 5 mg-325 mg oral tablet (Modify): 1 tab(s), PO, q4hr, PRN: for pain, # 24 tab(s), 0 Refill(s), Type: Hard Stop.     Orders   Requests (Return to Office):  Return to Clinic (Request) (Order): RFV: A1C 1 week prior, Return in 6 months.

## 2022-02-16 NOTE — TELEPHONE ENCOUNTER
---------------------  From: Shira BURK [place]   To: PIOTR CORREIA    Sent: 9/25/2021 7:17:00 PM CDT  Subject: General Message     These results are much improved.  Please keep appointments until we know your hemoglobin is back to normal.    Lucio Silva MD      Results:  Date Result Name Ind Value Ref Range   9/24/2021 1:54 PM Ferritin  52 ng/mL (24 - 380)   9/24/2021 1:54 PM WBC  7.8 (3.8 - 10.8)   9/24/2021 1:54 PM RBC  4.40 (4.20 - 5.80)   9/24/2021 1:54 PM Hgb ((L)) 10.1 gm/dL (13.2 - 17.1)   9/24/2021 1:54 PM Hct ((L)) 34.3 % (38.5 - 50.0)   9/24/2021 1:54 PM MCV ((L)) 78.0 fL (80.0 - 100.0)   9/24/2021 1:54 PM MCH ((L)) 23.0 pg (27.0 - 33.0)   9/24/2021 1:54 PM MCHC ((L)) 29.4 gm/dL (32.0 - 36.0)   9/24/2021 1:54 PM RDW ((H)) 25.9 % (11.0 - 15.0)   9/24/2021 1:54 PM Platelet  158 (140 - 400)   9/24/2021 1:54 PM MPV  See comment fL (7.5 - 12.5)   9/24/2021 1:54 PM Abs Neutrophils  7,371 (1,500 - 7,800)   9/24/2021 1:54 PM Abs Lymphocytes ((L)) 304 (850 - 3,900)   9/24/2021 1:54 PM Abs Monocytes ((L)) 109 (200 - 950)   9/24/2021 1:54 PM Abs Eosinophils ((L)) 8 (15 - 500)   9/24/2021 1:54 PM Abs Basophils  8 (0 - 200)   9/24/2021 1:54 PM Neutrophils  94.5 %    9/24/2021 1:54 PM Lymphocytes  3.9 %    9/24/2021 1:54 PM Monocytes  1.4 %    9/24/2021 1:54 PM Eosinophils  0.1 %    9/24/2021 1:54 PM Basophils  0.1 %    9/24/2021 1:54 PM CBC Morphology  See comment (NORMAL - )

## 2022-02-16 NOTE — PROGRESS NOTES
Patient:   PIOTR CORREIA            MRN: [id number]            FIN: [id number]               Age:   73 years     Sex:  Male     :  1945   Associated Diagnoses:   Abdominal pain   Author:   Dexter Silva MD      Chief Complaint   2019 2:02 PM CST    c/o abdomial pain, constipation, bloating x 6 months     Chief complaint and symptoms noted above confirmed with patient.      History of Present Illness             The patient presents with abdominal pain.  The abdominal pain is generalized.  The abdominal pain is described as aching and cramping.  The severity of the abdominal pain is moderate.  The abdominal pain is constant.  The abdominal pain has lasted for 6 month(s).  Radiation of pain: none.  Exacerbating factors consist of eating.  Relieving factors consist of none.  Associated symptoms consist of nausea, constipation, abdominal distention, dysuria, denies vomiting and denies diarrhea.        Review of Systems   Constitutional:  Negative.    Ear/Nose/Mouth/Throat:  Negative.    Respiratory:  Negative.    Cardiovascular:  Negative.    Gastrointestinal:  Negative except as documented in history of present illness.    Genitourinary:  Negative except as documented in history of present illness.       Health Status   Allergies:    Allergic Reactions (Selected)  No Known Medication Allergies   Medications:  (Selected)   Prescriptions  Prescribed  CPAP 13: CPAP 13, See Instructions, Instructions: Use every night. Have a download in the sleep center in one month., Supply, # 1 EA, 0 Refill(s), Type: Maintenance  FLUoxetine 40 mg oral capsule: 1 cap(s) ( 40 mg ), po, daily, # 90 cap(s), 3 Refill(s), Type: Maintenance, Pharmacy: [email or web address], Due for appt next month, 1 cap(s) po daily  FREESTYLE LITE      JUAN C: 1 EA, id, daily, # 50 EA, 11 Refill(s), Pharmacy: [id number]  [place] 5 mg-325 mg oral tablet: 1 tab(s), PO, q4hr, PRN: for pain, # 24 tab(s), 0 Refill(s), Type: Maintenance,  Pharmacy: [facility], 1 tab(s) po q4 hrs,PRN:for pain  atorvastatin 40 mg oral tablet: 1 tab(s) ( 40 mg ), po, daily, # 90 tab(s), 3 Refill(s), Type: Maintenance, Pharmacy: [facility], Due for appt next month, 1 tab(s) po daily  doxazosin 2 mg oral tablet: 1 tab(s) ( 2 mg ), po, daily, # 90 tab(s), 3 Refill(s), Type: Maintenance, Pharmacy: [facility], Due for appt next month, 1 tab(s) po daily  hydrochlorothiazide-lisinopril 25 mg-20 mg oral tablet: 1 tab(s), po, daily, # 90 tab(s), 3 Refill(s), Type: Maintenance, Pharmacy: [facility], Due for appt next month, 1 tab(s) po daily  isosorbide mononitrate 30 mg oral tablet, extended release: 2 tab(s) ( 60 mg ), po, qam, # 180 tab(s), 3 Refill(s), Type: Soft Stop, Pharmacy: [facility], 2 tab(s) po qam,x90 day(s)  metFORMIN 500 mg oral tablet, extended release: 2 tab(s) ( 1,000 mg ), po, bid, # 360 tab(s), 3 Refill(s), Type: Maintenance, Pharmacy: [facility], Due for appt next month, 2 tab(s) po bid  nitroglycerin 0.4 mg sublingual tablet: 1 tab(s) ( 0.4 mg ), SL, q5min, Instructions: If chest pain not relieved in 5 minutes after first dose, seek immediate medical attention, PRN: for chest pain, # 25 tab(s), 3 Refill(s), Type: Maintenance, Pharmacy: [facility], 1 tab(s) sl q5...  Documented Medications  Documented  Fish Oil oral capsule: po, daily, 0 Refill(s), Type: Maintenance  aspirin 81 mg oral delayed release tablet: = 1 tab(s) ( 81 mg ), Oral, daily, 0 Refill(s), Type: Maintenance   Problem list:    All Problems  BPH (benign prostatic hyperplasia) / SNOMED CT 750888219 / Confirmed  Back pain, chronic / SNOMED CT 623789093 / Confirmed  Coronary Artery Disease / ICD-9-.00 / Confirmed  DM Type 2 (Diabetes Mellitus, Type 2) / ICD-9-.00 / Confirmed  Hypertension / ICD-9-.9 / Confirmed  Obese / ICD-9-.00 / Probable  Hyperlipemia / SNOMED CT 373756195 / Confirmed  Depression,  Recurrent / SNOMED CT 405675107 / Confirmed  Sleep Apnea / ICD-9-.57 / Confirmed      Histories   Past Medical History:    Active  Sleep Apnea (ICD-9-.57): Onset on 2005 at 59 years.  Comments:  10/18/2013 CDT 3:10 PM CDT - Michel Fields MD  AHI 7.7 and REM AHI 22 in 2013 CST 1:54 PM CST - Michel Fields MD  On 11/10/2013 AHI 18.3 with oximetry susanne 77%. Good/optimal CPAP titration to 12 with 6.5 minutes supine REM  Hyperlipemia (SNOMED CT 327176260)  DM Type 2 (Diabetes Mellitus, Type 2) (ICD-9-.00)  Coronary Artery Disease (ICD-9-.00)  BPH (benign prostatic hyperplasia) (SNOMED CT 426022716)  Depression, Recurrent (SNOMED CT 665902349)  Obese (ICD-9-.00)  Back pain, chronic (SNOMED CT 460523347)  Hypertension (ICD-9-.9)  Resolved  ACUTE MYOCARDIAL INFARCTION (ICD-9-):  Resolved in  at 61 years.   Family History:    Stroke  Father ()     Procedure history:    Colonoscopy (SNOMED CT 053880494) performed by Shukri Arndt on 2013 at 68 Years.  Comments:  2013 1:13 PM CST - Germaine Lea RN  Sedation: MAC  Diverticulosis in the sigmoid colon, otherwise normal.  Repeat in 10 years.  Angioplasty (SNOMED CT 7300874) in the month of 2007 at 61 Years.  CABG - Coronary artery bypass graft (SNOMED CT 733312894) in the month of 2004 at 58 Years.  Colonoscopy (SNOMED CT 893207863) performed by Aashish Connolly MD on 2002 at 56 Years.  Comments:  2010 7:05 AM CST - Isabella Delgado  Repeat in 10 years.  ORIF right hand in  at 56 Years.  Flexible sigmoidoscopy (SNOMED CT 71797987) performed by Castillo Anaya MD on 1995 at 50 Years.  Comments:  10/16/2013 9:17 AM CDT - Gisele Arevalo  Negative.  ORIF left shoulder in  at 30 Years.      Physical Examination   Vital Signs   2019 2:02 PM CST Temperature Tympanic 95.9 DegF  LOW    Peripheral Pulse Rate 70 bpm    Pulse Site Radial artery    HR Method Manual    Systolic  Blood Pressure 108 mmHg    Diastolic Blood Pressure 60 mmHg    Mean Arterial Pressure 76 mmHg    BP Site Right arm    BP Method Manual    Oxygen Saturation 94 %      Measurements from flowsheet : Measurements   2/26/2019 2:02 PM CST Height Measured - Standard 67 in    Weight Measured - Standard 192 lb    BSA 2.03 m2    Body Mass Index 30.07 kg/m2  HI         Review / Management   Radiology results   X-ray, Flat and Upright negative      Impression and Plan   Diagnosis     Abdominal pain (INA60-LL R10.9).     Course:  Not improving.    Orders     Orders   Requests (Radiology):  XR Abdomen Flat and Upright (Request) (Order): Abdominal pain.     Orders   Lab (Gen Lab  Reference Lab):  Lipase* ([facility]) (Order): Specimen Type: Serum, Collection Date: 2/26/2019 2:12 PM CST  CBC (h/h, RBC, indices, WBC, Plt)* ([facility]) (Order): Specimen Type: Blood, Collection Date: 2/26/2019 2:12 PM CST  Comprehensive Metabolic Panel* ([facility]) (Order): Specimen Type: Serum, Collection Date: 2/26/2019 2:12 PM CST.        Professional Services   Counseling Summary:  This was a 25 minute visit with greater than 50% of that time spent counseling the patient, and coordination of care..    Counseling included differential diagnoses, treatment options, risks and benefits, and current condition.    The patient was interactive, attentive, asked questions, and verbalized understanding.    Family present included spouse.

## 2022-02-16 NOTE — NURSING NOTE
Comprehensive Intake Entered On:  11/27/2020 12:46 PM CST    Performed On:  11/27/2020 12:40 PM CST by Margo Carrasco CMA               Summary   Chief Complaint :   Patient exposed to positive covid 6 days ago. C/o headache, cough and SOB x1 day. Verbal consent granted for telemedicine visit.   Advance Directive :   Yes   Menstrual Status :   N/A   Height/Length Estimated :   67 in(Converted to: 5 ft 7 in, 170.18 cm)    Margo Carrasco CMA - 11/27/2020 12:40 PM CST   Health Status   Allergies Verified? :   Yes   Medication History Verified? :   Yes   Immunizations Current :   Yes   Medical History Verified? :   Yes   Pre-Visit Planning Status :   Completed   Tobacco Use? :   Former smoker   Margo Carrasco CMA - 11/27/2020 12:40 PM CST   Meds / Allergies   (As Of: 11/27/2020 12:46:50 PM CST)   Allergies (Active)   No Known Medication Allergies  Estimated Onset Date:   Unspecified ; Created By:   Deana French CMA; Reaction Status:   Active ; Category:   Drug ; Substance:   No Known Medication Allergies ; Type:   Allergy ; Updated By:   Deana French CMA; Reviewed Date:   11/27/2020 12:44 PM CST        Medication List   (As Of: 11/27/2020 12:46:50 PM CST)   Prescription/Discharge Order    acetaminophen-hydrocodone  :   acetaminophen-hydrocodone ; Status:   Prescribed ; Ordered As Mnemonic:   Norco 5 mg-325 mg oral tablet ; Simple Display Line:   1 tab(s), PO, q4hr, PRN: for pain, 24 tab(s), 0 Refill(s) ; Ordering Provider:   Dexter Silva MD; Catalog Code:   acetaminophen-hydrocodone ; Order Dt/Tm:   3/24/2020 1:06:36 PM CDT          atorvastatin  :   atorvastatin ; Status:   Prescribed ; Ordered As Mnemonic:   atorvastatin 40 mg oral tablet ; Simple Display Line:   See Instructions, TAKE 1 TABLET BY MOUTH  DAILY, 90 tab(s), 3 Refill(s) ; Ordering Provider:   Dexter Silva MD; Catalog Code:   atorvastatin ; Order Dt/Tm:   5/28/2020 9:41:31 AM CDT          atorvastatin  :   atorvastatin ; Status:    Prescribed ; Ordered As Mnemonic:   atorvastatin 40 mg oral tablet ; Simple Display Line:   1 tab(s), Oral, daily, 90 tab(s), 0 Refill(s) ; Ordering Provider:   Dexter Silva MD; Catalog Code:   atorvastatin ; Order Dt/Tm:   3/9/2020 8:21:22 AM CDT          doxazosin  :   doxazosin ; Status:   Prescribed ; Ordered As Mnemonic:   doxazosin 2 mg oral tablet ; Simple Display Line:   See Instructions, TAKE 1 TABLET BY MOUTH  DAILY, 90 tab(s), 3 Refill(s) ; Ordering Provider:   Dexter Silva MD; Catalog Code:   doxazosin ; Order Dt/Tm:   5/28/2020 9:41:32 AM CDT          doxazosin  :   doxazosin ; Status:   Prescribed ; Ordered As Mnemonic:   doxazosin 2 mg oral tablet ; Simple Display Line:   1 tab(s), Oral, daily, 90 tab(s), 0 Refill(s) ; Ordering Provider:   Dexter Silva MD; Catalog Code:   doxazosin ; Order Dt/Tm:   3/9/2020 8:21:47 AM CDT          FLUoxetine  :   FLUoxetine ; Status:   Prescribed ; Ordered As Mnemonic:   FLUoxetine 40 mg oral capsule ; Simple Display Line:   See Instructions, TAKE 1 CAPSULE BY MOUTH  DAILY, 90 cap(s), 3 Refill(s) ; Ordering Provider:   Dexter Silva MD; Catalog Code:   FLUoxetine ; Order Dt/Tm:   5/28/2020 9:41:33 AM CDT          FLUoxetine  :   FLUoxetine ; Status:   Prescribed ; Ordered As Mnemonic:   FLUoxetine 40 mg oral capsule ; Simple Display Line:   1 cap(s), Oral, daily, 90 cap(s), 0 Refill(s) ; Ordering Provider:   Dexter Silva MD; Catalog Code:   FLUoxetine ; Order Dt/Tm:   3/9/2020 8:22:03 AM CDT          isosorbide mononitrate  :   isosorbide mononitrate ; Status:   Prescribed ; Ordered As Mnemonic:   isosorbide mononitrate 30 mg oral tablet, extended release ; Simple Display Line:   2 tab(s), Oral, qam, 180 tab(s), 3 Refill(s) ; Ordering Provider:   Dexter Silva MD; Catalog Code:   isosorbide mononitrate ; Order Dt/Tm:   5/28/2020 9:43:22 AM CDT          lisinopril  :   lisinopril ; Status:   Prescribed ; Ordered As  Mnemonic:   lisinopril 10 mg oral tablet ; Simple Display Line:   10 mg, 1 tab(s), Oral, daily, 90 tab(s), 3 Refill(s) ; Ordering Provider:   Dexter Silav MD; Catalog Code:   lisinopril ; Order Dt/Tm:   10/23/2020 9:21:34 AM CDT          metFORMIN  :   metFORMIN ; Status:   Prescribed ; Ordered As Mnemonic:   MetFORMIN (Eqv-Glucophage XR) 500 mg oral tablet, extended release ; Simple Display Line:   See Instructions, TAKE 2 TABLETS BY MOUTH  TWICE DAILY, 360 tab(s), 3 Refill(s) ; Ordering Provider:   Dexter Silva MD; Catalog Code:   metFORMIN ; Order Dt/Tm:   5/28/2020 9:41:34 AM CDT          Miscellaneous Prescription  :   Miscellaneous Prescription ; Status:   Prescribed ; Ordered As Mnemonic:   FREESTYLE LITE      JUAN C ; Simple Display Line:   1 EA, id, daily, 50 EA ; Ordering Provider:   Dexter Silva MD; Catalog Code:   Miscellaneous Prescription ; Order Dt/Tm:   4/9/2010 10:09:35 AM CDT          Miscellaneous Rx Supply  :   Miscellaneous Rx Supply ; Status:   Prescribed ; Ordered As Mnemonic:   CPAP 13 ; Simple Display Line:   See Instructions, Use every night. Have a download in the sleep center in one month., 1 EA ; Ordering Provider:   Michel Fields MD; Catalog Code:   Miscellaneous Rx Supply ; Order Dt/Tm:   1/17/2014 10:40:19 AM CST          nitroglycerin  :   nitroglycerin ; Status:   Prescribed ; Ordered As Mnemonic:   nitroglycerin 0.4 mg sublingual tablet ; Simple Display Line:   0.4 mg, 1 tab(s), SL, q5min, If chest pain not relieved in 5 minutes after first dose, seek immediate medical attention, PRN: for chest pain, 100 tab(s), 3 Refill(s) ; Ordering Provider:   Dexter Silva MD; Catalog Code:   nitroglycerin ; Order Dt/Tm:   10/23/2020 9:05:52 AM CDT            Home Meds    ascorbic acid  :   ascorbic acid ; Status:   Documented ; Ordered As Mnemonic:   Vitamin C ; Simple Display Line:   daily, 0 Refill(s) ; Catalog Code:   ascorbic acid ; Order Dt/Tm:    10/23/2020 8:48:26 AM CDT          omega-3 polyunsaturated fatty acids  :   omega-3 polyunsaturated fatty acids ; Status:   Documented ; Ordered As Mnemonic:   Fish Oil oral capsule ; Simple Display Line:   po, daily, 0 Refill(s) ; Catalog Code:   omega-3 polyunsaturated fatty acids ; Order Dt/Tm:   7/19/2016 8:35:56 AM CDT            ID Risk Screen   Recent Travel History :   No recent travel   Family Member Travel History :   No recent travel   Other Exposure to Infectious Disease :   Community exposure to COVID-19 within the last 14 days   Margo Carrasco CMA - 11/27/2020 12:40 PM CST   Social History   Social History   (As Of: 11/27/2020 12:46:50 PM CST)   Alcohol:  Past      Quit 1974   (Last Updated: 4/13/2020 2:45:59 PM CDT by Isabella Delgado)          Tobacco:  Past      Quit 1972   (Last Updated: 4/13/2020 2:45:42 PM CDT by Isabella Delgado)          Electronic Cigarette/Vaping:        Electronic Cigarette Use: Former use, quit more than 90 days ago.   (Last Updated: 11/27/2020 12:41:16 PM CST by Margo Carrasco CMA)          Substance Abuse:  Denies Substance Abuse      Never   (Last Updated: 9/28/2018 2:13:39 PM CDT by Isabella Delgado)          Employment/School:        Retired, Highest education level: High school.   (Last Updated: 4/13/2020 2:48:29 PM CDT by Isabella Delgado)          Home/Environment:        Marital status: .  Spouse/Partner name: Brianda.  Living situation: Home/Independent.  Injuries/Abuse/Neglect in household: No.  Feels unsafe at home: No.  Family/Friends available for support: Yes.   (Last Updated: 4/13/2020 2:46:58 PM CDT by Isabella Delgado)          Nutrition/Health:        Type of diet: Regular.  Wants to lose weight: Yes.  Sleeping concerns: No.  Feels highly stressed: No.   (Last Updated: 4/13/2020 2:46:17 PM CDT by Isabella Delgado)          Exercise:  Occasional exercise      Exercise frequency: 1-2 times/week.  Exercise type: Walking.   (Last Updated: 4/13/2020 2:46:41 PM CDT by Danny  Isabella)          Sexual:        Sexually active: No.  Identifies as male, History of STD: No.  History of sexual abuse: No.   (Last Updated: 4/13/2020 2:48:58 PM CDT by Isabella Delgado)          Other:        Hearing impairment.   (Last Updated: 4/13/2020 2:47:18 PM CDT by Iasbella Delgado)

## 2022-02-16 NOTE — PROGRESS NOTES
Patient:   PIOTR CORREIA            MRN: [id number]            FIN: [id number]               Age:   75 years     Sex:  Male     :  1945   Associated Diagnoses:   Community acquired pneumonia; Anemia   Author:   Dexter Silva MD      Chief Complaint   2021 1:20 PM CDT     Follow up pneumonia. Dizziness worsening.   Chief complaint and symptoms noted above confirmed with patient.      History of Present Illness             The patient presents for follow-up evaluation of pneumonia symptom(s).  The quality of the patient's pneumonia since the last visit is described as increase and coughing.  The symptom(s) of pneumonia are episodic.  Exacerbating factors consist of recent illness.  Relieving factors consist of oxygen.  Associated symptoms consist of dizzy.        Review of Systems   Constitutional:  Negative.    Ear/Nose/Mouth/Throat:  Negative.    Respiratory:  Negative except as documented in history of present illness.    Cardiovascular:  Negative.    Gastrointestinal:  Negative.       Health Status   Allergies:    Allergic Reactions (Selected)  No Known Medication Allergies   Medications:  (Selected)   Prescriptions  Prescribed  CPAP 13: CPAP 13, See Instructions, Instructions: Use every night. Have a download in the sleep center in one month., Supply, # 1 EA, 0 Refill(s), Type: Maintenance  FLUoxetine 40 mg oral capsule: = 1 cap(s), Oral, daily, # 90 cap(s), 0 Refill(s), Type: Maintenance, Pharmacy: [facility], Due for appt in April  FLUoxetine 40 mg oral capsule: See Instructions, Instructions: TAKE 1 CAPSULE BY MOUTH  DAILY, # 90 cap(s), 3 Refill(s), Type: Maintenance, Pharmacy: [facility], TAKE 1 CAPSULE BY MOUTH  DAILY, 67, in, 20 8:57:00 CDT, Height Measured, 197.2, lb, 20 9:22:00 CDT...  FREESTYLE LITE      JUAN C: 1 EA, id, daily, # 50 EA, 11 Refill(s), Pharmacy: [id number]  MetFORMIN (Eqv-Glucophage XR) 500 mg oral tablet, extended release: See  Instructions, Instructions: TAKE 2 TABLETS BY MOUTH  TWICE DAILY, # 360 tab(s), 3 Refill(s), Type: Maintenance, Pharmacy: [facility], TAKE 2 TABLETS BY MOUTH  TWICE DAILY, 67, in, 05/28/20 8:57:00 CDT, Height Measured, 197.2, lb, 05/21/2...  Norco 5 mg-325 mg oral tablet: 1 tab(s), PO, q4hr, PRN: for pain, # 24 tab(s), 0 Refill(s), Type: Maintenance, Pharmacy: [facility], 1 tab(s) Oral q4 hrs,PRN:for pain  atorvastatin 40 mg oral tablet: = 1 tab(s), Oral, daily, # 90 tab(s), 0 Refill(s), Type: Maintenance, Pharmacy: [facility], Due for appt in April  atorvastatin 40 mg oral tablet: See Instructions, Instructions: TAKE 1 TABLET BY MOUTH  DAILY, # 90 tab(s), 3 Refill(s), Type: Maintenance, Pharmacy: [facility], TAKE 1 TABLET BY MOUTH  DAILY, 67, in, 05/28/20 8:57:00 CDT, Height Measured, 197.2, lb, 05/21/20 9:22:00 CDT,...  doxazosin 2 mg oral tablet: = 1 tab(s), Oral, daily, # 90 tab(s), 0 Refill(s), Type: Maintenance, Pharmacy: [facility], Due for appt in April  doxazosin 2 mg oral tablet: See Instructions, Instructions: TAKE 1 TABLET BY MOUTH  DAILY, # 90 tab(s), 3 Refill(s), Type: Maintenance, Pharmacy: [facility], TAKE 1 TABLET BY MOUTH  DAILY, 67, in, 05/28/20 8:57:00 CDT, Height Measured, 197.2, lb, 05/21/20 9:22:00 CDT,...  isosorbide mononitrate 30 mg oral tablet, extended release: = 2 tab(s), Oral, qam, # 180 tab(s), 3 Refill(s), Type: Maintenance, Pharmacy: [facility], Due for appt in April, 2 tab(s) Oral qam, 67, in, 05/28/20 8:57:00 CDT, Height Measured, 197.2, lb, 05/21/20 9:22:00 CDT, Weight Measured  lisinopril 10 mg oral tablet: = 1 tab(s) ( 10 mg ), Oral, daily, # 90 tab(s), 3 Refill(s), Type: Maintenance, Pharmacy: [facility], 1 tab(s) Oral daily, 67, in, 05/28/20 8:57:00 CDT, Height Measured, 201, lb, 10/23/20 8:44:00 CDT, Weight Measured  nitroglycerin 0.4 mg sublingual tablet: = 1 tab(s) ( 0.4 mg ), SL, q5min,  Instructions: If chest pain not relieved in 5 minutes after first dose, seek immediate medical attention, PRN: for chest pain, # 100 tab(s), 3 Refill(s), Type: Maintenance, Pharmacy: [facility], This is a 3...  Documented Medications  Documented  Fish Oil oral capsule: po, daily, 0 Refill(s), Type: Maintenance  Iron: Iron, Instructions: 325mg daily, 0 Refill(s), Type: Maintenance  Oxygen Air Delivery System: Oxygen Air Delivery System, Instructions: 2 Liters. 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  Comments:  10/18/2013 CDT 3:10 PM CDT Michel Tubbs MD  AHI 7.7 and REM AHI 22 in 2005 11/25/2013 CST 1:54 PM CST Michel Tubbs MD  On 11/10/2013 AHI 18.3 with oximetry susanne 77%. Good/optimal CPAP titration to 12 with 6.5 minutes supine REM  Hyperlipemia (SNOMED CT 574013156)  Type 2 diabetes mellitus without complications (SNOMED CT 984286785)  Atherosclerotic heart disease of native coronary artery without angina pectoris (SNOMED CT 8757735431)  BPH  (benign prostatic hyperplasia) (SNOMED CT 903823466)  Depression, Recurrent (SNOMED CT 460462889)  Obesity, unspecified (SNOMED CT 9237236714)  Back pain, chronic (SNOMED CT 321130893)  Essential (primary) hypertension (SNOMED CT 75895153)  Resolved  Inpatient stay (SNOMED CT 960961454): Onset on 3/20/2021 at 75 years.  Resolved on 3/22/2021 at 75 years.  Comments:  3/25/2021 CDT 10:53 AM CDT - Isabella Delgado  [facility] [place] - Pneumonia of both lungs due to infectious organism.  ACUTE MYOCARDIAL INFARCTION (ICD-9-):  Resolved in  at 61 years.   Family History:    Hypertension  Father ()  Heart disease  Father ()  Alcohol abuse  Mother ()  Stroke  Father ()  Liver disease  Mother ()     Procedure history:    Angiogram (SNOMED CT 429860696) in 2017 at 72 Years.  Colonoscopy (SNOMED CT 115611680) performed by Shukri Arndt on 2013 at 68 Years.  Comments:  2013 1:13 PM CST - Leonora WILLS, Germaine  Sedation: MAC  Diverticulosis in the sigmoid colon, otherwise normal.  Repeat in 10 years.  Angioplasty (SNOMED CT 5860381) in the month of 2007 at 61 Years.  CABG - Coronary artery bypass graft (SNOMED CT 196502603) in the month of 2004 at 58 Years.  Colonoscopy (SNOMED CT 268873901) performed by Aashish Connolly MD on 2002 at 56 Years.  Comments:  2010 7:05 AM CST - Isabella Delgado  Repeat in 10 years.  ORIF right hand in  at 56 Years.  Flexible sigmoidoscopy (SNOMED CT 37892217) performed by Castillo Anaya MD on 1995 at 50 Years.  Comments:  10/16/2013 9:17 AM CDT - Gisele Arevalo  Negative.  ORIF left shoulder in  at 30 Years.      Physical Examination   Vital Signs   2021 1:20 PM CDT Temperature Tympanic 99.0 DegF    Peripheral Pulse Rate 73 bpm    Systolic Blood Pressure 96 mmHg    Diastolic Blood Pressure 50 mmHg  LOW    Mean Arterial Pressure 65 mmHg    BP Site Right arm    BP Method Manual    Oxygen Saturation 90 %  LOW       Measurements from flowsheet : Measurements   4/8/2021 1:20 PM CDT Height/Length Estimated 67 in    Weight Estimated 198 lb    BSA Estimated 2.06 m2    Body Mass Index Estimated 31.01 kg/m2      General:  Alert and oriented, No acute distress.    Eye:  Normal conjunctiva.    HENT:  Normocephalic, Tympanic membranes are clear.    Neck:  Supple, Non-tender.    Respiratory:  Respirations are non-labored, Breath sounds are equal.         Breath sounds: Bilateral, Diminished.    Cardiovascular:  Normal rate, Regular rhythm, No murmur.       Review / Management   Results review:  Hemoglobin 8.9 down 3/10th.       Impression and Plan   Diagnosis     Community acquired pneumonia (PIJ88-AQ J18.9).     Course:  Improving, Progressing as expected.    Diagnosis     Anemia (DUM54-RO D64.9).     Course:  Improving: none.    Orders     Orders   Requests (Consults / Referrals):  Referral (Request) (Order): 4/8/2021 2:14 PM CDT, Referred to: Internal Medicine, Referred to: Dr. Fields, Anemia  Community acquired pneumonia  Community acquired pneumonia.

## 2022-02-16 NOTE — NURSING NOTE
Comprehensive Intake Entered On:  4/26/2019 9:02 AM CDT    Performed On:  4/26/2019 8:56 AM CDT by Rubi Wallace MA               Summary   Chief Complaint :   DM med check Reveiw labs; Normal foot Exam 10/10   Advance Directive :   Yes   Menstrual Status :   N/A   Weight Measured :   196.4 lb(Converted to: 196 lb 6 oz, 89.09 kg)    Height Measured :   67 in(Converted to: 5 ft 7 in, 170.18 cm)    Body Mass Index :   30.76 kg/m2 (HI)    Body Surface Area :   2.05 m2   Systolic Blood Pressure :   126 mmHg   Diastolic Blood Pressure :   68 mmHg   Mean Arterial Pressure :   87 mmHg   Peripheral Pulse Rate :   72 bpm   BP Site :   Left arm   Pulse Site :   Radial artery   BP Method :   Manual   HR Method :   Manual   Temperature Tympanic :   97.2 DegF(Converted to: 36.2 DegC)  (LOW)    Rubi Wallace MA - 4/26/2019 8:56 AM CDT   Health Status   Allergies Verified? :   Yes   Medication History Verified? :   Yes   Immunizations Current :   Yes   Medical History Verified? :   Yes   Pre-Visit Planning Status :   Completed   Tobacco Use? :   Former smoker   Rubi Wallace MA - 4/26/2019 8:56 AM CDT   Consents   Consent for Immunization Exchange :   Consent Granted   Consent for Immunizations to Providers :   Consent Granted   Rubi Wallace MA - 4/26/2019 8:56 AM CDT   Meds / Allergies   (As Of: 4/26/2019 9:02:13 AM CDT)   Allergies (Active)   No Known Medication Allergies  Estimated Onset Date:   Unspecified ; Created By:   Deana French CMA; Reaction Status:   Active ; Category:   Drug ; Substance:   No Known Medication Allergies ; Type:   Allergy ; Updated By:   Deana French CMA; Reviewed Date:   4/26/2019 8:58 AM CDT        Medication List   (As Of: 4/26/2019 9:02:13 AM CDT)   Prescription/Discharge Order    acetaminophen-hydrocodone  :   acetaminophen-hydrocodone ; Status:   Prescribed ; Ordered As Mnemonic:   Norco 5 mg-325 mg oral tablet ; Simple Display Line:   1 tab(s), PO, q4hr, PRN: for pain, 24 tab(s), 0  Refill(s) ; Ordering Provider:   Noah Bernal PA-C; Catalog Code:   acetaminophen-hydrocodone ; Order Dt/Tm:   3/11/2019 8:02:30 AM          atorvastatin  :   atorvastatin ; Status:   Prescribed ; Ordered As Mnemonic:   atorvastatin 40 mg oral tablet ; Simple Display Line:   40 mg, 1 tab(s), po, daily, 90 tab(s), 3 Refill(s) ; Ordering Provider:   Dexter Silva MD; Catalog Code:   atorvastatin ; Order Dt/Tm:   3/11/2019 7:57:53 AM          isosorbide mononitrate  :   isosorbide mononitrate ; Status:   Prescribed ; Ordered As Mnemonic:   isosorbide mononitrate 30 mg oral tablet, extended release ; Simple Display Line:   60 mg, 2 tab(s), po, qam, for 90 day(s), 180 tab(s), 3 Refill(s) ; Ordering Provider:   Dexter Silva MD; Catalog Code:   isosorbide mononitrate ; Order Dt/Tm:   3/11/2019 7:57:32 AM          FLUoxetine  :   FLUoxetine ; Status:   Prescribed ; Ordered As Mnemonic:   FLUoxetine 40 mg oral capsule ; Simple Display Line:   40 mg, 1 cap(s), po, daily, 90 cap(s), 3 Refill(s) ; Ordering Provider:   Dexter Silva MD; Catalog Code:   FLUoxetine ; Order Dt/Tm:   3/11/2019 7:57:08 AM          doxazosin  :   doxazosin ; Status:   Prescribed ; Ordered As Mnemonic:   doxazosin 2 mg oral tablet ; Simple Display Line:   2 mg, 1 tab(s), po, daily, 90 tab(s), 3 Refill(s) ; Ordering Provider:   Dexter Silva MD; Catalog Code:   doxazosin ; Order Dt/Tm:   3/11/2019 7:56:39 AM          metFORMIN  :   metFORMIN ; Status:   Prescribed ; Ordered As Mnemonic:   metFORMIN 500 mg oral tablet, extended release ; Simple Display Line:   1,000 mg, 2 tab(s), po, bid, 360 tab(s), 3 Refill(s) ; Ordering Provider:   Dexter Silva MD; Catalog Code:   metFORMIN ; Order Dt/Tm:   3/11/2019 7:56:18 AM          hydrochlorothiazide-lisinopril  :   hydrochlorothiazide-lisinopril ; Status:   Prescribed ; Ordered As Mnemonic:   hydrochlorothiazide-lisinopril 25 mg-20 mg oral tablet ; Simple Display  Line:   1 tab(s), po, daily, 90 tab(s), 3 Refill(s) ; Ordering Provider:   Dexter Silva MD; Catalog Code:   hydrochlorothiazide-lisinopril ; Order Dt/Tm:   3/11/2019 7:56:02 AM          nitroglycerin  :   nitroglycerin ; Status:   Prescribed ; Ordered As Mnemonic:   nitroglycerin 0.4 mg sublingual tablet ; Simple Display Line:   0.4 mg, 1 tab(s), SL, q5min, If chest pain not relieved in 5 minutes after first dose, seek immediate medical attention, PRN: for chest pain, 100 tab(s), 3 Refill(s) ; Ordering Provider:   Dexter Silva MD; Catalog Code:   nitroglycerin ; Order Dt/Tm:   3/11/2019 7:55:41 AM          Miscellaneous Rx Supply  :   Miscellaneous Rx Supply ; Status:   Prescribed ; Ordered As Mnemonic:   CPAP 13 ; Simple Display Line:   See Instructions, Use every night. Have a download in the sleep center in one month., 1 EA ; Ordering Provider:   Michel Fields MD; Catalog Code:   Miscellaneous Rx Supply ; Order Dt/Tm:   1/17/2014 10:40:19 AM          Miscellaneous Prescription  :   Miscellaneous Prescription ; Status:   Prescribed ; Ordered As Mnemonic:   FREESTYLE LITE      JUAN C ; Simple Display Line:   1 EA, id, daily, 50 EA ; Ordering Provider:   Dexter Silva MD; Catalog Code:   Miscellaneous Prescription ; Order Dt/Tm:   4/9/2010 10:09:35 AM            Home Meds    aspirin  :   aspirin ; Status:   Documented ; Ordered As Mnemonic:   aspirin 81 mg oral delayed release tablet ; Simple Display Line:   81 mg, 1 tab(s), Oral, daily, 0 Refill(s) ; Catalog Code:   aspirin ; Order Dt/Tm:   1/24/2019 10:33:49 AM          omega-3 polyunsaturated fatty acids  :   omega-3 polyunsaturated fatty acids ; Status:   Documented ; Ordered As Mnemonic:   Fish Oil oral capsule ; Simple Display Line:   po, daily, 0 Refill(s) ; Catalog Code:   omega-3 polyunsaturated fatty acids ; Order Dt/Tm:   7/19/2016 8:35:56 AM

## 2022-02-16 NOTE — TELEPHONE ENCOUNTER
---------------------  From: Kateh Stack CMA (Phone Messages Pool ([id number]_[facility]))   To: [facility] Message Pool ([id number]_[facility]);     Sent: 9/25/2020 7:52:11 AM CDT  Subject: FW: Excuse for [employer] volunteer job           ---------------------  From: JIMENEZ CORREIA on behalf of PIOTR CORREIA  To: [facility]  Sent: 09/24/2020 09:26 a.m. CDT  Subject: Excuse for [employer] volunteer job  Please fax a message verifying Bill s at risk status relating to shortness of breath, heart disease and diabetes. Need to send it before next Thursday. Thank you.    Fax to [employer] at [phone].---------------------  From: Margo Carrasco CMA (T Message Pool ([id number]_[facility]))   To: Dexter Silva MD;     Sent: 9/25/2020 8:02:53 AM CDT  Subject: FW: Excuse for [employer] volunteer job---------------------  From: Dexter Silva MD   To: [employer] Message Pool ([id number]_[facility]); PIOTR CORREIA    Sent: 9/25/2020 8:59:57 AM CDT  Subject: RE: Excuse for [employer] volunteer job     Silver Lamar,    I need more information.  Why is the note being written?  What is [employer]?    I'm happy to write the note, just need the why.    Lucio Silva MD

## 2022-02-16 NOTE — TELEPHONE ENCOUNTER
---------------------  From: Fannie Miller LPN (Phone Messages Pool ([id number]_[facility]))   To: [facility] Message Pool ([id number]_[facility]);     Sent: 11/3/2021 8:08:32 AM CDT  Subject: FW: appointment for Turner MUSA out of clinic until Friday. Last seen [facility]  10/26/21 for covid exposure. Please advise        ---------------------  From: JIMENEZ CORREIA on behalf of PIOTR CORREIA  To: [facility]  Sent: 11/02/2021 06:10 p.m. CDT  Subject: appointment for Turner Silva: Turner has had severe coughing with phlegm often at night. His oximeter reading is low when he gets up in the morning then improves to 92 or so after about an hour. Is there a reason he was not advised to have the monoclonal infusion?  We don t know if he still is battling Covid or has a return of his pneumonia. He has been on prednisone a long time. Might there be an alternative?  It s been almost six weeks since his last blood test. Can we get another tomorrow?---------------------  From: Kathe Stack CMA ([email or web address] Message Pool (02899[facility]))   To: Noah Bernal PA-C;     Sent: 11/3/2021 8:34:04 AM CDT  Subject: FW: appointment for Turner---------------------  From: Noah Bernal PA-C   To: [email or web address] Message Pool ([id number]_[facility]);     Sent: 11/3/2021 9:41:11 AM CDT  Subject: RE: appointment for Turner MEDRANO

## 2022-02-16 NOTE — TELEPHONE ENCOUNTER
---------------------  From: Daisy Alanis CMA (Phone Messages Pool ([id number]_[facility]))   To: Phone Messages Pool (32224_[facility]);     Sent: 10/28/2021 10:22:44 AM CDT  Subject: Covid Results     Called and left a message for pt to call us back regarding his covid results. When pt calls please inform him his covid result came back positive. Please get an update on symptoms and if pt is improving and forward message to Noah Bernal. Pt should stay in quarantine for 10 days since onset of symptoms(10/22) and needs to be fever free for at least 24 hours and all other symptoms need to be improved. Also faxed results to [facility] which will also follow up with patient.Pt updated re: positive test.

## 2022-02-16 NOTE — NURSING NOTE
Comprehensive Intake Entered On:  5/28/2020 9:01 AM CDT    Performed On:  5/28/2020 8:57 AM CDT by Rubi Wallace MA               Summary   Chief Complaint :   DM/HTN med check, Medicaiton refills, Reveiw labs; Verbal permission for video visit    Advance Directive :   Yes   Menstrual Status :   N/A   Height Measured :   67 in(Converted to: 5 ft 7 in, 170.18 cm)    Rubi Wallace MA - 5/28/2020 8:57 AM CDT   Health Status   Allergies Verified? :   Yes   Medication History Verified? :   Yes   Immunizations Current :   Yes   Medical History Verified? :   Yes   Pre-Visit Planning Status :   Completed   Tobacco Use? :   Former smoker   Rubi Wallace MA - 5/28/2020 8:57 AM CDT   Consents   Consent for Immunization Exchange :   Consent Granted   Consent for Immunizations to Providers :   Consent Granted   Rubi Wallace MA - 5/28/2020 8:57 AM CDT   Meds / Allergies   (As Of: 5/28/2020 9:01:33 AM CDT)   Allergies (Active)   No Known Medication Allergies  Estimated Onset Date:   Unspecified ; Created By:   Deana French CMA; Reaction Status:   Active ; Category:   Drug ; Substance:   No Known Medication Allergies ; Type:   Allergy ; Updated By:   Deana French CMA; Reviewed Date:   5/28/2020 9:00 AM CDT        Medication List   (As Of: 5/28/2020 9:01:33 AM CDT)   Prescription/Discharge Order    acetaminophen-hydrocodone  :   acetaminophen-hydrocodone ; Status:   Prescribed ; Ordered As Mnemonic:   Norco 5 mg-325 mg oral tablet ; Simple Display Line:   1 tab(s), PO, q4hr, PRN: for pain, 24 tab(s), 0 Refill(s) ; Ordering Provider:   Dexter Silva MD; Catalog Code:   acetaminophen-hydrocodone ; Order Dt/Tm:   3/24/2020 1:06:36 PM CDT          isosorbide mononitrate  :   isosorbide mononitrate ; Status:   Prescribed ; Ordered As Mnemonic:   isosorbide mononitrate 30 mg oral tablet, extended release ; Simple Display Line:   2 tab(s), Oral, qam, 180 tab(s), 0 Refill(s) ; Ordering Provider:   Dexter Silva MD;  Catalog Code:   isosorbide mononitrate ; Order Dt/Tm:   3/9/2020 8:23:16 AM CDT          FLUoxetine  :   FLUoxetine ; Status:   Prescribed ; Ordered As Mnemonic:   FLUoxetine 40 mg oral capsule ; Simple Display Line:   1 cap(s), Oral, daily, 90 cap(s), 0 Refill(s) ; Ordering Provider:   Dexter Silva MD; Catalog Code:   FLUoxetine ; Order Dt/Tm:   3/9/2020 8:22:03 AM CDT          metFORMIN  :   metFORMIN ; Status:   Prescribed ; Ordered As Mnemonic:   metFORMIN 500 mg oral tablet, extended release ; Simple Display Line:   2 tab(s), Oral, bid, 360 tab(s), 0 Refill(s) ; Ordering Provider:   Dexter Silva MD; Catalog Code:   metFORMIN ; Order Dt/Tm:   3/9/2020 8:22:55 AM CDT          atorvastatin  :   atorvastatin ; Status:   Prescribed ; Ordered As Mnemonic:   atorvastatin 40 mg oral tablet ; Simple Display Line:   1 tab(s), Oral, daily, 90 tab(s), 0 Refill(s) ; Ordering Provider:   Dexter Silva MD; Catalog Code:   atorvastatin ; Order Dt/Tm:   3/9/2020 8:21:22 AM CDT          doxazosin  :   doxazosin ; Status:   Prescribed ; Ordered As Mnemonic:   doxazosin 2 mg oral tablet ; Simple Display Line:   1 tab(s), Oral, daily, 90 tab(s), 0 Refill(s) ; Ordering Provider:   Dexter Silva MD; Catalog Code:   doxazosin ; Order Dt/Tm:   3/9/2020 8:21:47 AM CDT          lisinopril  :   lisinopril ; Status:   Prescribed ; Ordered As Mnemonic:   lisinopril 5 mg oral tablet ; Simple Display Line:   5 mg, 1 tab(s), Oral, daily, 90 tab(s), 3 Refill(s) ; Ordering Provider:   Dexter Silva MD; Catalog Code:   lisinopril ; Order Dt/Tm:   6/28/2019 9:42:08 AM CDT          nitroglycerin  :   nitroglycerin ; Status:   Prescribed ; Ordered As Mnemonic:   nitroglycerin 0.4 mg sublingual tablet ; Simple Display Line:   0.4 mg, 1 tab(s), SL, q5min, If chest pain not relieved in 5 minutes after first dose, seek immediate medical attention, PRN: for chest pain, 100 tab(s), 3 Refill(s) ; Ordering Provider:    Dexter Silva MD; Catalog Code:   nitroglycerin ; Order Dt/Tm:   3/11/2019 7:55:41 AM CDT          Miscellaneous Rx Supply  :   Miscellaneous Rx Supply ; Status:   Prescribed ; Ordered As Mnemonic:   CPAP 13 ; Simple Display Line:   See Instructions, Use every night. Have a download in the sleep center in one month., 1 EA ; Ordering Provider:   Michel Fields MD; Catalog Code:   Miscellaneous Rx Supply ; Order Dt/Tm:   1/17/2014 10:40:19 AM CST          Miscellaneous Prescription  :   Miscellaneous Prescription ; Status:   Prescribed ; Ordered As Mnemonic:   FREESTYLE LITE      JUAN C ; Simple Display Line:   1 EA, id, daily, 50 EA ; Ordering Provider:   Dexter Silva MD; Catalog Code:   Miscellaneous Prescription ; Order Dt/Tm:   4/9/2010 10:09:35 AM CDT            Home Meds    omega-3 polyunsaturated fatty acids  :   omega-3 polyunsaturated fatty acids ; Status:   Documented ; Ordered As Mnemonic:   Fish Oil oral capsule ; Simple Display Line:   po, daily, 0 Refill(s) ; Catalog Code:   omega-3 polyunsaturated fatty acids ; Order Dt/Tm:   7/19/2016 8:35:56 AM CDT            ID Risk Screen   Recent Travel History :   No recent travel   Family Member Travel History :   No recent travel   Other Exposure to Infectious Disease :   Unknown   Rubi Wallace MA - 5/28/2020 8:57 AM CDT

## 2022-02-16 NOTE — TELEPHONE ENCOUNTER
---------------------  From: Fannie Miller LPN (Phone Messages Pool ([address] [place] [address]))   To: T Message Pool ([address] [place] [address]); PIOTR CORREIA    Sent: 3/31/2021 7:59:42 AM CDT  Subject: CONSUMER MESSAGE FW: Anthonys condition     Hi,    Dr. Silva is out of clinic today but will be back in tomorrow. I will forward this message to him. If Turner is feeling worse and would like to be seen and evaluated today please call to schedule with another provider.    Thanks,  Fannie CAMPBELL LPN        ---------------------  From: BRIANDA CORREIA on behalf of PIOTR CORREIA  To: [facility]  Sent: 03/30/2021 07:06 p.m. CDT  Subject: Turner s condition  Turner doesn t seem to be improving this week. His oxygen levels continue to drop below 90 when he removes the canula. His coughing is still painful and frequent, with no phlegm produced.    I wonder if it s time for him to see a specialist for the pneumonia. It s been a few weeks already. We worry about delaying looking at his  low iron levels too. Couldn t we do a home stool test in the mean time?    Right now Turner is scheduled to see you this Friday at 9:40 am. He is hoping to get his second covid vaccine Friday afternoon.---------------------  From: Margo Carrasco CMA (T Message Pool ([address] [place] [address]))   To: Dexter Silva MD;     Sent: 3/31/2021 8:10:59 AM CDT  Subject: FW: CONSUMER MESSAGE FW: Anthonys condition---------------------  From: Dexter Silva MD   To: [facility] Message Pool ([id number]); Michel Fields MD; PIOTR CORREIA    Sent: 3/31/2021 2:09:28 PM CDT  Subject: RE: CONSUMER MESSAGE FW: Bill's condition     Hi Brianda,    Pneumonia takes time but I am all for another perspective.  If we want to do it this week I would make an appointment here at the clinic with Dr. Pizarro.  He is an internist who specializes in adult medicine.  Let me see what I can do.    Lucio Silva MD

## 2022-02-16 NOTE — PROGRESS NOTES
Patient:   PIOTR CORREIA            MRN: [id number]            FIN: [id number]               Age:   73 years     Sex:  Male     :  1945   Associated Diagnoses:   Screening for prostate cancer; Abdominal pain   Author:   Dexter Silva MD      Chief Complaint   3/7/2019 1:15 PM CST     Follow up CT     Chief complaint and symptoms noted above confirmed with patient.      History of Present Illness             The patient presents with abdominal pain.  The abdominal pain is generalized.  The abdominal pain is described as aching.  The severity of the abdominal pain is moderate.  The abdominal pain is constant.  Exacerbating factors consist of none.  Relieving factors consist of none.  Associated symptoms consist of none.  CT showed BPH.        Review of Systems   Constitutional:  Negative.    Genitourinary:  Negative except as documented in history of present illness.       Health Status   Allergies:    Allergic Reactions (Selected)  No Known Medication Allergies   Medications:  (Selected)   Prescriptions  Prescribed  CPAP 13: CPAP 13, See Instructions, Instructions: Use every night. Have a download in the sleep center in one month., Supply, # 1 EA, 0 Refill(s), Type: Maintenance  FLUoxetine 40 mg oral capsule: 1 cap(s) ( 40 mg ), po, daily, # 90 cap(s), 3 Refill(s), Type: Maintenance, Pharmacy: [facility], Due for appt next month, 1 cap(s) po daily  FREESTYLE LITE      JUAN C: 1 EA, id, daily, # 50 EA, 11 Refill(s), Pharmacy: [facility] [id number]  [place] 5 mg-325 mg oral tablet: 1 tab(s), PO, q4hr, PRN: for pain, # 24 tab(s), 0 Refill(s), Type: Maintenance, Pharmacy: [facility], 1 tab(s) po q4 hrs,PRN:for pain  atorvastatin 40 mg oral tablet: 1 tab(s) ( 40 mg ), po, daily, # 90 tab(s), 3 Refill(s), Type: Maintenance, Pharmacy: [facility], Due for appt next month, 1 tab(s) po daily  doxazosin 2 mg oral tablet: 1 tab(s) ( 2 mg ), po, daily, # 90 tab(s), 3 Refill(s), Type:  Maintenance, Pharmacy: [facility], Due for appt next month, 1 tab(s) po daily  hydrochlorothiazide-lisinopril 25 mg-20 mg oral tablet: 1 tab(s), po, daily, # 90 tab(s), 3 Refill(s), Type: Maintenance, Pharmacy: [facility], Due for appt next month, 1 tab(s) po daily  isosorbide mononitrate 30 mg oral tablet, extended release: 2 tab(s) ( 60 mg ), po, qam, # 180 tab(s), 3 Refill(s), Type: Soft Stop, Pharmacy: [facility], 2 tab(s) po qam,x90 day(s)  metFORMIN 500 mg oral tablet, extended release: 2 tab(s) ( 1,000 mg ), po, bid, # 360 tab(s), 3 Refill(s), Type: Maintenance, Pharmacy: [facility], Due for appt next month, 2 tab(s) po bid  nitroglycerin 0.4 mg sublingual tablet: 1 tab(s) ( 0.4 mg ), SL, q5min, Instructions: If chest pain not relieved in 5 minutes after first dose, seek immediate medical attention, PRN: for chest pain, # 25 tab(s), 3 Refill(s), Type: Maintenance, Pharmacy: [facility], 1 tab(s) sl q5...  Documented Medications  Documented  Fish Oil oral capsule: po, daily, 0 Refill(s), Type: Maintenance  aspirin 81 mg oral delayed release tablet: = 1 tab(s) ( 81 mg ), Oral, daily, 0 Refill(s), Type: Maintenance   Problem list:    All Problems  BPH (benign prostatic hyperplasia) / SNOMED CT 920925400 / Confirmed  Back pain, chronic / SNOMED CT 705873406 / Confirmed  Coronary Artery Disease / ICD-9-.00 / Confirmed  DM Type 2 (Diabetes Mellitus, Type 2) / ICD-9-.00 / Confirmed  Hypertension / ICD-9-.9 / Confirmed  Obese / ICD-9-.00 / Probable  Hyperlipemia / SNOMED CT 489147920 / Confirmed  Depression, Recurrent / SNOMED CT 739585890 / Confirmed  Sleep Apnea / ICD-9-.57 / Confirmed      Histories   Past Medical History:    Active  Sleep Apnea (ICD-9-.57): Onset on 6/28/2005 at 59 years.  Comments:  10/18/2013 CDT 3:10 PM CDT Maranda Fields MD, Michel  AHI 7.7 and REM AHI 22 in 2005 11/25/2013 CST 1:54 PM CST Maranda Fields MD,  Michel  On 11/10/2013 AHI 18.3 with oximetry susanne 77%. Good/optimal CPAP titration to 12 with 6.5 minutes supine REM  Hyperlipemia (SNOMED CT 743379921)  DM Type 2 (Diabetes Mellitus, Type 2) (ICD-9-.00)  Coronary Artery Disease (ICD-9-.00)  BPH (benign prostatic hyperplasia) (SNOMED CT 794718617)  Depression, Recurrent (SNOMED CT 700746024)  Obese (ICD-9-.00)  Back pain, chronic (SNOMED CT 525951252)  Hypertension (ICD-9-.9)  Resolved  ACUTE MYOCARDIAL INFARCTION (ICD-9-):  Resolved in  at 61 years.   Family History:    Stroke  Father ()     Procedure history:    Colonoscopy (SNOMED CT 045512215) performed by Shukri Arndt on 2013 at 68 Years.  Comments:  2013 1:13 PM CST - Germaine Lea RN  Sedation: MAC  Diverticulosis in the sigmoid colon, otherwise normal.  Repeat in 10 years.  Angioplasty (SNOMED CT 9867835) in the month of 2007 at 61 Years.  CABG - Coronary artery bypass graft (SNOMED CT 646267457) in the month of 2004 at 58 Years.  Colonoscopy (SNOMED CT 495472423) performed by Aashish Connolly MD on 2002 at 56 Years.  Comments:  2010 7:05 AM CST - Isabella Delgado  Repeat in 10 years.  ORIF right hand in  at 56 Years.  Flexible sigmoidoscopy (SNOMED CT 97227067) performed by Castillo Anaya MD on 1995 at 50 Years.  Comments:  10/16/2013 9:17 AM CDT - Gisele Arevalo  Negative.  ORIF left shoulder in  at 30 Years.      Physical Examination   Vital Signs   3/7/2019 1:15 PM CST Peripheral Pulse Rate 68 bpm    Pulse Site Radial artery    HR Method Manual    Systolic Blood Pressure 112 mmHg    Diastolic Blood Pressure 60 mmHg    Mean Arterial Pressure 77 mmHg    BP Site Right arm    BP Method Manual      Measurements from flowsheet : Measurements   3/7/2019 1:15 PM CST Height Measured - Standard 67 in    Weight Measured - Standard 196.4 lb    BSA 2.05 m2    Body Mass Index 30.76 kg/m2  HI      General:  Alert and oriented, No acute  distress.    Gastrointestinal:  Soft, Non-tender, Non-distended.       Impression and Plan   Diagnosis     Screening for prostate cancer (MMT42-PH Z12.5).     Abdominal pain (WRZ10-AX R10.9).     Course:  Progressing as expected.    Orders     Orders (Selected)   Outpatient Orders  Ordered (Dispatched)  PSA, Total (Room)* ([facility]): Specimen Type: Serum, Collection Date: 03/07/19 13:25:00 CST.

## 2022-02-16 NOTE — TELEPHONE ENCOUNTER
---------------------  From: Hazel Jack CMA (Phone Messages Pool ([id number]_[facility]))   To: Dexter Silva MD; PIOTR CORREIA    Sent: 6/24/2019 9:27:42 AM CDT  Subject: CONSUMER MESSAGE: Vertigo/Dizziness     Good Morning Caroline,    Dr. Silva is out of the clinic today and will be back in clinic tomorrow, I will pass your message on to him.     Hazel Mcclelland CMA    ---------------------  From: JIMENEZ CORREIA on behalf of PIOTR CORREIA  To: [facility]  Sent: 06/22/2019 04:15 p.m. CDT  Subject: Vertigo/Dizziness  Dr. Silva: Turner has had more frequent dizzy spells lately. Really bad today. Our home blood pressure monitor read 104/62. He had not eaten much and had been standing with friends for quite a while. When he climbed our steps he had almost fainted. We wonder if his Lisinopril should be adjusted. He has been losing weight due to a lot of outdoor work and a reduced appetite. Please advise.Patient called and would like a call with response tomorrow since her portal is not working well for her.?   ?   ---------------------  From: Dexter Silva  To: Hazel Jack CMA (Phone Messages Pool ([id number]_[facility]))  Sent: June 24, 2019 12:44 PM CDT  Subject: RE: CONSUMER MESSAGE: Vertigo/Dizziness  ???  Please have him stop his Lisinopril and make an appointment to see me.Return Call    Time: 1259pm    Note: called to inform patient of CHT recommendation

## 2022-02-16 NOTE — PROGRESS NOTES
Patient:   PIOTR CORREIA            MRN: [id number]            FIN: [id number]               Age:   75 years     Sex:  Male     :  1945   Associated Diagnoses:   Anemia; History of DVT in adulthood   Author:   Dexter Silva MD      Chief Complaint   2021 11:06 AM CDT    Follow up anemia.        Interval History   Anemia   The course is improving.  The effect on daily activities is no change in activity level.  Associated symptoms characterized by no dizziness.        Review of Systems   Constitutional:  Negative.    Respiratory:  Negative.    Cardiovascular:  Negative.       Health Status   Allergies:    Allergic Reactions (Selected)  No Known Medication Allergies   Medications:  (Selected)   Prescriptions  Prescribed  CPAP 13: CPAP 13, See Instructions, Instructions: Use every night. Have a download in the sleep center in one month., Supply, # 1 EA, 0 Refill(s), Type: Maintenance  Eliquis 5 mg oral tablet: = 1 tab(s) ( 5 mg ), Oral, bid, # 180 tab(s), 0 Refill(s), Type: Maintenance, Pharmacy: [facility], 1 tab(s) Oral bid,x90 day(s), 67, in, 20 8:57:00 CDT, Height Measured, 181, lb, 21 10:17:00 CDT, Weight Measured  FLUoxetine 40 mg oral capsule: = 1 cap(s) ( 40 mg ), Oral, daily, # 90 cap(s), 0 Refill(s), Type: Maintenance, Pharmacy: [facility], 1 cap(s) Oral daily, 67, in, 20 8:57:00 CDT, Height Measured, 198, lb, 21 9:34:00 CDT, Weight Measured  FREESTYLE LITE      JUAN C: 1 EA, id, daily, # 50 EA, 11 Refill(s), Pharmacy: [id number]  MetFORMIN (Eqv-Glucophage XR) 500 mg oral tablet, extended release: = 2 tab(s) ( 1,000 mg ), Oral, bid, # 360 tab(s), 0 Refill(s), Type: Maintenance, Pharmacy: [facility], 2 tab(s) Oral bid, 67, in, 20 8:57:00 CDT, Height Measured, 198, lb, 21 9:34:00 CDT, Weight Measured  Norco 5 mg-325 mg oral tablet: 1 tab(s), PO, q4hr, PRN: for pain, # 24 tab(s), 0 Refill(s), Type: Maintenance,  Pharmacy: [facility], 1 tab(s) Oral q4 hrs,PRN:for pain  Oxygen: Oxygen, See Instructions, Instructions: D/C Oxygen, Supply, # 1 unknown unit, 0 Refill(s), Type: Maintenance  atorvastatin 40 mg oral tablet: = 1 tab(s) ( 40 mg ), Oral, daily, # 90 tab(s), 0 Refill(s), Type: Maintenance, Pharmacy: [facility], 1 tab(s) Oral daily, 67, in, 05/28/20 8:57:00 CDT, Height Measured, 198, lb, 04/02/21 9:34:00 CDT, Weight Measured  doxazosin 2 mg oral tablet: = 1 tab(s) ( 2 mg ), Oral, daily, # 90 tab(s), 0 Refill(s), Type: Maintenance, Pharmacy: [facility], 1 tab(s) Oral daily, 67, in, 05/28/20 8:57:00 CDT, Height Measured, 198, lb, 04/02/21 9:34:00 CDT, Weight Measured  isosorbide mononitrate 30 mg oral tablet, extended release: = 2 tab(s), Oral, qam, # 180 tab(s), 3 Refill(s), Type: Maintenance, Pharmacy: [facility], Due for appt in April, 2 tab(s) Oral qam, 67, in, 05/28/20 8:57:00 CDT, Height Measured, 197.2, lb, 05/21/20 9:22:00 CDT, Weight Measured  lisinopril 10 mg oral tablet: = 1 tab(s) ( 10 mg ), Oral, daily, # 90 tab(s), 3 Refill(s), Type: Maintenance, Pharmacy: [facility], 1 tab(s) Oral daily, 67, in, 05/28/20 8:57:00 CDT, Height Measured, 201, lb, 10/23/20 8:44:00 CDT, Weight Measured  nitroglycerin 0.4 mg sublingual tablet: = 1 tab(s) ( 0.4 mg ), SL, q5min, Instructions: If chest pain not relieved in 5 minutes after first dose, seek immediate medical attention, PRN: for chest pain, # 100 tab(s), 3 Refill(s), Type: Maintenance, Pharmacy: [facility], This is a 3...  predniSONE 10 mg oral tablet: = 3 tab(s) ( 30 mg ), Oral, daily, # 63 tab(s), 0 Refill(s), Type: Maintenance, Pharmacy: [id number], 3 tab(s) Oral daily,x21 day(s), 67, in, 05/28/20 8:57:00 CDT, Height Measured, 181, lb, 05/14/21 10:17:00 CDT, Weight Measured  Documented Medications  Documented  Fish Oil oral capsule: po, daily, 0 Refill(s), Type: Maintenance  Iron: Iron,  Instructions: 325mg daily, 0 Refill(s), Type: Maintenance  Oxygen Air Delivery System: Oxygen Air Delivery System, Instructions: 2 Liters. Length of need: 3 months, 0 Refill(s), Type: Maintenance  Vitamin C: daily, 0 Refill(s), Type: Maintenance  aspirin: Instructions: 81mg tab po daily, 0 Refill(s), Type: Maintenance  fluconazole 200 mg oral tablet: = 1 tab(s) ( 200 mg ), Oral, daily, 0 Refill(s), Type: Maintenance  insulin isophane (NPH) 100 units/mL human recombinant subcutaneous suspension: ( 30 units ), Subcutaneous, daily, 0 Refill(s), Type: Maintenance  pantoprazole 40 mg oral delayed release tablet: = 1 tab(s) ( 40 mg ), Oral, daily, # 90 tab(s), 0 Refill(s), Type: Maintenance  predniSONE 20 mg oral tablet: = 2 tab(s) ( 40 mg ), 0 Refill(s), Type: Maintenance  sulfamethoxazole-trimethoprim 400 mg-80 mg oral tablet: ( 80 mg ), Oral, daily, 0 Refill(s), Type: Maintenance   Problem list:    All Problems (Selected)  Obstructive sleep apnea (adult) (pediatric) / SNOMED CT 890266494 / Confirmed  Back pain, chronic / SNOMED CT 734573121 / Confirmed  Type 2 diabetes mellitus without complications / SNOMED CT 400037642 / Confirmed  Obesity, unspecified / SNOMED CT 6595708436 / Probable  Atherosclerotic heart disease of native coronary artery without angina pectoris / SNOMED CT 0451080594 / Confirmed  Depression, Recurrent / SNOMED CT 644463332 / Confirmed  BPH (benign prostatic hyperplasia) / SNOMED CT 904623993 / Confirmed  Hyperlipemia / SNOMED CT 598888787 / Confirmed  Anemia / SNOMED CT 395624788 / Confirmed  Essential (primary) hypertension / SNOMED CT 58627317 / Confirmed      Histories   Past Medical History:    Active  Obstructive sleep apnea (adult) (pediatric) (SNOMED CT 295502798): Onset on 6/28/2005 at 59 years.  Comments:  10/18/2013 CDT 3:10 PM CDT Michel Tubbs MD  AHI 7.7 and REM AHI 22 in 2005 11/25/2013 CST 1:54 PM CST Michel Tubbs MD  On 11/10/2013 AHI 18.3 with oximetry susanne 77%.  Good/optimal CPAP titration to 12 with 6.5 minutes supine REM  Hyperlipemia (SNOMED CT 480113859)  Type 2 diabetes mellitus without complications (SNOMED CT 586574378)  Atherosclerotic heart disease of native coronary artery without angina pectoris (SNOMED CT 2085440191)  BPH (benign prostatic hyperplasia) (SNOMED CT 498255889)  Depression, Recurrent (SNOMED CT 490147906)  Obesity, unspecified (SNOMED CT 3873538352)  Back pain, chronic (SNOMED CT 772177433)  Essential (primary) hypertension (SNOMED CT 53100119)  Resolved  Inpatient stay (SNOMED CT 850109879): Onset on 3/20/2021 at 75 years.  Resolved on 3/22/2021 at 75 years.  Comments:  3/25/2021 CDT 10:53 AM CDT - Danny Isabella  [facility] - [place], [place] - Pneumonia of both lungs due to infectious organism.  ACUTE MYOCARDIAL INFARCTION (ICD-9-):  Resolved in  at 61 years.   Family History:    Hypertension  Father ()  Heart disease  Father ()  Alcohol abuse  Mother ()  Stroke  Father ()  Liver disease  Mother ()     Procedure history:    Angiogram (SNOMED CT 817300320) in 2017 at 72 Years.  Colonoscopy (SNOMED CT 567923010) performed by Shukri Arndt on 2013 at 68 Years.  Comments:  2013 1:13 PM CST - Leonora WILLS, Germaine  Sedation: MAC  Diverticulosis in the sigmoid colon, otherwise normal.  Repeat in 10 years.  Angioplasty (SNOMED CT 8287836) in the month of 2007 at 61 Years.  CABG - Coronary artery bypass graft (SNOMED CT 346811681) in the month of 2004 at 58 Years.  Colonoscopy (SNOMED CT 062144871) performed by Aashish Connolly MD on 2002 at 56 Years.  Comments:  2010 7:05 AM CST - Isabella Delgado  Repeat in 10 years.  ORIF right hand in  at 56 Years.  Flexible sigmoidoscopy (SNOMED CT 44703181) performed by Castillo Anaya MD on 1995 at 50 Years.  Comments:  10/16/2013 9:17 AM CDT - Gisele Arevalo  Negative.  ORIF left shoulder in  at 30 Years.   Social History:         Electronic Cigarette/Vaping Assessment            Electronic Cigarette Use: Former use, quit more than 90 days ago.      Alcohol Assessment: Past            Quit 1974      Tobacco Assessment: Past            Quit 1972            Former smoker, quit more than 30 days ago      Substance Abuse Assessment: Denies Substance Abuse            Never      Employment and Education Assessment            Retired, Work/School description: .  Highest education level: High school.      Home and Environment Assessment            Marital status: .  Spouse/Partner name: Brianda.  Living situation: Home/Independent.               Injuries/Abuse/Neglect in household: No.  Feels unsafe at home: No.  Family/Friends available for support:               Yes.      Nutrition and Health Assessment            Type of diet: Regular.  Wants to lose weight: Yes.  Sleeping concerns: No.  Feels highly stressed: No.      Exercise and Physical Activity Assessment: Occasional exercise            Exercise frequency: 1-2 times/week.  Exercise type: Walking.      Sexual Assessment            Sexually active: No.  Identifies as male, History of STD: No.  History of sexual abuse: No.      Other Assessment            Hearing impairment.        Physical Examination   Vital Signs   6/8/2021 11:06 AM CDT Peripheral Pulse Rate 62 bpm    Systolic Blood Pressure 134 mmHg  HI    Diastolic Blood Pressure 68 mmHg    Mean Arterial Pressure 90 mmHg    BP Site Right arm    BP Method Manual    Oxygen Saturation 97 %      Measurements from flowsheet : Measurements   6/8/2021 11:06 AM CDT Height/Length Estimated 67 in    Weight Estimated 175.6 lb    BSA Estimated 1.94 m2    Body Mass Index Estimated 27.5 kg/m2      General:  Alert and oriented, No acute distress.    HENT:  Normocephalic, Tympanic membranes are clear.    Neck:  Supple, Non-tender.    Respiratory:  Lungs are clear to auscultation, Respirations are non-labored.    Cardiovascular:  Normal  rate, Regular rhythm, No murmur.    Gastrointestinal:  Soft, Non-tender, Non-distended.       Review / Management   Results review:  HGB 8.4.       Impression and Plan   Diagnosis     Anemia (IFJ73-KA D64.9).     Course:  Improving, Progressing as expected.    Orders     Will continue iron to BID, monitor HGB, Pulmonology next week and GI next week...     Diagnosis     History of DVT in adulthood (WPI60-GL Z86.718).     Course:  Improving, Progressing as expected.    Plan:  Reviewed [place] recommendation for venous Doppler.    Orders     Orders (Selected)   Outpatient Orders  Ordered  US Lower Extremity Venous Doppler (Request): Instructions: Specify Right, History of DVT in adulthood.

## 2022-02-16 NOTE — NURSING NOTE
Comprehensive Intake Entered On:  3/26/2021 10:29 AM CDT    Performed On:  3/26/2021 10:17 AM CDT by Margo Carrasco CMA               Summary   Chief Complaint :   Hospital follow up   Advance Directive :   Yes   Menstrual Status :   N/A   Weight Estimated :   200 lb(Converted to: 200 lb 0 oz, 91 kg)    Height/Length Estimated :   67 in(Converted to: 5 ft 7 in, 170.18 cm)    Body Mass Index Estimated :   31.32 kg/m2   BSA Estimated :   2.07 m2   Systolic Blood Pressure :   138 mmHg (HI)    Diastolic Blood Pressure :   62 mmHg   Mean Arterial Pressure :   87 mmHg   Peripheral Pulse Rate :   98 bpm   BP Site :   Right arm   BP Method :   Manual   Temperature Tympanic :   99.1 DegF(Converted to: 37.3 DegC)    Respiratory Rate :   20 br/min   Oxygen Saturation :   89 % (LOW)    (Comment: W/o home oxygen on-2 liters [Margo Carrasco CMA - 3/26/2021 10:17 AM CDT] )   Margo Carrasco CMA - 3/26/2021 10:17 AM CDT   Health Status   Allergies Verified? :   Yes   Medication History Verified? :   Yes   Immunizations Current :   Yes   Medical History Verified? :   Yes   Pre-Visit Planning Status :   Completed   Tobacco Use? :   Former smoker   Margo Carrasco CMA - 3/26/2021 10:17 AM CDT   Consents   Consent for Immunization Exchange :   Consent Granted   Consent for Immunizations to Providers :   Consent Granted   Margo Carrasco CMA - 3/26/2021 10:17 AM CDT   Meds / Allergies   (As Of: 3/26/2021 10:29:03 AM CDT)   Allergies (Active)   No Known Medication Allergies  Estimated Onset Date:   Unspecified ; Created By:   Deana French CMA; Reaction Status:   Active ; Category:   Drug ; Substance:   No Known Medication Allergies ; Type:   Allergy ; Updated By:   Deana French CMA; Reviewed Date:   3/26/2021 10:23 AM CDT        Medication List   (As Of: 3/26/2021 10:29:03 AM CDT)   Prescription/Discharge Order    acetaminophen-hydrocodone  :   acetaminophen-hydrocodone ; Status:   Prescribed ; Ordered As Mnemonic:   Norco 5 mg-325 mg oral  tablet ; Simple Display Line:   1 tab(s), PO, q4hr, PRN: for pain, 24 tab(s), 0 Refill(s) ; Ordering Provider:   Dexter Silva MD; Catalog Code:   acetaminophen-hydrocodone ; Order Dt/Tm:   3/24/2020 1:06:36 PM CDT          amoxicillin-clavulanate  :   amoxicillin-clavulanate ; Status:   Deleted ; Ordered As Mnemonic:   amoxicillin-clavulanate 875 mg-125 mg oral tablet ; Simple Display Line:   1 tab(s), Oral, q12 hrs, for 10 day(s), 20 tab(s), 0 Refill(s) ; Ordering Provider:   Dexter Silva MD; Catalog Code:   amoxicillin-clavulanate ; Order Dt/Tm:   3/19/2021 3:58:28 PM CDT          atorvastatin  :   atorvastatin ; Status:   Prescribed ; Ordered As Mnemonic:   atorvastatin 40 mg oral tablet ; Simple Display Line:   See Instructions, TAKE 1 TABLET BY MOUTH  DAILY, 90 tab(s), 3 Refill(s) ; Ordering Provider:   Dexter Silva MD; Catalog Code:   atorvastatin ; Order Dt/Tm:   5/28/2020 9:41:31 AM CDT          atorvastatin  :   atorvastatin ; Status:   Prescribed ; Ordered As Mnemonic:   atorvastatin 40 mg oral tablet ; Simple Display Line:   1 tab(s), Oral, daily, 90 tab(s), 0 Refill(s) ; Ordering Provider:   Dexter Silva MD; Catalog Code:   atorvastatin ; Order Dt/Tm:   3/9/2020 8:21:22 AM CDT          doxazosin  :   doxazosin ; Status:   Prescribed ; Ordered As Mnemonic:   doxazosin 2 mg oral tablet ; Simple Display Line:   See Instructions, TAKE 1 TABLET BY MOUTH  DAILY, 90 tab(s), 3 Refill(s) ; Ordering Provider:   Dexter Silva MD; Catalog Code:   doxazosin ; Order Dt/Tm:   5/28/2020 9:41:32 AM CDT          doxazosin  :   doxazosin ; Status:   Prescribed ; Ordered As Mnemonic:   doxazosin 2 mg oral tablet ; Simple Display Line:   1 tab(s), Oral, daily, 90 tab(s), 0 Refill(s) ; Ordering Provider:   Dexter Silva MD; Catalog Code:   doxazosin ; Order Dt/Tm:   3/9/2020 8:21:47 AM CDT          FLUoxetine  :   FLUoxetine ; Status:   Prescribed ; Ordered As Mnemonic:    FLUoxetine 40 mg oral capsule ; Simple Display Line:   See Instructions, TAKE 1 CAPSULE BY MOUTH  DAILY, 90 cap(s), 3 Refill(s) ; Ordering Provider:   Dexter Silva MD; Catalog Code:   FLUoxetine ; Order Dt/Tm:   5/28/2020 9:41:33 AM CDT          FLUoxetine  :   FLUoxetine ; Status:   Prescribed ; Ordered As Mnemonic:   FLUoxetine 40 mg oral capsule ; Simple Display Line:   1 cap(s), Oral, daily, 90 cap(s), 0 Refill(s) ; Ordering Provider:   Dexter Silva MD; Catalog Code:   FLUoxetine ; Order Dt/Tm:   3/9/2020 8:22:03 AM CDT          isosorbide mononitrate  :   isosorbide mononitrate ; Status:   Prescribed ; Ordered As Mnemonic:   isosorbide mononitrate 30 mg oral tablet, extended release ; Simple Display Line:   2 tab(s), Oral, qam, 180 tab(s), 3 Refill(s) ; Ordering Provider:   Dexter Silva MD; Catalog Code:   isosorbide mononitrate ; Order Dt/Tm:   5/28/2020 9:43:22 AM CDT          lisinopril  :   lisinopril ; Status:   Prescribed ; Ordered As Mnemonic:   lisinopril 10 mg oral tablet ; Simple Display Line:   10 mg, 1 tab(s), Oral, daily, 90 tab(s), 3 Refill(s) ; Ordering Provider:   Dexter Silva MD; Catalog Code:   lisinopril ; Order Dt/Tm:   10/23/2020 9:21:34 AM CDT          metFORMIN  :   metFORMIN ; Status:   Prescribed ; Ordered As Mnemonic:   MetFORMIN (Eqv-Glucophage XR) 500 mg oral tablet, extended release ; Simple Display Line:   See Instructions, TAKE 2 TABLETS BY MOUTH  TWICE DAILY, 360 tab(s), 3 Refill(s) ; Ordering Provider:   Dexter Silva MD; Catalog Code:   metFORMIN ; Order Dt/Tm:   5/28/2020 9:41:34 AM CDT          Miscellaneous Prescription  :   Miscellaneous Prescription ; Status:   Prescribed ; Ordered As Mnemonic:   FREESTYLE LITE      JUAN C ; Simple Display Line:   1 EA, id, daily, 50 EA ; Ordering Provider:   Dexter Silva MD; Catalog Code:   Miscellaneous Prescription ; Order Dt/Tm:   4/9/2010 10:09:35 AM CDT          Miscellaneous Rx  Supply  :   Miscellaneous Rx Supply ; Status:   Prescribed ; Ordered As Mnemonic:   CPAP 13 ; Simple Display Line:   See Instructions, Use every night. Have a download in the sleep center in one month., 1 EA ; Ordering Provider:   Michel Fields MD; Catalog Code:   Miscellaneous Rx Supply ; Order Dt/Tm:   1/17/2014 10:40:19 AM CST          nitroglycerin  :   nitroglycerin ; Status:   Prescribed ; Ordered As Mnemonic:   nitroglycerin 0.4 mg sublingual tablet ; Simple Display Line:   0.4 mg, 1 tab(s), SL, q5min, If chest pain not relieved in 5 minutes after first dose, seek immediate medical attention, PRN: for chest pain, 100 tab(s), 3 Refill(s) ; Ordering Provider:   Dexter Silva MD; Catalog Code:   nitroglycerin ; Order Dt/Tm:   10/23/2020 9:05:52 AM CDT            Home Meds    ascorbic acid  :   ascorbic acid ; Status:   Documented ; Ordered As Mnemonic:   Vitamin C ; Simple Display Line:   daily, 0 Refill(s) ; Catalog Code:   ascorbic acid ; Order Dt/Tm:   10/23/2020 8:48:26 AM CDT          aspirin  :   aspirin ; Status:   Documented ; Ordered As Mnemonic:   aspirin ; Simple Display Line:   81mg tab po daily, 0 Refill(s) ; Catalog Code:   aspirin ; Order Dt/Tm:   3/19/2021 2:43:15 PM CDT          cefdinir  :   cefdinir ; Status:   Documented ; Ordered As Mnemonic:   cefdinir 300 mg oral capsule ; Simple Display Line:   600 mg, 2 cap(s), for 5 day(s), 0 Refill(s) ; Catalog Code:   cefdinir ; Order Dt/Tm:   3/26/2021 9:39:07 AM CDT          doxycycline  :   doxycycline ; Status:   Documented ; Ordered As Mnemonic:   doxycycline monohydrate 100 mg oral capsule ; Simple Display Line:   100 mg, 1 cap(s), bid, for 5 day(s), 0 Refill(s) ; Catalog Code:   doxycycline ; Order Dt/Tm:   3/26/2021 9:39:07 AM CDT          Miscellaneous Prescription  :   Miscellaneous Prescription ; Status:   Processing ; Ordered As Mnemonic:   Iron ; Simple Display Line:   325mg daily, 0 Refill(s) ; Action Display:   Modify ;  Catalog Code:   Miscellaneous Prescription ; Order Dt/Tm:   3/26/2021 10:28:41 AM CDT          Miscellaneous Prescription  :   Miscellaneous Prescription ; Status:   Documented ; Ordered As Mnemonic:   Oxygen Air Delivery System ; Simple Display Line:   2 Liters. Length of need: 3 months, 0 Refill(s) ; Catalog Code:   Miscellaneous Prescription ; Order Dt/Tm:   3/26/2021 9:39:48 AM CDT          omega-3 polyunsaturated fatty acids  :   omega-3 polyunsaturated fatty acids ; Status:   Documented ; Ordered As Mnemonic:   Fish Oil oral capsule ; Simple Display Line:   po, daily, 0 Refill(s) ; Catalog Code:   omega-3 polyunsaturated fatty acids ; Order Dt/Tm:   7/19/2016 8:35:56 AM CDT            ID Risk Screen   Recent Travel History :   No recent travel   Family Member Travel History :   No recent travel   Other Exposure to Infectious Disease :   Unknown   COVID-19 Testing Status :   No positive COVID-19 test   Margo Carrasco CMA 3/26/2021 10:17 AM CDT

## 2022-02-16 NOTE — LETTER
(Inserted Image. Unable to display)   September 26, 2020      PIOTR CORREIA  [address] [place] [id number]        Dear PIOTR,      Thank you for selecting [facility] (previously [facility]) for your healthcare needs.     Our records indicate you are due for the following services:     Annual Physical  Diabetic Exam ~ Please bring your glucose meter and/or your blood glucose diary to your appointment.    To schedule an appointment or if you have further questions, please contact your primary clinic:   Former[facility]          [phone]   formerly [facility]    [phone]             [facility]         [phone]      Powered by [employer]    Sincerely,    Dexter Silva M.D.

## 2022-02-16 NOTE — PROGRESS NOTES
Patient:   PIOTR CORREIA            MRN: [id number]            FIN: [id number]               Age:   75 years     Sex:  Male     :  1945   Associated Diagnoses:   Anemia   Author:   Dexter Silva MD      Chief Complaint   2021 1:03 PM CDT    Follow up anemia and pneumonia.        Interval History   Anemia   The course is improving.  The effect on daily activities is no change in activity level.  Associated symptoms characterized by dizziness.        Review of Systems   Constitutional:  Negative.    Respiratory:  Negative.    Cardiovascular:  Negative.       Health Status   Allergies:    Allergic Reactions (Selected)  No Known Medication Allergies   Medications:  (Selected)   Prescriptions  Prescribed  CPAP 13: CPAP 13, See Instructions, Instructions: Use every night. Have a download in the sleep center in one month., Supply, # 1 EA, 0 Refill(s), Type: Maintenance  Eliquis 5 mg oral tablet: = 1 tab(s) ( 5 mg ), Oral, bid, # 180 tab(s), 0 Refill(s), Type: Maintenance, Pharmacy: [facility], 1 tab(s) Oral bid,x90 day(s), 67, in, 20 8:57:00 CDT, Height Measured, 181, lb, 21 10:17:00 CDT, Weight Measured  FLUoxetine 40 mg oral capsule: = 1 cap(s) ( 40 mg ), Oral, daily, # 90 cap(s), 0 Refill(s), Type: Maintenance, Pharmacy: [facility], 1 cap(s) Oral daily, 67, in, 20 8:57:00 CDT, Height Measured, 198, lb, 21 9:34:00 CDT, Weight Measured  FREESTYLE LITE      JUAN C: 1 EA, id, daily, # 50 EA, 11 Refill(s), Pharmacy: [id number]  MetFORMIN (Eqv-Glucophage XR) 500 mg oral tablet, extended release: = 2 tab(s) ( 1,000 mg ), Oral, bid, # 360 tab(s), 0 Refill(s), Type: Maintenance, Pharmacy: [facility], 2 tab(s) Oral bid, 67, in, 20 8:57:00 CDT, Height Measured, 198, lb, 21 9:34:00 CDT, Weight Measured  Norco 5 mg-325 mg oral tablet: 1 tab(s), PO, q4hr, PRN: for pain, # 24 tab(s), 0 Refill(s), Type: Maintenance, Pharmacy: [facility], 1 tab(s) Oral q4 hrs,PRN:for pain  atorvastatin 40 mg oral tablet: = 1 tab(s) ( 40 mg ), Oral, daily, # 90 tab(s), 0 Refill(s), Type: Maintenance, Pharmacy: [facility], 1 tab(s) Oral daily, 67, in, 05/28/20 8:57:00 CDT, Height Measured, 198, lb, 04/02/21 9:34:00 CDT, Weight Measured  doxazosin 2 mg oral tablet: = 1 tab(s) ( 2 mg ), Oral, daily, # 90 tab(s), 0 Refill(s), Type: Maintenance, Pharmacy: [facility], 1 tab(s) Oral daily, 67, in, 05/28/20 8:57:00 CDT, Height Measured, 198, lb, 04/02/21 9:34:00 CDT, Weight Measured  isosorbide mononitrate 30 mg oral tablet, extended release: = 2 tab(s), Oral, qam, # 180 tab(s), 3 Refill(s), Type: Maintenance, Pharmacy: [facility], Due for appt in April, 2 tab(s) Oral qam, 67, in, 05/28/20 8:57:00 CDT, Height Measured, 197.2, lb, 05/21/20 9:22:00 CDT, Weight Measured  lisinopril 10 mg oral tablet: = 1 tab(s) ( 10 mg ), Oral, daily, # 90 tab(s), 3 Refill(s), Type: Maintenance, Pharmacy: [facility], 1 tab(s) Oral daily, 67, in, 05/28/20 8:57:00 CDT, Height Measured, 201, lb, 10/23/20 8:44:00 CDT, Weight Measured  nitroglycerin 0.4 mg sublingual tablet: = 1 tab(s) ( 0.4 mg ), SL, q5min, Instructions: If chest pain not relieved in 5 minutes after first dose, seek immediate medical attention, PRN: for chest pain, # 100 tab(s), 3 Refill(s), Type: Maintenance, Pharmacy: [facility], This is a 3...  predniSONE 10 mg oral tablet: = 3 tab(s) ( 30 mg ), Oral, daily, # 63 tab(s), 0 Refill(s), Type: Maintenance, Pharmacy: [id number], 3 tab(s) Oral daily,x21 day(s), 67, in, 05/28/20 8:57:00 CDT, Height Measured, 181, lb, 05/14/21 10:17:00 CDT, Weight Measured  Documented Medications  Documented  Fish Oil oral capsule: po, daily, 0 Refill(s), Type: Maintenance  Iron: Iron, Instructions: 325mg daily, 0 Refill(s), Type: Maintenance  Oxygen Air Delivery System: Oxygen Air Delivery System, Instructions: 2 Liters. Length  of need: 3 months, 0 Refill(s), Type: Maintenance  Vitamin C: daily, 0 Refill(s), Type: Maintenance  aspirin: Instructions: 81mg tab po daily, 0 Refill(s), Type: Maintenance  fluconazole 200 mg oral tablet: = 1 tab(s) ( 200 mg ), Oral, daily, 0 Refill(s), Type: Maintenance  insulin isophane (NPH) 100 units/mL human recombinant subcutaneous suspension: ( 30 units ), Subcutaneous, daily, 0 Refill(s), Type: Maintenance  pantoprazole 40 mg oral delayed release tablet: = 1 tab(s) ( 40 mg ), Oral, daily, # 90 tab(s), 0 Refill(s), Type: Maintenance  predniSONE 20 mg oral tablet: = 2 tab(s) ( 40 mg ), 0 Refill(s), Type: Maintenance  sulfamethoxazole-trimethoprim 400 mg-80 mg oral tablet: ( 80 mg ), Oral, daily, 0 Refill(s), Type: Maintenance   Problem list:    All Problems (Selected)  Obstructive sleep apnea (adult) (pediatric) / SNOMED CT 645215037 / Confirmed  Back pain, chronic / SNOMED CT 412418212 / Confirmed  Type 2 diabetes mellitus without complications / SNOMED CT 163089256 / Confirmed  Obesity, unspecified / SNOMED CT 7637853955 / Probable  Atherosclerotic heart disease of native coronary artery without angina pectoris / SNOMED CT 1629972237 / Confirmed  Depression, Recurrent / SNOMED CT 833201937 / Confirmed  BPH (benign prostatic hyperplasia) / SNOMED CT 683138266 / Confirmed  Hyperlipemia / SNOMED CT 605805148 / Confirmed  Essential (primary) hypertension / SNOMED CT 23629210 / Confirmed      Histories   Past Medical History:    Active  Obstructive sleep apnea (adult) (pediatric) (SNOMED CT 948622812): Onset on 6/28/2005 at 59 years.  Comments:  10/18/2013 CDT 3:10 PM CDT Michel Tubbs MD  AHI 7.7 and REM AHI 22 in 2005 11/25/2013 CST 1:54 PM CST Michel Tubbs MD  On 11/10/2013 AHI 18.3 with oximetry susanne 77%. Good/optimal CPAP titration to 12 with 6.5 minutes supine REM  Hyperlipemia (SNOMED CT 390157964)  Type 2 diabetes mellitus without complications (SNOMED CT 682156667)  Atherosclerotic heart  disease of native coronary artery without angina pectoris (SNOMED CT 6635653073)  BPH (benign prostatic hyperplasia) (SNOMED CT 013392737)  Depression, Recurrent (SNOMED CT 089722424)  Obesity, unspecified (SNOMED CT 9874237075)  Back pain, chronic (SNOMED CT 858172955)  Essential (primary) hypertension (SNOMED CT 67478607)  Resolved  Inpatient stay (SNOMED CT 071405593): Onset on 3/20/2021 at 75 years.  Resolved on 3/22/2021 at 75 years.  Comments:  3/25/2021 CDT 10:53 AM CDT - Isabella Delgado  [facility] [place], [place] - Pneumonia of both lungs due to infectious organism.  ACUTE MYOCARDIAL INFARCTION (ICD-9-):  Resolved in  at 61 years.   Family History:    Hypertension  Father ()  Heart disease  Father ()  Alcohol abuse  Mother ()  Stroke  Father ()  Liver disease  Mother ()     Procedure history:    Angiogram (SNOMED CT 961701505) in 2017 at 72 Years.  Colonoscopy (SNOMED CT 488629144) performed by Shukri Arndt on 2013 at 68 Years.  Comments:  2013 1:13 PM CST - Leonora WILLS, Germaine  Sedation: MAC  Diverticulosis in the sigmoid colon, otherwise normal.  Repeat in 10 years.  Angioplasty (SNOMED CT 0954496) in the month of 2007 at 61 Years.  CABG - Coronary artery bypass graft (SNOMED CT 749410683) in the month of 2004 at 58 Years.  Colonoscopy (SNOMED CT 596648192) performed by Aashish Connolly MD on 2002 at 56 Years.  Comments:  2010 7:05 AM CST - Isabella Delgado  Repeat in 10 years.  ORIF right hand in  at 56 Years.  Flexible sigmoidoscopy (SNOMED CT 51205210) performed by Castillo Anaya MD on 1995 at 50 Years.  Comments:  10/16/2013 9:17 AM CDT - Gisele Arevalo  Negative.  ORIF left shoulder in  at 30 Years.   Social History:        Electronic Cigarette/Vaping Assessment            Electronic Cigarette Use: Former use, quit more than 90 days ago.      Alcohol Assessment: Past            Quit       Tobacco Assessment: Past             Quit 1972            Former smoker, quit more than 30 days ago      Substance Abuse Assessment: Denies Substance Abuse            Never      Employment and Education Assessment            Retired, Work/School description: .  Highest education level: High school.      Home and Environment Assessment            Marital status: .  Spouse/Partner name: Brianda.  Living situation: Home/Independent.               Injuries/Abuse/Neglect in household: No.  Feels unsafe at home: No.  Family/Friends available for support:               Yes.      Nutrition and Health Assessment            Type of diet: Regular.  Wants to lose weight: Yes.  Sleeping concerns: No.  Feels highly stressed: No.      Exercise and Physical Activity Assessment: Occasional exercise            Exercise frequency: 1-2 times/week.  Exercise type: Walking.      Sexual Assessment            Sexually active: No.  Identifies as male, History of STD: No.  History of sexual abuse: No.      Other Assessment            Hearing impairment.        Physical Examination   Vital Signs   5/19/2021 1:03 PM CDT Peripheral Pulse Rate 98 bpm    Systolic Blood Pressure 132 mmHg  HI    Diastolic Blood Pressure 68 mmHg    Mean Arterial Pressure 89 mmHg    BP Site Right arm    BP Method Manual    Oxygen Saturation 96 %      Measurements from flowsheet : Measurements   5/19/2021 1:03 PM CDT Height/Length Estimated 67 in    Weight Measured - Standard 178 lb      General:  Alert and oriented, No acute distress.    HENT:  Normocephalic, Tympanic membranes are clear.    Neck:  Supple, Non-tender.    Respiratory:  Lungs are clear to auscultation, Respirations are non-labored.    Cardiovascular:  Normal rate, Regular rhythm, No murmur.    Gastrointestinal:  Soft, Non-tender, Non-distended.       Review / Management   Results review:  HGB 8.7.       Impression and Plan   Diagnosis     Anemia ([id number] D64.9).     Course:  Improving, Progressing as  expected.    Orders     Will increase iron to BID..

## 2022-02-16 NOTE — PROGRESS NOTES
Patient:   PIOTR CORREIA            MRN: [id number]            FIN: [id number]               Age:   75 years     Sex:  Male     :  1945   Associated Diagnoses:   Community acquired pneumonia   Author:   Dexter Silav MD      Chief Complaint   3/26/2021 10:17 AM CDT   Hospital follow up     Chief complaint and symptoms noted above confirmed with patient.      History of Present Illness             The patient presents for follow-up evaluation of pneumonia symptom(s).  The quality of the patient's pneumonia since the last visit is described as being unchanged from previous visit.  The symptom(s) of pneumonia are constant.  Was in hospital x 3 days, has home O2.  Exacerbating factors consist of none.  Relieving factors consist of beta-agonist, oxygen, rest and sitting upright.  Associated symptoms consist of none.        Review of Systems   Constitutional:  Negative.    Ear/Nose/Mouth/Throat:  Negative.    Respiratory:  Negative except as documented in history of present illness.    Cardiovascular:  Negative.    Gastrointestinal:  Negative.       Health Status   Allergies:    Allergic Reactions (Selected)  No Known Medication Allergies   Medications:  (Selected)   Prescriptions  Prescribed  CPAP 13: CPAP 13, See Instructions, Instructions: Use every night. Have a download in the sleep center in one month., Supply, # 1 EA, 0 Refill(s), Type: Maintenance  FLUoxetine 40 mg oral capsule: = 1 cap(s), Oral, daily, # 90 cap(s), 0 Refill(s), Type: Maintenance, Pharmacy: [facility], Due for appt in April  FLUoxetine 40 mg oral capsule: See Instructions, Instructions: TAKE 1 CAPSULE BY MOUTH  DAILY, # 90 cap(s), 3 Refill(s), Type: Maintenance, Pharmacy: [facility], TAKE 1 CAPSULE BY MOUTH  DAILY, 67, in, 20 8:57:00 CDT, Height Measured, 197.2, lb, 20 9:22:00 CDT...  FREESTYLE LITE      JUAN C: 1 EA, id, daily, # 50 EA, 11 Refill(s), Pharmacy: [id number]  MetFORMIN  (Eqv-Glucophage XR) 500 mg oral tablet, extended release: See Instructions, Instructions: TAKE 2 TABLETS BY MOUTH  TWICE DAILY, # 360 tab(s), 3 Refill(s), Type: Maintenance, Pharmacy: [facility], TAKE 2 TABLETS BY MOUTH  TWICE DAILY, 67, in, 05/28/20 8:57:00 CDT, Height Measured, 197.2, lb, 05/21/2...  Norco 5 mg-325 mg oral tablet: 1 tab(s), PO, q4hr, PRN: for pain, # 24 tab(s), 0 Refill(s), Type: Maintenance, Pharmacy: [facility], 1 tab(s) Oral q4 hrs,PRN:for pain  atorvastatin 40 mg oral tablet: = 1 tab(s), Oral, daily, # 90 tab(s), 0 Refill(s), Type: Maintenance, Pharmacy: [facility], Due for appt in April  atorvastatin 40 mg oral tablet: See Instructions, Instructions: TAKE 1 TABLET BY MOUTH  DAILY, # 90 tab(s), 3 Refill(s), Type: Maintenance, Pharmacy: [facility], TAKE 1 TABLET BY MOUTH  DAILY, 67, in, 05/28/20 8:57:00 CDT, Height Measured, 197.2, lb, 05/21/20 9:22:00 CDT,...  doxazosin 2 mg oral tablet: = 1 tab(s), Oral, daily, # 90 tab(s), 0 Refill(s), Type: Maintenance, Pharmacy: [facility], Due for appt in April  doxazosin 2 mg oral tablet: See Instructions, Instructions: TAKE 1 TABLET BY MOUTH  DAILY, # 90 tab(s), 3 Refill(s), Type: Maintenance, Pharmacy: [facility], TAKE 1 TABLET BY MOUTH  DAILY, 67, in, 05/28/20 8:57:00 CDT, Height Measured, 197.2, lb, 05/21/20 9:22:00 CDT,...  isosorbide mononitrate 30 mg oral tablet, extended release: = 2 tab(s), Oral, qam, # 180 tab(s), 3 Refill(s), Type: Maintenance, Pharmacy: [facility], Due for appt in April, 2 tab(s) Oral qam, 67, in, 05/28/20 8:57:00 CDT, Height Measured, 197.2, lb, 05/21/20 9:22:00 CDT, Weight Measured  lisinopril 10 mg oral tablet: = 1 tab(s) ( 10 mg ), Oral, daily, # 90 tab(s), 3 Refill(s), Type: Maintenance, Pharmacy: [facility], 1 tab(s) Oral daily, 67, in, 05/28/20 8:57:00 CDT, Height Measured, 201, lb, 10/23/20 8:44:00 CDT, Weight Measured  nitroglycerin 0.4  mg sublingual tablet: = 1 tab(s) ( 0.4 mg ), SL, q5min, Instructions: If chest pain not relieved in 5 minutes after first dose, seek immediate medical attention, PRN: for chest pain, # 100 tab(s), 3 Refill(s), Type: Maintenance, Pharmacy: [email or web address], This is a 3...  Documented Medications  Documented  Fish Oil oral capsule: po, daily, 0 Refill(s), Type: Maintenance  Iron: Iron, Instructions: 325mg daily, 0 Refill(s), Type: Maintenance  Oxygen Air Delivery System: Oxygen Air Delivery System, Instructions: 2 Liters. Length of need: 3 months, 0 Refill(s), Type: Maintenance  Vitamin C: daily, 0 Refill(s), Type: Maintenance  aspirin: Instructions: 81mg tab po daily, 0 Refill(s), Type: Maintenance  cefdinir 300 mg oral capsule: = 2 cap(s) ( 600 mg ), 0 Refill(s), Type: Maintenance  doxycycline monohydrate 100 mg oral capsule: = 1 cap(s) ( 100 mg ), bid, 0 Refill(s), Type: Maintenance   Problem list:    All Problems (Selected)  Obstructive sleep apnea (adult) (pediatric) / SNOMED CT 303947243 / Confirmed  Back pain, chronic / SNOMED CT 965928424 / Confirmed  Type 2 diabetes mellitus without complications / SNOMED CT 226732337 / Confirmed  Obesity, unspecified / SNOMED CT 5027917698 / Probable  Atherosclerotic heart disease of native coronary artery without angina pectoris / SNOMED CT 9370258582 / Confirmed  Depression, Recurrent / SNOMED CT 500096753 / Confirmed  BPH (benign prostatic hyperplasia) / SNOMED CT 359900959 / Confirmed  Hyperlipemia / SNOMED CT 824840693 / Confirmed  Essential (primary) hypertension / SNOMED CT 29142458 / Confirmed      Histories   Past Medical History:    Active  Obstructive sleep apnea (adult) (pediatric) (SNOMED CT 125848025): Onset on 6/28/2005 at 59 years.  Comments:  10/18/2013 CDT 3:10 PM CDT Michel Tubbs MD  AHI 7.7 and REM AHI 22 in 2005 11/25/2013 CST 1:54 PM CST Michel Tubbs MD  On 11/10/2013 AHI 18.3 with oximetry susanne 77%. Good/optimal CPAP titration to 12  with 6.5 minutes supine REM  Hyperlipemia (SNOMED CT 096583270)  Type 2 diabetes mellitus without complications (SNOMED CT 110754544)  Atherosclerotic heart disease of native coronary artery without angina pectoris (SNOMED CT 4674917463)  BPH (benign prostatic hyperplasia) (SNOMED CT 909118516)  Depression, Recurrent (SNOMED CT 963589567)  Obesity, unspecified (SNOMED CT 5578406143)  Back pain, chronic (SNOMED CT 168270659)  Essential (primary) hypertension (SNOMED CT 09847152)  Resolved  Inpatient stay (SNOMED CT 892123204): Onset on 3/20/2021 at 75 years.  Resolved on 3/22/2021 at 75 years.  Comments:  3/25/2021 CDT 10:53 AM CDT - Danny Isabella  [facility], [place] - Pneumonia of both lungs due to infectious organism.  ACUTE MYOCARDIAL INFARCTION (ICD-9-):  Resolved in  at 61 years.   Family History:    Hypertension  Father ()  Heart disease  Father ()  Alcohol abuse  Mother ()  Stroke  Father ()  Liver disease  Mother ()     Procedure history:    Angiogram (SNOMED CT 762524613) in 2017 at 72 Years.  Colonoscopy (SNOMED CT 235039557) performed by Shukri Arndt on 2013 at 68 Years.  Comments:  2013 1:13 PM CST - Leonora WILLS, Germaine  Sedation: MAC  Diverticulosis in the sigmoid colon, otherwise normal.  Repeat in 10 years.  Angioplasty (SNOMED CT 7412548) in the month of 2007 at 61 Years.  CABG - Coronary artery bypass graft (SNOMED CT 454769837) in the month of 2004 at 58 Years.  Colonoscopy (SNOMED CT 757425753) performed by Aashish Connolly MD on 2002 at 56 Years.  Comments:  2010 7:05 AM CST - Isabella Delgado  Repeat in 10 years.  ORIF right hand in  at 56 Years.  Flexible sigmoidoscopy (SNOMED CT 96128107) performed by Castillo Anaya MD on 1995 at 50 Years.  Comments:  10/16/2013 9:17 AM CDT - Gisele Arevalo  Negative.  ORIF left shoulder in  at 30 Years.      Physical Examination   Vital Signs   3/26/2021 10:17 AM CDT  Temperature Tympanic 99.1 DegF    Peripheral Pulse Rate 98 bpm    Respiratory Rate 20 br/min    Systolic Blood Pressure 138 mmHg  HI    Diastolic Blood Pressure 62 mmHg    Mean Arterial Pressure 87 mmHg    BP Site Right arm    BP Method Manual    Oxygen Saturation 89 %  LOW      Measurements from flowsheet : Measurements   3/26/2021 10:17 AM CDT Height/Length Estimated 67 in    Weight Estimated 200 lb    BSA Estimated 2.07 m2    Body Mass Index Estimated 31.32 kg/m2      General:  Alert and oriented, No acute distress.    Eye:  Normal conjunctiva.    HENT:  Normocephalic, Tympanic membranes are clear.    Neck:  Supple, Non-tender.    Respiratory:  Respirations are non-labored, Breath sounds are equal.         Breath sounds: Bilateral, Diminished.    Cardiovascular:  Normal rate, Regular rhythm, No murmur.       Impression and Plan   Diagnosis     Community acquired pneumonia ([id number] J18.9).     Course:  Improving, Progressing as expected.    Orders     Orders (Selected)   Documented Medications  Documented  cefdinir 300 mg oral capsule: = 2 cap(s) ( 600 mg ), 0 Refill(s), Type: Maintenance  doxycycline monohydrate 100 mg oral capsule: = 1 cap(s) ( 100 mg ), bid, 0 Refill(s), Type: Maintenance.     Will continue on home O2 and see back in a week..

## 2022-02-16 NOTE — TELEPHONE ENCOUNTER
---------------------  From: JIMENEZ CORREIA on behalf of PIOTR CORREIA  To: [facility]  Sent: 04/12/2021 03:13 p.m. CDT  Subject: Pulmonologist  Dr. Silva: Turenr cancelled his appointment with Dr. Simpson at [facility] today. He has been worse and did not feel he could handle the trip (I guess). Anyway, we rescheduled for May 13, which was the soonest he could get another appointment with Dr. Simpson.    Turner is having more of the coughing that takes his breath away. He continues to depend on the oxygenator. His oximeter readings are rarely above 93% and generally about 90-91%.  This is with the cannula attached.    Do you want to see him this week?  He  has  an  appointment with Dr. Fields  next Monday.---------------------  From: Josette Aburto CMA (Phone Messages Pool ([id number]_[facility]))   To: Dexter Silva MD;     Sent: 4/12/2021 3:32:49 PM CDT  Subject: FW: Pulmonologist---------------------  From: Dexter Silva MD   To: Phone Messages Pool ([id number]_[facility]); PIOTR CORREIA    Sent: 4/12/2021 4:04:10 PM CDT  Subject: RE: Pulmonologist     Let's plan on seeing him Thursday or Friday.Dr. Shira Sandoval would like to plan on seeing you this Thursday or Friday. Please call or send a portal message to schedule an appointment with Dr Silva.  Hope this helps,    Ry Ragsdale via portal.

## 2022-02-16 NOTE — PROGRESS NOTES
Patient:   PIOTR CORREIA            MRN: [id number]            FIN: [id number]               Age:   73 years     Sex:  Male     :  1945   Associated Diagnoses:   Hypertension   Author:   Dexter Silva MD      Visit Information      Date of Service: 2019 09:17 am  Performing Location: [facility]  Encounter#: [id number]      Primary Care Provider (PCP):  Dexter Silva MD    NPI# [id number]      Referring Provider:  Dexter Silva MD    NPI# [id number]      Chief Complaint   2019 9:26 AM CDT    Follow up low blood pressure     Chief complaint and symptoms noted above confirmed with patient.      History of Present Illness             The patient presents for follow-up evaluation of hypertension.  The quality of hypertension symptom(s) since the patient's last visit is described as being unchanged.  The severity of the hypertension symptom(s) since the last visit is moderate.  Since the patient's last visit, the timing/course of hypertension symptom(s) is constant.  Exacerbating factors consist of none.  Relieving factors consist of medication.        Review of Systems   Constitutional:  Negative.    Respiratory:  Negative.    Cardiovascular:  Negative.       Health Status   Allergies:    Allergic Reactions (Selected)  No Known Medication Allergies   Medications:  (Selected)   Prescriptions  Prescribed  CPAP 13: CPAP 13, See Instructions, Instructions: Use every night. Have a download in the sleep center in one month., Supply, # 1 EA, 0 Refill(s), Type: Maintenance  FLUoxetine 40 mg oral capsule: = 1 cap(s) ( 40 mg ), po, daily, # 90 cap(s), 3 Refill(s), Type: Maintenance, Pharmacy: [facility], 1 cap(s) Oral daily  FREESTYLE LITE      JUAN C: 1 EA, id, daily, # 50 EA, 11 Refill(s), Pharmacy: [id number]  [place] 5 mg-325 mg oral tablet: 1 tab(s), PO, q4hr, PRN: for pain, # 24 tab(s), 0 Refill(s), Type: Maintenance, Pharmacy: [facility], 1  tab(s) Oral q4 hrs,PRN:for pain  atorvastatin 40 mg oral tablet: = 1 tab(s) ( 40 mg ), po, daily, # 90 tab(s), 3 Refill(s), Type: Maintenance, Pharmacy: [facility], 1 tab(s) Oral daily  doxazosin 2 mg oral tablet: = 1 tab(s) ( 2 mg ), po, daily, # 90 tab(s), 3 Refill(s), Type: Maintenance, Pharmacy: [facility], 1 tab(s) Oral daily  isosorbide mononitrate 30 mg oral tablet, extended release: = 2 tab(s) ( 60 mg ), po, qam, # 180 tab(s), 3 Refill(s), Type: Soft Stop, Pharmacy: [facility], 2 tab(s) Oral qam,x90 day(s)  metFORMIN 500 mg oral tablet, extended release: = 2 tab(s) ( 1,000 mg ), po, bid, # 360 tab(s), 3 Refill(s), Type: Maintenance, Pharmacy: [facility], Due for appt next month, 2 tab(s) Oral bid  nitroglycerin 0.4 mg sublingual tablet: = 1 tab(s) ( 0.4 mg ), SL, q5min, Instructions: If chest pain not relieved in 5 minutes after first dose, seek immediate medical attention, PRN: for chest pain, # 100 tab(s), 3 Refill(s), Type: Maintenance, [facility], This is a 3...  Documented Medications  Documented  Fish Oil oral capsule: po, daily, 0 Refill(s), Type: Maintenance  aspirin 81 mg oral delayed release tablet: = 1 tab(s) ( 81 mg ), Oral, daily, 0 Refill(s), Type: Maintenance   Problem list:    All Problems  BPH (benign prostatic hyperplasia) / SNOMED CT 097875866 / Confirmed  Back pain, chronic / SNOMED CT 522433331 / Confirmed  Coronary Artery Disease / ICD-9-.00 / Confirmed  DM Type 2 (Diabetes Mellitus, Type 2) / ICD-9-.00 / Confirmed  Hypertension / ICD-9-.9 / Confirmed  Obese / ICD-9-.00 / Probable  Hyperlipemia / SNOMED CT 231067811 / Confirmed  Depression, Recurrent / SNOMED CT 686467414 / Confirmed  Sleep Apnea / ICD-9-.57 / Confirmed      Histories   Past Medical History:    Active  Sleep Apnea (ICD-9-.57): Onset on 6/28/2005 at 59 years.  Comments:  10/18/2013 CDT 3:10 PM CDT - Michel Fields MD  AHI 7.7  and REM AHI 22 in 2013 CST 1:54 PM CST - Michel Fields MD  On 11/10/2013 AHI 18.3 with oximetry susanne 77%. Good/optimal CPAP titration to 12 with 6.5 minutes supine REM  Hyperlipemia (SNOMED CT 787526622)  DM Type 2 (Diabetes Mellitus, Type 2) (ICD-9-.00)  Coronary Artery Disease (ICD-9-.00)  BPH (benign prostatic hyperplasia) (SNOMED CT 814950810)  Depression, Recurrent (SNOMED CT 220979839)  Obese (ICD-9-.00)  Back pain, chronic (SNOMED CT 611232318)  Hypertension (ICD-9-.9)  Resolved  ACUTE MYOCARDIAL INFARCTION (ICD-9-):  Resolved in  at 61 years.   Family History:    Stroke  Father ()     Procedure history:    Colonoscopy (SNOMED CT 847259102) performed by Shukri Arndt on 2013 at 68 Years.  Comments:  2013 1:13 PM CST - Germaine Lea RN  Sedation: MAC  Diverticulosis in the sigmoid colon, otherwise normal.  Repeat in 10 years.  Angioplasty (SNOMED CT 3589177) in the month of 2007 at 61 Years.  CABG - Coronary artery bypass graft (SNOMED CT 877458630) in the month of 2004 at 58 Years.  Colonoscopy (SNOMED CT 768180251) performed by Aashish Connolly MD on 2002 at 56 Years.  Comments:  2010 7:05 AM CST - Isabella Delgado  Repeat in 10 years.  ORIF right hand in  at 56 Years.  Flexible sigmoidoscopy (SNOMED CT 86198111) performed by Castillo Anaya MD on 1995 at 50 Years.  Comments:  10/16/2013 9:17 AM CDT - Gisele Arevalo  Negative.  ORIF left shoulder in  at 30 Years.   Social History:        Alcohol Assessment: Past      Tobacco Assessment: Past      Substance Abuse Assessment: Denies Substance Abuse            Never      Employment and Education Assessment            Retired      Home and Environment Assessment            Marital status: .  Spouse/Partner name: Brianda.      Nutrition and Health Assessment            Type of diet: Regular.      Exercise and Physical Activity Assessment: Does not exercise      Sexual  Assessment            Sexually active: No.      Physical Examination   Vital Signs   6/28/2019 9:26 AM CDT Temperature Tympanic 97.2 DegF  LOW    Peripheral Pulse Rate 64 bpm    Pulse Site Radial artery    HR Method Manual    Systolic Blood Pressure 128 mmHg    Diastolic Blood Pressure 64 mmHg    Mean Arterial Pressure 85 mmHg    BP Site Left arm    BP Method Manual      Measurements from flowsheet : Measurements   6/28/2019 9:26 AM CDT Height Measured - Standard 67 in    Weight Measured - Standard 187 lb    BSA 2 m2    Body Mass Index 29.29 kg/m2  HI      General:  Alert and oriented, No acute distress.    Eye:  Normal conjunctiva.    HENT:  Normocephalic, Tympanic membranes are clear.    Neck:  Supple, Non-tender.    Respiratory:  Lungs are clear to auscultation, Respirations are non-labored.    Cardiovascular:  Normal rate, Regular rhythm.       Review / Management   Results review:  Lab results   4/19/2019 10:00 AM CDT Hgb A1c 6.2  HI    Cholesterol 120 mg/dL    Non-HDL 74    HDL 46 mg/dL    Chol/HDL Ratio 2.6    LDL 60    Triglyceride 48 mg/dL    U Microalbumin 1.0 mg/dL    Microalbumin Comment See comment    WBC 5.0    RBC 4.47    Hgb 13.3 gm/dL    Hct 38.4 %  LOW    MCV 85.9 fL    MCH 29.8 pg    MCHC 34.6 gm/dL    RDW 13.1 %    Platelet 168    MPV 9.8 fL   .       Impression and Plan   Diagnosis     Hypertension (BAZ20-BR I10).     Course:  Progressing as expected.    Orders     Orders (Selected)   Prescriptions  Prescribed  lisinopril 5 mg oral tablet: = 1 tab(s) ( 5 mg ), Oral, daily, # 90 tab(s), 3 Refill(s), Type: Maintenance, [facility]: [facility], 1 tab(s) Oral daily  Completed  hydrochlorothiazide-lisinopril 25 mg-20 mg oral tablet: 1 tab(s), po, daily, # 90 tab(s), 3 Refill(s), Type: Maintenance, Pharmacy: [facility], Due for appt next month, 1 tab(s) Oral daily.     Orders     F/U  if not improving, sooner if getting worse.

## 2022-02-16 NOTE — NURSING NOTE
Comprehensive Intake Entered On:  8/10/2019 4:31 PM CDT    Performed On:  8/10/2019 4:29 PM CDT by Esme Perez               Summary   Chief Complaint :   Right big toe injury from fall today.  Right shoulder/arm pain as well.    Advance Directive :   Yes   Menstrual Status :   N/A   Weight Measured :   187.4 lb(Converted to: 187 lb 6 oz, 85.00 kg)    Systolic Blood Pressure :   132 mmHg (HI)    Diastolic Blood Pressure :   70 mmHg   Mean Arterial Pressure :   91 mmHg   Peripheral Pulse Rate :   55 bpm (LOW)    Temperature Tympanic :   97.9 DegF(Converted to: 36.6 DegC)    Oxygen Saturation :   96 %   Esme Perez - 8/10/2019 4:29 PM CDT   Health Status   Allergies Verified? :   Yes   Medication History Verified? :   Yes   Immunizations Current :   Yes   Esme Perez - 8/10/2019 4:29 PM CDT   Meds / Allergies   (As Of: 8/10/2019 4:31:47 PM CDT)   Allergies (Active)   No Known Medication Allergies  Estimated Onset Date:   Unspecified ; Created By:   Deana French CMA; Reaction Status:   Active ; Category:   Drug ; Substance:   No Known Medication Allergies ; Type:   Allergy ; Updated By:   Deana French CMA; Reviewed Date:   6/28/2019 9:44 AM CDT        Medication List   (As Of: 8/10/2019 4:31:47 PM CDT)   Prescription/Discharge Order    lisinopril  :   lisinopril ; Status:   Prescribed ; Ordered As Mnemonic:   lisinopril 5 mg oral tablet ; Simple Display Line:   5 mg, 1 tab(s), Oral, daily, 90 tab(s), 3 Refill(s) ; Ordering Provider:   Dexter Silva MD; Catalog Code:   lisinopril ; Order Dt/Tm:   6/28/2019 9:42:08 AM          acetaminophen-hydrocodone  :   acetaminophen-hydrocodone ; Status:   Prescribed ; Ordered As Mnemonic:   Norco 5 mg-325 mg oral tablet ; Simple Display Line:   1 tab(s), PO, q4hr, PRN: for pain, 24 tab(s), 0 Refill(s) ; Ordering Provider:   Noah Bernal PA-C; Catalog Code:   acetaminophen-hydrocodone ; Order Dt/Tm:   3/11/2019 8:02:30 AM          atorvastatin  :    atorvastatin ; Status:   Prescribed ; Ordered As Mnemonic:   atorvastatin 40 mg oral tablet ; Simple Display Line:   40 mg, 1 tab(s), po, daily, 90 tab(s), 3 Refill(s) ; Ordering Provider:   Dexter Silva MD; Catalog Code:   atorvastatin ; Order Dt/Tm:   3/11/2019 7:57:53 AM          isosorbide mononitrate  :   isosorbide mononitrate ; Status:   Prescribed ; Ordered As Mnemonic:   isosorbide mononitrate 30 mg oral tablet, extended release ; Simple Display Line:   60 mg, 2 tab(s), po, qam, for 90 day(s), 180 tab(s), 3 Refill(s) ; Ordering Provider:   Dexter Silva MD; Catalog Code:   isosorbide mononitrate ; Order Dt/Tm:   3/11/2019 7:57:32 AM          FLUoxetine  :   FLUoxetine ; Status:   Prescribed ; Ordered As Mnemonic:   FLUoxetine 40 mg oral capsule ; Simple Display Line:   40 mg, 1 cap(s), po, daily, 90 cap(s), 3 Refill(s) ; Ordering Provider:   Dexter Silva MD; Catalog Code:   FLUoxetine ; Order Dt/Tm:   3/11/2019 7:57:08 AM          doxazosin  :   doxazosin ; Status:   Prescribed ; Ordered As Mnemonic:   doxazosin 2 mg oral tablet ; Simple Display Line:   2 mg, 1 tab(s), po, daily, 90 tab(s), 3 Refill(s) ; Ordering Provider:   Dexter Silva MD; Catalog Code:   doxazosin ; Order Dt/Tm:   3/11/2019 7:56:39 AM          metFORMIN  :   metFORMIN ; Status:   Prescribed ; Ordered As Mnemonic:   metFORMIN 500 mg oral tablet, extended release ; Simple Display Line:   1,000 mg, 2 tab(s), po, bid, 360 tab(s), 3 Refill(s) ; Ordering Provider:   Dexter Silva MD; Catalog Code:   metFORMIN ; Order Dt/Tm:   3/11/2019 7:56:18 AM          nitroglycerin  :   nitroglycerin ; Status:   Prescribed ; Ordered As Mnemonic:   nitroglycerin 0.4 mg sublingual tablet ; Simple Display Line:   0.4 mg, 1 tab(s), SL, q5min, If chest pain not relieved in 5 minutes after first dose, seek immediate medical attention, PRN: for chest pain, 100 tab(s), 3 Refill(s) ; Ordering Provider:   Shira BURK,  Dexter; Catalog Code:   nitroglycerin ; Order Dt/Tm:   3/11/2019 7:55:41 AM          Miscellaneous Rx Supply  :   Miscellaneous Rx Supply ; Status:   Prescribed ; Ordered As Mnemonic:   CPAP 13 ; Simple Display Line:   See Instructions, Use every night. Have a download in the sleep center in one month., 1 EA ; Ordering Provider:   Michel Fields MD; Catalog Code:   Miscellaneous Rx Supply ; Order Dt/Tm:   1/17/2014 10:40:19 AM          Miscellaneous Prescription  :   Miscellaneous Prescription ; Status:   Prescribed ; Ordered As Mnemonic:   FREESTYLE LITE      JUAN C ; Simple Display Line:   1 EA, id, daily, 50 EA ; Ordering Provider:   Dexter Silva MD; Catalog Code:   Miscellaneous Prescription ; Order Dt/Tm:   4/9/2010 10:09:35 AM            Home Meds    aspirin  :   aspirin ; Status:   Documented ; Ordered As Mnemonic:   aspirin 81 mg oral delayed release tablet ; Simple Display Line:   81 mg, 1 tab(s), Oral, daily, 0 Refill(s) ; Catalog Code:   aspirin ; Order Dt/Tm:   1/24/2019 10:33:49 AM          omega-3 polyunsaturated fatty acids  :   omega-3 polyunsaturated fatty acids ; Status:   Documented ; Ordered As Mnemonic:   Fish Oil oral capsule ; Simple Display Line:   po, daily, 0 Refill(s) ; Catalog Code:   omega-3 polyunsaturated fatty acids ; Order Dt/Tm:   7/19/2016 8:35:56 AM

## 2022-02-16 NOTE — TELEPHONE ENCOUNTER
---------------------  From: Nelli Hunter RN (Phone Messages Pool ([id number]_[facility]))   To: Appointment Pool ([id number]_[place] [facility] [place]);     Sent: 10/1/2020 4:01:40 PM CDT  Subject: FW: Email update           ---------------------  From: JIMENEZ CORREIA on behalf of PIOTR CORREIA  To: [facility]  Sent: 10/01/2020 03:55 p.m. CDT  Subject: Email update  It appears our old email is still on your records. Can this be updated to : [email or web address]? Thank you.

## 2022-02-16 NOTE — NURSING NOTE
Comprehensive Intake Entered On:  9/24/2021 1:32 PM CDT    Performed On:  9/24/2021 1:20 PM CDT by Margo Carrasco CMA               Summary   Chief Complaint :   Anemia follow up. Wanting labs for hgb and ferritin per Hematology.   Advance Directive :   Yes   Menstrual Status :   N/A   Weight Measured :   176.1 lb(Converted to: 176 lb 2 oz, 79.878 kg)    Height/Length Estimated :   67 in(Converted to: 5 ft 7 in, 170.18 cm)    Systolic Blood Pressure :   132 mmHg (HI)    Diastolic Blood Pressure :   60 mmHg   Mean Arterial Pressure :   84 mmHg   Peripheral Pulse Rate :   96 bpm   Margo Carrasco CMA - 9/24/2021 1:20 PM CDT   Health Status   Allergies Verified? :   Yes   Medication History Verified? :   Yes   Immunizations Current :   Yes   Medical History Verified? :   Yes   Pre-Visit Planning Status :   Completed   Tobacco Use? :   Former smoker   Margo Carrasco CMA - 9/24/2021 1:20 PM CDT   Consents   Consent for Immunization Exchange :   Consent Granted   Consent for Immunizations to Providers :   Consent Granted   Margo Carrasco CMA - 9/24/2021 1:20 PM CDT   Meds / Allergies   (As Of: 9/24/2021 1:32:07 PM CDT)   Allergies (Active)   No Known Medication Allergies  Estimated Onset Date:   Unspecified ; Created By:   Deana French CMA; Reaction Status:   Active ; Category:   Drug ; Substance:   No Known Medication Allergies ; Type:   Allergy ; Updated By:   Deana French CMA; Reviewed Date:   9/24/2021 1:24 PM CDT        Medication List   (As Of: 9/24/2021 1:32:07 PM CDT)   Prescription/Discharge Order    acetaminophen-hydrocodone  :   acetaminophen-hydrocodone ; Status:   Prescribed ; Ordered As Mnemonic:   Norco 5 mg-325 mg oral tablet ; Simple Display Line:   1 tab(s), PO, q4hr, PRN: for pain, 24 tab(s), 0 Refill(s) ; Ordering Provider:   Shira BURK [place]; Catalog Code:   acetaminophen-hydrocodone ; Order Dt/Tm:   3/24/2020 1:06:36 PM CDT          apixaban  :   apixaban ; Status:   Prescribed ; Ordered As  Mnemonic:   Eliquis 5 mg oral tablet ; Simple Display Line:   5 mg, 1 tab(s), Oral, bid, 180 tab(s), 0 Refill(s) ; Ordering Provider:   Dexter Silva MD; Catalog Code:   apixaban ; Order Dt/Tm:   9/22/2021 1:22:16 PM CDT          atorvastatin  :   atorvastatin ; Status:   Prescribed ; Ordered As Mnemonic:   atorvastatin 40 mg oral tablet ; Simple Display Line:   40 mg, 1 tab(s), Oral, daily, 90 tab(s), 0 Refill(s) ; Ordering Provider:   Dexter Silva MD; Catalog Code:   atorvastatin ; Order Dt/Tm:   4/29/2021 7:07:58 AM CDT          doxazosin  :   doxazosin ; Status:   Prescribed ; Ordered As Mnemonic:   doxazosin 2 mg oral tablet ; Simple Display Line:   2 mg, 1 tab(s), Oral, daily, 90 tab(s), 0 Refill(s) ; Ordering Provider:   Dexter Silva MD; Catalog Code:   doxazosin ; Order Dt/Tm:   4/29/2021 7:08:22 AM CDT          FLUoxetine  :   FLUoxetine ; Status:   Prescribed ; Ordered As Mnemonic:   FLUoxetine 40 mg oral capsule ; Simple Display Line:   40 mg, 1 cap(s), Oral, daily, 90 cap(s), 0 Refill(s) ; Ordering Provider:   Dexter Silva MD; Catalog Code:   FLUoxetine ; Order Dt/Tm:   4/29/2021 7:08:50 AM CDT          insulin isophane (NPH)  :   insulin isophane (NPH) ; Status:   Prescribed ; Ordered As Mnemonic:   insulin isophane (NPH) 100 units/mL human recombinant subcutaneous suspension ; Simple Display Line:   4 units, Subcutaneous, daily, 3 pen_needle, 1 Refill(s) ; Ordering Provider:   Dexter Silva MD; Catalog Code:   insulin isophane (NPH) ; Order Dt/Tm:   7/8/2021 3:55:05 PM CDT          isosorbide mononitrate  :   isosorbide mononitrate ; Status:   Prescribed ; Ordered As Mnemonic:   isosorbide mononitrate 30 mg oral tablet, extended release ; Simple Display Line:   2 tab(s), Oral, qam, 180 tab(s), 3 Refill(s) ; Ordering Provider:   Dexter Silva MD; Catalog Code:   isosorbide mononitrate ; Order Dt/Tm:   5/28/2020 9:43:22 AM CDT          metFORMIN  :    metFORMIN ; Status:   Prescribed ; Ordered As Mnemonic:   MetFORMIN (Eqv-Glucophage XR) 500 mg oral tablet, extended release ; Simple Display Line:   1,000 mg, 2 tab(s), Oral, bid, 360 tab(s), 0 Refill(s) ; Ordering Provider:   Dexter Silva MD; Catalog Code:   metFORMIN ; Order Dt/Tm:   4/29/2021 7:09:14 AM CDT          Miscellaneous Prescription  :   Miscellaneous Prescription ; Status:   Prescribed ; Ordered As Mnemonic:   FREESTYLE LITE      JUAN C ; Simple Display Line:   1 EA, id, daily, 50 EA ; Ordering Provider:   Dexter Silva MD; Catalog Code:   Miscellaneous Prescription ; Order Dt/Tm:   4/9/2010 10:09:35 AM CDT          Miscellaneous Rx Supply  :   Miscellaneous Rx Supply ; Status:   Prescribed ; Ordered As Mnemonic:   CPAP 13 ; Simple Display Line:   See Instructions, Use every night. Have a download in the sleep center in one month., 1 EA ; Ordering Provider:   Michel Fields MD; Catalog Code:   Miscellaneous Rx Supply ; Order Dt/Tm:   1/17/2014 10:40:19 AM CST          Miscellaneous Rx Supply  :   Miscellaneous Rx Supply ; Status:   Prescribed ; Ordered As Mnemonic:   Pen Needles ; Simple Display Line:   See Instructions, Use as directed with insulin, 100 EA, 0 Refill(s) ; Ordering Provider:   Dexter Silva MD; Catalog Code:   Miscellaneous Rx Supply ; Order Dt/Tm:   7/28/2021 2:47:25 PM CDT          nitroglycerin  :   nitroglycerin ; Status:   Prescribed ; Ordered As Mnemonic:   nitroglycerin 0.4 mg sublingual tablet ; Simple Display Line:   0.4 mg, 1 tab(s), SL, q5min, If chest pain not relieved in 5 minutes after first dose, seek immediate medical attention, PRN: for chest pain, 100 tab(s), 3 Refill(s) ; Ordering Provider:   Dexter Silva MD; Catalog Code:   nitroglycerin ; Order Dt/Tm:   10/23/2020 9:05:52 AM CDT            Home Meds    amoxicillin  :   amoxicillin ; Status:   Documented ; Ordered As Mnemonic:   amoxicillin 500 mg oral capsule ; Simple Display Line:    See Instructions, Take 4 caps 1 hour prior to the procedure, 0 Refill(s) ; Catalog Code:   amoxicillin ; Order Dt/Tm:   7/8/2021 3:08:06 PM CDT          ascorbic acid  :   ascorbic acid ; Status:   Documented ; Ordered As Mnemonic:   Vitamin C ; Simple Display Line:   daily, 0 Refill(s) ; Catalog Code:   ascorbic acid ; Order Dt/Tm:   10/23/2020 8:48:26 AM CDT          aspirin  :   aspirin ; Status:   Documented ; Ordered As Mnemonic:   aspirin ; Simple Display Line:   81mg tab po daily, 0 Refill(s) ; Catalog Code:   aspirin ; Order Dt/Tm:   3/19/2021 2:43:15 PM CDT          Miscellaneous Prescription  :   Miscellaneous Prescription ; Status:   Documented ; Ordered As Mnemonic:   Blood Builder ; Simple Display Line:   1 tab po daily, 0 Refill(s) ; Catalog Code:   Miscellaneous Prescription ; Order Dt/Tm:   7/20/2021 10:46:35 AM CDT          Miscellaneous Prescription  :   Miscellaneous Prescription ; Status:   Documented ; Ordered As Mnemonic:   Iron ; Simple Display Line:   325mg daily, 0 Refill(s) ; Catalog Code:   Miscellaneous Prescription ; Order Dt/Tm:   3/26/2021 10:27:51 AM CDT          multivitamin with iron  :   multivitamin with iron ; Status:   Documented ; Ordered As Mnemonic:   Iron 100 Plus oral tablet ; Simple Display Line:   See Instructions, 65mg tid Oral daily, 0 Refill(s) ; Catalog Code:   multivitamin with iron ; Order Dt/Tm:   7/8/2021 3:06:10 PM CDT          omega-3 polyunsaturated fatty acids  :   omega-3 polyunsaturated fatty acids ; Status:   Documented ; Ordered As Mnemonic:   Fish Oil oral capsule ; Simple Display Line:   po, daily, 0 Refill(s) ; Catalog Code:   omega-3 polyunsaturated fatty acids ; Order Dt/Tm:   7/19/2016 8:35:56 AM CDT          pantoprazole  :   pantoprazole ; Status:   Documented ; Ordered As Mnemonic:   pantoprazole 40 mg oral delayed release tablet ; Simple Display Line:   40 mg, 1 tab(s), 0 Refill(s) ; Catalog Code:   pantoprazole ; Order Dt/Tm:   5/14/2021  10:23:42 AM CDT          predniSONE  :   predniSONE ; Status:   Documented ; Ordered As Mnemonic:   predniSONE 20 mg oral tablet ; Simple Display Line:   See Instructions, 1.5 tab(s) Oral daily 10 day(s), 0 Refill(s) ; Catalog Code:   predniSONE ; Order Dt/Tm:   9/2/2021 9:19:30 AM CDT          sulfamethoxazole-trimethoprim  :   sulfamethoxazole-trimethoprim ; Status:   Documented ; Ordered As Mnemonic:   sulfamethoxazole-trimethoprim 400 mg-80 mg oral tablet ; Simple Display Line:   1 tab(s), Oral, daily, 0 Refill(s) ; Catalog Code:   sulfamethoxazole-trimethoprim ; Order Dt/Tm:   9/24/2021 1:29:38 PM CDT

## 2022-02-16 NOTE — NURSING NOTE
Comprehensive Intake Entered On:  1/24/2019 9:57 AM CST    Performed On:  1/24/2019 9:53 AM CST by Margo Lisa               Summary   Chief Complaint :   Cardio f/u.    Advance Directive :   Yes   Menstrual Status :   N/A   Weight Measured :   194 lb(Converted to: 194 lb 0 oz, 88.00 kg)    Height Measured :   67 in(Converted to: 5 ft 7 in, 170.18 cm)    Body Mass Index :   30.38 kg/m2 (HI)    Body Surface Area :   2.04 m2   Systolic Blood Pressure :   110 mmHg   Diastolic Blood Pressure :   60 mmHg   Mean Arterial Pressure :   77 mmHg   Peripheral Pulse Rate :   66 bpm   Margo Lisa - 1/24/2019 9:53 AM CST   Meds / Allergies   (As Of: 1/24/2019 9:57:20 AM CST)   Allergies (Active)   No Known Medication Allergies  Estimated Onset Date:   Unspecified ; Created By:   Deana French CMA; Reaction Status:   Active ; Category:   Drug ; Substance:   No Known Medication Allergies ; Type:   Allergy ; Updated By:   Deana French CMA; Reviewed Date:   9/26/2018 8:32 AM CDT        Medication List   (As Of: 1/24/2019 9:57:20 AM CST)   Prescription/Discharge Order    acetaminophen-hydrocodone  :   acetaminophen-hydrocodone ; Status:   Prescribed ; Ordered As Mnemonic:   Norco 5 mg-325 mg oral tablet ; Simple Display Line:   1 tab(s), PO, q4hr, PRN: for pain, 24 tab(s), 0 Refill(s) ; Ordering Provider:   Dexter Silva MD; Catalog Code:   acetaminophen-hydrocodone ; Order Dt/Tm:   3/19/2018 9:40:16 AM          isosorbide mononitrate  :   isosorbide mononitrate ; Status:   Prescribed ; Ordered As Mnemonic:   isosorbide mononitrate 30 mg oral tablet, extended release ; Simple Display Line:   60 mg, 2 tab(s), po, qam, for 90 day(s), 180 tab(s), 3 Refill(s) ; Ordering Provider:   Dexter Silva MD; Catalog Code:   isosorbide mononitrate ; Order Dt/Tm:   3/19/2018 9:39:38 AM          metFORMIN  :   metFORMIN ; Status:   Prescribed ; Ordered As Mnemonic:   metFORMIN 500 mg oral tablet, extended release  ; Simple Display Line:   1,000 mg, 2 tab(s), po, bid, 360 tab(s), 3 Refill(s) ; Ordering Provider:   Dexter Silva MD; Catalog Code:   metFORMIN ; Order Dt/Tm:   3/19/2018 9:37:42 AM          hydrochlorothiazide-lisinopril  :   hydrochlorothiazide-lisinopril ; Status:   Prescribed ; Ordered As Mnemonic:   hydrochlorothiazide-lisinopril 25 mg-20 mg oral tablet ; Simple Display Line:   1 tab(s), po, daily, 90 tab(s), 3 Refill(s) ; Ordering Provider:   Dexter Silva MD; Catalog Code:   hydrochlorothiazide-lisinopril ; Order Dt/Tm:   3/19/2018 9:37:41 AM          atorvastatin  :   atorvastatin ; Status:   Prescribed ; Ordered As Mnemonic:   atorvastatin 40 mg oral tablet ; Simple Display Line:   40 mg, 1 tab(s), po, daily, 90 tab(s), 3 Refill(s) ; Ordering Provider:   Dexter Silva MD; Catalog Code:   atorvastatin ; Order Dt/Tm:   3/19/2018 9:37:39 AM          FLUoxetine  :   FLUoxetine ; Status:   Prescribed ; Ordered As Mnemonic:   FLUoxetine 40 mg oral capsule ; Simple Display Line:   40 mg, 1 cap(s), po, daily, 90 cap(s), 3 Refill(s) ; Ordering Provider:   Dexter Silva MD; Catalog Code:   FLUoxetine ; Order Dt/Tm:   3/19/2018 9:37:36 AM          doxazosin  :   doxazosin ; Status:   Prescribed ; Ordered As Mnemonic:   doxazosin 2 mg oral tablet ; Simple Display Line:   2 mg, 1 tab(s), po, daily, 90 tab(s), 3 Refill(s) ; Ordering Provider:   Dexter Silva MD; Catalog Code:   doxazosin ; Order Dt/Tm:   3/19/2018 9:37:34 AM          clopidogrel  :   clopidogrel ; Status:   Prescribed ; Ordered As Mnemonic:   Plavix 75 mg oral tablet ; Simple Display Line:   75 mg, 1 tab(s), po, daily, 90 tab(s), 3 Refill(s) ; Ordering Provider:   Dexter Silva MD; Catalog Code:   clopidogrel ; Order Dt/Tm:   3/19/2018 9:38:31 AM          nitroglycerin  :   nitroglycerin ; Status:   Prescribed ; Ordered As Mnemonic:   nitroglycerin 0.4 mg sublingual tablet ; Simple Display Line:   0.4 mg,  1 tab(s), SL, q5min, If chest pain not relieved in 5 minutes after first dose, seek immediate medical attention, PRN: for chest pain, 25 tab(s), 3 Refill(s) ; Ordering Provider:   Dexter Silva MD; Catalog Code:   nitroglycerin ; Order Dt/Tm:   9/12/2017 3:26:47 PM          aspirin  :   aspirin ; Status:   Prescribed ; Ordered As Mnemonic:   aspirin 325 mg oral tablet ; Simple Display Line:   325 mg, 1 tab(s), PO, Daily, 90 tab(s), 3 Refill(s) ; Ordering Provider:   Dexter Silva MD; Catalog Code:   aspirin ; Order Dt/Tm:   2/7/2017 9:18:36 AM          Miscellaneous Rx Supply  :   Miscellaneous Rx Supply ; Status:   Prescribed ; Ordered As Mnemonic:   CPAP 13 ; Simple Display Line:   See Instructions, Use every night. Have a download in the sleep center in one month., 1 EA ; Ordering Provider:   Michel Fields MD; Catalog Code:   Miscellaneous Rx Supply ; Order Dt/Tm:   1/17/2014 10:40:19 AM          Miscellaneous Prescription  :   Miscellaneous Prescription ; Status:   Prescribed ; Ordered As Mnemonic:   FREESTYLE LITE      JUAN C ; Simple Display Line:   1 EA, id, daily, 50 EA ; Ordering Provider:   Dexter Silva MD; Catalog Code:   Miscellaneous Prescription ; Order Dt/Tm:   4/9/2010 10:09:35 AM            Home Meds    omega-3 polyunsaturated fatty acids  :   omega-3 polyunsaturated fatty acids ; Status:   Documented ; Ordered As Mnemonic:   Fish Oil oral capsule ; Simple Display Line:   po, daily, 0 Refill(s) ; Catalog Code:   omega-3 polyunsaturated fatty acids ; Order Dt/Tm:   7/19/2016 8:35:56 AM            [facility] Meds    influenza virus vaccine, inactivated  :   influenza virus vaccine, inactivated ; Status:   Processing ; Ordered As Mnemonic:   Flublok Quadrivalent [id number] ; Ordering Provider:   Dexter Silva MD; Action Display:   Complete ; Catalog Code:   influenza virus vaccine, inactivated ; Order Dt/Tm:   1/24/2019 9:57:15 AM

## 2022-02-16 NOTE — LETTER
(Inserted Image. Unable to display)   September 25, 2019      PIOTR CORREIA  [address] [place] [id number]        Dear PIOTR,      Thank you for selecting [facility] (previously [facility]) for your healthcare needs.     Our records indicate you are due for the following services:     Annual Physical    To schedule an appointment or if you have further questions, please contact your primary clinic:   [facility]          [phone]   Former[facility]    [phone]             [facility]         [phone]      Powered by [employer]    Sincerely,    Dexter Silva M.D.

## 2022-02-16 NOTE — TELEPHONE ENCOUNTER
Entered by Marianne Suarez CMA on March 24, 2020 5:15:51 PM CDT  ---------------------  From: Marianne Suarez CMA   To: [facility]    Sent: 3/24/2020 5:15:51 PM CDT  Subject: Medication Management     ** Not Approved: Refill not appropriate, Given a three month Rx on 3/9/20 **  isosorbide mononitrate (ISOSORBIDE MONONITRATE  30MG  TAB  EXTENDED RELEASE)  TAKE 2 TABLETS BY MOUTH  EVERY MORNING  Qty:  180 tab(s)        Days Supply:  90        Refills:  0          Substitutions Allowed     Route To Pharmacy - [facility]   Signed by Marianne Suarez CMA            ------------------------------------------  From: [facility]  To: Dexter Silva MD  Sent: March 24, 2020 1:25:22 PM CDT  Subject: Medication Management  Due: March 25, 2020 1:25:22 PM CDT    ** On Hold Pending Signature **  Drug: isosorbide mononitrate (Imdur 30 mg oral tablet, extended release)  TAKE 2 TABLETS BY MOUTH  EVERY MORNING  Quantity: 180 tab(s)  Days Supply: 90  Refills: 0  Substitutions Allowed  Notes from Pharmacy: - Ref: [id number]    Dispensed Drug: isosorbide mononitrate (isosorbide mononitrate 30 mg oral tablet, extended release)  TAKE 2 TABLETS BY MOUTH  EVERY MORNING  Quantity: 180 tab(s)  Days Supply: 90  Refills: 0  Substitutions Allowed  Notes from Pharmacy:   ------------------------------------------

## 2022-02-16 NOTE — TELEPHONE ENCOUNTER
---------------------  From: Dexter Silva MD   To: Phone Messages Pool ([id number]);     Sent: 2/27/2019 8:13:02 AM CST  Subject: General Message     Can you call Bill and get him in for abdominal CT ASAP.  Order placed.Returned Call  Time: 8:47 am  Note:  Called & left a message as it went straight to voicemail. Advised to call back as soon as possible.---------------------  From: Marianne Suarez CMA (Phone Messages Pool ([id number]))   To: Dexter Silva MD;     Cc: Referral Coordinators Pool ([id number]);      Sent: 2/27/2019 9:03:40 AM CST  Subject: RE: General Message     Returned Call  Time: 8:59 am  Note:  Pt called back. Eager to have the testing done today. Would like to go this afternoon. I called the hospital & made him an apt at 1:00 pm today. He will have nothing to eat or drink two hours prior. I faxed the order to referral dept and called and left a message for Deysi advising that the pt has an apt at 1:00 pm today and to determine if a PA needs to be completed. Order will then need to be faxed to [facility]. Pt was notified of the apt and advised that we will call him when his results are back. Pt had no further questions.---------------------  From: Dexter Silva MD   To: [facility] [employer] [facility] [employer] [facility] ([id number]);     Sent: 2/27/2019 11:44:09 AM CST  Subject: RE: General Message     Thanks.

## 2022-02-16 NOTE — PROGRESS NOTES
Patient:   PIOTR CORREIA            MRN: [id number]            FIN: [id number]               Age:   76 years     Sex:  Male     :  1945   Associated Diagnoses:   Close exposure to 2019-nCoV   Author:   Noah Bernal PA-C      Report Summary   Diagnosis  Close exposure to 2019-nCoV (ZML40-IX Z20.822).  Patient InstructionsSummary   Visit Information      Date of Service: 10/26/2021 06:30 am  Performing Location: [facility]  Encounter#: [id number]      Primary Care Provider (PCP):  Dexter Silva MD    NPI# [id number]      Referring Provider:  Noah Bernal PA-C    NPI# [id number]   Visit type:  Telephone Encounter.    Source of history:  Patient.    Location of patient:  Home  Call Start Time:   845  Call End Time:    850      Chief Complaint   Covid Symptoms      History of Present Illness   Today's visit was conducted via telephone due to the COVID-19 pandemic. Patient's consent to telephone visit was obtained and documented.      Reason for visit:  Covid symptoms since 10/22. No SOB. No fever. No exposure. Has been vaccinated.      Review of Systems   Constitutional:  Fatigue.    Eye:  Negative.    Ear/Nose/Mouth/Throat:  Nasal congestion, Sore throat.    Respiratory:  Cough, No shortness of breath.    Neurologic:  Headache.       Impression and Plan   Diagnosis     Close exposure to 2019-nCoV (DZG62-BF Z20.822).     Patient Instructions:       Counseled: Patient, Regarding treatment, Regarding medications.    Summary:  Patient should remain isolated until results of test return and given that tests are not 100% accurate, would be safest to assume that they are contagious with COVID-19 until their symptoms have fully resolved. Isolation is recommended for at least 7 days from the onset of symptoms and for 3 days after resolution of fevers and productive cough. This means patient should not go to work or any public areas. In addition, it is recommended at home that they separate  themselves from other people and from animals as much as possible, including using a separate bathroom. If they do need to be around others, a facemask is recommended. Frequent hand hygiene and cleaning of high touch surfaces is also recommended.   Symptoms can last for several weeks. For patients with COVID-19, they can sometimes start to improve and then get worse again. If symptoms worsen at any time, including significant shortness of breath, low oxygen levels, high fevers that cannot be controlled, or concerns for dehydration, they should seek medical care. If going to the ER, calling 911, or seeking care at the clinic, they are reminded to notify staff that they have been tested for COVID-19.  Patient also is informed that testing will be done in their car at a scheduled time. Test will be sent to an outside commercial lab and billed by that lab. [facility] cannot confirm to patient how billing will be handled by their insurance company.    Patient is also informed that testing for COVID-19 must be reported to the public health department along with contact information for the patient.  .       Health Status   Allergies:    Allergic Reactions (Selected)  No Known Medication Allergies   Medications:  (Selected)   Prescriptions  Prescribed  CPAP 13: CPAP 13, See Instructions, Instructions: Use every night. Have a download in the sleep center in one month., Supply, # 1 EA, 0 Refill(s), Type: Maintenance  Eliquis 5 mg oral tablet: = 1 tab(s) ( 5 mg ), Oral, bid, # 180 tab(s), 0 Refill(s), Type: Maintenance, Pharmacy: [facility], 1 tab(s) Oral bid, 67, in, 05/28/20 8:57:00 CDT, Height Measured, 180, lb, 07/08/21 14:57:00 CDT, Weight Measured  FLUoxetine 40 mg oral capsule: = 1 cap(s) ( 40 mg ), Oral, daily, # 90 cap(s), 3 Refill(s), Type: Maintenance, Pharmacy: [facility], Do not fill until patient calls, 1 cap(s) Oral daily, 67, in, 05/28/20 8:57:00 CDT, Height Measured, 176.1, lb, 09/24/21  13:20:00 CDT, Weig...  FREESTYLE LITE      JUAN C: 1 EA, id, daily, # 50 EA, 11 Refill(s), Pharmacy: [id number]  MetFORMIN (Eqv-Glucophage XR) 500 mg oral tablet, extended release: = 2 tab(s) ( 1,000 mg ), Oral, bid, # 360 tab(s), 3 Refill(s), Type: Maintenance, Pharmacy: [facility], Do not fill until patient calls, 2 tab(s) Oral bid, 67, in, 05/28/20 8:57:00 CDT, Height Measured, 176.1, lb, 09/24/21 13:20:00 CDT, Weig...  Norco 5 mg-325 mg oral tablet: 1 tab(s), PO, q4hr, PRN: for pain, # 24 tab(s), 0 Refill(s), Type: Maintenance, Pharmacy: [facility], 1 tab(s) Oral q4 hrs,PRN:for pain  Pen Needles: Pen Needles, See Instructions, Instructions: Use as directed with insulin, Supply, # 100 EA, 0 Refill(s), Type: Maintenance, Pharmacy: [facility] #[id number], Use as directed with insulin, 67, in, 05/28/20 8:57:00 CDT, Height Measured, 180, lb, 0...  atorvastatin 40 mg oral tablet: = 1 tab(s) ( 40 mg ), Oral, daily, # 90 tab(s), 3 Refill(s), Type: Maintenance, Pharmacy: [facility], Do not fill until patient calls, 1 tab(s) Oral daily, 67, in, 05/28/20 8:57:00 CDT, Height Measured, 176.1, lb, 09/24/21 13:20:00 CDT, Weig...  doxazosin 2 mg oral tablet: = 1 tab(s) ( 2 mg ), Oral, daily, # 90 tab(s), 3 Refill(s), Type: Maintenance, Pharmacy: [facility], Do not fill until patient calls, 1 tab(s) Oral daily, 67, in, 05/28/20 8:57:00 CDT, Height Measured, 176.1, lb, 09/24/21 13:20:00 CDT, Weigh...  insulin isophane (NPH) 100 units/mL human recombinant subcutaneous suspension: ( 4 units ), Subcutaneous, daily, # 3 pen_needle, 1 Refill(s), Type: Maintenance, Pharmacy: [facility] #[id number], 4 units Subcutaneous daily, 67, in, 05/28/20 8:57:00 CDT, Height Measured, 180, lb, 07/08/21 14:57:00 CDT, Weight Measured  isosorbide mononitrate 30 mg oral tablet, extended release: = 2 tab(s), Oral, qam, # 180 tab(s), 3 Refill(s), Type: Maintenance, Pharmacy: [facility],  Due for appt in April, 2 tab(s) Oral qam, 67, in, 05/28/20 8:57:00 CDT, Height Measured, 176.1, lb, 09/24/21 13:20:00 CDT, Weight Measured  nitroglycerin 0.4 mg sublingual tablet: = 1 tab(s) ( 0.4 mg ), SL, q5min, Instructions: If chest pain not relieved in 5 minutes after first dose, seek immediate medical attention, PRN: for chest pain, # 100 tab(s), 3 Refill(s), Type: Maintenance, Pharmacy: [facility], This is a 3...  Documented Medications  Documented  Blood Builder: Blood Builder, Instructions: 1 tab po daily, 0 Refill(s), Type: Maintenance  Fish Oil oral capsule: po, daily, 0 Refill(s), Type: Maintenance  Iron 100 Plus oral tablet: See Instructions, Instructions: 65mg tid Oral daily, 0 Refill(s), Type: Maintenance  Iron: Iron, Instructions: 325mg daily, 0 Refill(s), Type: Maintenance  Vitamin C: daily, 0 Refill(s), Type: Maintenance  amoxicillin 500 mg oral capsule: See Instructions, Instructions: Take 4 caps 1 hour prior to the procedure, 0 Refill(s), Type: Maintenance  aspirin: Instructions: 81mg tab po daily, 0 Refill(s), Type: Maintenance  pantoprazole 40 mg oral delayed release tablet: = 1 tab(s) ( 40 mg ), 0 Refill(s), Type: Maintenance  predniSONE 20 mg oral tablet: See Instructions, Instructions: 1.5 tab(s) Oral daily 10 day(s), 0 Refill(s), Type: Maintenance  sulfamethoxazole-trimethoprim 400 mg-80 mg oral tablet: 1 tab(s), Oral, daily, 0 Refill(s), Type: Maintenance   Problem list:    All Problems  Type 2 diabetes mellitus without complications / SNOMED CT 804733485 / Confirmed  Obstructive sleep apnea (adult) (pediatric) / SNOMED CT 881689982 / Confirmed  Obesity, unspecified / SNOMED CT 3742261323 / Probable  Hyperlipemia / SNOMED CT 033675567 / Confirmed  History of DVT in adulthood / SNOMED CT 2360364415 / Confirmed  Essential (primary) hypertension / SNOMED CT 45976079 / Confirmed  Depression, Recurrent / SNOMED CT 505157045 / Confirmed  BPH (benign prostatic hyperplasia) / SNOMED CT  558765427 / Confirmed  Back pain, chronic / SNOMED CT 336141433 / Confirmed  Atherosclerotic 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  Comments:  10/18/2013 CDT 3:10 PM CDT - Michel Fields MD  AHI 7.7 and REM AHI 22 in 2013 CST 1:54 PM CST - Michel Fields MD  On 11/10/2013 AHI 18.3 with oximetry susanne 77%. Good/optimal CPAP titration to 12 with 6.5 minutes supine REM  Hyperlipemia (185537560)  Type 2 diabetes mellitus without complications (819043364)  Atherosclerotic heart disease of native coronary artery without angina pectoris (5980444972)  BPH (benign prostatic hyperplasia) (075018156)  Depression, Recurrent (825067434)  Obesity, unspecified (3908191279)  Back pain, chronic (282084593)  Essential (primary) hypertension (53829195)  Anemia (822510692)  Resolved  Inpatient stay (093572629): Onset on 3/20/2021 at 75 years.  Resolved on 3/22/2021 at 75 years.  Comments:  3/25/2021 CDT 10:53 AM CDT - Danny [facility], [place] - Pneumonia of both lungs due to infectious organism.  ACUTE MYOCARDIAL INFARCTION (410):  Resolved in  at 61 years.   Family History:    Hypertension  Father ()  Heart disease  Father ()  Alcohol abuse  Mother ()  Stroke  Father ()  Liver disease  Mother ()     Procedure history:    Angiogram (321267778) in 2017 at 72 Years.  Colonoscopy (889155741) on 2013 at 68 Years.  Comments:  2013 1:13 PM CST - Leonora WILLS, Germaine  Sedation: MAC  Diverticulosis in the sigmoid colon, otherwise normal.  Repeat in 10 years.  Angioplasty (3352321) in the month of 2007 at 61  Years.  CABG - Coronary artery bypass graft x 3 (305766344) in the month of 2/2004 at 58 Years.  Colonoscopy (880423358) on 4/4/2002 at 56 Years.  Comments:  12/9/2010 7:05 AM CST - Isabella Delgado  Repeat in 10 years.  ORIF right hand in 2001 at 56 Years.  Flexible sigmoidoscopy (82706642) on 11/13/1995 at 50 Years.  Comments:  10/16/2013 9:17 AM CDT - Gisele Arevalo  Negative.  ORIF left shoulder in 1975 at 30 Years.   Social History:        Electronic Cigarette/Vaping Assessment            Electronic Cigarette Use: Former use, quit more than 90 days ago.      Alcohol Assessment: Past            Quit 1974      Tobacco Assessment: Past            Quit 1972            Former smoker, quit more than 30 days ago      Substance Abuse Assessment: Denies Substance Abuse            Never      Employment and Education Assessment            Retired, Work/School description: .  Highest education level: High school.      Home and Environment Assessment            Marital status: .  Spouse/Partner name: Brianda.  Living situation: Home/Independent.               Injuries/Abuse/Neglect in household: No.  Feels unsafe at home: No.  Family/Friends available for support:               Yes.      Nutrition and Health Assessment            Type of diet: Regular.  Wants to lose weight: Yes.  Sleeping concerns: No.  Feels highly stressed: No.      Exercise and Physical Activity Assessment: Occasional exercise            Exercise frequency: 1-2 times/week.  Exercise type: Walking.      Sexual Assessment            Sexually active: No.  Identifies as male, History of STD: No.  History of sexual abuse: No.      Other Assessment            Hearing impairment.        Health Maintenance      Recommendations     Pending (in the next year)        OverDue           Body Mass Index Check due  05/21/21  and every 1  year(s)        Due            Depression Screen due  10/23/21  and every 1  year(s)           DM - Communication with  Managing Provider due  10/26/21  and every 6  month(s)           Fall Risk Screen due  10/26/21  and every 1  year(s)           Lung Cancer Screen due  10/26/21  and every 1  year(s)        Due In Future            DM - HgbA1c not due until  01/20/22  and every 6  month(s)           DM - Foot Exam not due until  01/20/22  and every 6  month(s)           DM - Microalbumin not due until  07/20/22  and every 1  year(s)           Lipid Disorders Screen not due until  07/20/22  and every 1  year(s)           Influenza Vaccine not due until  09/01/22  and every 1  year(s)           DM - Eye Exam not due until  09/21/22  and every 1  year(s)           High Blood Pressure Screen not due until  09/24/22  and every 1  year(s)           Obesity Screen and Counseling not due until  09/24/22  and every 1  year(s)           Tobacco Use Screen not due until  09/24/22  and every 1  year(s)     Satisfied (in the past 1 year)        Satisfied            DM - Eye Exam on  09/21/21.           DM - Eye Exam on  09/21/21.           DM - Eye Exam on  09/21/21.           DM - Foot Exam on  07/20/21.           DM - HgbA1c on  07/20/21.           DM - Microalbumin on  07/20/21.           DM - Microalbumin on  07/20/21.           High Blood Pressure Screen on  09/24/21.           High Blood Pressure Screen on  07/20/21.           High Blood Pressure Screen on  07/08/21.           High Blood Pressure Screen on  06/22/21.           High Blood Pressure Screen on  06/08/21.           High Blood Pressure Screen on  05/19/21.           High Blood Pressure Screen on  05/14/21.           High Blood Pressure Screen on  05/06/21.           High Blood Pressure Screen on  04/08/21.           High Blood Pressure Screen on  04/02/21.           High Blood Pressure Screen on  03/26/21.           High Blood Pressure Screen on  03/19/21.           Influenza Vaccine on  09/24/21.           Influenza Vaccine on  09/24/21.           Influenza Vaccine on   09/24/21.           Lipid Disorders Screen on  07/20/21.           Lipid Disorders Screen on  07/20/21.           Lipid Disorders Screen on  07/20/21.           Lipid Disorders Screen on  07/20/21.           Obesity Screen and Counseling on  09/24/21.           Obesity Screen and Counseling on  07/08/21.           Obesity Screen and Counseling on  05/19/21.           Obesity Screen and Counseling on  05/14/21.           Obesity Screen and Counseling on  05/06/21.           Obesity Screen and Counseling on  04/02/21.           Obesity Screen and Counseling on  03/19/21.           Tobacco Use Screen on  09/24/21.           Tobacco Use Screen on  07/20/21.           Tobacco Use Screen on  07/08/21.           Tobacco Use Screen on  06/22/21.           Tobacco Use Screen on  06/08/21.           Tobacco Use Screen on  05/19/21.           Tobacco Use Screen on  05/14/21.           Tobacco Use Screen on  05/06/21.           Tobacco Use Screen on  04/08/21.           Tobacco Use Screen on  04/02/21.           Tobacco Use Screen on  03/26/21.           Tobacco Use Screen on  03/19/21.           Tobacco Use Screen on  03/09/21.           Tobacco Use Screen on  11/27/20.

## 2022-02-16 NOTE — TELEPHONE ENCOUNTER
---------------------  From: Lucio Crandall LPN   To: [facility] ([id number]);     Sent: 10/28/2021 11:12:20 AM CDT  Subject: General Message     Phone message    PCP:   _ CHT      Time of Call:  _       Person Calling:  _ Self  Phone number:  _    Returned call at: _    Note:   _ Pt was updated re: positive covid test.  Says that symptoms have not gotten worse.  Overall feeling okay.  Wife has been asymptomatic so far.  Recommended that she seek testing 5 days after his isolation comes to an end or sooner if symptoms develop.    Last office visit and reason:  _noted

## 2022-02-16 NOTE — LETTER
(Inserted Image. Unable to display) May 29, 2020Re: PIOTR MOREAUJONNDOB:  1945Meri Win [address] [place] [address] ([facility])[place], [place] [address]Le: Dr. WinThe following patient has been referred to your office/practice:   PIOTR MOREAUJONN Appointment : PendingLocation : N/APlease refer to the attached clinical documentation for a summary of PIOTR's care.  Please do not hesitate to contact our office if any additional clinical questions arise. All relevant records and transition of care documents should be mailed or faxed.Your assistance in providing continuity of care is appreciatedSincerely, [facility] [place] [address] [place] [address](P) [phone](F) [phone]

## 2022-02-16 NOTE — TELEPHONE ENCOUNTER
---------------------  From: Margo Carrasco CMA   To: Appointment Pool ([id number]_[place]);     Sent: 11/27/2020 1:54:35 PM CST  Subject: Curbside testing     Please call [phone] to assist in scheduling curbside per CHT. Order given and info sheet completed.pt scheduled

## 2022-02-16 NOTE — TELEPHONE ENCOUNTER
---------------------  From: Charley Plaza RN (Phone Messages Pool ([address]_[facility]))   To: Advanced Practice Provider [place] ([address]_[facility]);     Sent: 11/30/2021 9:27:55 AM CST  Subject: Amoxicillin       PCP:   [facility]      Time of Call:  0925       Person Calling:  Pt's wife  Phone number:  [phone]    Note:   Pt's wife calling stating that her  has a dentist appointment on December 9th and they are in need of prophylactic amoxicillin to take 1 hour prior to appointment.  Wife is requesting that this be sent to the [facility] in [place].     Last office visit and reason:  11/17/2021  Anemia---------------------  From: Chon CAM, Biju CONTRERAS (Advanced Practice Provider [place] ([address]_[facility]))   To: Phone [place] Pool ([address]_[place] [facility] [place]);     Sent: 11/30/2021 10:14:47 AM CST  Subject: RE: Amoxicillin     Rx sent inCall to pt to inform, pt stated understanding.

## 2022-02-16 NOTE — PROGRESS NOTES
Patient:   PIOTR CORREIA            MRN: [id number]            FIN: [id number]               Age:   75 years     Sex:  Male     :  1945   Associated Diagnoses:   None   Author:   Dexter Silva MD      Procedure   EKG procedure   Date:  2021.     Confirmed: patient.     Performed by: [facility] .     EKG findings   Interpretation: Dexter Sivla MD.     Rhythm: heart rate  75  beats/min.     Axis: normal axis, normal configuration.     Intervals: normal.     P waves: .     QRS complex: normal.     ST-T-U complex: normal.     Interpretation: Sinus with 1st degree AV block..

## 2022-02-16 NOTE — PROGRESS NOTES
Patient came into the clinic today for joaquín covid testing per CHT. O2= 95%. Specimen sent to [facility]. Forms faxed to [facility]. Priority= None

## 2022-02-16 NOTE — PROGRESS NOTES
Patient:   PIOTR CORREIA            MRN: [id number]            FIN: [id number]               Age:   75 years     Sex:  Male     :  1945   Associated Diagnoses:   Coronary Artery Disease; DM Type 2 (Diabetes Mellitus, Type 2); Hyperlipemia; Medicare annual wellness visit, initial   Author:   Dexter Silva MD      Visit Information      Date of Service: 10/23/2020 08:36 am  Performing Location: [facility]  Encounter#: [id number]      Primary Care Provider (PCP):  Dexter Silva MD# [id number]      Date of Service: 10/23/2020 08:36 am  Performing Location: [facility]  Encounter#: [id number]      Referring Provider:  Dexter Sliva MD# [id number]      Primary Care Provider (PCP):  Dexter Silva MD# [id number]      Referring Provider:  Dexter Silva MD# [id number]      Care Providers  Not recorded for selected visit.  Ancillary Services  Not recorded for selected visit.          Current Visit Date:  10/23/2020  Last AWV Date:  10/11/2019  Initial AWV Date:  2017          Chief Complaint   10/23/2020 8:44 AM CDT   AWV     Chief complaint and symptoms noted above confirmed with patient.      History of Present Illness             The patient presents for Medicare annual wellness exam.  The patient's general health status is described as good.  The patient's diet is described as balanced.  Associated symptoms consist of shortness of breath.  Compliance problems: none.               The patient presents for follow-up evaluation of diabetes.  The quality of the patient's diabetes is described as being unchanged from previous visit.  Exacerbating factors consist of none.  Glucose results: within target range.  Hemoglobin A1c results: > 6% and within target range.        Review of Systems   Ear/Nose/Mouth/Throat:  Hearing is evaluated.    See completed Health History for Review of Systems   Psychiatric:  GDS noted.     ROS reviewed as documented in chart      Health Status   Allergies:    Allergic Reactions (Selected)  No Known Medication Allergies   Medications:  (Selected)   Prescriptions  Prescribed  CPAP 13: CPAP 13, See Instructions, Instructions: Use every night. Have a download in the sleep center in one month., Supply, # 1 EA, 0 Refill(s), Type: Maintenance  FLUoxetine 40 mg oral capsule: = 1 cap(s), Oral, daily, # 90 cap(s), 0 Refill(s), Type: Maintenance, Pharmacy: [facility], Due for appt in April  FLUoxetine 40 mg oral capsule: See Instructions, Instructions: TAKE 1 CAPSULE BY MOUTH  DAILY, # 90 cap(s), 3 Refill(s), Type: Maintenance, Pharmacy: [facility], TAKE 1 CAPSULE BY MOUTH  DAILY, 67, in, 05/28/20 8:57:00 CDT, Height Measured, 197.2, lb, 05/21/20 9:22:00 CDT...  FREESTYLE LITE      JUAN C: 1 EA, id, daily, # 50 EA, 11 Refill(s), Pharmacy: [id number]  MetFORMIN (Eqv-Glucophage XR) 500 mg oral tablet, extended release: See Instructions, Instructions: TAKE 2 TABLETS BY MOUTH  TWICE DAILY, # 360 tab(s), 3 Refill(s), Type: Maintenance, Pharmacy: [facility], TAKE 2 TABLETS BY MOUTH  TWICE DAILY, 67, in, 05/28/20 8:57:00 CDT, Height Measured, 197.2, lb, 05/21/2...  Norco 5 mg-325 mg oral tablet: 1 tab(s), PO, q4hr, PRN: for pain, # 24 tab(s), 0 Refill(s), Type: Maintenance, Pharmacy: [facility], 1 tab(s) Oral q4 hrs,PRN:for pain  atorvastatin 40 mg oral tablet: = 1 tab(s), Oral, daily, # 90 tab(s), 0 Refill(s), Type: Maintenance, Pharmacy: [facility], Due for appt in April  atorvastatin 40 mg oral tablet: See Instructions, Instructions: TAKE 1 TABLET BY MOUTH  DAILY, # 90 tab(s), 3 Refill(s), Type: Maintenance, Pharmacy: [facility], TAKE 1 TABLET BY MOUTH  DAILY, 67, in, 05/28/20 8:57:00 CDT, Height Measured, 197.2, lb, 05/21/20 9:22:00 CDT,...  doxazosin 2 mg oral tablet: = 1 tab(s), Oral, daily, # 90 tab(s), 0 Refill(s), Type: Maintenance,  Pharmacy: [facility], Due for appt in April  doxazosin 2 mg oral tablet: See Instructions, Instructions: TAKE 1 TABLET BY MOUTH  DAILY, # 90 tab(s), 3 Refill(s), Type: Maintenance, Pharmacy: [facility], TAKE 1 TABLET BY MOUTH  DAILY, 67, in, 05/28/20 8:57:00 CDT, Height Measured, 197.2, lb, 05/21/20 9:22:00 CDT,...  isosorbide mononitrate 30 mg oral tablet, extended release: = 2 tab(s), Oral, qam, # 180 tab(s), 3 Refill(s), Type: Maintenance, Pharmacy: [facility], Due for appt in April, 2 tab(s) Oral qam, 67, in, 05/28/20 8:57:00 CDT, Height Measured, 197.2, lb, 05/21/20 9:22:00 CDT, Weight Measured  lisinopril 5 mg oral tablet: = 1 tab(s) ( 5 mg ), Oral, daily, # 90 tab(s), 3 Refill(s), Type: Maintenance, Pharmacy: [facility], 1 tab(s) Oral daily  lisinopril 5 mg oral tablet: See Instructions, Instructions: TAKE 1 TABLET BY MOUTH  DAILY, # 90 tab(s), 3 Refill(s), Type: Maintenance, Pharmacy: [facility], TAKE 1 TABLET BY MOUTH  DAILY, 67, in, 05/28/20 8:57:00 CDT, Height Measured, 197.2, lb, 05/21/20 9:22:00 CDT,...  nitroglycerin 0.4 mg sublingual tablet: = 1 tab(s) ( 0.4 mg ), SL, q5min, Instructions: If chest pain not relieved in 5 minutes after first dose, seek immediate medical attention, PRN: for chest pain, # 100 tab(s), 3 Refill(s), Type: Maintenance, Pharmacy: [facility], This is a 3...  Documented Medications  Documented  Fish Oil oral capsule: po, daily, 0 Refill(s), Type: Maintenance  Vitamin C: daily, 0 Refill(s), Type: Maintenance   Problem list:    All Problems (Selected)  Obstructive sleep apnea (adult) (pediatric) / SNOMED CT 658528965 / Confirmed  Back pain, chronic / SNOMED CT 832778211 / Confirmed  Type 2 diabetes mellitus without complications / SNOMED CT 696938883 / Confirmed  Atherosclerotic heart disease of native coronary artery without angina pectoris / SNOMED CT 9541263088 / Confirmed  Depression, Recurrent / SNOMED CT 561212038 /  Confirmed  BPH (benign prostatic hyperplasia) / SNOMED CT 404037309 / Confirmed  Hyperlipemia / SNOMED CT 119375563 / Confirmed  Essential (primary) hypertension / SNOMED CT 57761470 / Confirmed   Medicare Assessments      Valdez Fall Risk  (10/23/2020 08:44 am)       History of Fall in Last 3 Months Valdez:  No     Functional Assessment  (10/23/2020 08:44 am)       Bathing ADL Index:  Independent (2)       Dressing ADL Index:  Independent (2)       Toileting ADL Index:  Independent (2)       Transferring Bed or Chair ADL Index:  Independent (2)       Continence ADL Index:  Independent (2)       Feeding ADL Index:  Independent (2)       ADL Index Score:  12     Depression Screening  Not recorded for selected visit.    Detailed Depression Screening  Not recorded for selected visit.    Geriatric Depression Screen  (10/23/2020 08:44 am)       Geriatric Depression Satisfied Life:  Yes       Geriatric Depression Dropped Activities:  No       Geriatric Depression Life Empty:  No       Geriatric Depression Bored:  No       Geriatric Depression Good Spirits:  Yes       Geriatric Depression Afraid Bad Things:  No       Geriatric Depression Feel Happy:  Yes       Geriatric Depression Feel Helpless:  No       Geriatric Depression Prefer to Stay Home:  Yes       Geriatric Depression Memory Problems:  Yes       Geriatric Depression Wonderful Be Alive:  Yes       Geriatric Depression Feel Worthless:  No       Geriatric Depression Situation Hopeless:  No       Geriatric Depression Others Better Off:  No       Geriatric Depression Full of Energy:  No       Geriatric Depression Total Score:  3     Hearing Screen  (10/23/2020 08:52 am)       Hearing Screen Comments:  Hearing aides     Home Safety Screen  (10/23/2020 08:44 am)       Emergency Numbers Kept/Updated:  Yes       Aware of Smoking Dangers:  Yes       Smoke Alarms/Fire Extinguisher Available:  Yes       Household Members Fire Safety Knowledge:  Yes       Firearms Unloaded and  Secure:  Yes       Floor Rugs Removed or Fastened:  Yes       Mats in Bathtub/Shower:  No       Stairway Rails or Banisters:  Yes       Outdoor Clutter Safety:  Yes       Indoor Clutter Safety:  Yes       Electrical Cord Safety:  Yes     Vision Screening   No qualifying data available.      Histories   Past Medical History:    Active  Obstructive sleep apnea (adult) (pediatric) (SNOMED CT 607977503): Onset on 2005 at 59 years.  Comments:  10/18/2013 CDT 3:10 PM CDT - Michel Fields MD  AHI 7.7 and REM AHI 22 in 2013 CST 1:54 PM CST - Michel Fields MD  On 11/10/2013 AHI 18.3 with oximetry susanne 77%. Good/optimal CPAP titration to 12 with 6.5 minutes supine REM  Hyperlipemia (SNOMED CT 188296863)  Type 2 diabetes mellitus without complications (SNOMED CT 777568309)  Atherosclerotic heart disease of native coronary artery without angina pectoris (SNOMED CT 3222584181)  BPH (benign prostatic hyperplasia) (SNOMED CT 076750037)  Depression, Recurrent (SNOMED CT 511118888)  Back pain, chronic (SNOMED CT 702360769)  Essential (primary) hypertension (SNOMED CT 93451440)  Resolved  ACUTE MYOCARDIAL INFARCTION (ICD-9-):  Resolved in  at 61 years.   Family History:    Stroke  Father ()     Procedure history:    Colonoscopy (SNOMED CT 959618765) performed by Shukri Arndt on 2013 at 68 Years.  Comments:  2013 1:13 PM CST - Germaine Lea RN  Sedation: MAC  Diverticulosis in the sigmoid colon, otherwise normal.  Repeat in 10 years.  Angioplasty (SNOMED CT 5115892) in the month of 2007 at 61 Years.  CABG - Coronary artery bypass graft (SNOMED CT 267726656) in the month of 2004 at 58 Years.  Colonoscopy (SNOMED CT 157193653) performed by Aashish Connolly MD on 2002 at 56 Years.  Comments:  2010 7:05 AM CST - Isabella Delgado  Repeat in 10 years.  ORIF right hand in  at 56 Years.  Flexible sigmoidoscopy (SNOMED CT 75994533) performed by Castillo Anaya MD on 1995  at 50 Years.  Comments:  10/16/2013 9:17 AM CDT - Gisele Arevalo  Negative.  ORIF left shoulder in 1975 at 30 Years.   Social History:        Alcohol Assessment: Past            Quit 1974      Tobacco Assessment: Past            Quit 1972      Substance Abuse Assessment: Denies Substance Abuse            Never      Employment and Education Assessment            Retired, Highest education level: High school.      Home and Environment Assessment            Marital status: .  Spouse/Partner name: Brianda.  Living situation: Home/Independent.               Injuries/Abuse/Neglect in household: No.  Feels unsafe at home: No.  Family/Friends available for support:               Yes.      Nutrition and Health Assessment            Type of diet: Regular.  Wants to lose weight: Yes.  Sleeping concerns: No.  Feels highly stressed: No.      Exercise and Physical Activity Assessment: Occasional exercise            Exercise frequency: 1-2 times/week.  Exercise type: Walking.      Sexual Assessment            Sexually active: No.  Identifies as male, History of STD: No.  History of sexual abuse: No.      Other Assessment            Hearing impairment.        Physical Examination   Vital Signs   10/23/2020 8:44 AM CDT Temperature Tympanic 97.7 DegF  LOW    Peripheral Pulse Rate 61 bpm    Systolic Blood Pressure 177 mmHg  HI    Diastolic Blood Pressure 78 mmHg    Mean Arterial Pressure 111 mmHg    BP Site Right arm    BP Method Electronic      Measurements from flowsheet : Measurements   10/23/2020 8:44 AM CDT Height/Length Estimated 67 in    Weight Measured - Standard 201 lb      Documented vital signs:       Blood Pressure: Systolic  145  mmHg, Diastolic  80  mmHg.    General:  Alert and oriented, No acute distress.    Eye:  Pupils are equal, round and reactive to light, Normal conjunctiva.    HENT:  Tympanic membranes are clear, Oral mucosa is moist.    Neck:  Supple, Non-tender, No carotid bruit, No lymphadenopathy.     Respiratory:  Respirations are non-labored.    Cardiovascular:  Normal rate, Regular rhythm, No edema.    Gastrointestinal:  Soft, Non-tender, Non-distended.    Musculoskeletal:  Normal range of motion, Normal gait.    Integumentary:  Warm, No rash.    Neurologic:  Alert, Oriented, No focal deficits.    Cognition and Speech:  Oriented, Speech clear and coherent, Functional cognition intact.    Psychiatric:  Cooperative, Appropriate mood & affect, Normal judgment, Patient's GDS and CAGE questionnaire reviewed and discussed with patient. .       Impression and Plan   Course:  Progressing as expected.    Plan:  Discussed  preventative services.  Appropriate weight and diet discussed.  Discussed Advance Life Directives.    Preventative services needed::  Preventative services checklist reviewed, updated and copy given to patient.  Please see scanned document.         HIV risk screening: No.    Patient Instructions:          Limitations: Limit caffeine intake, Limit alcohol consumption.         Counseled: Patient, Regarding treatment, Regarding medications, Diet, Activity, Verbalized understanding.    Diagnosis     Coronary Artery Disease (CVU41-WB I25.10).     Hyperlipemia (VVL83-WZ E78.00).     Coronary Artery Disease (DUT40-SE I25.10).     Course:  Progressing as expected.    Orders     Orders (Selected)   Prescriptions  Prescribed  atorvastatin 40 mg oral tablet: See Instructions, Instructions: TAKE 1 TABLET BY MOUTH  DAILY, # 90 tab(s), 3 Refill(s), Type: Maintenance, Pharmacy: [facility], TAKE 1 TABLET BY MOUTH  DAILY, 67, in, 05/28/20 8:57:00 CDT, Height Measured, 197.2, lb, 05/21/20 9:22:00 CDT,...  isosorbide mononitrate 30 mg oral tablet, extended release: = 2 tab(s), Oral, qam, # 180 tab(s), 3 Refill(s), Type: Maintenance, Pharmacy: [facility], Due for appt in April, 2 tab(s) Oral qam, 67, in, 05/28/20 8:57:00 CDT, Height Measured, 197.2, lb, 05/21/20 9:22:00 CDT, Weight  Measured  nitroglycerin 0.4 mg sublingual tablet: = 1 tab(s) ( 0.4 mg ), SL, q5min, Instructions: If chest pain not relieved in 5 minutes after first dose, seek immediate medical attention, PRN: for chest pain, # 100 tab(s), 3 Refill(s), Type: Maintenance, Pharmacy: [facility], This is a 3....     Diagnosis     DM Type 2 (Diabetes Mellitus, Type 2) (LYS76-OS E11.9).     Hyperlipemia (KXT44-KN E78.00).     Course:  Well controlled.    Orders     Orders   Lab (Gen Lab  Reference Lab):  Hemoglobin A1c* ([facility]) (Order): Specimen Type: Blood, Collection Date: 10/23/2020 9:11 AM CDT.     Diagnosis     Medicare annual wellness visit, initial (FTQ94-EL Z00.00).     Diagnosis     Coronary Artery Disease (FPK03-PZ I25.10).     DM Type 2 (Diabetes Mellitus, Type 2) (BRU55-CH E11.9).     Hyperlipemia (VLG89-VW E78.00).     Orders   Total time spent with patient and coordination of care:  _  minutes

## 2022-02-16 NOTE — NURSING NOTE
Comprehensive Intake Entered On:  6/28/2019 9:31 AM CDT    Performed On:  6/28/2019 9:26 AM CDT by Rubi Wallace MA               Summary   Chief Complaint :   Follow up low blood pressure    Advance Directive :   Yes   Menstrual Status :   N/A   Weight Measured :   187 lb(Converted to: 187 lb 0 oz, 84.82 kg)    Height Measured :   67 in(Converted to: 5 ft 7 in, 170.18 cm)    Body Mass Index :   29.29 kg/m2 (HI)    Body Surface Area :   2 m2   Systolic Blood Pressure :   128 mmHg   Diastolic Blood Pressure :   64 mmHg   Mean Arterial Pressure :   85 mmHg   Peripheral Pulse Rate :   64 bpm   BP Site :   Left arm   Pulse Site :   Radial artery   BP Method :   Manual   HR Method :   Manual   Temperature Tympanic :   97.2 DegF(Converted to: 36.2 DegC)  (LOW)    Rubi Wallace MA - 6/28/2019 9:26 AM CDT   Health Status   Allergies Verified? :   Yes   Medication History Verified? :   Yes   Immunizations Current :   Yes   Medical History Verified? :   Yes   Pre-Visit Planning Status :   Completed   Tobacco Use? :   Former smoker   Rubi Wallace MA - 6/28/2019 9:26 AM CDT   Consents   Consent for Immunization Exchange :   Consent Granted   Consent for Immunizations to Providers :   Consent Granted   Rubi Wallace MA - 6/28/2019 9:26 AM CDT   Meds / Allergies   (As Of: 6/28/2019 9:31:17 AM CDT)   Allergies (Active)   No Known Medication Allergies  Estimated Onset Date:   Unspecified ; Created By:   Deana French CMA; Reaction Status:   Active ; Category:   Drug ; Substance:   No Known Medication Allergies ; Type:   Allergy ; Updated By:   Deana French CMA; Reviewed Date:   6/28/2019 9:28 AM CDT        Medication List   (As Of: 6/28/2019 9:31:17 AM CDT)   Prescription/Discharge Order    acetaminophen-hydrocodone  :   acetaminophen-hydrocodone ; Status:   Prescribed ; Ordered As Mnemonic:   Norco 5 mg-325 mg oral tablet ; Simple Display Line:   1 tab(s), PO, q4hr, PRN: for pain, 24 tab(s), 0 Refill(s) ; Ordering  Provider:   Noah Bernal PA-C; Catalog Code:   acetaminophen-hydrocodone ; Order Dt/Tm:   3/11/2019 8:02:30 AM          atorvastatin  :   atorvastatin ; Status:   Prescribed ; Ordered As Mnemonic:   atorvastatin 40 mg oral tablet ; Simple Display Line:   40 mg, 1 tab(s), po, daily, 90 tab(s), 3 Refill(s) ; Ordering Provider:   Dexter Silva MD; Catalog Code:   atorvastatin ; Order Dt/Tm:   3/11/2019 7:57:53 AM          isosorbide mononitrate  :   isosorbide mononitrate ; Status:   Prescribed ; Ordered As Mnemonic:   isosorbide mononitrate 30 mg oral tablet, extended release ; Simple Display Line:   60 mg, 2 tab(s), po, qam, for 90 day(s), 180 tab(s), 3 Refill(s) ; Ordering Provider:   Dexter Silva MD; Catalog Code:   isosorbide mononitrate ; Order Dt/Tm:   3/11/2019 7:57:32 AM          FLUoxetine  :   FLUoxetine ; Status:   Prescribed ; Ordered As Mnemonic:   FLUoxetine 40 mg oral capsule ; Simple Display Line:   40 mg, 1 cap(s), po, daily, 90 cap(s), 3 Refill(s) ; Ordering Provider:   Dexter Silva MD; Catalog Code:   FLUoxetine ; Order Dt/Tm:   3/11/2019 7:57:08 AM          doxazosin  :   doxazosin ; Status:   Prescribed ; Ordered As Mnemonic:   doxazosin 2 mg oral tablet ; Simple Display Line:   2 mg, 1 tab(s), po, daily, 90 tab(s), 3 Refill(s) ; Ordering Provider:   Dexter Silva MD; Catalog Code:   doxazosin ; Order Dt/Tm:   3/11/2019 7:56:39 AM          metFORMIN  :   metFORMIN ; Status:   Prescribed ; Ordered As Mnemonic:   metFORMIN 500 mg oral tablet, extended release ; Simple Display Line:   1,000 mg, 2 tab(s), po, bid, 360 tab(s), 3 Refill(s) ; Ordering Provider:   Dexter Silva MD; Catalog Code:   metFORMIN ; Order Dt/Tm:   3/11/2019 7:56:18 AM          hydrochlorothiazide-lisinopril  :   hydrochlorothiazide-lisinopril ; Status:   Prescribed ; Ordered As Mnemonic:   hydrochlorothiazide-lisinopril 25 mg-20 mg oral tablet ; Simple Display Line:   1 tab(s), po,  daily, 90 tab(s), 3 Refill(s) ; Ordering Provider:   Dexter Silva MD; Catalog Code:   hydrochlorothiazide-lisinopril ; Order Dt/Tm:   3/11/2019 7:56:02 AM          nitroglycerin  :   nitroglycerin ; Status:   Prescribed ; Ordered As Mnemonic:   nitroglycerin 0.4 mg sublingual tablet ; Simple Display Line:   0.4 mg, 1 tab(s), SL, q5min, If chest pain not relieved in 5 minutes after first dose, seek immediate medical attention, PRN: for chest pain, 100 tab(s), 3 Refill(s) ; Ordering Provider:   Dexter Silva MD; Catalog Code:   nitroglycerin ; Order Dt/Tm:   3/11/2019 7:55:41 AM          Miscellaneous Rx Supply  :   Miscellaneous Rx Supply ; Status:   Prescribed ; Ordered As Mnemonic:   CPAP 13 ; Simple Display Line:   See Instructions, Use every night. Have a download in the sleep center in one month., 1 EA ; Ordering Provider:   Michel Fields MD; Catalog Code:   Miscellaneous Rx Supply ; Order Dt/Tm:   1/17/2014 10:40:19 AM          Miscellaneous Prescription  :   Miscellaneous Prescription ; Status:   Prescribed ; Ordered As Mnemonic:   FREESTYLE LITE      JUAN C ; Simple Display Line:   1 EA, id, daily, 50 EA ; Ordering Provider:   Dexter Silva MD; Catalog Code:   Miscellaneous Prescription ; Order Dt/Tm:   4/9/2010 10:09:35 AM            Home Meds    aspirin  :   aspirin ; Status:   Documented ; Ordered As Mnemonic:   aspirin 81 mg oral delayed release tablet ; Simple Display Line:   81 mg, 1 tab(s), Oral, daily, 0 Refill(s) ; Catalog Code:   aspirin ; Order Dt/Tm:   1/24/2019 10:33:49 AM          omega-3 polyunsaturated fatty acids  :   omega-3 polyunsaturated fatty acids ; Status:   Documented ; Ordered As Mnemonic:   Fish Oil oral capsule ; Simple Display Line:   po, daily, 0 Refill(s) ; Catalog Code:   omega-3 polyunsaturated fatty acids ; Order Dt/Tm:   7/19/2016 8:35:56 AM

## 2022-02-16 NOTE — PROGRESS NOTES
Chief Complaint    Right big toe injury from fall today.  Right shoulder/arm pain as well.  History of Present Illness      Chief complaint as above reviewed and confirmed with patient.  Pt presents to the clinic with concerns re: R great toe pain. Miss stepped and slipped falling on a few stairs, hyper flexing several R toes.  Also fell and rolled but denies any other pain or discomfort.  Occurred about 1.5 hours ago and having pain with ambulation, mild swelling.  No tingling or numbness.  Physical Exam   Vitals & Measurements    T: 97.9   F (Tympanic)  HR: 55(Peripheral)  BP: 132/70  SpO2: 96%     WT: 187.4 lb       pt is in no acute distress, appears well      exam of the R foot reveals mild swelling at the 1st MTP joint.  There is pain with AROM at the 1st MTP joint with active PF, DF as well as passive ROM in flexion and extension.  no TTP of the ankle, base of the 5th, navicular or 2-5th metatarsals or phalanges.       no open wounds.       peripheral pulses intact. cap refill brisk. sensation intact       xray: negative for any acute fracture or dislocation.          Assessment/Plan       1. Sprain of great toe (S93.503A)         post op shoe for protection and pain control.  Ice, relative rest, advance as tolerated by pain.  fu prn.  Ibuprofen for pain.                Pain of right great toe (M79.674)         Ordered:          XR Toes Right (Request), Priority: Routine, Instructions: R great toe, Pain of right great toe           Patient Information     Name:PIOTR CORREIA      Address:      [address] [place] [id number]     Sex:Male     YOB: 1945     [phone]     Emergency Contact:JIMENEZ CORREIA     MRN:[id number]     FIN:[id number]     Location:[facility]     Date of Service:08/10/2019      Primary Care Physician:       Dexter Silva MD, [phone]      Attending Physician:       Robel CAM, Nia HUNTER, [phone]  Problem  List/Past Medical History    Ongoing     Back pain, chronic     BPH (benign prostatic hyperplasia)     Coronary Artery Disease     Depression, Recurrent     DM Type 2 (Diabetes Mellitus, Type 2)     Hyperlipemia     Hypertension     Obese     Sleep Apnea       Comments: On 11/10/2013 AHI 18.3 with oximetry susanne 77%. Good/optimal CPAP titration to 12 with 6.5 minutes supine REM AHI 7.7 and REM AHI 22 in 2005    Historical     ACUTE MYOCARDIAL INFARCTION  Procedure/Surgical History     Colonoscopy (11/08/2013)            Comments: Sedation: MAC      Diverticulosis in the sigmoid colon, otherwise normal.      Repeat in 10 years..     Angioplasty (05.2007)           CABG - Coronary artery bypass graft (02.2004)           Colonoscopy (04/04/2002)            Comments: Repeat in 10 years..     ORIF right hand (2001)           Flexible sigmoidoscopy (11/13/1995)            Comments: Negative..     ORIF left shoulder (1975)        Medications    aspirin 81 mg oral delayed release tablet, 81 mg= 1 tab(s), Oral, daily    atorvastatin 40 mg oral tablet, 40 mg= 1 tab(s), Oral, daily, 3 refills    CPAP 13, See Instructions    doxazosin 2 mg oral tablet, 2 mg= 1 tab(s), Oral, daily, 3 refills    Fish Oil oral capsule, Oral, daily    FLUoxetine 40 mg oral capsule, 40 mg= 1 cap(s), Oral, daily, 3 refills    FREESTYLE LITE JUAN C, 1 EA, ID, daily, 11 refills    isosorbide mononitrate 30 mg oral tablet, extended release, 60 mg= 2 tab(s), Oral, qam, 3 refills    lisinopril 5 mg oral tablet, 5 mg= 1 tab(s), Oral, daily, 3 refills    metFORMIN 500 mg oral tablet, extended release, 1000 mg= 2 tab(s), Oral, bid, 3 refills    nitroglycerin 0.4 mg sublingual tablet, 0.4 mg= 1 tab(s), SL, q5 min, PRN, 3 refills    Norco 5 mg-325 mg oral tablet, 1 tab(s), Oral, q4 hrs, PRN  Allergies    No Known Medication Allergies  Social History    Smoking Status - 06/28/2019     Former smoker     Alcohol - Past, 09/28/2018     Employment/School      Retired,  09/28/2018     Exercise - Does not exercise, 09/28/2018     Home/Environment      Marital status: . Spouse/Partner name: Brianda., 09/28/2018     Nutrition/Health      Type of diet: Regular., 09/28/2018     Sexual      Sexually active: No., 09/28/2018     Substance Abuse - Denies Substance Abuse, 09/28/2018      Never, 09/28/2018     Tobacco - Past, 05/17/2010  Family History    Stroke: Father.  Immunizations      Vaccine Date Status      influenza virus vaccine, inactivated 09/12/2017 Given      influenza virus vaccine, inactivated 10/10/2016 Given      influenza virus vaccine, inactivated 10/29/2015 Given      pneumococcal (PCV13) 07/15/2015 Given      influenza virus vaccine, inactivated 10/23/2014 Given      influenza virus vaccine, inactivated 10/10/2013 Given      influenza virus vaccine, inactivated 10/25/2012 Given      influenza 10/01/2011 Recorded      influenza 10/01/2010 Given      ZOS, shingles 10/24/2008 Recorded      pneumococcal (PPSV23) 09/05/2007 Recorded      Td 11/09/2006 Recorded

## 2022-02-16 NOTE — TELEPHONE ENCOUNTER
---------------------  From: Esme Perez   To: [employer] Message Pool ([id number]_[facility]);     Sent: 5/21/2021 9:50:58 AM CDT  Subject: Order to Discontinue Home Oxygen     Phone Message    PCP:   CHT      Time of Call:  9:49am       Person Calling:  [employer]  Phone number:     Note:   Requesting order be faxed to discontinue Oxygen. They received a verbal order to discontinue home O2, but will require a faxed order.     Fax order to: [phone]---------------------  From: Margo Carrasco CMA ([employer] Message Pool ([id number]_[facility]))   To: Dexter Silva MD;     Sent: 5/21/2021 9:56:51 AM CDT  Subject: FW: Order to Discontinue Home Oxygen  ** Submitted: **  Order:Miscellaneous Rx Supply (Oxygen)  See Instructions  D/C Oxygen  Qty:  1 unknown unit        Refills:  0          Substitutions Allowed     Route To Pharmacy - [facility] #[id number]    Signed by Dexter Silva MD  5/21/2021 3:02:00 PM [facility]---------------------  From: Dexter Silva MD   To: [employer] Pool ([id number]_[facility]);     Sent: 5/21/2021 10:04:36 AM CDT  Subject: RE: Order to Discontinue Home Oxygen     OK to print and fax.Order printed and faxed with confirmation.

## 2022-02-16 NOTE — TELEPHONE ENCOUNTER
---------------------  From: Margo Carrasco CMA (CHT Message Pool ([id number]_[facility]))   To: Dexter Silva MD;     Sent: 3/17/2021 2:01:16 PM CDT  Subject: FW: Turner's condition           ---------------------  From: Ronald/Janae FOX (Phone Messages Pool ([id number]))   To: T Message Pool ([id number]);     Sent: 3/17/2021 1:50:11 PM CDT  Subject: FW: Bill's condition           ---------------------  From: JIMENEZ OCRREIA on behalf of PIOTR CORREIA  To: [facility]  Sent: 03/17/2021 01:48 p.m. 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 He often is having coughing fits which leave his chest hurting and he is exhausted. There is a tickle lower in his chest, around the bottom of the ribs. Turner has a follow up appointment in April, but we re concerned about this increasing cough. He has been taking two daily iron tablets as recommended by Dr. Silva. He is still quite tired and weak.---------------------  From: Dexter Silva MD   To: Spotted Message Pool ([id number]);     Sent: 3/17/2021 2:08:43 PM CDT  Subject: RE: Turner's condition     I would like to see him Friday.---------------------  From: Albina Griffiths (Spotted Message Pool ([id number]_[facility]))   To: PIOTR CORREIA    Sent: 3/17/2021 2:49:43 PM CDT  Subject: FW: Bill's condition     Dr. Silva would like to see Turner on Friday. Please call [phone] to schedule an in office visit.---------------------  From: Albina Griffiths (CHT Message Pool ([id number]_[facility]))   To: Appointment Pool (32224_[place]);     Sent: 3/17/2021 2:53:00 PM CDT  Subject: FW: Bill's condition     Please assist in scheduling pt for this Friday with [facility].  An in office visit.---------------------  From: Albina Griffiths (CHT Message Pool ([id number]_[facility]))   To: CHT Message Pool (32224_[facility]);     Sent: 3/17/2021 2:53:29 PM CDT  Subject: FW: Bill's conditionLVM  [id number]

## 2022-02-16 NOTE — LETTER
(Inserted Image. Unable to display) January 29, 2021Re: PIOTR MOREAUJONNDOB:  1945  [facility]  [address] [facility]. [place] [address]Qq:   [facility] HospitalThe following patient has been referred to your office/practice:   PIOTR CORREIA Appointment : 02/01/2021Location : N/APlease refer to the attached clinical documentation for a summary of PIOTR's care.  Please do not hesitate to contact our office if any additional clinical questions arise. All relevant records and transition of care documents should be mailed or faxed.Your assistance in providing continuity of care is appreciatedSincerely, [facility] & [address] [place] [address](P) [phone](F) [phone]

## 2022-02-16 NOTE — PROGRESS NOTES
Patient:   PIOTR CORREIA            MRN: [id number]            FIN: [id number]               Age:   75 years     Sex:  Male     :  1945   Associated Diagnoses:   Stable angina pectoris; Anemia   Author:   Dexter Silva MD      Chief Complaint   2021 1:20 PM CDT    Anemia follow up. Wanting labs for hgb and ferritin per Hematology.     Chief complaint and symptoms noted above confirmed with patient.      Interval History   Angina   Anginal episodes increased.  Change in activity tolerance decreased tolerance.  Prn use of nitroglycerin unchanged.  The course is worsening.  Associated symptoms characterized by edema: peripheral and shortness of breath.        Review of Systems   Constitutional:  Negative except as documented in history of present illness.    Cardiovascular:  Negative except as documented in history of present illness.    Gastrointestinal:  Negative.       Health Status   Allergies:    Allergic Reactions (Selected)  No Known Medication Allergies   Medications:  (Selected)   Prescriptions  Prescribed  CPAP 13: CPAP 13, See Instructions, Instructions: Use every night. Have a download in the sleep center in one month., Supply, # 1 EA, 0 Refill(s), Type: Maintenance  Eliquis 5 mg oral tablet: = 1 tab(s) ( 5 mg ), Oral, bid, # 180 tab(s), 0 Refill(s), Type: Maintenance, Pharmacy: [facility], 1 tab(s) Oral bid, 67, in, 20 8:57:00 CDT, Height Measured, 180, lb, 21 14:57:00 CDT, Weight Measured  FLUoxetine 40 mg oral capsule: = 1 cap(s) ( 40 mg ), Oral, daily, # 90 cap(s), 3 Refill(s), Type: Maintenance, Pharmacy: [facility], Do not fill until patient calls, 1 cap(s) Oral daily, 67, in, 20 8:57:00 CDT, Height Measured, 176.1, lb, 21 13:20:00 CDT, Shlomo...  FREESTYLE LITE      JUAN C: 1 EA, id, daily, # 50 EA, 11 Refill(s), Pharmacy: [id number]  MetFORMIN (Eqv-Glucophage XR) 500 mg oral tablet, extended release: = 2 tab(s) ( 1,000 mg ),  Oral, bid, # 360 tab(s), 3 Refill(s), Type: Maintenance, Pharmacy: [facility], Do not fill until patient calls, 2 tab(s) Oral bid, 67, in, 05/28/20 8:57:00 CDT, Height Measured, 176.1, lb, 09/24/21 13:20:00 CDT, Weig...  Norco 5 mg-325 mg oral tablet: 1 tab(s), PO, q4hr, PRN: for pain, # 24 tab(s), 0 Refill(s), Type: Maintenance, Pharmacy: [facility], 1 tab(s) Oral q4 hrs,PRN:for pain  Pen Needles: Pen Needles, See Instructions, Instructions: Use as directed with insulin, Supply, # 100 EA, 0 Refill(s), Type: Maintenance, Pharmacy: [facility] #[id number], Use as directed with insulin, 67, in, 05/28/20 8:57:00 CDT, Height Measured, 180, lb, 0...  atorvastatin 40 mg oral tablet: = 1 tab(s) ( 40 mg ), Oral, daily, # 90 tab(s), 3 Refill(s), Type: Maintenance, Pharmacy: [facility], Do not fill until patient calls, 1 tab(s) Oral daily, 67, in, 05/28/20 8:57:00 CDT, Height Measured, 176.1, lb, 09/24/21 13:20:00 CDT, Weig...  doxazosin 2 mg oral tablet: = 1 tab(s) ( 2 mg ), Oral, daily, # 90 tab(s), 3 Refill(s), Type: Maintenance, Pharmacy: [facility], Do not fill until patient calls, 1 tab(s) Oral daily, 67, in, 05/28/20 8:57:00 CDT, Height Measured, 176.1, lb, 09/24/21 13:20:00 CDT, Weigh...  insulin isophane (NPH) 100 units/mL human recombinant subcutaneous suspension: ( 4 units ), Subcutaneous, daily, # 3 pen_needle, 1 Refill(s), Type: Maintenance, Pharmacy: [facility] #[id number], 4 units Subcutaneous daily, 67, in, 05/28/20 8:57:00 CDT, Height Measured, 180, lb, 07/08/21 14:57:00 CDT, Weight Measured  isosorbide mononitrate 30 mg oral tablet, extended release: = 2 tab(s), Oral, qam, # 180 tab(s), 3 Refill(s), Type: Maintenance, Pharmacy: [facility], Due for appt in April, 2 tab(s) Oral qam, 67, in, 05/28/20 8:57:00 CDT, Height Measured, 176.1, lb, 09/24/21 13:20:00 CDT, Weight Measured  nitroglycerin 0.4 mg sublingual tablet: = 1 tab(s) ( 0.4 mg ), SL, q5min,  Instructions: If chest pain not relieved in 5 minutes after first dose, seek immediate medical attention, PRN: for chest pain, # 100 tab(s), 3 Refill(s), Type: Maintenance, Pharmacy: [facility], This is a 3...  Documented Medications  Documented  Blood Builder: Blood Builder, Instructions: 1 tab po daily, 0 Refill(s), Type: Maintenance  Fish Oil oral capsule: po, daily, 0 Refill(s), Type: Maintenance  Iron 100 Plus oral tablet: See Instructions, Instructions: 65mg tid Oral daily, 0 Refill(s), Type: Maintenance  Iron: Iron, Instructions: 325mg daily, 0 Refill(s), Type: Maintenance  Vitamin C: daily, 0 Refill(s), Type: Maintenance  amoxicillin 500 mg oral capsule: See Instructions, Instructions: Take 4 caps 1 hour prior to the procedure, 0 Refill(s), Type: Maintenance  aspirin: Instructions: 81mg tab po daily, 0 Refill(s), Type: Maintenance  pantoprazole 40 mg oral delayed release tablet: = 1 tab(s) ( 40 mg ), 0 Refill(s), Type: Maintenance  predniSONE 20 mg oral tablet: See Instructions, Instructions: 1.5 tab(s) Oral daily 10 day(s), 0 Refill(s), Type: Maintenance  sulfamethoxazole-trimethoprim 400 mg-80 mg oral tablet: 1 tab(s), Oral, daily, 0 Refill(s), Type: Maintenance   Problem list:    All Problems (Selected)  Obstructive sleep apnea (adult) (pediatric) / SNOMED CT 228113439 / Confirmed  Back pain, chronic / SNOMED CT 240018243 / Confirmed  Type 2 diabetes mellitus without complications / SNOMED CT 317176940 / Confirmed  Obesity, unspecified / SNOMED CT 3497317038 / Probable  History of DVT in adulthood / SNOMED CT 1147514820 / Confirmed  Atherosclerotic heart disease of native coronary artery without angina pectoris / SNOMED CT 8341662882 / Confirmed  Depression, Recurrent / SNOMED CT 811499042 / Confirmed  BPH (benign prostatic hyperplasia) / SNOMED CT 603471967 / Confirmed  Hyperlipemia / SNOMED CT 699815841 / Confirmed  Anemia / SNOMED CT 074076953 / Confirmed  Essential (primary) hypertension /  SNOMED CT 99564712 / Confirmed      Histories   Past Medical History:    Active  Obstructive sleep apnea (adult) (pediatric) (SNOMED CT 015017365): Onset on 2005 at 59 years.  Comments:  10/18/2013 CDT 3:10 PM CDT - Michel Fields MD  AHI 7.7 and REM AHI 22 in 2013 CST 1:54 PM CST - Michel Fields MD  On 11/10/2013 AHI 18.3 with oximetry susanne 77%. Good/optimal CPAP titration to 12 with 6.5 minutes supine REM  Hyperlipemia (SNOMED CT 033599550)  Type 2 diabetes mellitus without complications (SNOMED CT 633779450)  Atherosclerotic heart disease of native coronary artery without angina pectoris (SNOMED CT 4258343029)  BPH (benign prostatic hyperplasia) (SNOMED CT 398356453)  Depression, Recurrent (SNOMED CT 390137056)  Obesity, unspecified (SNOMED CT 4405085209)  Back pain, chronic (SNOMED CT 018058929)  Essential (primary) hypertension (SNOMED CT 09425711)  Anemia (SNOMED CT 339228252)  Resolved  Inpatient stay (SNOMED CT 755727584): Onset on 3/20/2021 at 75 years.  Resolved on 3/22/2021 at 75 years.  Comments:  3/25/2021 CDT 10:53 AM CDT - [facility], [place] - Pneumonia of both lungs due to infectious organism.  ACUTE MYOCARDIAL INFARCTION (ICD-9-):  Resolved in  at 61 years.   Family History:    Hypertension  Father ()  Heart disease  Father ()  Alcohol abuse  Mother ()  Stroke  Father ()  Liver disease  Mother ()     Procedure history:    Angiogram (SNOMED CT 406977434) in 2017 at 72 Years.  Colonoscopy (SNOMED CT 494339756) performed by Shukri Arndt on 2013 at 68 Years.  Comments:  2013 1:13 PM CST - Leonora WILLS, Germaine  Sedation: MAC  Diverticulosis in the sigmoid colon, otherwise normal.  Repeat in 10 years.  Angioplasty (SNOMED CT 9381268) in the month of 2007 at 61 Years.  CABG - Coronary artery bypass graft x 3 (SNOMED CT 811599638) in the month of 2004 at 58 Years.  Colonoscopy (SNOMED CT  474187182) performed by Aashish Connolly MD on 4/4/2002 at 56 Years.  Comments:  12/9/2010 7:05 AM CST - Danny Isabella  Repeat in 10 years.  ORIF right hand in 2001 at 56 Years.  Flexible sigmoidoscopy (SNOMED CT 90926070) performed by Castillo Anaya MD on 11/13/1995 at 50 Years.  Comments:  10/16/2013 9:17 AM CDT - Gisele Arevalo  Negative.  ORIF left shoulder in 1975 at 30 Years.      Physical Examination   Vital Signs   9/24/2021 1:20 PM CDT Peripheral Pulse Rate 96 bpm    Systolic Blood Pressure 132 mmHg  HI    Diastolic Blood Pressure 60 mmHg    Mean Arterial Pressure 84 mmHg      Measurements from flowsheet : Measurements   9/24/2021 1:20 PM CDT Height/Length Estimated 67 in    Weight Measured - Standard 176.1 lb      General:  Alert and oriented, No acute distress.    Neck:  Supple.    Respiratory:  Lungs are clear to auscultation, Respirations are non-labored.    Cardiovascular:  Normal rate, Regular rhythm.         Edema: Bilateral, 1+.       Review / Management   Results review:  HGB 10.2.       Impression and Plan   Diagnosis     Stable angina pectoris (QVP34-NL I20.8).     Course:  Progressing as expected, Stable.    Orders     Orders (Selected)   Prescriptions  Prescribed  atorvastatin 40 mg oral tablet: = 1 tab(s) ( 40 mg ), Oral, daily, # 90 tab(s), 3 Refill(s), Type: Maintenance, Pharmacy: [facility], Do not fill until patient calls, 1 tab(s) Oral daily, 67, in, 05/28/20 8:57:00 CDT, Height Measured, 176.1, lb, 09/24/21 13:20:00 CDT, Shlomo...  isosorbide mononitrate 30 mg oral tablet, extended release: = 2 tab(s), Oral, qam, # 180 tab(s), 3 Refill(s), Type: Maintenance, Pharmacy: [facility], Due for appt in April, 2 tab(s) Oral qam, 67, in, 05/28/20 8:57:00 CDT, Height Measured, 176.1, lb, 09/24/21 13:20:00 CDT, Weight Measured  nitroglycerin 0.4 mg sublingual tablet: = 1 tab(s) ( 0.4 mg ), SL, q5min, Instructions: If chest pain not relieved in 5 minutes after first dose, seek immediate  medical attention, PRN: for chest pain, # 100 tab(s), 3 Refill(s), Type: Maintenance, Pharmacy: [facility], This is a 3....     Diagnosis     Anemia (MQS33-KP D64.9).     Course:  Improving: none.    Orders     Orders (Selected)   Outpatient Orders  Ordered (In Transit)  CBC (includes diff/plt)* ([facility]): Specimen Type: Blood, Collection Date: 09/24/21 14:02:00 CDT  Ferritin* ([facility]): Specimen Type: Serum, Collection Date: 09/24/21 13:47:00 CDT.

## 2022-02-16 NOTE — PROGRESS NOTES
Patient:   PIOTR CORREIA            MRN: [id number]            FIN: [id number]               Age:   73 years     Sex:  Male     :  1945   Associated Diagnoses:   Medicare annual wellness visit, initial; Coronary Artery Disease; DM Type 2 (Diabetes Mellitus, Type 2); Hyperlipemia   Author:   Dexter Silva MD      Visit Information      Primary Care Provider (PCP):  Dexter Silva MD    NPI# [id number]      Date of Service: 10/11/2019 08:54 am  Performing Location: [facility]  Encounter#: [id number]      Referring Provider:  Dexter Silva MD    NPMEGHANN# [id number]      Care Providers  Not recorded for selected visit.  Ancillary Services  Not recorded for selected visit.          Current Visit Date:  10/11/2019  Last AWV Date:  2018  Initial AWV Date:  2017          Chief Complaint   10/11/2019 9:08 AM CDT   AWV     Chief complaint and symptoms noted above confirmed with patient.      History of Present Illness             The patient presents for Medicare annual wellness exam.  The patient's general health status is described as good.  The patient's diet is described as balanced.  Exercise routine.  Associated symptoms consist of none.               The patient presents for follow-up evaluation of diabetes.  The quality of the patient's diabetes is described as being unchanged from previous visit.  Exacerbating factors consist of none.  Relieving factors consist of increased activity and medication.  Associated symptoms consist of none.  Glucose results: within target range.  Hemoglobin A1c results: > 6% and within target range.        Review of Systems   Ear/Nose/Mouth/Throat:  Hearing is evaluated.    See completed Health History for Review of Systems   Psychiatric:  GDS noted.    ROS reviewed as documented in chart      Health Status   Allergies:    Allergic Reactions (Selected)  No Known Medication Allergies   Medications:  (Selected)    Prescriptions  Prescribed  CPAP 13: CPAP 13, See Instructions, Instructions: Use every night. Have a download in the sleep center in one month., Supply, # 1 EA, 0 Refill(s), Type: Maintenance  FLUoxetine 40 mg oral capsule: = 1 cap(s) ( 40 mg ), po, daily, # 90 cap(s), 3 Refill(s), Type: Maintenance, Pharmacy: [facility], 1 cap(s) Oral daily  FREESTYLE LITE      JUAN C: 1 EA, id, daily, # 50 EA, 11 Refill(s), Pharmacy: [id number]  Marty 5 mg-325 mg oral tablet: 1 tab(s), PO, q4hr, PRN: for pain, # 24 tab(s), 0 Refill(s), Type: Maintenance, Pharmacy: [facility], 1 tab(s) Oral q4 hrs,PRN:for pain  atorvastatin 40 mg oral tablet: = 1 tab(s) ( 40 mg ), po, daily, # 90 tab(s), 3 Refill(s), Type: Maintenance, Pharmacy: [facility], 1 tab(s) Oral daily  doxazosin 2 mg oral tablet: = 1 tab(s) ( 2 mg ), po, daily, # 90 tab(s), 3 Refill(s), Type: Maintenance, Pharmacy: [facility], 1 tab(s) Oral daily  isosorbide mononitrate 30 mg oral tablet, extended release: = 2 tab(s) ( 60 mg ), po, qam, # 180 tab(s), 3 Refill(s), Type: Soft Stop, Pharmacy: [facility], 2 tab(s) Oral qam,x90 day(s)  lisinopril 5 mg oral tablet: = 1 tab(s) ( 5 mg ), Oral, daily, # 90 tab(s), 3 Refill(s), Type: Maintenance, Pharmacy: [facility], 1 tab(s) Oral daily  metFORMIN 500 mg oral tablet, extended release: = 2 tab(s) ( 1,000 mg ), po, bid, # 360 tab(s), 3 Refill(s), Type: Maintenance, Pharmacy: [facility], Due for appt next month, 2 tab(s) Oral bid  nitroglycerin 0.4 mg sublingual tablet: = 1 tab(s) ( 0.4 mg ), SL, q5min, Instructions: If chest pain not relieved in 5 minutes after first dose, seek immediate medical attention, PRN: for chest pain, # 100 tab(s), 3 Refill(s), Type: Maintenance, Pharmacy: [facility], This is a 3...  Documented Medications  Documented  Fish Oil oral capsule: po, daily, 0 Refill(s), Type: Maintenance   Problem list:    All Problems  BPH  (benign prostatic hyperplasia) / SNOMED CT 770914899 / Confirmed  Back pain, chronic / SNOMED CT 173699561 / Confirmed  Coronary Artery Disease / ICD-9-.00 / Confirmed  DM Type 2 (Diabetes Mellitus, Type 2) / ICD-9-.00 / Confirmed  Hypertension / ICD-9-.9 / Confirmed  Obese / ICD-9-.00 / Probable  Hyperlipemia / SNOMED CT 877631220 / Confirmed  Depression, Recurrent / SNOMED CT 500614430 / Confirmed  Sleep Apnea / ICD-9-.57 / Confirmed   Medicare Assessments      Valdez Fall Risk  (10/11/2019 09:08 am)       History of Fall in Last 3 Months Valdez:  No     Functional Assessment  (10/11/2019 09:08 am)       Bathing ADL Index:  Independent (2)       Dressing ADL Index:  Independent (2)       Toileting ADL Index:  Independent (2)       Transferring Bed or Chair ADL Index:  Independent (2)       Feeding ADL Index:  Independent (2)     Depression Screening  Not recorded for selected visit.    Detailed Depression Screening  Not recorded for selected visit.    Geriatric Depression Screen  (10/11/2019 09:08 am)       Geriatric Depression Satisfied Life:  Yes       Geriatric Depression Dropped Activities:  No       Geriatric Depression Life Empty:  No       Geriatric Depression Bored:  No       Geriatric Depression Good Spirits:  Yes       Geriatric Depression Afraid Bad Things:  No       Geriatric Depression Feel Happy:  Yes       Geriatric Depression Feel Helpless:  No       Geriatric Depression Prefer to Stay Home:  No       Geriatric Depression Memory Problems:  No       Geriatric Depression Wonderful Be Alive:  Yes       Geriatric Depression Feel Worthless:  No       Geriatric Depression Situation Hopeless:  No       Geriatric Depression Others Better Off:  No       Geriatric Depression Full of Energy:  Yes       Geriatric Depression Total Score:  0     Hearing Screen  (10/11/2019 09:08 am)       Hearing Screen Comments:  Hearing aids     Home Safety Screen  (10/11/2019 09:08 am)        Emergency Numbers Kept/Updated:  Yes       Aware of Smoking Dangers:  Yes       Smoke Alarms/Fire Extinguisher Available:  Yes       Household Members Fire Safety Knowledge:  Yes       Floor Rugs Removed or Fastened:  Yes       Mats in Bathtub/Shower:  Yes       Stairway Rails or Banisters:  Yes       Outdoor Clutter Safety:  Yes       Indoor Clutter Safety:  Yes       Electrical Cord Safety:  Yes     Vision Screening  Eye, Right Visual Acuity Evaluated: 20/25 (10/11/19 09:08:00)  Eye, Left Visual Acuity Evaluated: 20/25 (10/11/19 09:08:00)  Vision Test Type: Automated (10/11/19 09:08:00)      Histories   Past Medical History:    Active  Sleep Apnea (ICD-9-.57): Onset on 2005 at 59 years.  Comments:  10/18/2013 CDT 3:10 PM CDT - Michel Fields MD  AHI 7.7 and REM AHI 22 in 2013 CST 1:54 PM CST - Michel Fields MD  On 11/10/2013 AHI 18.3 with oximetry susanne 77%. Good/optimal CPAP titration to 12 with 6.5 minutes supine REM  Hyperlipemia (SNOMED CT 571125102)  DM Type 2 (Diabetes Mellitus, Type 2) (ICD-9-.00)  Coronary Artery Disease (ICD-9-.00)  BPH (benign prostatic hyperplasia) (SNOMED CT 143591645)  Depression, Recurrent (SNOMED CT 331084663)  Obese (ICD-9-.00)  Back pain, chronic (SNOMED CT 920902732)  Hypertension (ICD-9-.9)  Resolved  ACUTE MYOCARDIAL INFARCTION (ICD-9-):  Resolved in  at 61 years.   Family History:    Stroke  Father ()     Procedure history:    Colonoscopy (SNOMED CT 491936904) performed by Shukri Arndt on 2013 at 68 Years.  Comments:  2013 1:13 PM CST - Germaine Lea RN  Sedation: MAC  Diverticulosis in the sigmoid colon, otherwise normal.  Repeat in 10 years.  Angioplasty (SNOMED CT 4184167) in the month of 2007 at 61 Years.  CABG - Coronary artery bypass graft (SNOMED CT 007019661) in the month of 2004 at 58 Years.  Colonoscopy (SNOMED CT 583887775) performed by Aashish Connolly MD on 2002 at 56  Years.  Comments:  12/9/2010 7:05 AM CST - Danny Isabella  Repeat in 10 years.  ORIF right hand in 2001 at 56 Years.  Flexible sigmoidoscopy (SNOMED CT 39693888) performed by Castillo Anaya MD on 11/13/1995 at 50 Years.  Comments:  10/16/2013 9:17 AM CDT - Gisele Arevalo  Negative.  ORIF left shoulder in 1975 at 30 Years.   Social History:        Alcohol Assessment: Past      Tobacco Assessment: Past      Substance Abuse Assessment: Denies Substance Abuse            Never      Employment and Education Assessment            Retired      Home and Environment Assessment            Marital status: .  Spouse/Partner name: Brianda.      Nutrition and Health Assessment            Type of diet: Regular.      Exercise and Physical Activity Assessment: Does not exercise      Sexual Assessment            Sexually active: No.        Physical Examination   Vital Signs   10/11/2019 9:08 AM CDT Temperature Tympanic 97.6 DegF  LOW    Peripheral Pulse Rate 72 bpm    Pulse Site Radial artery    HR Method Manual    Systolic Blood Pressure 128 mmHg    Diastolic Blood Pressure 74 mmHg    Mean Arterial Pressure 92 mmHg    BP Site Left arm    BP Method Manual      Measurements from flowsheet : Measurements   10/11/2019 9:08 AM CDT Height Measured - Standard 67 in    Weight Measured - Standard 188 lb    BSA 2.01 m2    Body Mass Index 29.44 kg/m2  HI      General:  Alert and oriented, No acute distress.    Eye:  Pupils are equal, round and reactive to light, Normal conjunctiva.    HENT:  Tympanic membranes are clear, Oral mucosa is moist.    Neck:  Supple, Non-tender, No carotid bruit, No lymphadenopathy.    Respiratory:  Respirations are non-labored.    Cardiovascular:  Normal rate, Regular rhythm, No edema.    Gastrointestinal:  Soft, Non-tender, Non-distended.    Musculoskeletal:  Normal range of motion, Normal gait.    Integumentary:  Warm, No rash.    Neurologic:  Alert, Oriented, No focal deficits.    Cognition and Speech:  Oriented,  Speech clear and coherent, Functional cognition intact.    Psychiatric:  Cooperative, Appropriate mood & affect, Normal judgment, Patient's GDS and CAGE questionnaire reviewed and discussed with patient. .       Impression and Plan   Course:  Progressing as expected.    Plan:  Discussed  preventative services.  Appropriate weight and diet discussed.  Discussed Advance Life Directives.    Preventative services needed::  Preventative services checklist reviewed, updated and copy given to patient.  Please see scanned document.         HIV risk screening: No.    Patient Instructions:          Limitations: Limit caffeine intake, Limit alcohol consumption.         Counseled: Patient, Regarding treatment, Regarding medications, Diet, Activity, Verbalized understanding.    Diagnosis     Coronary Artery Disease (PUE78-TJ I25.10).     DM Type 2 (Diabetes Mellitus, Type 2) (FMT65-JU E11.9).     Hyperlipemia (EPZ30-MC E78.00).     Course:  Well controlled.    Orders     Orders (Selected)   Outpatient Orders  Order  Hemoglobin A1c* ([facility]): Specimen Type: Blood, Collection Date: 10/11/2019 9:35 AM CDT.     Diagnosis     Medicare annual wellness visit, initial (UUY24-NM Z00.00).     Diagnosis     Coronary Artery Disease (XUQ73-AQ I25.10).     DM Type 2 (Diabetes Mellitus, Type 2) (UZX54-UM E11.9).     Hyperlipemia (JHR95-ON E78.00).     Orders   Total time spent with patient and coordination of care:  _  minutes

## 2022-02-16 NOTE — LETTER
(Inserted Image. Unable to display)   [address] [place]  [address]  [phone]    October 14, 2019      PIOTR CORREIA      [facility] [address], [place] [id number]        Dear PIOTR,    Thank you for selecting [facility] for your healthcare needs. Below you will find the result of your recent test(s) done at our clinic.     This is in your target range of less than 8.      Result Name Current Result Previous Result Reference Range   Hgb A1c ((H)) 6.0 10/11/2019 ((H)) 6.2 4/19/2019  - <5.7       Please contact my practice at [phone] if you have any questions or concerns.      Sincerely,        Dexter Silva MD ,   Ana Lott MD,   Noah Bernal PA-C

## 2022-02-16 NOTE — TELEPHONE ENCOUNTER
---------------------  From: Dexter Silva MD ([facility] Message Pool ([id number]_[facility]))   To: PIOTR CORREIA    Sent: 6/7/2021 8:08:09 PM CDT  Subject: RE: Prednisone     Hi Brianda,    I am looking forward to seeing him tomorrow.  Would like to get him off Insulin as well.  Most likely when we don't need the prednisone any more.  We will continue to taper as tolerated.  See you tomorrow.    Lucio Silva MD      ---------------------  From: BRIANDA CORREIA on behalf of PIOTR CORREIA  To: [facility] Message Pool ([id number])  Sent: 06/07/2021 07:13 a.m. CDT  Subject: RE: Prednisone  Bill is improving so 20 Prednisone is good. We have an appointment for 11am Tuesday. Hope to check his hemoglobin then. Would like to get off the insulin soon. Lots of swelling in his ankles that bothers me. [place] doctor recommends an ultrasound.       Addendum by Dexter Silva MD on June 06, 2021 3:46:14 PM CDT  ---------------------  From: Dexter Silva MD ([facility] Message Pool ([id number]_[facility]))  To: Phone Messages Pool ([id number] [place]); PIOTR CORREIA  Sent: 6/6/2021 3:46:14 PM CDT  Subject: RE: Prednisone       Hi Brianda,       On May 14th I wanted Turner to take 30 mg of Prednisone daily for 3 weeks.  That should have taken him to last Friday.  If he is doing well we can cut back to 20 mg daily.  If there is any question please make an appointment to see me Tuesday.       Lucio Silva MD  ---------------------  From: Nelli Hunter RN (Phone Messages Pool ([id number]))  To: T Message Pool ([id number]);  Sent: 6/4/2021 8:04:59 AM CDT  Subject: FW: Prednisone       Please advise.  40mg and 30mg on patient s medication list.            ---------------------  From: BRIANDA CORREIA on behalf of PIOTR CORREIA  To: [facility]  Sent: 06/04/2021 07:41 a.m. CDT  Subject: Prednisone  I noted at our visit with Dr. Silva 5/14 that I should reduce Prednisone from 40  to 30 and then to 20 on May 19. However I can t find that noted in the records. At my recent ED visit at [place] [facility] I received 30. Am I supposed to take 30 or 20 at this time?

## 2022-02-16 NOTE — TELEPHONE ENCOUNTER
---------------------  From: Shira BURK Meredith   To: PIOTR CORREIA    Sent: 5/22/2020 9:15:12 AM CDT  Subject: Patient Message - Results Notification     These results look good, keep up the good work!  Please keep scheduled follow up appointments.    Lucio Sivla MD    Results:  Date Result Name Ind Value Ref Range   5/21/2020 9:21 AM Sodium Level  140 mmol/L (135 - 146)   5/21/2020 9:21 AM Potassium Level  4.3 mmol/L (3.5 - 5.3)   5/21/2020 9:21 AM Chloride Level  103 mmol/L (98 - 110)   5/21/2020 9:21 AM CO2 Level  28 mmol/L (20 - 32)   5/21/2020 9:21 AM Glucose Level  93 mg/dL (65 - 99)   5/21/2020 9:21 AM BUN  15 mg/dL (7 - 25)   5/21/2020 9:21 AM Creatinine Level  0.86 mg/dL (0.70 - 1.18)   5/21/2020 9:21 AM BUN/Creat Ratio  NOT APPLICABLE (6 - 22)   5/21/2020 9:21 AM eGFR  85 mL/min/1.73m2 (> OR = 60 - )   5/21/2020 9:21 AM eGFR African American  98 mL/min/1.73m2 (> OR = 60 - )   5/21/2020 9:21 AM Calcium Level  9.4 mg/dL (8.6 - 10.3)   5/21/2020 9:21 AM Bilirubin Total  0.7 mg/dL (0.2 - 1.2)   5/21/2020 9:21 AM Alkaline Phosphatase  70 unit/L (35 - 144)   5/21/2020 9:21 AM AST/SGOT  18 unit/L (10 - 35)   5/21/2020 9:21 AM ALT/SGPT  13 unit/L (9 - 46)   5/21/2020 9:21 AM Protein Total  6.4 gm/dL (6.1 - 8.1)   5/21/2020 9:21 AM Albumin Level  4.4 gm/dL (3.6 - 5.1)   5/21/2020 9:21 AM Globulin  2.0 (1.9 - 3.7)   5/21/2020 9:21 AM A/G Ratio  2.2 (1.0 - 2.5)   5/21/2020 9:21 AM Hgb A1c ((H)) 6.0 ( - <5.7)   5/21/2020 9:21 AM Cholesterol  117 mg/dL ( - <200)   5/21/2020 9:21 AM Non-HDL Cholesterol  72 ( - <130)   5/21/2020 9:21 AM HDL  45 mg/dL (> OR = 40 - )   5/21/2020 9:21 AM Cholesterol/HDL Ratio  2.6 ( - <5.0)   5/21/2020 9:21 AM LDL  57    5/21/2020 9:21 AM Triglyceride  70 mg/dL ( - <150)   5/21/2020 9:21 AM PSA  1.2 ng/mL ( - < OR = 4.0)   5/21/2020 9:21 AM U Microalbumin  0.8 mg/dL (See Note: - )   5/21/2020 9:21 AM Microalbumin Comment  See comment    5/21/2020 9:21 AM WBC  7.5 (3.8 - 10.8)    5/21/2020 9:21 AM RBC  4.44 (4.20 - 5.80)   5/21/2020 9:21 AM Hgb ((L)) 13.1 gm/dL (13.2 - 17.1)   5/21/2020 9:21 AM Hct  39.4 % (38.5 - 50.0)   5/21/2020 9:21 AM MCV  88.7 fL (80.0 - 100.0)   5/21/2020 9:21 AM MCH  29.5 pg (27.0 - 33.0)   5/21/2020 9:21 AM MCHC  33.2 gm/dL (32.0 - 36.0)   5/21/2020 9:21 AM RDW  12.6 % (11.0 - 15.0)   5/21/2020 9:21 AM Platelet  183 (140 - 400)   5/21/2020 9:21 AM MPV  10.1 fL (7.5 - 12.5)

## 2022-02-16 NOTE — PROGRESS NOTES
Patient:   PIOTR CORREIA            MRN: [id number]            FIN: [id number]               Age:   75 years     Sex:  Male     :  1945   Associated Diagnoses:   Pneumonia   Author:   Dexter Silva MD      Chief Complaint   3/19/2021 2:38 PM CDT    Cough with SOB and burning in the lungs.     Chief complaint and symptoms noted above confirmed with patient.      History of Present Illness             The patient presents with dyspnea.  The dyspnea is described as breathlessness at rest.  The severity of the dyspnea is moderate.  The timing/course of symptom(s) associated to dyspnea is constant.  Exacerbating factors consist of none.  Relieving factors consist of none.  Associated symptoms consist of cough and fatigue.        Review of Systems   Constitutional:  Negative.    Ear/Nose/Mouth/Throat:  Negative.    Respiratory:  Shortness of breath, Cough, Sputum production.    Cardiovascular:  Negative.       Health Status   Allergies:    Allergic Reactions (Selected)  No Known Medication Allergies   Medications:  (Selected)   Prescriptions  Prescribed  CPAP 13: CPAP 13, See Instructions, Instructions: Use every night. Have a download in the sleep center in one month., Supply, # 1 EA, 0 Refill(s), Type: Maintenance  FLUoxetine 40 mg oral capsule: = 1 cap(s), Oral, daily, # 90 cap(s), 0 Refill(s), Type: Maintenance, Pharmacy: [facility], Due for appt in April  FLUoxetine 40 mg oral capsule: See Instructions, Instructions: TAKE 1 CAPSULE BY MOUTH  DAILY, # 90 cap(s), 3 Refill(s), Type: Maintenance, Pharmacy: [facility], TAKE 1 CAPSULE BY MOUTH  DAILY, 67, in, 20 8:57:00 CDT, Height Measured, 197.2, lb, 20 9:22:00 CDT...  FREESTYLE LITE      JUAN C: 1 EA, id, daily, # 50 EA, 11 Refill(s), Pharmacy: [id number]  MetFORMIN (Eqv-Glucophage XR) 500 mg oral tablet, extended release: See Instructions, Instructions: TAKE 2 TABLETS BY MOUTH  TWICE DAILY, # 360 tab(s), 3  Refill(s), Type: Maintenance, Pharmacy: [facility], TAKE 2 TABLETS BY MOUTH  TWICE DAILY, 67, in, 05/28/20 8:57:00 CDT, Height Measured, 197.2, lb, 05/21/2...  [place] 5 mg-325 mg oral tablet: 1 tab(s), PO, q4hr, PRN: for pain, # 24 tab(s), 0 Refill(s), Type: Maintenance, Pharmacy: [facility], 1 tab(s) Oral q4 hrs,PRN:for pain  atorvastatin 40 mg oral tablet: = 1 tab(s), Oral, daily, # 90 tab(s), 0 Refill(s), Type: Maintenance, Pharmacy: [facility], Due for appt in April  atorvastatin 40 mg oral tablet: See Instructions, Instructions: TAKE 1 TABLET BY MOUTH  DAILY, # 90 tab(s), 3 Refill(s), Type: Maintenance, Pharmacy: [facility], TAKE 1 TABLET BY MOUTH  DAILY, 67, in, 05/28/20 8:57:00 CDT, Height Measured, 197.2, lb, 05/21/20 9:22:00 CDT,...  doxazosin 2 mg oral tablet: = 1 tab(s), Oral, daily, # 90 tab(s), 0 Refill(s), Type: Maintenance, Pharmacy: [facility], Due for appt in April  doxazosin 2 mg oral tablet: See Instructions, Instructions: TAKE 1 TABLET BY MOUTH  DAILY, # 90 tab(s), 3 Refill(s), Type: Maintenance, Pharmacy: [facility], TAKE 1 TABLET BY MOUTH  DAILY, 67, in, 05/28/20 8:57:00 CDT, Height Measured, 197.2, lb, 05/21/20 9:22:00 CDT,...  isosorbide mononitrate 30 mg oral tablet, extended release: = 2 tab(s), Oral, qam, # 180 tab(s), 3 Refill(s), Type: Maintenance, Pharmacy: [facility], Due for appt in April, 2 tab(s) Oral qam, 67, in, 05/28/20 8:57:00 CDT, Height Measured, 197.2, lb, 05/21/20 9:22:00 CDT, Weight Measured  lisinopril 10 mg oral tablet: = 1 tab(s) ( 10 mg ), Oral, daily, # 90 tab(s), 3 Refill(s), Type: Maintenance, Pharmacy: [facility], 1 tab(s) Oral daily, 67, in, 05/28/20 8:57:00 CDT, Height Measured, 201, lb, 10/23/20 8:44:00 CDT, Weight Measured  nitroglycerin 0.4 mg sublingual tablet: = 1 tab(s) ( 0.4 mg ), SL, q5min, Instructions: If chest pain not relieved in 5 minutes after first dose, seek immediate  medical attention, PRN: for chest pain, # 100 tab(s), 3 Refill(s), Type: Maintenance, Pharmacy: [facility], This is a 3...  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  Comments:  10/18/2013 CDT 3:10 PM CDT Michel Tubbs MD  AHI 7.7 and REM AHI 22 in 2013 CST 1:54 PM CST Michel Tubbs MD  On 11/10/2013 AHI 18.3 with oximetry susanne 77%. 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   Family History:    Hypertension  Father ()  Heart disease  Father  ()  Alcohol abuse  Mother ()  Stroke  Father ()  Liver disease  Mother ()     Procedure history:    Colonoscopy (SNOMED CT 125237093) performed by Shukri Arndt on 2013 at 68 Years.  Comments:  2013 1:13 PM CST - Leonora WILLS, Germaine  Sedation: MAC  Diverticulosis in the sigmoid colon, otherwise normal.  Repeat in 10 years.  Angioplasty (SNOMED CT 8751228) in the month of 2007 at 61 Years.  CABG - Coronary artery bypass graft (SNOMED CT 143964473) in the month of 2004 at 58 Years.  Colonoscopy (SNOMED CT 692345556) performed by Aashish Connolly MD on 2002 at 56 Years.  Comments:  2010 7:05 AM CST - Isabella Delgado  Repeat in 10 years.  ORIF right hand in  at 56 Years.  Flexible sigmoidoscopy (SNOMED CT 71197191) performed by Castillo Anaya MD on 1995 at 50 Years.  Comments:  10/16/2013 9:17 AM CDT - Gisele Arevalo  Negative.  ORIF left shoulder in  at 30 Years.      Physical Examination   Vital Signs   3/19/2021 2:38 PM CDT Temperature Tympanic 99.1 DegF    Peripheral Pulse Rate 83 bpm    Respiratory Rate 20 br/min    Systolic Blood Pressure 116 mmHg    Diastolic Blood Pressure 60 mmHg    Mean Arterial Pressure 79 mmHg    BP Site Right arm    BP Method Manual    Oxygen Saturation 86 %  LOW      Measurements from flowsheet : Measurements   3/19/2021 2:38 PM CDT Height/Length Estimated 67 in    Weight Measured - Standard 200 lb      General:  Alert and oriented, No acute distress.    Neck:  Supple.    Respiratory:  Lungs are clear to auscultation, Respirations are non-labored.    Cardiovascular:  Normal rate, Regular rhythm, No murmur.       Review / Management   Results review:  HGB 9.0 stable.    Radiology results   X-ray, CXR shows right sided pneumonia by my read      Impression and Plan   Diagnosis     Pneumonia (ZNL90-PO J18.9).     Course:  Not improving.    Orders     Orders   Pharmacy:  amoxicillin-clavulanate 875 mg-125 mg oral tablet (Prescribe):  = 1 tab(s), Oral, q12 hrs, x 10 day(s), # 20 tab(s), 0 Refill(s), Type: Acute, Pharmacy: [facility], 1 tab(s) Oral q12 hrs,x10 day(s), 67, in, 5/28/2020 8:57 AM CDT, Height Measured, 200, lb, 3/19/2021 2:38 PM CDT, Weight Measured.     RTC 2 weeks.        Professional Services   Counseling Summary:  This was a 50 minute visit with greater than 50% of that time spent counseling the patient, and coordination of care..    Counseling included abnormal lab results, treatment options, risks and benefits, medications, and new diagnosis.    The patient was interactive, attentive, asked questions, and verbalized understanding.    Family present included spouse.

## 2022-02-16 NOTE — PROGRESS NOTES
Patient:   PIOTR CORREIA            MRN: [id number]            FIN: [id number]               Age:   72 years     Sex:  Male     :  1945   Associated Diagnoses:   Medicare annual wellness visit, initial; Coronary Artery Disease; DM Type 2 (Diabetes Mellitus, Type 2); Hyperlipemia   Author:   Dexter Silva MD      Visit Information      Care Providers  Not recorded for selected visit.  Ancillary Services  Not recorded for selected visit.          Current Visit Date:  2018  Initial AWV Date:  2017          Chief Complaint   2018 8:26 AM CDT    AWV     Chief complaint and symptoms noted above confirmed with patient.      History of Present Illness             The patient presents for Medicare annual wellness exam.  The patient's general health status is described as unchanged and stable.  The patient's diet is described as balanced.  Exercise occasional.  Associated symptoms consist of denies shortness of breath and denies weight loss.        Review of Systems   Ear/Nose/Mouth/Throat:  Hearing is evaluated.    See completed Health History for Review of Systems   Psychiatric:  GDS noted.    ROS reviewed as documented in chart      Health Status   Allergies:    Allergic Reactions (Selected)  No Known Medication Allergies   Medications:  (Selected)   Outpatient Medications  Ordered  Flublok Quadrivalent [id number]: 0.5 mL, IM, once  Prescriptions  Prescribed  CPAP 13: CPAP 13, See Instructions, Instructions: Use every night. Have a download in the sleep center in one month., Supply, # 1 EA, 0 Refill(s), Type: Maintenance  FLUoxetine 40 mg oral capsule: 1 cap(s) ( 40 mg ), po, daily, # 90 cap(s), 3 Refill(s), Type: Maintenance, Pharmacy: [facility], Due for appt next month, 1 cap(s) po daily  FREESTYLE LITE      JUAN C: 1 EA, id, daily, # 50 EA, 11 Refill(s), Pharmacy: [id number]  [place] 5 mg-325 mg oral tablet: 1 tab(s), PO, q4hr, PRN: for pain, # 24 tab(s), 0 Refill(s),  Type: Maintenance, Pharmacy: [facility], 1 tab(s) po q4 hrs,PRN:for pain  Plavix 75 mg oral tablet: 1 tab(s) ( 75 mg ), po, daily, # 90 tab(s), 3 Refill(s), Type: Maintenance, Pharmacy: [facility], 1 tab(s) po daily  aspirin 325 mg oral tablet: 1 tab(s) ( 325 mg ), PO, Daily, # 90 tab(s), 3 Refill(s), Type: Maintenance, Pharmacy: [facility], 1 tab(s) po daily  atorvastatin 40 mg oral tablet: 1 tab(s) ( 40 mg ), po, daily, # 90 tab(s), 3 Refill(s), Type: Maintenance, Pharmacy: [facility], Due for appt next month, 1 tab(s) po daily  doxazosin 2 mg oral tablet: 1 tab(s) ( 2 mg ), po, daily, # 90 tab(s), 3 Refill(s), Type: Maintenance, Pharmacy: [facility], Due for appt next month, 1 tab(s) po daily  hydrochlorothiazide-lisinopril 25 mg-20 mg oral tablet: 1 tab(s), po, daily, # 90 tab(s), 3 Refill(s), Type: Maintenance, Pharmacy: [facility], Due for appt next month, 1 tab(s) po daily  isosorbide mononitrate 30 mg oral tablet, extended release: 2 tab(s) ( 60 mg ), po, qam, # 180 tab(s), 3 Refill(s), Type: Soft Stop, Pharmacy: [facility], 2 tab(s) po qam,x90 day(s)  metFORMIN 500 mg oral tablet, extended release: 2 tab(s) ( 1,000 mg ), po, bid, # 360 tab(s), 3 Refill(s), Type: Maintenance, Pharmacy: [facility], Due for appt next month, 2 tab(s) po bid  nitroglycerin 0.4 mg sublingual tablet: 1 tab(s) ( 0.4 mg ), SL, q5min, Instructions: If chest pain not relieved in 5 minutes after first dose, seek immediate medical attention, PRN: for chest pain, # 25 tab(s), 3 Refill(s), Type: Maintenance, Pharmacy: [facility], 1 tab(s) sl q5...  Documented Medications  Documented  Fish Oil oral capsule: po, daily, 0 Refill(s), Type: Maintenance   Problem list:    All Problems  BPH (benign prostatic hyperplasia) / SNOMED CT 813039546 / Confirmed  Back pain, chronic / SNOMED CT 993211499 / Confirmed  Coronary Artery Disease / ICD-9-.00 /  Confirmed  DM Type 2 (Diabetes Mellitus, Type 2) / ICD-9-.00 / Confirmed  Hypertension / ICD-9-.9 / Confirmed  Obese / ICD-9-.00 / Probable  Hyperlipemia / SNOMED CT 642723213 / Confirmed  Depression, Recurrent / SNOMED CT 629372469 / Confirmed  Sleep Apnea / ICD-9-.57 / Confirmed   Medicare Assessments      Valdez Fall Risk  (09/26/2018 08:26 am)       History of Fall in Last 3 Months Valdez:  No     Functional Assessment  (09/26/2018 08:26 am)       Bathing ADL Index:  Independent (2)       Dressing ADL Index:  Independent (2)       Toileting ADL Index:  Independent (2)       Continence ADL Index:  Independent (2)       Feeding ADL Index:  Independent (2)     Geriatric Depression Screen  (09/26/2018 08:26 am)       Geriatric Depression Satisfied Life:  Yes       Geriatric Depression Dropped Activities:  No       Geriatric Depression Life Empty:  No       Geriatric Depression Bored:  No       Geriatric Depression Good Spirits:  Yes       Geriatric Depression Afraid Bad Things:  No       Geriatric Depression Feel Happy:  Yes       Geriatric Depression Feel Helpless:  No       Geriatric Depression Prefer to Stay Home:  Yes       Geriatric Depression Memory Problems:  No       Geriatric Depression Wonderful Be Alive:  Yes       Geriatric Depression Feel Worthless:  No       Geriatric Depression Situation Hopeless:  No       Geriatric Depression Others Better Off:  No       Geriatric Depression Full of Energy:  No       Geriatric Depression Total Score:  2     Hearing Screen  (09/26/2018 08:26 am)       Hearing Screen Comments:  Hearing aids     Home Safety Screen  (09/26/2018 08:26 am)       Emergency Numbers Kept/Updated:  Yes       Aware of Smoking Dangers:  Yes       Smoke Alarms/Fire Extinguisher Available:  Yes       Household Members Fire Safety Knowledge:  Yes       Firearms Unloaded and Secure:  Yes       Floor Rugs Removed or Fastened:  Yes       Mats in Bathtub/Shower:  Yes        Stairway Rails or Banisters:  Yes       Outdoor Clutter Safety:  Yes       Indoor Clutter Safety:  Yes       Electrical Cord Safety:  Yes     Vision Screen  Not recorded for selected visit.     ADL's and Safety reviewed with patient.      Histories   Past Medical History:    Active  Sleep Apnea (ICD-9-.57): Onset on 6/28/2005 at 59 years.  Comments:  10/18/2013 CDT 3:10 PM CDT - Michel Fields MD  AHI 7.7 and REM AHI 22 in 2005 11/25/2013 CST 1:54 PM CST - Michel Fields MD  On 11/10/2013 AHI 18.3 with oximetry susanne 77%. Good/optimal CPAP titration to 12 with 6.5 minutes supine REM  Hyperlipemia (SNOMED CT 057391055)  DM Type 2 (Diabetes Mellitus, Type 2) (ICD-9-.00)  Coronary Artery Disease (ICD-9-.00)  BPH (benign prostatic hyperplasia) (SNOMED CT 207200787)  Depression, Recurrent (SNOMED CT 263002023)  Hypertension (ICD-9-.9)  Resolved  ACUTE MYOCARDIAL INFARCTION (ICD-9-):  Resolved in 2007 at 61 years.   Family History:    Stroke  Father     Procedure history:    Colonoscopy (SNOMED CT 315623868) performed by Shukri Arndt on 11/8/2013 at 68 Years.  Comments:  11/12/2013 1:13 PM - Germaine Lea RN  Sedation: MAC  Diverticulosis in the sigmoid colon, otherwise normal.  Repeat in 10 years.  Angioplasty (SNOMED CT 9038705) in the month of 5/2007 at 61 Years.  CABG - Coronary artery bypass graft (SNOMED CT 071967100) in the month of 2/2004 at 58 Years.  Colonoscopy (SNOMED CT 088039261) performed by Aashish Connolly MD on 4/4/2002 at 56 Years.  Comments:  12/9/2010 7:05 AM - Isabella Delgado  Repeat in 10 years.  ORIF right hand in 2001 at 56 Years.  Flexible sigmoidoscopy (SNOMED CT 57642317) performed by Castillo Anaya MD on 11/13/1995 at 50 Years.  Comments:  10/16/2013 9:17 AM - Gisele Arevalo  Negative.  ORIF left shoulder in 1975 at 30 Years.   Social History:        Alcohol Assessment: Denies Alcohol Use      Tobacco Assessment: Past      Exercise and Physical Activity  Assessment: Occasional exercise        Physical Examination   Vital Signs   9/26/2018 8:26 AM CDT Peripheral Pulse Rate 64 bpm    Pulse Site Radial artery    HR Method Manual    Systolic Blood Pressure 108 mmHg    Diastolic Blood Pressure 60 mmHg    Mean Arterial Pressure 76 mmHg    BP Site Left arm    BP Method Manual      Measurements from flowsheet : Measurements   9/26/2018 8:26 AM CDT Height Measured - Standard 67 in    Weight Measured - Standard 195.6 lb    BSA 2.05 m2    Body Mass Index 30.63 kg/m2  HI      General:  Alert and oriented, No acute distress.    Eye:  Pupils are equal, round and reactive to light, Normal conjunctiva.    HENT:  Tympanic membranes are clear, Oral mucosa is moist.    Neck:  Supple, Non-tender, No carotid bruit, No lymphadenopathy.    Respiratory:  Respirations are non-labored.    Cardiovascular:  Normal rate, Regular rhythm, No edema.    Gastrointestinal:  Soft, Non-tender, Non-distended.    Musculoskeletal:  Normal range of motion, Normal gait.    Integumentary:  Warm, No rash.    Neurologic:  Alert, Oriented, No focal deficits.    Cognition and Speech:  Oriented, Speech clear and coherent, Functional cognition intact.    Psychiatric:  Cooperative, Appropriate mood & affect, Normal judgment, Patient's GDS and CAGE questionnaire reviewed and discussed with patient. .       Impression and Plan   Course:  Progressing as expected.    Plan:  Discussed  preventative services.  Appropriate weight and diet discussed.  Discussed Advance Life Directives.    Preventative services needed::  Preventative services checklist reviewed, updated and copy given to patient.  Please see scanned document.         HIV risk screening: No.    Patient Instructions:          Limitations: Limit caffeine intake, Limit alcohol consumption.         Counseled: Patient, Regarding treatment, Regarding medications, Diet, Activity, Verbalized understanding.    Diagnosis     Medicare annual wellness visit, initial  (BCW61-UG Z00.00).     Diagnosis     Coronary Artery Disease (TUK11-JJ I25.10).     DM Type 2 (Diabetes Mellitus, Type 2) (ADE37-XS E11.9).     Hyperlipemia (GAL51-DU E78.00).     Orders     Orders   Lab (Gen Lab  Reference Lab):  Hemoglobin A1c* ([facility]) (Order): Specimen Type: Blood, Collection Date: 9/26/2018 8:52 AM CDT.     Orders   Requests (Return to Office):  Return to Clinic (Request) (Order): RFV: CBC, LIPID, Comp, A1C, UDAY 1 week prior, Return in 6 months.     Total time spent with patient and coordination of care:  _  minutes

## 2022-02-16 NOTE — TELEPHONE ENCOUNTER
---------------------  From: Gisele Vaughan CMA (Phone Messages Pool ([id number]_[facility]))   To: [facility] Message Pool ([id number]);     Sent: 5/7/2021 9:19:35 AM CDT !  Subject: General Message-Urgent lab     Phone Message    PCP:   CHT      Time of Call:  0906       Person Calling:  [facility]    Note:   Reporting Urgent Hgb of 7.0 - verified by repeat    Last office visit and reason:  5/6 pneumonia---------------------  From: Josette Aburto CMA ([facility] Message Pool ([id number]_[facility]))   To: Dexter Silva MD;     Sent: 5/7/2021 10:23:39 AM CDT  Subject: FW: General Message-Urgent lab---------------------  From: Dexter Silva MD   To: [facility] Message Pool ([id number]_[facility]);     Sent: 5/7/2021 10:33:55 AM CDT  Subject: RE: General Message-Urgent lab     We are aware, patient has been called and will go to [place] if he feels like he is worsening.noted.

## 2022-02-16 NOTE — TELEPHONE ENCOUNTER
After-hours phone message received at 3:58 PM.  Call returned immediately.  Patient had an object fall on his foot and thinks that he may have broken it.  He is wondering if he should go in to get it checked out.  Explained that urgent care was open until 5 and yes he would probably benefit from getting an x-ray and then getting it treated appropriately.  His wife was planning to bring him in to right away.

## 2022-02-16 NOTE — TELEPHONE ENCOUNTER
---------------------  From: Josette Aburto CMA (Phone Messages Pool ([id number]_[facility]))   To: Carlito Draper MD;     Sent: 7/16/2021 10:58:47 AM CDT  Subject: Phone Message, very lethargic.     Phone Message    PCP:   CHT [facility]       Time of Call:  1046       Person Calling:  Patient's wife, Brianda.  Phone number:  [phone], LDM.    Returned call at: _    Note:   Hgb was lower at recent visit with CHT per Brianda. CHT recommended supplementing iron and eating iron-rich foods. History of aortic valve replacement date of surgery 06-29-21 at [place].     Patient experiencing fatigue, sleeping way more than normal, lethargic - will take long naps all day, staying awake for very short periods time, and just sitting around the house for 4 days getting worse. Brianda was planning on going out of town with friends, but may have to cancel due to husbands declining health.    Please advise, she's will to bring him in, if the covering provider feel it necessary. Patient is scheduled to see CHT on Tuesday next week.     Last office visit and reason:  07-08-21 CHT---------------------  From: Carlito Draper MD   To: Phone Messages Pool ([id number]_[place] [facility] [place]);     Sent: 7/16/2021 12:22:05 PM CDT  Subject: RE: Phone Message, very lethargic.     hard to weigh in here.    If just lethargy, I think reasonable to continue to monitor and keep planned short-term f/u with CHT  If she has more concerns, then would recommend he be evaluated in personNotified Brianda at 1225 they will monitor for now, but informed if anything changes for the worse over the weekend should go to the ED or urgent care. No further questions.

## 2022-02-16 NOTE — TELEPHONE ENCOUNTER
---------------------  From: Aimee Eubanks RN (Phone Messages Pool (32224_[facility]))   To: T Message Pool (32224_[facility]);     Cc: Phone Messages Pool (32224_[place] [facility] [place]);      Sent: 9/3/2021 9:00:51 AM CDT  Subject: Prednisone Rx      Time of Call:  0735  Return call at:0830     Person Calling:  Brianda Smith  Phone number:  [phone]    Note:   Patient was placed on Prednisone taper by Dr. Chacon on 8/27/21. The Rx was to be sent to [facility] and a small supply sent to local pharmacy to get started. The mail order pharmacy tells them they do not have this steroid Rx from Dr. Chacon. They did get the small order at a local pharmacy on 8/31/21. The small starter Rx is going to run out though. I have asked that she call Dr. Chacon who ordered this Rx today and request it be sent in again. If she is unable to reach them today she will call us back. If she is unable to reach Dr. Chacon we can ask T to send in a Rx for them. The prednisone course is out lined in the consult note of 8/27/21 from Dr. Chacon     Last office visit and reason:  fatigue 8/20/21Noted

## 2022-02-16 NOTE — NURSING NOTE
Comprehensive Intake Entered On:  6/8/2021 11:10 AM CDT    Performed On:  6/8/2021 11:06 AM CDT by Margo Carrasco CMA               Summary   Chief Complaint :   Follow up anemia.    Advance Directive :   Yes   Menstrual Status :   N/A   Weight Estimated :   175.6 lb(Converted to: 175 lb 10 oz, 80 kg)    Height/Length Estimated :   67 in(Converted to: 5 ft 7 in, 170.18 cm)    Body Mass Index Estimated :   27.5 kg/m2   BSA Estimated :   1.94 m2   Systolic Blood Pressure :   134 mmHg (HI)    Diastolic Blood Pressure :   68 mmHg   Mean Arterial Pressure :   90 mmHg   Peripheral Pulse Rate :   62 bpm   BP Site :   Right arm   BP Method :   Manual   Oxygen Saturation :   97 %   Margo Carrasco CMA - 6/8/2021 11:06 AM CDT   Health Status   Allergies Verified? :   Yes   Medication History Verified? :   Yes   Immunizations Current :   Yes   Medical History Verified? :   Yes   Pre-Visit Planning Status :   Completed   Tobacco Use? :   Former smoker   Margo Carrasco CMA - 6/8/2021 11:06 AM CDT   Meds / Allergies   (As Of: 6/8/2021 11:10:43 AM CDT)   Allergies (Active)   No Known Medication Allergies  Estimated Onset Date:   Unspecified ; Created By:   Deana French CMA; Reaction Status:   Active ; Category:   Drug ; Substance:   No Known Medication Allergies ; Type:   Allergy ; Updated By:   Deana French CMA; Reviewed Date:   6/8/2021 11:07 AM CDT        Medication List   (As Of: 6/8/2021 11:10:43 AM CDT)   Prescription/Discharge Order    acetaminophen-hydrocodone  :   acetaminophen-hydrocodone ; Status:   Prescribed ; Ordered As Mnemonic:   Norco 5 mg-325 mg oral tablet ; Simple Display Line:   1 tab(s), PO, q4hr, PRN: for pain, 24 tab(s), 0 Refill(s) ; Ordering Provider:   Dexter Silva MD; Catalog Code:   acetaminophen-hydrocodone ; Order Dt/Tm:   3/24/2020 1:06:36 PM CDT          apixaban  :   apixaban ; Status:   Prescribed ; Ordered As Mnemonic:   Eliquis 5 mg oral tablet ; Simple Display Line:   5 mg, 1 tab(s),  Oral, bid, for 90 day(s), 180 tab(s), 0 Refill(s) ; Ordering Provider:   Dexter Silva MD; Catalog Code:   apixaban ; Order Dt/Tm:   5/14/2021 10:50:13 AM CDT          atorvastatin  :   atorvastatin ; Status:   Prescribed ; Ordered As Mnemonic:   atorvastatin 40 mg oral tablet ; Simple Display Line:   40 mg, 1 tab(s), Oral, daily, 90 tab(s), 0 Refill(s) ; Ordering Provider:   Dexter Silva MD; Catalog Code:   atorvastatin ; Order Dt/Tm:   4/29/2021 7:07:58 AM CDT          doxazosin  :   doxazosin ; Status:   Prescribed ; Ordered As Mnemonic:   doxazosin 2 mg oral tablet ; Simple Display Line:   2 mg, 1 tab(s), Oral, daily, 90 tab(s), 0 Refill(s) ; Ordering Provider:   Dexter Silva MD; Catalog Code:   doxazosin ; Order Dt/Tm:   4/29/2021 7:08:22 AM CDT          FLUoxetine  :   FLUoxetine ; Status:   Prescribed ; Ordered As Mnemonic:   FLUoxetine 40 mg oral capsule ; Simple Display Line:   40 mg, 1 cap(s), Oral, daily, 90 cap(s), 0 Refill(s) ; Ordering Provider:   Dexter Silva MD; Catalog Code:   FLUoxetine ; Order Dt/Tm:   4/29/2021 7:08:50 AM CDT          isosorbide mononitrate  :   isosorbide mononitrate ; Status:   Prescribed ; Ordered As Mnemonic:   isosorbide mononitrate 30 mg oral tablet, extended release ; Simple Display Line:   2 tab(s), Oral, qam, 180 tab(s), 3 Refill(s) ; Ordering Provider:   Dexter Silva MD; Catalog Code:   isosorbide mononitrate ; Order Dt/Tm:   5/28/2020 9:43:22 AM CDT          lisinopril  :   lisinopril ; Status:   Prescribed ; Ordered As Mnemonic:   lisinopril 10 mg oral tablet ; Simple Display Line:   10 mg, 1 tab(s), Oral, daily, 90 tab(s), 3 Refill(s) ; Ordering Provider:   Dexter Silva MD; Catalog Code:   lisinopril ; Order Dt/Tm:   10/23/2020 9:21:34 AM CDT          metFORMIN  :   metFORMIN ; Status:   Prescribed ; Ordered As Mnemonic:   MetFORMIN (Eqv-Glucophage XR) 500 mg oral tablet, extended release ; Simple Display Line:    1,000 mg, 2 tab(s), Oral, bid, 360 tab(s), 0 Refill(s) ; Ordering Provider:   Dexter Silva MD; Catalog Code:   metFORMIN ; Order Dt/Tm:   4/29/2021 7:09:14 AM CDT          Miscellaneous Prescription  :   Miscellaneous Prescription ; Status:   Prescribed ; Ordered As Mnemonic:   FREESTYLE LITE      JUAN C ; Simple Display Line:   1 EA, id, daily, 50 EA ; Ordering Provider:   Dexter Silva MD; Catalog Code:   Miscellaneous Prescription ; Order Dt/Tm:   4/9/2010 10:09:35 AM CDT          Miscellaneous Rx Supply  :   Miscellaneous Rx Supply ; Status:   Prescribed ; Ordered As Mnemonic:   CPAP 13 ; Simple Display Line:   See Instructions, Use every night. Have a download in the sleep center in one month., 1 EA ; Ordering Provider:   Michel Fields MD; Catalog Code:   Miscellaneous Rx Supply ; Order Dt/Tm:   1/17/2014 10:40:19 AM CST          Miscellaneous Rx Supply  :   Miscellaneous Rx Supply ; Status:   Prescribed ; Ordered As Mnemonic:   Oxygen ; Simple Display Line:   See Instructions, D/C Oxygen, 1 unknown unit, 0 Refill(s) ; Ordering Provider:   Dexter Silva MD; Catalog Code:   Miscellaneous Rx Supply ; Order Dt/Tm:   5/21/2021 10:04:46 AM CDT          nitroglycerin  :   nitroglycerin ; Status:   Prescribed ; Ordered As Mnemonic:   nitroglycerin 0.4 mg sublingual tablet ; Simple Display Line:   0.4 mg, 1 tab(s), SL, q5min, If chest pain not relieved in 5 minutes after first dose, seek immediate medical attention, PRN: for chest pain, 100 tab(s), 3 Refill(s) ; Ordering Provider:   Dexter Silva MD; Catalog Code:   nitroglycerin ; Order Dt/Tm:   10/23/2020 9:05:52 AM CDT          predniSONE  :   predniSONE ; Status:   Prescribed ; Ordered As Mnemonic:   predniSONE 10 mg oral tablet ; Simple Display Line:   30 mg, 3 tab(s), Oral, daily, for 21 day(s), 63 tab(s), 0 Refill(s) ; Ordering Provider:   Dexter Silva MD; Catalog Code:   predniSONE ; Order Dt/Tm:   5/14/2021 10:52:32 AM  CDT            Home Meds    ascorbic acid  :   ascorbic acid ; Status:   Documented ; Ordered As Mnemonic:   Vitamin C ; Simple Display Line:   daily, 0 Refill(s) ; Catalog Code:   ascorbic acid ; Order Dt/Tm:   10/23/2020 8:48:26 AM CDT          aspirin  :   aspirin ; Status:   Documented ; Ordered As Mnemonic:   aspirin ; Simple Display Line:   81mg tab po daily, 0 Refill(s) ; Catalog Code:   aspirin ; Order Dt/Tm:   3/19/2021 2:43:15 PM CDT          fluconazole  :   fluconazole ; Status:   Documented ; Ordered As Mnemonic:   fluconazole 200 mg oral tablet ; Simple Display Line:   200 mg, 1 tab(s), Oral, daily, 0 Refill(s) ; Catalog Code:   fluconazole ; Order Dt/Tm:   5/14/2021 10:23:42 AM CDT          insulin isophane (NPH)  :   insulin isophane (NPH) ; Status:   Documented ; Ordered As Mnemonic:   insulin isophane (NPH) 100 units/mL human recombinant subcutaneous suspension ; Simple Display Line:   30 units, Subcutaneous, daily, 0 Refill(s) ; Catalog Code:   insulin isophane (NPH) ; Order Dt/Tm:   5/14/2021 10:24:40 AM CDT          Miscellaneous Prescription  :   Miscellaneous Prescription ; Status:   Documented ; Ordered As Mnemonic:   Iron ; Simple Display Line:   325mg daily, 0 Refill(s) ; Catalog Code:   Miscellaneous Prescription ; Order Dt/Tm:   3/26/2021 10:27:51 AM CDT          Miscellaneous Prescription  :   Miscellaneous Prescription ; Status:   Documented ; Ordered As Mnemonic:   Oxygen Air Delivery System ; Simple Display Line:   2 Liters. Length of need: 3 months, 0 Refill(s) ; Catalog Code:   Miscellaneous Prescription ; Order Dt/Tm:   3/26/2021 9:39:48 AM CDT          omega-3 polyunsaturated fatty acids  :   omega-3 polyunsaturated fatty acids ; Status:   Documented ; Ordered As Mnemonic:   Fish Oil oral capsule ; Simple Display Line:   po, daily, 0 Refill(s) ; Catalog Code:   omega-3 polyunsaturated fatty acids ; Order Dt/Tm:   7/19/2016 8:35:56 AM CDT          pantoprazole  :   pantoprazole ;  Status:   Documented ; Ordered As Mnemonic:   pantoprazole 40 mg oral delayed release tablet ; Simple Display Line:   40 mg, 1 tab(s), Oral, daily, 90 tab(s), 0 Refill(s) ; Catalog Code:   pantoprazole ; Order Dt/Tm:   5/14/2021 10:23:42 AM CDT          predniSONE  :   predniSONE ; Status:   Documented ; Ordered As Mnemonic:   predniSONE 20 mg oral tablet ; Simple Display Line:   40 mg, 2 tab(s), 0 Refill(s) ; Catalog Code:   predniSONE ; Order Dt/Tm:   5/6/2021 9:51:12 AM CDT          sulfamethoxazole-trimethoprim  :   sulfamethoxazole-trimethoprim ; Status:   Documented ; Ordered As Mnemonic:   sulfamethoxazole-trimethoprim 400 mg-80 mg oral tablet ; Simple Display Line:   80 mg, Oral, daily, 0 Refill(s) ; Catalog Code:   sulfamethoxazole-trimethoprim ; Order Dt/Tm:   5/6/2021 9:50:14 AM CDT            ID Risk Screen   Recent Travel History :   No recent travel   Family Member Travel History :   No recent travel   Other Exposure to Infectious Disease :   Unknown   COVID-19 Testing Status :   No positive COVID-19 test   Margo Carrasco CMA - 6/8/2021 11:06 AM CDT

## 2022-02-16 NOTE — TELEPHONE ENCOUNTER
---------------------  From: Aimee Eubanks RN (Phone Messages Pool ([id number]_[employer]))   To: Dexter Silva MD;     Sent: 1/28/2021 6:53:59 PM CST  Subject: FW: Cardiologist     <Add Text>        ---------------------  From: JIMENEZ CORREIA on behalf of PIOTR CORREIA  To: [facility]  Sent: 01/28/2021 05:10 p.m. CST  Subject: Cardiologist  Dr. Silva, In November you indicated your office would send a referral to [facility]. Still no contact. This winter Bill is experiencing increased symptoms  in particular when he is exposed to cold air. He is now using his nitro pills daily it seems. He complains of  chest tightness, though it is relieved by rest and the nitro. I m really worried and wish we could get him an evaluation. His last cardiologist visit was  January 30 2020, at [facility].  ** Submitted: **  Order:Referral (Request)  Details:  1/29/2021 8:07 AM CST, Referred to: Cardiology, Referred to: [facility] if we can't get in within a week let's use [facility] Priority: Urgent, ACUTE MYOCARDIAL INFARCTION  Atherosclerotic heart disease of native coronary artery without angina pectoris         Signed by Dexter Silva MD  1/29/2021 2:07:00 PM [facility]---------------------  From: Dexter Silva MD   To: Phone Messages Pool ([id number]_[employer]); PIOTR CORREIA    Sent: 1/29/2021 8:09:21 AM CST  Subject: RE: Cardiologist     I put a referral in for an urgent consult. If you don't hear from them today let me know.  If his symptoms don't resolve with Nitro call 911 and take him to the ER.    Lucio Silva MD---------------------  From: Dexter Silva MD   To: Referral Coordinators Pool ([id number]_[facility]);     Sent: 1/29/2021 8:09:57 AM CST  Subject: FW: Cardiologist     Please see urgent referral below.

## 2022-02-16 NOTE — TELEPHONE ENCOUNTER
---------------------  From: Millie WILLS, Shirley MUIR (Phone Messages Pool ([id number]_[facility]))   To: Dexter Silva MD;     Sent: 10/29/2021 4:15:55 PM CDT !  Subject: Urgent     Phone message    PCP:   [facility]     Time of Call:  1611       Person Calling:  Pt wife  Phone number:  [phone]    Returned call at: _    Note:   Pt is having blood in sputum with coughing and is having trouble taking a deep breath. No fever. Diagnosed with COVID yesterday. Oxygen saturation 95%. No dizziness. Little headache. No changes in vision.     I advised pt go to ER. He said he want s to know what [facility] thinks. And, if he should go to the ER should he go to [facility] that is where he's been doctoring?    Please advise.---------------------  From: Dexter Silva MD   To: Phone Messages Pool ([id number]_[facility] [facility]);     Sent: 10/29/2021 4:54:08 PM CDT  Subject: RE: Urgent     Advised to monitor sats and if gets to 90% should go in.  Sputum small pink spot.noted

## 2022-02-16 NOTE — TELEPHONE ENCOUNTER
---------------------  From: Fannie Miller LPN (Phone Messages Pool ([id number]_[facility]))   To: T Message Pool ([id number]_[facility]);     Sent: 5/21/2021 11:27:18 AM CDT  Subject: CONSUMER MESSAGE FW: Blood Draw     Pt seen [facility] 5/19 for anemia. Is [facility] ok ordering hgb or would you prefer orders be faxed by GI doctor?        ---------------------  From: JIMENEZ CORREIA on behalf of PIOTR CORREIA  To: [facility]  Sent: 05/21/2021 10:55 a.m. CDT  Subject: Blood Draw  Bill s GI doctor would like you to check his .hemoglobin level around June 7. Please make an appointment or call Bill at [phone]---------------------  From: Margo Carrasco CMA (T Message Pool ([id number]))   To: Dexter Silva MD;     Sent: 5/21/2021 11:43:51 AM CDT  Subject: FW: CONSUMER MESSAGE FW: Blood Draw     Patient has follow up appt w/ [facility] 06/17/21. Advise on Hgb order  ** Submitted: **  Order:CBC Automated (Hgb, Hct, RBC, WBC and Platelet Count) and Auto Diff WB (Request)  Details:  Anemia         Signed by Dexter Silva MD  5/21/2021 6:07:00 PM [facility]---------------------  From: Dexter Silva MD   To: T Message Pool ([id number]_[facility]); PIOTR CORREIA    Sent: 5/21/2021 1:10:38 PM CDT  Subject: RE: CONSUMER MESSAGE FW: Blood Draw     MEGHANN Hollingsworth put an order in for June 7th.  This is not a fasting test.  He needs to call ([phone]) and set up a lab only appointment for that day.    Lucio Silva MD

## 2022-02-16 NOTE — NURSING NOTE
Comprehensive Intake Entered On:  2/26/2019 2:05 PM CST    Performed On:  2/26/2019 2:02 PM CST by Marianne Suarez CMA               Summary   Chief Complaint :   c/o abdomial pain, constipation, bloating x 6 months   Advance Directive :   Yes   Menstrual Status :   N/A   Weight Measured :   192 lb(Converted to: 192 lb 0 oz, 87.09 kg)    Height Measured :   67 in(Converted to: 5 ft 7 in, 170.18 cm)    Body Mass Index :   30.07 kg/m2 (HI)    Body Surface Area :   2.03 m2   Systolic Blood Pressure :   108 mmHg   Diastolic Blood Pressure :   60 mmHg   Mean Arterial Pressure :   76 mmHg   Peripheral Pulse Rate :   70 bpm   BP Site :   Right arm   Pulse Site :   Radial artery   BP Method :   Manual   HR Method :   Manual   Temperature Tympanic :   95.9 DegF(Converted to: 35.5 DegC)  (LOW)    Oxygen Saturation :   94 %   Marianne Suarez CMA - 2/26/2019 2:02 PM CST   Health Status   Allergies Verified? :   Yes   Medication History Verified? :   Yes   Immunizations Current :   Yes   Medical History Verified? :   Yes   Pre-Visit Planning Status :   Completed   Tobacco Use? :   Former smoker   Marianne Suarez CMA - 2/26/2019 2:02 PM CST   Consents   Consent for Immunization Exchange :   Consent Granted   Consent for Immunizations to Providers :   Consent Granted   Marianne Suarez CMA - 2/26/2019 2:02 PM CST   Meds / Allergies   (As Of: 2/26/2019 2:05:54 PM CST)   Allergies (Active)   No Known Medication Allergies  Estimated Onset Date:   Unspecified ; Created By:   Deana French CMA; Reaction Status:   Active ; Category:   Drug ; Substance:   No Known Medication Allergies ; Type:   Allergy ; Updated By:   Deana French CMA; Reviewed Date:   2/26/2019 2:02 PM CST        Medication List   (As Of: 2/26/2019 2:05:55 PM CST)   Prescription/Discharge Order    acetaminophen-hydrocodone  :   acetaminophen-hydrocodone ; Status:   Prescribed ; Ordered As Mnemonic:   Norco 5 mg-325 mg oral tablet ; Simple Display Line:   1 tab(s), PO, q4hr, PRN: for  pain, 24 tab(s), 0 Refill(s) ; Ordering Provider:   Dexter Silva MD; Catalog Code:   acetaminophen-hydrocodone ; Order Dt/Tm:   3/19/2018 9:40:16 AM          atorvastatin  :   atorvastatin ; Status:   Prescribed ; Ordered As Mnemonic:   atorvastatin 40 mg oral tablet ; Simple Display Line:   40 mg, 1 tab(s), po, daily, 90 tab(s), 3 Refill(s) ; Ordering Provider:   Dexter Silva MD; Catalog Code:   atorvastatin ; Order Dt/Tm:   3/19/2018 9:37:39 AM          doxazosin  :   doxazosin ; Status:   Prescribed ; Ordered As Mnemonic:   doxazosin 2 mg oral tablet ; Simple Display Line:   2 mg, 1 tab(s), po, daily, 90 tab(s), 3 Refill(s) ; Ordering Provider:   Dexter Silva MD; Catalog Code:   doxazosin ; Order Dt/Tm:   3/19/2018 9:37:34 AM          FLUoxetine  :   FLUoxetine ; Status:   Prescribed ; Ordered As Mnemonic:   FLUoxetine 40 mg oral capsule ; Simple Display Line:   40 mg, 1 cap(s), po, daily, 90 cap(s), 3 Refill(s) ; Ordering Provider:   Dexter Silva MD; Catalog Code:   FLUoxetine ; Order Dt/Tm:   3/19/2018 9:37:36 AM          hydrochlorothiazide-lisinopril  :   hydrochlorothiazide-lisinopril ; Status:   Prescribed ; Ordered As Mnemonic:   hydrochlorothiazide-lisinopril 25 mg-20 mg oral tablet ; Simple Display Line:   1 tab(s), po, daily, 90 tab(s), 3 Refill(s) ; Ordering Provider:   Dexter Silva MD; Catalog Code:   hydrochlorothiazide-lisinopril ; Order Dt/Tm:   3/19/2018 9:37:41 AM          isosorbide mononitrate  :   isosorbide mononitrate ; Status:   Prescribed ; Ordered As Mnemonic:   isosorbide mononitrate 30 mg oral tablet, extended release ; Simple Display Line:   60 mg, 2 tab(s), po, qam, for 90 day(s), 180 tab(s), 3 Refill(s) ; Ordering Provider:   Dexter Silva MD; Catalog Code:   isosorbide mononitrate ; Order Dt/Tm:   3/19/2018 9:39:38 AM          metFORMIN  :   metFORMIN ; Status:   Prescribed ; Ordered As Mnemonic:   metFORMIN 500 mg oral tablet,  extended release ; Simple Display Line:   1,000 mg, 2 tab(s), po, bid, 360 tab(s), 3 Refill(s) ; Ordering Provider:   Dexter Silva MD; Catalog Code:   metFORMIN ; Order Dt/Tm:   3/19/2018 9:37:42 AM          Miscellaneous Prescription  :   Miscellaneous Prescription ; Status:   Prescribed ; Ordered As Mnemonic:   FREESTYLE LITE      JUAN C ; Simple Display Line:   1 EA, id, daily, 50 EA ; Ordering Provider:   Dexter Silva MD; Catalog Code:   Miscellaneous Prescription ; Order Dt/Tm:   4/9/2010 10:09:35 AM          Miscellaneous Rx Supply  :   Miscellaneous Rx Supply ; Status:   Prescribed ; Ordered As Mnemonic:   CPAP 13 ; Simple Display Line:   See Instructions, Use every night. Have a download in the sleep center in one month., 1 EA ; Ordering Provider:   Michel Fields MD; Catalog Code:   Miscellaneous Rx Supply ; Order Dt/Tm:   1/17/2014 10:40:19 AM          nitroglycerin  :   nitroglycerin ; Status:   Prescribed ; Ordered As Mnemonic:   nitroglycerin 0.4 mg sublingual tablet ; Simple Display Line:   0.4 mg, 1 tab(s), SL, q5min, If chest pain not relieved in 5 minutes after first dose, seek immediate medical attention, PRN: for chest pain, 25 tab(s), 3 Refill(s) ; Ordering Provider:   Dexter Silva MD; Catalog Code:   nitroglycerin ; Order Dt/Tm:   9/12/2017 3:26:47 PM            Home Meds    aspirin  :   aspirin ; Status:   Documented ; Ordered As Mnemonic:   aspirin 81 mg oral delayed release tablet ; Simple Display Line:   81 mg, 1 tab(s), Oral, daily, 0 Refill(s) ; Catalog Code:   aspirin ; Order Dt/Tm:   1/24/2019 10:33:49 AM          omega-3 polyunsaturated fatty acids  :   omega-3 polyunsaturated fatty acids ; Status:   Documented ; Ordered As Mnemonic:   Fish Oil oral capsule ; Simple Display Line:   po, daily, 0 Refill(s) ; Catalog Code:   omega-3 polyunsaturated fatty acids ; Order Dt/Tm:   7/19/2016 8:35:56 AM

## 2022-02-16 NOTE — PROGRESS NOTES
Patient:   PIOTR CORREIA            MRN: [id number]            FIN: [id number]               Age:   72 years     Sex:  Male     :  1945   Associated Diagnoses:   Left hip pain   Author:   Dexter Silva MD      Chief Complaint   2017 8:38 AM CST    c/o L hip pain x 1 month - worsening; no injury     Chief complaint and symptoms noted above confirmed with patient.      History of Present Illness             The patient presents with hip pain.  The hip pain is located on the left.  The hip pain is described as aching.  The severity of the hip pain is moderate.  The hip pain is constant.  Radiation of pain none.        Review of Systems   Constitutional:  Negative.    Musculoskeletal:  Negative except as documented in history of present illness.       Health Status   Allergies:    Allergic Reactions (Selected)  No Known Medication Allergies   Medications:  (Selected)   Prescriptions  Prescribed  CPAP 13: CPAP 13, See Instructions, Instructions: Use every night. Have a download in the sleep center in one month., Supply, # 1 EA, 0 Refill(s), Type: Maintenance  FLUoxetine 40 mg oral capsule: 1 cap(s) ( 40 mg ), po, daily, # 90 cap(s), 3 Refill(s), Type: Maintenance, Pharmacy: [facility], Pt due for visit, 1 cap(s) po daily  FREESTYLE LITE      JUAN C: 1 EA, id, daily, # 50 EA, 11 Refill(s), Pharmacy: [id number]  Imdur 30 mg oral tablet, extended release: 2 tab(s) ( 60 mg ), PO, qAM, # 180 tab(s), 3 Refill(s), Type: Maintenance, Pharmacy: [facility], This is an updated prescription, 2 tab(s) po qam  Norco 5 mg-325 mg oral tablet: 1 tab(s), PO, q4hr, PRN: for pain, # 24 tab(s), 0 Refill(s), Type: Maintenance, Pharmacy: [facility], 1 tab(s) po q4 hrs,PRN:for pain  aspirin 325 mg oral tablet: 1 tab(s) ( 325 mg ), PO, Daily, # 90 tab(s), 3 Refill(s), Type: Maintenance, Pharmacy: [facility], 1 tab(s) po daily  atorvastatin 40 mg oral tablet: 1 tab(s) ( 40 mg  ), PO, Daily, # 90 tab(s), 3 Refill(s), Type: Maintenance, Pharmacy: [facility], 1 tab(s) po daily  doxazosin 2 mg oral tablet: 1 tab(s) ( 2 mg ), po, daily, # 90 tab(s), 3 Refill(s), Type: Maintenance, Pharmacy: [facility], Pt due for visit, 1 tab(s) po daily  hydroCHLOROthiazide-lisinopril 25 mg-20 mg oral tablet: 1 tab(s), po, daily, # 90 tab(s), 3 Refill(s), Type: Maintenance, Pharmacy: [facility], pt due for visit, 1 tab(s) po daily  metFORMIN 500 mg oral tablet, extended release: 2 tab(s) ( 1,000 mg ), po, bid, # 360 tab(s), 3 Refill(s), Type: Maintenance, Pharmacy: [facility], 2 tab(s) po bid  nitroglycerin 0.4 mg sublingual tablet: 1 tab(s) ( 0.4 mg ), SL, q5min, Instructions: If chest pain not relieved in 5 minutes after first dose, seek immediate medical attention, PRN: for chest pain, # 25 tab(s), 3 Refill(s), Type: Maintenance, Pharmacy: [facility], 1 tab(s) sl q5...  Documented Medications  Documented  Fish Oil oral capsule: po, daily, 0 Refill(s), Type: Maintenance   Problem list:    All Problems  BPH (benign prostatic hyperplasia) / SNOMED CT 620980377 / Confirmed  Back pain, chronic / SNOMED CT 641617961 / Confirmed  Coronary Artery Disease / ICD-9-.00 / Confirmed  DM Type 2 (Diabetes Mellitus, Type 2) / ICD-9-.00 / Confirmed  Hypertension / ICD-9-.9 / Confirmed  Obese / ICD-9-.00 / Probable  Hyperlipemia / SNOMED CT 208566907 / Confirmed  Depression, Recurrent / SNOMED CT 142211249 / Confirmed  Sleep Apnea / ICD-9-.57 / Confirmed      Histories   Past Medical History:    Active  Sleep Apnea (ICD-9-.57): Onset on 6/28/2005 at 59 years.  Comments:  10/18/2013 CDT 3:10 PM CDT - Michel Fields MD  AHI 7.7 and REM AHI 22 in 2005 11/25/2013 CST 1:54 PM CST - Michel Fields MD  On 11/10/2013 AHI 18.3 with oximetry susanne 77%. Good/optimal CPAP titration to 12 with 6.5 minutes supine REM  Hyperlipemia (SNOMED CT  711133251)  DM Type 2 (Diabetes Mellitus, Type 2) (ICD-9-.00)  Coronary Artery Disease (ICD-9-.00)  BPH (benign prostatic hyperplasia) (SNOMED CT 684511639)  Depression, Recurrent (SNOMED CT 958333538)  Hypertension (ICD-9-.9)  Resolved  ACUTE MYOCARDIAL INFARCTION (ICD-9-):  Resolved in 2007 at 61 years.   Family History:    Stroke  Father     Procedure history:    Colonoscopy (SNOMED CT 354319488) performed by Shukri Arndt on 11/8/2013 at 68 Years.  Comments:  11/12/2013 1:13 PM - Leonora WILLS, Germaine  Sedation: MAC  Diverticulosis in the sigmoid colon, otherwise normal.  Repeat in 10 years.  Angioplasty (SNOMED CT 8677126) in the month of 5/2007 at 61 Years.  CABG - Coronary artery bypass graft (SNOMED CT 846517506) in the month of 2/2004 at 58 Years.  Colonoscopy (SNOMED CT 884398166) performed by Aashish Connolly MD on 4/4/2002 at 56 Years.  Comments:  12/9/2010 7:05 AM - Isabella Delgado  Repeat in 10 years.  ORIF right hand in 2001 at 56 Years.  Flexible sigmoidoscopy (SNOMED CT 16760969) performed by Castillo Anaya MD on 11/13/1995 at 50 Years.  Comments:  10/16/2013 9:17 AM - Gisele Arevalo  Negative.  ORIF left shoulder in 1975 at 30 Years.      Physical Examination   Vital Signs   12/1/2017 8:38 AM CST Temperature Tympanic 97.1 DegF  LOW    Peripheral Pulse Rate 64 bpm    Pulse Site Radial artery    HR Method Manual    Systolic Blood Pressure 134 mmHg    Diastolic Blood Pressure 66 mmHg    Mean Arterial Pressure 89 mmHg    BP Site Right arm    BP Method Manual      Measurements from flowsheet : Measurements   12/1/2017 8:38 AM CST Height Measured - Standard 67 in    Weight Measured - Standard 205.4 lb    BSA 2.1 m2    Body Mass Index 32.17 kg/m2      General:  Alert and oriented, No acute distress.    Musculoskeletal:       Lower extremity exam: Hip ( Left, Range of motion ( Restricted by pain ) ).    Integumentary:  Warm, Dry, Pink.       Review / Management   Radiology results   X-ray,  minor DJD      Impression and Plan   Diagnosis     Left hip pain (UGU97-JU M25.552).     Course:  Not improving.    Orders     Orders   Pharmacy:  predniSONE 10 mg oral tablet (Prescribe): See Instructions, Instructions: 4 tabs daily for 4 days then 3 tabs daily for 4 days then 2 tabs daily for 4 days then 1 tab daily for 4 days then 1/2 tab daily for 4 days, # 42 tab(s), 0 Refill(s), Type: Maintenance, Pharmacy: [id number], 4 tabs daily for 4 days then 3 tabs daily for 4 days then 2 tabs daily for 4 days then 1 tab daily for 4 days then 1/2 tab daily for 4 days.     Orders   Requests (Consults / Referrals):  Referral (Request) (Order): 12/1/2017 9:22 AM CST, Referred to: Orthopaedics, Referred to: [facility] in [place], Reason for referral: Hip Pain, Left hip pain.

## 2022-02-16 NOTE — TELEPHONE ENCOUNTER
---------------------  From: Reina Reece RN (Phone Messages Pool ([address] [place] [address]))   To: Advanced Practice Provider Pool ([address] [place] [address]);     Sent: 9/22/2021 10:53:03 AM CDT  Subject: General Message-eliquis       PCP: CHT      Time of Call:  1032am       Person Calling:  chaim Bagley  Phone number:  [phone]    Note:   VM received from Yudi, inquiring if the prescription for Eliquis was sent to [facility].     Please advise. Eliquis is a documented medication.     Last office visit and reason:  7/20/21 DM, anemia CHT---------------------  From: Ana Arteaga (Advanced Practice Provider Pool ([address] [place] [address]))   To: Phone Messages Pool ([address] [place] [address]);     Sent: 9/22/2021 11:57:32 AM CDT  Subject: RE: General Message-eliquis     please send this to [facility] to address tomorrow---------------------  From: Charley Plaza RN (Phone Messages Pool ([address] [place] [address]))   To: [facility] Message Pool ([address] [place] [address]);     Sent: 9/22/2021 11:59:56 AM CDT  Subject: FW: General Message-eliquis---------------------  From: Margo Carrasco CMA ([facility] Message Pool ([address] [place] [address]))   To: Dexter Silva MD;     Sent: 9/22/2021 12:20:02 PM CDT  Subject: FW: General Message-eliquis  ** Submitted: **  Order:apixaban (Eliquis 5 mg oral tablet)  1 tab(s)  Oral  bid  Qty:  180 tab(s)        Refills:  0          Substitutions Allowed     Route To Pharmacy - [facility]    Signed by Dexter Silva MD  9/22/2021 6:22:00 PM [facility]    ** Documented **  Discontinue:apixaban (Eliquis 5 mg oral tablet)   Signed by Dexter Silva MD  9/22/2021 6:22:00 PM [facility]---------------------  From: Dexter Silva MD   To: [facility] Message Pool ([id number]);     Sent: 9/22/2021 1:22:50 PM CDT  Subject: RE: General Message-eliquis     It has been now.[facility]

## 2022-02-16 NOTE — PROGRESS NOTES
Patient:   PIOTR CORREIA            MRN: [id number]            FIN: [id number]               Age:   73 years     Sex:  Male     :  1945   Associated Diagnoses:   DM Type 2 (Diabetes Mellitus, Type 2); Hypertension; Coronary Artery Disease   Author:   Dexter Silva MD      Report Summary   DiagnosisCourse:  Well controlled. Orders  Orders (Selected)   Prescriptions  Prescribed  metFORMIN 500 mg oral tablet, extended release: = 2 tab(s) ( 1,000 mg ), po, bid, # 360 tab(s), 3 Refill(s), Type: Maintenance, Pharmacy: [facility], Due for appt next month, 2 tab(s) Oral bid.     Visit Information      Date of Service: 2019 08:54 am  Performing Location: [facility]  Encounter#: [id number]      Primary Care Provider (PCP):  Dexter Silva MD# [id number]      Referring Provider:  Dexter Silva MD# [id number]   Visit type:  Scheduled follow-up.    Source of history:  Self.    History limitation:  None.       Chief Complaint   2019 8:56 AM CDT    DM med check Reveiw labs; Normal foot Exam 10/10     Chief complaint and symptoms noted above confirmed with patient.      History of Present Illness             The patient presents for follow-up evaluation of hypertension.  The quality of hypertension symptom(s) since the patient's last visit is described as being unchanged.  The severity of the hypertension symptom(s) since the last visit is moderate.  Since the patient's last visit, the timing/course of hypertension symptom(s) is constant.  Exacerbating factors consist of none.  Relieving factors consist of medication.               The patient presents for follow-up evaluation of diabetes.  The quality of the patient's diabetes is described as being unchanged from previous visit.  Exacerbating factors consist of none.  Relieving factors consist of medication.  Glucose results: within target range.  Hemoglobin A1c results: > 6% and within target range.         Review of Systems   Constitutional:  Negative.    Respiratory:  Negative.    Cardiovascular:  Negative.    Gastrointestinal:  Negative.              Health Status   Allergies:    Allergic Reactions (Selected)  No Known Medication Allergies   Medications:  (Selected)   Prescriptions  Prescribed  CPAP 13: CPAP 13, See Instructions, Instructions: Use every night. Have a download in the sleep center in one month., Supply, # 1 EA, 0 Refill(s), Type: Maintenance  FLUoxetine 40 mg oral capsule: = 1 cap(s) ( 40 mg ), po, daily, # 90 cap(s), 3 Refill(s), Type: Maintenance, Pharmacy: [facility], 1 cap(s) Oral daily  FREESTYLE LITE      JUAN C: 1 EA, id, daily, # 50 EA, 11 Refill(s), Pharmacy: [id number]  [place] 5 mg-325 mg oral tablet: 1 tab(s), PO, q4hr, PRN: for pain, # 24 tab(s), 0 Refill(s), Type: Maintenance, Pharmacy: [facility], 1 tab(s) Oral q4 hrs,PRN:for pain  atorvastatin 40 mg oral tablet: = 1 tab(s) ( 40 mg ), po, daily, # 90 tab(s), 3 Refill(s), Type: Maintenance, Pharmacy: [facility], 1 tab(s) Oral daily  doxazosin 2 mg oral tablet: = 1 tab(s) ( 2 mg ), po, daily, # 90 tab(s), 3 Refill(s), Type: Maintenance, Pharmacy: [facility], 1 tab(s) Oral daily  hydrochlorothiazide-lisinopril 25 mg-20 mg oral tablet: 1 tab(s), po, daily, # 90 tab(s), 3 Refill(s), Type: Maintenance, Pharmacy: [facility], Due for appt next month, 1 tab(s) Oral daily  isosorbide mononitrate 30 mg oral tablet, extended release: = 2 tab(s) ( 60 mg ), po, qam, # 180 tab(s), 3 Refill(s), Type: Soft Stop, Pharmacy: [facility], 2 tab(s) Oral qam,x90 day(s)  metFORMIN 500 mg oral tablet, extended release: = 2 tab(s) ( 1,000 mg ), po, bid, # 360 tab(s), 3 Refill(s), Type: Maintenance, [facility], Due for appt next month, 2 tab(s) Oral bid  nitroglycerin 0.4 mg sublingual tablet: = 1 tab(s) ( 0.4 mg ), SL, q5min, Instructions: If chest pain not relieved in 5  minutes after first dose, seek immediate medical attention, PRN: for chest pain, # 100 tab(s), 3 Refill(s), Type: Maintenance, Pharmacy: [facility], This is a 3...  Documented Medications  Documented  Fish Oil oral capsule: po, daily, 0 Refill(s), Type: Maintenance  aspirin 81 mg oral delayed release tablet: = 1 tab(s) ( 81 mg ), Oral, daily, 0 Refill(s), Type: Maintenance   Problem list:    All Problems  BPH (benign prostatic hyperplasia) / SNOMED CT 990851066 / Confirmed  Back pain, chronic / SNOMED CT 348253920 / Confirmed  Coronary Artery Disease / ICD-9-.00 / Confirmed  DM Type 2 (Diabetes Mellitus, Type 2) / ICD-9-.00 / Confirmed  Hypertension / ICD-9-.9 / Confirmed  Obese / ICD-9-.00 / Probable  Hyperlipemia / SNOMED CT 436200800 / Confirmed  Depression, Recurrent / SNOMED CT 825254124 / Confirmed  Sleep Apnea / ICD-9-.57 / Confirmed      Histories   Past Medical History:    Active  Sleep Apnea (ICD-9-.57): Onset on 2005 at 59 years.  Comments:  10/18/2013 CDT 3:10 PM CDT Michel Tubbs MD  AHI 7.7 and REM AHI 22 in 2013 CST 1:54 PM CST Michel Tubbs MD  On 11/10/2013 AHI 18.3 with oximetry susanne 77%. Good/optimal CPAP titration to 12 with 6.5 minutes supine REM  Hyperlipemia (SNOMED CT 759953099)  DM Type 2 (Diabetes Mellitus, Type 2) (ICD-9-.00)  Coronary Artery Disease (ICD-9-.00)  BPH (benign prostatic hyperplasia) (SNOMED CT 976761327)  Depression, Recurrent (SNOMED CT 313532260)  Obese (ICD-9-.00)  Back pain, chronic (SNOMED CT 090211675)  Hypertension (ICD-9-.9)  Resolved  ACUTE MYOCARDIAL INFARCTION (ICD-9-):  Resolved in  at 61 years.   Family History:    Stroke  Father ()     Procedure history:    Colonoscopy (SNOMED CT 395874677) performed by Shukri Arndt on 2013 at 68 Years.  Comments:  2013 1:13 PM JOSEPHINE - Leonora WILLS, Germaine  Sedation: MAC  Diverticulosis in the  sigmoid colon, otherwise normal.  Repeat in 10 years.  Angioplasty (SNOMED CT 7752852) in the month of 5/2007 at 61 Years.  CABG - Coronary artery bypass graft (SNOMED CT 802045783) in the month of 2/2004 at 58 Years.  Colonoscopy (SNOMED CT 975592318) performed by Aashish Connolly MD on 4/4/2002 at 56 Years.  Comments:  12/9/2010 7:05 AM CST - Isabella Delgado  Repeat in 10 years.  ORIF right hand in 2001 at 56 Years.  Flexible sigmoidoscopy (SNOMED CT 84760767) performed by Castillo Anaya MD on 11/13/1995 at 50 Years.  Comments:  10/16/2013 9:17 AM CDT - Gisele Arevalo  Negative.  ORIF left shoulder in 1975 at 30 Years.   Social History:        Alcohol Assessment: Past      Tobacco Assessment: Past      Substance Abuse Assessment: Denies Substance Abuse            Never      Employment and Education Assessment            Retired      Home and Environment Assessment            Marital status: .  Spouse/Partner name: Brianda.      Nutrition and Health Assessment            Type of diet: Regular.      Exercise and Physical Activity Assessment: Does not exercise      Sexual Assessment            Sexually active: No.      Physical Examination   Vital Signs   4/26/2019 8:56 AM CDT Temperature Tympanic 97.2 DegF  LOW    Peripheral Pulse Rate 72 bpm    Pulse Site Radial artery    HR Method Manual    Systolic Blood Pressure 126 mmHg    Diastolic Blood Pressure 68 mmHg    Mean Arterial Pressure 87 mmHg    BP Site Left arm    BP Method Manual      Measurements from flowsheet : Measurements   4/26/2019 8:56 AM CDT Height Measured - Standard 67 in    Weight Measured - Standard 196.4 lb    BSA 2.05 m2    Body Mass Index 30.76 kg/m2  HI      General:  Alert and oriented, No acute distress.    Eye:  Normal conjunctiva.    HENT:  Normocephalic, Tympanic membranes are clear.    Neck:  Supple, Non-tender, No carotid bruit.    Respiratory:  Lungs are clear to auscultation, Respirations are non-labored, Breath sounds are equal.     Cardiovascular:  Normal rate, Regular rhythm, No murmur, No gallop.    Gastrointestinal:  Soft, Non-tender, Non-distended, Normal bowel sounds.    Integumentary:  Warm, Dry, Pink.    Feet:  Normal by visual exam, Normal pulses, Sensation intact, By monofilament exam.    Neurologic:  Alert, Oriented, No focal deficits.    Psychiatric:  Cooperative, Appropriate mood & affect.       Review / Management   Results review:  Lab results   4/19/2019 10:00 AM CDT Hgb A1c 6.2  HI    Cholesterol 120 mg/dL    Non-HDL 74    HDL 46 mg/dL    Chol/HDL Ratio 2.6    LDL 60    Triglyceride 48 mg/dL    U Microalbumin 1.0 mg/dL    Microalbumin Comment See comment    WBC 5.0    RBC 4.47    Hgb 13.3 gm/dL    Hct 38.4 %  LOW    MCV 85.9 fL    MCH 29.8 pg    MCHC 34.6 gm/dL    RDW 13.1 %    Platelet 168    MPV 9.8 fL   3/7/2019 1:47 PM CST Sodium Level 139 mmol/L    Potassium Level 3.8 mmol/L    Chloride Level 103 mmol/L    CO2 Level 30 mmol/L    Glucose Level 151 mg/dL  HI    BUN 13 mg/dL    Creatinine 0.88 mg/dL    BUN/Creat Ratio NOT APPLICABLE    eGFR 85 mL/min/1.73m2    eGFR African American 98 mL/min/1.73m2    Calcium Level 9.5 mg/dL    Bili Total 0.7 mg/dL    Alk Phos 66 unit/L    AST/SGOT 20 unit/L    ALT/SGPT 25 unit/L    Protein Total 6.1 gm/dL    Albumin Level 4.2 gm/dL    Globulin 1.9    A/G Ratio 2.2    Lipase Level 46 unit/L    PSA 1.2 ng/mL   2/26/2019 2:52 PM CST Sodium Level 142 mmol/L    Potassium Level 3.7 mmol/L    Chloride Level 102 mmol/L    CO2 Level 34 mmol/L  HI    Glucose Level 113 mg/dL  HI    BUN 10 mg/dL    Creatinine 0.82 mg/dL    BUN/Creat Ratio NOT APPLICABLE    eGFR 87 mL/min/1.73m2    eGFR African American 101 mL/min/1.73m2    Calcium Level 9.6 mg/dL    Bili Total 0.6 mg/dL    Alk Phos 71 unit/L    AST/SGOT 52 unit/L  HI    ALT/SGPT 71 unit/L  HI    Protein Total 6.2 gm/dL    Albumin Level 4.0 gm/dL    Globulin 2.2    A/G Ratio 1.8    Lipase Level 171 unit/L  HI    WBC 6.7    RBC 4.28    Hgb 12.7 gm/dL   LOW    Hct 36.9 %  LOW    MCV 86.2 fL    MCH 29.7 pg    MCHC 34.4 gm/dL    RDW 12.9 %    Platelet 217    MPV 9.8 fL   .       Impression and Plan   Diagnosis     Hypertension (ALM02-KN I10).     Coronary Artery Disease (RJX53-LM I25.10).     Course:  Progressing as expected.    Plan:       Diet: Low cholesterol, Low fat, Sodium restricted.    Patient Instructions:       Counseled: Patient, Regarding diagnosis, Regarding treatment, Regarding medications, Diet, Activity, Verbalized understanding.    Orders     Orders (Selected)   Prescriptions  Prescribed  doxazosin 2 mg oral tablet: = 1 tab(s) ( 2 mg ), po, daily, # 90 tab(s), 3 Refill(s), Type: Maintenance, Pharmacy: [facility], 1 tab(s) Oral daily  hydrochlorothiazide-lisinopril 25 mg-20 mg oral tablet: 1 tab(s), po, daily, # 90 tab(s), 3 Refill(s), Type: Maintenance, Pharmacy: [facility], Due for appt next month, 1 tab(s) Oral daily  isosorbide mononitrate 30 mg oral tablet, extended release: = 2 tab(s) ( 60 mg ), po, qam, # 180 tab(s), 3 Refill(s), Type: Soft Stop, Pharmacy: [facility], 2 tab(s) Oral qam,x90 day(s)  nitroglycerin 0.4 mg sublingual tablet: = 1 tab(s) ( 0.4 mg ), SL, q5min, Instructions: If chest pain not relieved in 5 minutes after first dose, seek immediate medical attention, PRN: for chest pain, # 100 tab(s), 3 Refill(s), Type: Maintenance, Pharmacy: [facility], This is a 3....     Diagnosis     DM Type 2 (Diabetes Mellitus, Type 2) (EGT75-VC E11.9).     Course:  Well controlled.    Orders     Orders (Selected)   Prescriptions  Prescribed  metFORMIN 500 mg oral tablet, extended release: = 2 tab(s) ( 1,000 mg ), po, bid, # 360 tab(s), 3 Refill(s), Type: Maintenance, Pharmacy: [facility], Due for appt next month, 2 tab(s) Oral bid.

## 2022-02-16 NOTE — PROGRESS NOTES
Patient:   PIOTR CORREIA            MRN: [id number]            FIN: [id number]               Age:   75 years     Sex:  Male     :  1945   Associated Diagnoses:   Exposure to COVID-19 virus   Author:   Dexter Silva MD      Visit Information      Date of Service: 2020 11:09 am  Performing Location: [facility]  Encounter#: [id number]      Primary Care Provider (PCP):  Dexter Silva MD    NPI# [id number]      Referring Provider:  Dexter Silva MD    NPI# [id number]   Visit type:  Telephone Encounter.    Source of history:  Patient.    Location of patient:  Home  Call Start Time:   1340  Call End Time:    1352      Chief Complaint   2020 12:40 PM CST  Patient exposed to positive covid 6 days ago. C/o headache, cough and SOB x1 day. Verbal consent granted for telemedicine visit.     _Doing well, exposed to Covid.  Slight cough.      History of Present Illness   Today's visit was conducted via telephone due to the COVID-19 pandemic. Patient's consent to telephone visit was obtained and documented.      Reason for visit:  _      Review of Systems   Constitutional:  Negative.    Ear/Nose/Mouth/Throat:  Negative except as documented in history of present illness.    Respiratory:  Negative except as documented in history of present illness.    Cardiovascular:  Negative.       Impression and Plan   Diagnosis     Exposure to COVID-19 virus (BBR96-TB Z20.828).     Course:  Progressing as expected, Unchanged.    Orders     Orders   Lab (Gen Lab  Reference Lab):  SARS-CoV-2 RNA (COVID-19), Qualitative NAAT* ([facility]) (Order): Specimen Type: Anterior Nares, Collection Date: 2020 1:50 PM CST.        Health Status   Allergies:    Allergic Reactions (Selected)  No Known Medication Allergies   Medications:  (Selected)   Prescriptions  Prescribed  CPAP 13: CPAP 13, See Instructions, Instructions: Use every night. Have a download in the sleep center in one month., Supply, #  1 EA, 0 Refill(s), Type: Maintenance  FLUoxetine 40 mg oral capsule: = 1 cap(s), Oral, daily, # 90 cap(s), 0 Refill(s), Type: Maintenance, Pharmacy: [facility], Due for appt in April  FLUoxetine 40 mg oral capsule: See Instructions, Instructions: TAKE 1 CAPSULE BY MOUTH  DAILY, # 90 cap(s), 3 Refill(s), Type: Maintenance, Pharmacy: [facility], TAKE 1 CAPSULE BY MOUTH  DAILY, 67, in, 05/28/20 8:57:00 CDT, Height Measured, 197.2, lb, 05/21/20 9:22:00 CDT...  FREESTYLE LITE      JUAN C: 1 EA, id, daily, # 50 EA, 11 Refill(s), Pharmacy: [id number]  MetFORMIN (Eqv-Glucophage XR) 500 mg oral tablet, extended release: See Instructions, Instructions: TAKE 2 TABLETS BY MOUTH  TWICE DAILY, # 360 tab(s), 3 Refill(s), Type: Maintenance, Pharmacy: [facility], TAKE 2 TABLETS BY MOUTH  TWICE DAILY, 67, in, 05/28/20 8:57:00 CDT, Height Measured, 197.2, lb, 05/21/2...  Norco 5 mg-325 mg oral tablet: 1 tab(s), PO, q4hr, PRN: for pain, # 24 tab(s), 0 Refill(s), Type: Maintenance, Pharmacy: [facility], 1 tab(s) Oral q4 hrs,PRN:for pain  atorvastatin 40 mg oral tablet: = 1 tab(s), Oral, daily, # 90 tab(s), 0 Refill(s), Type: Maintenance, Pharmacy: [facility], Due for appt in April  atorvastatin 40 mg oral tablet: See Instructions, Instructions: TAKE 1 TABLET BY MOUTH  DAILY, # 90 tab(s), 3 Refill(s), Type: Maintenance, Pharmacy: [facility], TAKE 1 TABLET BY MOUTH  DAILY, 67, in, 05/28/20 8:57:00 CDT, Height Measured, 197.2, lb, 05/21/20 9:22:00 CDT,...  doxazosin 2 mg oral tablet: = 1 tab(s), Oral, daily, # 90 tab(s), 0 Refill(s), Type: Maintenance, Pharmacy: [facility], Due for appt in April  doxazosin 2 mg oral tablet: See Instructions, Instructions: TAKE 1 TABLET BY MOUTH  DAILY, # 90 tab(s), 3 Refill(s), Type: Maintenance, [facility], TAKE 1 TABLET BY MOUTH  DAILY, 67, in, 05/28/20 8:57:00 CDT, Height Measured, 197.2, lb, 05/21/20  9:22:00 CDT,...  isosorbide mononitrate 30 mg oral tablet, extended release: = 2 tab(s), Oral, qam, # 180 tab(s), 3 Refill(s), Type: Maintenance, Pharmacy: [facility], Due for appt in April, 2 tab(s) Oral qam, 67, in, 05/28/20 8:57:00 CDT, Height Measured, 197.2, lb, 05/21/20 9:22:00 CDT, Weight Measured  lisinopril 10 mg oral tablet: = 1 tab(s) ( 10 mg ), Oral, daily, # 90 tab(s), 3 Refill(s), Type: Maintenance, [facility]: [facility], 1 tab(s) Oral daily, 67, in, 05/28/20 8:57:00 CDT, Height Measured, 201, lb, 10/23/20 8:44:00 CDT, Weight Measured  nitroglycerin 0.4 mg sublingual tablet: = 1 tab(s) ( 0.4 mg ), SL, q5min, Instructions: If chest pain not relieved in 5 minutes after first dose, seek immediate medical attention, PRN: for chest pain, # 100 tab(s), 3 Refill(s), Type: Maintenance, Pharmacy: [facility], This is a 3...  Documented Medications  Documented  Fish Oil oral capsule: po, daily, 0 Refill(s), Type: Maintenance  Vitamin C: daily, 0 Refill(s), Type: Maintenance   Problem list:    All Problems  Obstructive sleep apnea (adult) (pediatric) / SNOMED CT 928310409 / Confirmed  Back pain, chronic / SNOMED CT 354254300 / Confirmed  Type 2 diabetes mellitus without complications / SNOMED CT 918865650 / Confirmed  Atherosclerotic heart disease of native coronary artery without angina pectoris / SNOMED CT 6459217368 / Confirmed  Depression, Recurrent / SNOMED CT 668022788 / Confirmed  BPH (benign prostatic hyperplasia) / SNOMED CT 918338992 / Confirmed  Hyperlipemia / SNOMED CT 344432839 / Confirmed  Essential (primary) hypertension / SNOMED CT 63427901 / Confirmed      Histories   Past Medical History:    Active  Obstructive sleep apnea (adult) (pediatric) (SNOMED CT 867920694): Onset on 6/28/2005 at 59 years.  Comments:  10/18/2013 CDT 3:10 PM CDT - Michel Fields MD  AHI 7.7 and REM AHI 22 in 2005 11/25/2013 CST 1:54 PM CST Michel Tubbs MD  On 11/10/2013 AHI 18.3 with  oximetry susanne 77%. 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   Family History:    Hypertension  Father ()  Heart disease  Father ()  Alcohol abuse  Mother ()  Stroke  Father ()  Liver disease  Mother ()     Procedure history:    Colonoscopy (SNOMED CT 396971373) performed by Shukri Arndt on 2013 at 68 Years.  Comments:  2013 1:13 PM CST - Leonora WILLS, Germaine  Sedation: MAC  Diverticulosis in the sigmoid colon, otherwise normal.  Repeat in 10 years.  Angioplasty (SNOMED CT 9638282) in the month of 2007 at 61 Years.  CABG - Coronary artery bypass graft (SNOMED CT 395220461) in the month of 2004 at 58 Years.  Colonoscopy (SNOMED CT 844124246) performed by Aashish Connolly MD on 2002 at 56 Years.  Comments:  2010 7:05 AM CST - Isabella Delgado  Repeat in 10 years.  ORIF right hand in  at 56 Years.  Flexible sigmoidoscopy (SNOMED CT 39239876) performed by Castillo Anaya MD on 1995 at 50 Years.  Comments:  10/16/2013 9:17 AM CDT - Gisele Arevalo  Negative.  ORIF left shoulder in  at 30 Years.   Social History:        Electronic Cigarette/Vaping Assessment            Electronic Cigarette Use: Former use, quit more than 90 days ago.      Alcohol Assessment: Past            Quit       Tobacco Assessment: Past            Quit       Substance Abuse Assessment: Denies Substance Abuse            Never      Employment and Education Assessment             Retired, Highest education level: High school.      Home and Environment Assessment            Marital status: .  Spouse/Partner name: Brianda.  Living situation: Home/Independent.               Injuries/Abuse/Neglect in household: No.  Feels unsafe at home: No.  Family/Friends available for support:               Yes.      Nutrition and Health Assessment            Type of diet: Regular.  Wants to lose weight: Yes.  Sleeping concerns: No.  Feels highly stressed: No.      Exercise and Physical Activity Assessment: Occasional exercise            Exercise frequency: 1-2 times/week.  Exercise type: Walking.      Sexual Assessment            Sexually active: No.  Identifies as male, History of STD: No.  History of sexual abuse: No.      Other Assessment            Hearing impairment.        Physical Examination   Measurements from flowsheet : Measurements   11/27/2020 12:40 PM CST  Height/Length Estimated   67 in        Review / Management   Results review:  Lab results: 10/23/2020 9:29 AM CDT   Hgb A1c                   6.3  HI  .

## 2022-02-16 NOTE — PROGRESS NOTES
Patient:   PIOTR CORREIA            MRN: [id number]            FIN: [id number]               Age:   72 years     Sex:  Male     :  1945   Associated Diagnoses:   DM Type 2 (Diabetes Mellitus, Type 2); Hypertension; Coronary Artery Disease; Back pain, chronic   Author:   Dexter Silva MD      Report Summary   DiagnosisCourse:  Well controlled. Orders  Orders (Selected)   Prescriptions  Prescribed  metFORMIN 500 mg oral tablet, extended release: 2 tab(s) ( 1,000 mg ), po, bid, # 360 tab(s), 3 Refill(s), Type: Maintenance, Pharmacy: [facility], Due for appt next month, 2 tab(s) po bid.     Visit Information      Date of Service: 2018 09:10 am  Performing Location: [facility]  Encounter#: [id number]      Primary Care Provider (PCP):  Dexter Silva MD# [id number]      Referring Provider:  Dexter Silva MD# [id number]   Visit type:  Scheduled follow-up.    Source of history:  Self.    History limitation:  None.       Chief Complaint   3/19/2018 9:23 AM CDT    DM med check, due for fasting labs, foot exam normal     Chief complaint and symptoms noted above confirmed with patient.      History of Present Illness             The patient presents for follow-up evaluation of diabetes.  The quality of the patient's diabetes is described as being unchanged from previous visit.  Associated symptoms consist of none.  Compliance problems: none.  Glucose results: within target range.  Hemoglobin A1c results: > 6% and within target range.               The patient presents for follow-up evaluation of hypertension.  The quality of hypertension symptom(s) since the patient's last visit is described as being unchanged.  The severity of the hypertension symptom(s) since the last visit is moderate.  Since the patient's last visit, the timing/course of hypertension symptom(s) is constant.  Exacerbating factors consist of none.  Relieving factors consist of  medication.        Review of Systems   Constitutional:  Negative.    Respiratory:  Negative.    Cardiovascular:  Negative.    Gastrointestinal:  Negative.              Health Status   Allergies:    Allergic Reactions (Selected)  No Known Medication Allergies   Medications:  (Selected)   Prescriptions  Prescribed  CPAP 13: CPAP 13, See Instructions, Instructions: Use every night. Have a download in the sleep center in one month., Supply, # 1 EA, 0 Refill(s), Type: Maintenance  FLUoxetine 40 mg oral capsule: 1 cap(s) ( 40 mg ), po, daily, # 90 cap(s), 3 Refill(s), Type: Maintenance, Pharmacy: [facility], Due for appt next month, 1 cap(s) po daily  FREESTYLE LITE      JUAN C: 1 EA, id, daily, # 50 EA, 11 Refill(s), Pharmacy: [id number]  [place] 5 mg-325 mg oral tablet: 1 tab(s), PO, q4hr, PRN: for pain, # 24 tab(s), 0 Refill(s), Type: Maintenance, Pharmacy: [facility], 1 tab(s) po q4 hrs,PRN:for pain  Plavix 75 mg oral tablet: 1 tab(s) ( 75 mg ), po, daily, # 90 tab(s), 3 Refill(s), Type: Maintenance, Pharmacy: [facility], 1 tab(s) po daily  aspirin 325 mg oral tablet: 1 tab(s) ( 325 mg ), PO, Daily, # 90 tab(s), 3 Refill(s), Type: Maintenance, Pharmacy: [facility], 1 tab(s) po daily  atorvastatin 40 mg oral tablet: 1 tab(s) ( 40 mg ), po, daily, # 90 tab(s), 3 Refill(s), Type: Maintenance, Pharmacy: [facility], Due for appt next month, 1 tab(s) po daily  doxazosin 2 mg oral tablet: 1 tab(s) ( 2 mg ), po, daily, # 90 tab(s), 3 Refill(s), Type: Maintenance, Pharmacy: [facility], Due for appt next month, 1 tab(s) po daily  hydrochlorothiazide-lisinopril 25 mg-20 mg oral tablet: 1 tab(s), po, daily, # 90 tab(s), 3 Refill(s), Type: Maintenance, Pharmacy: [facility], Due for appt next month, 1 tab(s) po daily  isosorbide mononitrate 30 mg oral tablet, extended release: 2 tab(s) ( 60 mg ), po, qam, # 180 tab(s), 3 Refill(s), Type: Soft Stop,  Pharmacy: [facility], 2 tab(s) po qam,x90 day(s)  metFORMIN 500 mg oral tablet, extended release: 2 tab(s) ( 1,000 mg ), po, bid, # 360 tab(s), 3 Refill(s), Type: Maintenance, Pharmacy: [facility], Due for appt next month, 2 tab(s) po bid  nitroglycerin 0.4 mg sublingual tablet: 1 tab(s) ( 0.4 mg ), SL, q5min, Instructions: If chest pain not relieved in 5 minutes after first dose, seek immediate medical attention, PRN: for chest pain, # 25 tab(s), 3 Refill(s), Type: Maintenance, Pharmacy: [facility], 1 tab(s) sl q5...  Documented Medications  Documented  Fish Oil oral capsule: po, daily, 0 Refill(s), Type: Maintenance   Problem list:    All Problems  BPH (benign prostatic hyperplasia) / SNOMED CT 409689849 / Confirmed  Back pain, chronic / SNOMED CT 646900967 / Confirmed  Coronary Artery Disease / ICD-9-.00 / Confirmed  DM Type 2 (Diabetes Mellitus, Type 2) / ICD-9-.00 / Confirmed  Hypertension / ICD-9-.9 / Confirmed  Obese / ICD-9-.00 / Probable  Hyperlipemia / SNOMED CT 370557794 / Confirmed  Depression, Recurrent / SNOMED CT 137614610 / Confirmed  Sleep Apnea / ICD-9-.57 / Confirmed      Histories   Past Medical History:    Active  Sleep Apnea (ICD-9-.57): Onset on 6/28/2005 at 59 years.  Comments:  10/18/2013 CDT 3:10 PM CDT Michel Tubbs MD  AHI 7.7 and REM AHI 22 in 2005 11/25/2013 CST 1:54 PM CST Michel Tubbs MD  On 11/10/2013 AHI 18.3 with oximetry susanne 77%. Good/optimal CPAP titration to 12 with 6.5 minutes supine REM  Hyperlipemia (SNOMED CT 716529203)  DM Type 2 (Diabetes Mellitus, Type 2) (ICD-9-.00)  Coronary Artery Disease (ICD-9-.00)  BPH (benign prostatic hyperplasia) (SNOMED CT 038231104)  Depression, Recurrent (SNOMED CT 176858289)  Hypertension (ICD-9-.9)  Resolved  ACUTE MYOCARDIAL INFARCTION (ICD-9-):  Resolved in 2007 at 61 years.   Family History:    Stroke  Father     Procedure history:     Colonoscopy (SNOMED CT 173696774) performed by Shukri Arndt on 11/8/2013 at 68 Years.  Comments:  11/12/2013 1:13 PM - Germaine Lea RN  Sedation: MAC  Diverticulosis in the sigmoid colon, otherwise normal.  Repeat in 10 years.  Angioplasty (SNOMED CT 4292023) in the month of 5/2007 at 61 Years.  CABG - Coronary artery bypass graft (SNOMED CT 789195333) in the month of 2/2004 at 58 Years.  Colonoscopy (SNOMED CT 023614784) performed by Aashish Connolly MD on 4/4/2002 at 56 Years.  Comments:  12/9/2010 7:05 AM - Isabella Delgado  Repeat in 10 years.  ORIF right hand in 2001 at 56 Years.  Flexible sigmoidoscopy (SNOMED CT 68292469) performed by Castillo Anaya MD on 11/13/1995 at 50 Years.  Comments:  10/16/2013 9:17 AM - Gisele Arevalo  Negative.  ORIF left shoulder in 1975 at 30 Years.   Social History:        Alcohol Assessment: Denies Alcohol Use      Tobacco Assessment: Past      Exercise and Physical Activity Assessment: Occasional exercise        Physical Examination   Vital Signs   3/19/2018 9:23 AM CDT Temperature Tympanic 97.3 DegF  LOW    Peripheral Pulse Rate 68 bpm    Pulse Site Radial artery    HR Method Manual    Systolic Blood Pressure 124 mmHg    Diastolic Blood Pressure 64 mmHg    Mean Arterial Pressure 84 mmHg    BP Site Left arm    BP Method Manual      Measurements from flowsheet : Measurements   3/19/2018 9:23 AM CDT Height Measured - Standard 67 in    Weight Measured - Standard 205.4 lb    BSA 2.1 m2    Body Mass Index 32.17 kg/m2  HI      General:  Alert and oriented, No acute distress.    Eye:  Normal conjunctiva.    HENT:  Normocephalic, Tympanic membranes are clear.    Neck:  Supple, Non-tender, No carotid bruit.    Respiratory:  Lungs are clear to auscultation, Respirations are non-labored, Breath sounds are equal.    Cardiovascular:  Normal rate, Regular rhythm, No murmur, No gallop.    Gastrointestinal:  Soft, Non-tender, Non-distended, Normal bowel sounds.    Integumentary:  Warm, Dry,  Pink.    Feet:  Normal by visual exam, Normal pulses, Sensation intact, By monofilament exam.    Neurologic:  Alert, Oriented, No focal deficits.    Psychiatric:  Cooperative, Appropriate mood & affect.       Review / Management   Results review      Impression and Plan   Diagnosis     Hypertension (IUQ57-LD I10).     Coronary Artery Disease (GSO47-TU I25.10).     Course:  Progressing as expected.    Plan:       Diet: Low cholesterol, Low fat, Sodium restricted.    Patient Instructions:       Counseled: Patient, Regarding diagnosis, Regarding treatment, Regarding medications, Diet, Activity, Verbalized understanding.    Orders     Orders (Selected)   Prescriptions  Prescribed  Plavix 75 mg oral tablet: 1 tab(s) ( 75 mg ), po, daily, # 90 tab(s), 3 Refill(s), Type: Maintenance, Pharmacy: [facility], 1 tab(s) po daily  atorvastatin 40 mg oral tablet: 1 tab(s) ( 40 mg ), po, daily, # 90 tab(s), 3 Refill(s), Type: Maintenance, Pharmacy: [facility], Due for appt next month, 1 tab(s) po daily  doxazosin 2 mg oral tablet: 1 tab(s) ( 2 mg ), po, daily, # 90 tab(s), 3 Refill(s), Type: Maintenance, Pharmacy: [facility], Due for appt next month, 1 tab(s) po daily  hydrochlorothiazide-lisinopril 25 mg-20 mg oral tablet: 1 tab(s), po, daily, # 90 tab(s), 3 Refill(s), Type: Maintenance, Pharmacy: [facility], Due for appt next month, 1 tab(s) po daily  isosorbide mononitrate 30 mg oral tablet, extended release: 2 tab(s) ( 60 mg ), po, qam, # 180 tab(s), 3 Refill(s), Type: Soft Stop, Pharmacy: [facility], 2 tab(s) po qam,x90 day(s).     Diagnosis     DM Type 2 (Diabetes Mellitus, Type 2) (DNY43-MO E11.9).     Course:  Well controlled.    Orders     Orders (Selected)   Prescriptions  Prescribed  metFORMIN 500 mg oral tablet, extended release: 2 tab(s) ( 1,000 mg ), po, bid, # 360 tab(s), 3 Refill(s), Type: Maintenance, Pharmacy: [facility], Due for appt next month, 2 tab(s)  po bid.     Diagnosis     Back pain, chronic (WFV41-XW G89.29).     Plan:  [place] Prescription Drug Monitoring Program checked and reviewed.     .    Orders     Orders (Selected)   Prescriptions  Prescribed  Norco 5 mg-325 mg oral tablet: 1 tab(s), PO, q4hr, PRN: for pain, # 24 tab(s), 0 Refill(s), Type: Maintenance, Pharmacy: [facility], 1 tab(s) po q4 hrs,PRN:for pain.

## 2022-02-16 NOTE — TELEPHONE ENCOUNTER
---------------------  From: Kathe Stack CMA (Phone Messages Pool ([id number] [facility]))   To: Appointment Pool ([id number]);     Sent: 4/14/2021 8:49:12 AM CDT  Subject: FW: appointment           ---------------------  From: JIMENEZ CORREIA on behalf of PIOTR CORREIA  To: [facility]  Sent: 04/13/2021 09:15 p.m. CDT  Subject: appointment  Please cancel Bill s appdointment for Thursday with Dr. Silva. He is at [facility] treating his condition. Expect them to send  report to Dr. Silva.done

## 2022-02-16 NOTE — NURSING NOTE
Pt seen today for COVID testing per Dr Shukri Arndt for Preop Px. Fax to: [phone]  O2=95%  Specimen sent to [facility].  PUI form faxed to [facility].  Corey Hutchinson CMA

## 2022-02-16 NOTE — TELEPHONE ENCOUNTER
---------------------  From: Clive Reed MD   Sent: 3/1/2021 10:26:06 AM CST  Subject: General Message     Patient had an angiogram today without any significant lesions.  His hemoglobin today was 8.8 which could be a factor in his exertional angina.  I spoke with Dr. Ambrocio from [facility].  He should have an appointment this week to pursue a work up of his anemia.

## 2022-02-16 NOTE — PROGRESS NOTES
Patient:   PIOTR CORREIA            MRN: [id number]            FIN: [id number]               Age:   74 years     Sex:  Male     :  1945   Associated Diagnoses:   URI with cough and congestion; Back pain, chronic   Author:   Dexter Silva MD      Visit Information      Date of Service: 2020 08:50 am  Performing Location: [facility]  Encounter#: [id number]      Primary Care Provider (PCP):  Dexter Silva MD    NPI# [id number]      Referring Provider:  Dexter Silva MD    NPI# [id number]   Visit type:  Telephone Encounter.    Source of history:  Mother.    Location of patient:  Home  Call Start Time:   13:00  Call End Time:    13:15         Chief Complaint   _      History of Present Illness   Today's visit was conducted via telephone due to the COVID-19 pandemic.     Reason for visit:  Sinus congestion, no fever,       Review of Systems   Constitutional:  No fever, No chills, No sweats, No fatigue.    Respiratory:  No cough.    Cardiovascular:  Negative.       Impression and Plan   Diagnosis     URI with cough and congestion (ZAQ81-YK J06.9).     Course:  Improving.    Diagnosis     Back pain, chronic (WPW22-TH G89.29).     Course:  Stable.    Orders     Orders (Selected)   Prescriptions  Prescribed  Norco 5 mg-325 mg oral tablet: 1 tab(s), PO, q4hr, PRN: for pain, # 24 tab(s), 0 Refill(s), Type: Maintenance, Pharmacy: [facility], 1 tab(s) Oral q4 hrs,PRN:for pain.        Health Status   Allergies:    Allergic Reactions (Selected)  No Known Medication Allergies   Medications:  (Selected)   Prescriptions  Prescribed  CPAP 13: CPAP 13, See Instructions, Instructions: Use every night. Have a download in the sleep center in one month., Supply, # 1 EA, 0 Refill(s), Type: Maintenance  FLUoxetine 40 mg oral capsule: = 1 cap(s), Oral, daily, # 90 cap(s), 0 Refill(s), Type: Maintenance, Pharmacy: [facility], Due for appt in April  FREESTYLE LITE      JUAN C: 1  EA, id, daily, # 50 EA, 11 Refill(s), Pharmacy: [id number]  [place] 5 mg-325 mg oral tablet: 1 tab(s), PO, q4hr, PRN: for pain, # 24 tab(s), 0 Refill(s), Type: Maintenance, Pharmacy: [facility], 1 tab(s) Oral q4 hrs,PRN:for pain  atorvastatin 40 mg oral tablet: = 1 tab(s), Oral, daily, # 90 tab(s), 0 Refill(s), Type: Maintenance, Pharmacy: [facility], Due for appt in April  doxazosin 2 mg oral tablet: = 1 tab(s), Oral, daily, # 90 tab(s), 0 Refill(s), Type: Maintenance, Pharmacy: [facility], Due for appt in April  isosorbide mononitrate 30 mg oral tablet, extended release: = 2 tab(s), Oral, qam, # 180 tab(s), 0 Refill(s), Type: Maintenance, Pharmacy: [facility], Due for appt in April  lisinopril 5 mg oral tablet: = 1 tab(s) ( 5 mg ), Oral, daily, # 90 tab(s), 3 Refill(s), Type: Maintenance, Pharmacy: [facility], 1 tab(s) Oral daily  metFORMIN 500 mg oral tablet, extended release: = 2 tab(s), Oral, bid, # 360 tab(s), 0 Refill(s), Type: Maintenance, Pharmacy: [facility], Due for appt in April  nitroglycerin 0.4 mg sublingual tablet: = 1 tab(s) ( 0.4 mg ), SL, q5min, Instructions: If chest pain not relieved in 5 minutes after first dose, seek immediate medical attention, PRN: for chest pain, # 100 tab(s), 3 Refill(s), Type: Maintenance, Pharmacy: [facility], This is a 3...  Documented Medications  Documented  Fish Oil oral capsule: po, daily, 0 Refill(s), Type: Maintenance   Problem list:    All Problems  Back pain, chronic / SNOMED CT 877994077 / Confirmed  BPH (benign prostatic hyperplasia) / SNOMED CT 243316157 / Confirmed  Coronary Artery Disease / ICD-9-.00 / Confirmed  Depression, Recurrent / SNOMED CT 066696404 / Confirmed  DM Type 2 (Diabetes Mellitus, Type 2) / ICD-9-.00 / Confirmed  Hyperlipemia / SNOMED CT 291088769 / Confirmed  Hypertension / ICD-9-.9 / Confirmed  Obese / ICD-9-.00 / Probable  Sleep Apnea  / ICD-9-.57 / Confirmed      Histories   Past Medical History:    Active  Sleep Apnea (ICD-9-.57): Onset on 2005 at 59 years.  Comments:  10/18/2013 CDT 3:10 PM CDT - Michel Fields MD  AHI 7.7 and REM AHI 22 in 2013 CST 1:54 PM CST - Michel Fields MD  On 11/10/2013 AHI 18.3 with oximetry susanne 77%. Good/optimal CPAP titration to 12 with 6.5 minutes supine REM  Hyperlipemia (SNOMED CT 379247352)  DM Type 2 (Diabetes Mellitus, Type 2) (ICD-9-.00)  Coronary Artery Disease (ICD-9-.00)  BPH (benign prostatic hyperplasia) (SNOMED CT 680691521)  Depression, Recurrent (SNOMED CT 326718011)  Obese (ICD-9-.00)  Back pain, chronic (SNOMED CT 118308780)  Hypertension (ICD-9-.9)  Resolved  ACUTE MYOCARDIAL INFARCTION (ICD-9-):  Resolved in  at 61 years.   Family History:    Stroke  Father ()     Procedure history:    Colonoscopy (SNOMED CT 134613349) performed by Shukri Arndt on 2013 at 68 Years.  Comments:  2013 1:13 PM CST - Germaine Lea RN  Sedation: MAC  Diverticulosis in the sigmoid colon, otherwise normal.  Repeat in 10 years.  Angioplasty (SNOMED CT 5506060) in the month of 2007 at 61 Years.  CABG - Coronary artery bypass graft (SNOMED CT 467358463) in the month of 2004 at 58 Years.  Colonoscopy (SNOMED CT 365793298) performed by Aashish Connolly MD on 2002 at 56 Years.  Comments:  2010 7:05 AM CST - Isabella Delgado  Repeat in 10 years.  ORIF right hand in  at 56 Years.  Flexible sigmoidoscopy (SNOMED CT 09459057) performed by Castillo Anaya MD on 1995 at 50 Years.  Comments:  10/16/2013 9:17 AM CDT - Gisele Arevalo  Negative.  ORIF left shoulder in  at 30 Years.   Social History:        Alcohol Assessment: Past      Tobacco Assessment: Past      Substance Abuse Assessment: Denies Substance Abuse            Never      Employment and Education Assessment            Retired      Home and Environment Assessment             Marital status: .  Spouse/Partner name: Brianda.      Nutrition and Health Assessment            Type of diet: Regular.      Exercise and Physical Activity Assessment: Does not exercise      Sexual Assessment            Sexually active: No.

## 2022-02-16 NOTE — TELEPHONE ENCOUNTER
---------------------  From: Nelli Hunter RN (Phone Messages Pool ([id number]_[facility]))   To: [facility] Message Pool ([id number]_[facility]);     Sent: 6/4/2021 8:04:59 AM CDT  Subject: FW: Prednisone     Please advise.   40mg and 30mg on patient's medication list.       ---------------------  From: JIMENEZ CORREIA on behalf of PIOTR CORREIA  To: [facility]  Sent: 06/04/2021 07:41 a.m. CDT  Subject: Prednisone  I noted at our visit with Dr. Silva 5/14 that I should reduce Prednisone from 40 to 30 and then to 20 on May 19. However I can t find that noted in the records. At my recent ED visit at [place] I received 30. Am I supposed to take 30 or 20 at this time?---------------------  From: Dexter Silva MD ([facility] Message Pool ([id number]_[facility]))   To: Phone [facility] Pool (32224_[facility]); PIOTR CORREIA    Sent: 6/6/2021 3:46:14 PM CDT  Subject: RE: Prednisone     Silver Lamar,    On May 14th I wanted Bill to take 30 mg of Prednisone daily for 3 weeks.  That should have taken him to last Friday.  If he is doing well we can cut back to 20 mg daily.  If there is any question please make an appointment to see me Tuesday.    Lucio Silva MD

## 2022-02-16 NOTE — NURSING NOTE
CAGE Assessment Entered On:  10/11/2019 9:49 AM CDT    Performed On:  10/11/2019 9:49 AM CDT by Rubi Wallace MA               Assessment   Have you ever felt you should cut down on your drinking :   No   Have people annoyed you by criticizing your drinking :   No   Have you ever felt bad or guilty about your drinking :   No   Have you ever taken a drink first thing in the morning to steady your nerves or get rid of a hangover (Eye-opener) :   No   CAGE Score :   0    Rubi Wallace MA - 10/11/2019 9:49 AM CDT

## 2022-02-16 NOTE — NURSING NOTE
Comprehensive Intake Entered On:  2021 2:46 PM CST    Performed On:  2021 2:42 PM CST by Janae Zhang               Summary   Chief Complaint :   FU labs.   Advance Directive :   Yes   Menstrual Status :   N/A   Weight Measured :   177.5 lb(Converted to: 177 lb 8 oz, 80.513 kg)    Height/Length Estimated :   67 in(Converted to: 5 ft 7 in, 170.18 cm)    Systolic Blood Pressure :   124 mmHg   Diastolic Blood Pressure :   58 mmHg (LOW)    Mean Arterial Pressure :   80 mmHg   Peripheral Pulse Rate :   75 bpm   BP Site :   Right arm   BP Method :   Manual   HR Method :   Electronic   Temperature Tympanic :   98.2 DegF(Converted to: 36.8 DegC)    Oxygen Saturation :   92 % (LOW)    Janae Zhang - 2021 2:42 PM CST   Health Status   Allergies Verified? :   Yes   Medication History Verified? :   Yes   Immunizations Current :   Yes   Medical History Verified? :   Yes   Pre-Visit Planning Status :   Completed   Tobacco Use? :   Former smoker   Janae Zhang - 2021 2:42 PM CST   Demographics   Last Name :   Etienne   Address :   [address] [place] [address]   First Name :   Prashant Hollingsworth Initial :   QUANG   Responsible Party Date of Birth () :   1945 CDT   City :   [place]   State :   [place]   Zip Code :   [address]   Janae Zhang - 2021 2:42 PM CST   Providers Grid   Provider Name :    Dr. Mamadou Newberry     Provider Specialty :    Eye Doctor   [facility]   cardiology   Dentist     Reason for Seeing Provider :          RF        Comments :    [place]   Fax # for Rx-  [phone]      DAYNE Polo Megan M - 2021 2:42 PM CST  Janae Zhang - 2021 2:42 PM CST  Janae Zhang - 2021 2:42 PM CST  Janae Zhang - 2021 2:42 PM CST      Ancillary Services Grid   Name :    [facility]              Type of Service :    Pharmacy              Contact Information :    Mail Order                [place]ic[place]  Janae GARCIA - 11/17/2021 2:42 PM CST         Consents   Consent for Immunization Exchange :   Consent Granted   Consent for Immunizations to Providers :   Consent Granted   Janae Zhang - 11/17/2021 2:42 PM CST   Meds / Allergies   (As Of: 11/17/2021 2:46:41 PM CST)   Allergies (Active)   No Known Medication Allergies  Estimated Onset Date:   Unspecified ; Created By:   Deana French CMA; Reaction Status:   Active ; Category:   Drug ; Substance:   No Known Medication Allergies ; Type:   Allergy ; Updated By:   Deana French CMA; Reviewed Date:   11/17/2021 2:45 PM CST        Medication List   (As Of: 11/17/2021 2:46:41 PM CST)   Prescription/Discharge Order    acetaminophen-hydrocodone  :   acetaminophen-hydrocodone ; Status:   Prescribed ; Ordered As Mnemonic:   Norco 5 mg-325 mg oral tablet ; Simple Display Line:   1 tab(s), PO, q4hr, PRN: for pain, 24 tab(s), 0 Refill(s) ; Ordering Provider:   Dexter Silva MD; Catalog Code:   acetaminophen-hydrocodone ; Order Dt/Tm:   3/24/2020 1:06:36 PM CDT          apixaban  :   apixaban ; Status:   Prescribed ; Ordered As Mnemonic:   Eliquis 5 mg oral tablet ; Simple Display Line:   5 mg, 1 tab(s), Oral, bid, 180 tab(s), 0 Refill(s) ; Ordering Provider:   Dexter Silva MD; Catalog Code:   apixaban ; Order Dt/Tm:   9/22/2021 1:22:16 PM CDT          atorvastatin  :   atorvastatin ; Status:   Prescribed ; Ordered As Mnemonic:   atorvastatin 40 mg oral tablet ; Simple Display Line:   40 mg, 1 tab(s), Oral, daily, 90 tab(s), 3 Refill(s) ; Ordering Provider:   Dexter Silva MD; Catalog Code:   atorvastatin ; Order Dt/Tm:   9/24/2021 1:42:05 PM CDT          doxazosin  :   doxazosin ; Status:   Prescribed ; Ordered As Mnemonic:   doxazosin 2 mg oral tablet ; Simple Display Line:   2 mg, 1 tab(s), Oral, daily, 90 tab(s), 3 Refill(s) ; Ordering Provider:   Dexter Silva MD; Catalog Code:   doxazosin ; Order Dt/Tm:   9/24/2021 1:42:06 PM CDT           FLUoxetine  :   FLUoxetine ; Status:   Prescribed ; Ordered As Mnemonic:   FLUoxetine 40 mg oral capsule ; Simple Display Line:   40 mg, 1 cap(s), Oral, daily, 90 cap(s), 3 Refill(s) ; Ordering Provider:   Dexter Silva MD; Catalog Code:   FLUoxetine ; Order Dt/Tm:   9/24/2021 1:42:05 PM CDT          insulin isophane (NPH)  :   insulin isophane (NPH) ; Status:   Prescribed ; Ordered As Mnemonic:   insulin isophane (NPH) 100 units/mL human recombinant subcutaneous suspension ; Simple Display Line:   4 units, Subcutaneous, daily, 3 pen_needle, 1 Refill(s) ; Ordering Provider:   Dexter Silva MD; Catalog Code:   insulin isophane (NPH) ; Order Dt/Tm:   7/8/2021 3:55:05 PM CDT          isosorbide mononitrate  :   isosorbide mononitrate ; Status:   Prescribed ; Ordered As Mnemonic:   isosorbide mononitrate 30 mg oral tablet, extended release ; Simple Display Line:   2 tab(s), Oral, qam, 180 tab(s), 3 Refill(s) ; Ordering Provider:   Dexter Silva MD; Catalog Code:   isosorbide mononitrate ; Order Dt/Tm:   9/24/2021 1:45:52 PM CDT          metFORMIN  :   metFORMIN ; Status:   Prescribed ; Ordered As Mnemonic:   MetFORMIN (Eqv-Glucophage XR) 500 mg oral tablet, extended release ; Simple Display Line:   1,000 mg, 2 tab(s), Oral, bid, 360 tab(s), 3 Refill(s) ; Ordering Provider:   Dexter Silva MD; Catalog Code:   metFORMIN ; Order Dt/Tm:   9/24/2021 1:42:04 PM CDT          Miscellaneous Prescription  :   Miscellaneous Prescription ; Status:   Prescribed ; Ordered As Mnemonic:   FREESTYLE LITE      JUAN C ; Simple Display Line:   1 EA, id, daily, 50 EA ; Ordering Provider:   Dexter Silva MD; Catalog Code:   Miscellaneous Prescription ; Order Dt/Tm:   4/9/2010 10:09:35 AM CDT          Miscellaneous Rx Supply  :   Miscellaneous Rx Supply ; Status:   Prescribed ; Ordered As Mnemonic:   CPAP 13 ; Simple Display Line:   See Instructions, Use every night. Have a download in the sleep center  in one month., 1 EA ; Ordering Provider:   Michel Fields MD; Catalog Code:   Miscellaneous Rx Supply ; Order Dt/Tm:   1/17/2014 10:40:19 AM CST          Miscellaneous Rx Supply  :   Miscellaneous Rx Supply ; Status:   Prescribed ; Ordered As Mnemonic:   Pen Needles ; Simple Display Line:   See Instructions, Use as directed with insulin, 100 EA, 0 Refill(s) ; Ordering Provider:   Dexter Silva MD; Catalog Code:   Miscellaneous Rx Supply ; Order Dt/Tm:   7/28/2021 2:47:25 PM CDT          nitroglycerin  :   nitroglycerin ; Status:   Prescribed ; Ordered As Mnemonic:   nitroglycerin 0.4 mg sublingual tablet ; Simple Display Line:   0.4 mg, 1 tab(s), SL, q5min, If chest pain not relieved in 5 minutes after first dose, seek immediate medical attention, PRN: for chest pain, 100 tab(s), 3 Refill(s) ; Ordering Provider:   Dexter Silva MD; Catalog Code:   nitroglycerin ; Order Dt/Tm:   9/24/2021 1:42:06 PM CDT            Home Meds    amoxicillin  :   amoxicillin ; Status:   Documented ; Ordered As Mnemonic:   amoxicillin 500 mg oral capsule ; Simple Display Line:   See Instructions, Take 4 caps 1 hour prior to the procedure, 0 Refill(s) ; Catalog Code:   amoxicillin ; Order Dt/Tm:   7/8/2021 3:08:06 PM CDT          ascorbic acid  :   ascorbic acid ; Status:   Documented ; Ordered As Mnemonic:   Vitamin C ; Simple Display Line:   daily, 0 Refill(s) ; Catalog Code:   ascorbic acid ; Order Dt/Tm:   10/23/2020 8:48:26 AM CDT          aspirin  :   aspirin ; Status:   Documented ; Ordered As Mnemonic:   aspirin ; Simple Display Line:   81mg tab po daily, 0 Refill(s) ; Catalog Code:   aspirin ; Order Dt/Tm:   3/19/2021 2:43:15 PM CDT          Miscellaneous Prescription  :   Miscellaneous Prescription ; Status:   Documented ; Ordered As Mnemonic:   Blood Builder ; Simple Display Line:   1 tab po daily, 0 Refill(s) ; Catalog Code:   Miscellaneous Prescription ; Order Dt/Tm:   7/20/2021 10:46:35 AM CDT           Miscellaneous Prescription  :   Miscellaneous Prescription ; Status:   Documented ; Ordered As Mnemonic:   Iron ; Simple Display Line:   325mg daily, 0 Refill(s) ; Catalog Code:   Miscellaneous Prescription ; Order Dt/Tm:   3/26/2021 10:27:51 AM CDT          multivitamin with iron  :   multivitamin with iron ; Status:   Documented ; Ordered As Mnemonic:   Iron 100 Plus oral tablet ; Simple Display Line:   See Instructions, 65mg tid Oral daily, 0 Refill(s) ; Catalog Code:   multivitamin with iron ; Order Dt/Tm:   7/8/2021 3:06:10 PM CDT          omega-3 polyunsaturated fatty acids  :   omega-3 polyunsaturated fatty acids ; Status:   Documented ; Ordered As Mnemonic:   Fish Oil oral capsule ; Simple Display Line:   po, daily, 0 Refill(s) ; Catalog Code:   omega-3 polyunsaturated fatty acids ; Order Dt/Tm:   7/19/2016 8:35:56 AM CDT          pantoprazole  :   pantoprazole ; Status:   Documented ; Ordered As Mnemonic:   pantoprazole 40 mg oral delayed release tablet ; Simple Display Line:   40 mg, 1 tab(s), 0 Refill(s) ; Catalog Code:   pantoprazole ; Order Dt/Tm:   5/14/2021 10:23:42 AM CDT          predniSONE  :   predniSONE ; Status:   Documented ; Ordered As Mnemonic:   predniSONE 20 mg oral tablet ; Simple Display Line:   See Instructions, 1.5 tab(s) Oral daily 10 day(s), 0 Refill(s) ; Catalog Code:   predniSONE ; Order Dt/Tm:   9/2/2021 9:19:30 AM CDT          sulfamethoxazole-trimethoprim  :   sulfamethoxazole-trimethoprim ; Status:   Documented ; Ordered As Mnemonic:   sulfamethoxazole-trimethoprim 400 mg-80 mg oral tablet ; Simple Display Line:   1 tab(s), Oral, daily, 0 Refill(s) ; Catalog Code:   sulfamethoxazole-trimethoprim ; Order Dt/Tm:   9/24/2021 1:29:38 PM CDT

## 2022-02-16 NOTE — PROGRESS NOTES
Patient:   PIOTR CORREIA            MRN: [id number]            FIN: [id number]               Age:   71 years     Sex:  Male     :  1945   Associated Diagnoses:   Medicare annual wellness visit, initial   Author:   Shira BURK [place]      Visit Information      Care Providers  (2017 02:55 pm)  Dr. Cedillo, Eye Doctor, [place]  Dr. Pastor, [facility], Fax # for Rx- [phone]  Dr. Win, cardiology,   Dr. Newberry, Dentist, RW  Ancillary Services  (2017 02:55 pm)  [facility], Pharmacy, Mail Order        Current Visit Date:  2017          Chief Complaint   2017 2:55 PM CDT    AWV; DM/HTN med check, review labs, foot exam normal     Chief complaint and symptoms noted above confirmed with patient.      History of Present Illness             The patient presents for Medicare annual wellness exam.  The patient's general health status is described as unchanged and stable.  The patient's diet is described as balanced.  Exercise occasional.  Associated symptoms consist of denies shortness of breath and denies weight loss.  Compliance problems: none.        Review of Systems   Ear/Nose/Mouth/Throat:  Hearing is evaluated.    See completed Health History for Review of Systems      Health Status   Allergies:    Allergic Reactions (Selected)  No Known Medication Allergies   Medications:  (Selected)   Prescriptions  Prescribed  CPAP 13: CPAP 13, See Instructions, Instructions: Use every night. Have a download in the sleep center in one month., Supply, # 1 EA, 0 Refill(s), Type: Maintenance  FLUoxetine 40 mg oral capsule: 1 cap(s) ( 40 mg ), po, daily, # 90 cap(s), 3 Refill(s), Type: Maintenance, Pharmacy: [facility], Pt due for visit, 1 cap(s) po daily  FREESTYLE LITE      JUAN C: 1 EA, id, daily, # 50 EA, 11 Refill(s), Pharmacy: [id number]  Imdur 30 mg oral tablet, extended release: 2 tab(s) ( 60 mg ), PO, qAM, # 180 tab(s), 3 Refill(s), Type: Maintenance, Pharmacy:  [facility], This is an updated prescription, 2 tab(s) po qam  Norco 5 mg-325 mg oral tablet: 1 tab(s), PO, q4hr, PRN: for pain, # 24 tab(s), 0 Refill(s), Type: Maintenance, Pharmacy: [facility], 1 tab(s) po q4 hrs,PRN:for pain  aspirin 325 mg oral tablet: 1 tab(s) ( 325 mg ), PO, Daily, # 90 tab(s), 3 Refill(s), Type: Maintenance, Pharmacy: [facility], 1 tab(s) po daily  atorvastatin 40 mg oral tablet: 1 tab(s) ( 40 mg ), PO, Daily, # 90 tab(s), 3 Refill(s), Type: Maintenance, Pharmacy: [facility], 1 tab(s) po daily  doxazosin 2 mg oral tablet: 1 tab(s) ( 2 mg ), po, daily, # 90 tab(s), 3 Refill(s), Type: Maintenance, Pharmacy: [facility], Pt due for visit, 1 tab(s) po daily  hydroCHLOROthiazide-lisinopril 25 mg-20 mg oral tablet: 1 tab(s), po, daily, # 90 tab(s), 3 Refill(s), Type: Maintenance, Pharmacy: [facility], pt due for visit, 1 tab(s) po daily  metFORMIN 500 mg oral tablet, extended release: 2 tab(s) ( 1,000 mg ), po, bid, # 360 tab(s), 3 Refill(s), Type: Maintenance, Pharmacy: [facility], 2 tab(s) po bid  nitroglycerin 0.4 mg sublingual tablet: 1 tab(s) ( 0.4 mg ), SL, q5min, Instructions: If chest pain not relieved in 5 minutes after first dose, seek immediate medical attention, PRN: for chest pain, # 10 tab(s), 3 Refill(s), Type: Maintenance, Pharmacy: [facility], 1 tab(s) sl q5...  Documented Medications  Documented  Fish Oil oral capsule: po, daily, 0 Refill(s), Type: Maintenance   Problem list:    All Problems  BPH (benign prostatic hyperplasia) / SNOMED CT 797683691 / Confirmed  Back pain, chronic / SNOMED CT 761839530 / Confirmed  Coronary Artery Disease / ICD-9-.00 / Confirmed  DM Type 2 (Diabetes Mellitus, Type 2) / ICD-9-.00 / Confirmed  Hypertension / ICD-9-.9 / Confirmed  Obese / ICD-9-.00 / Probable  Hyperlipemia / SNOMED CT 042423187 / Confirmed  Depression, Recurrent / SNOMED CT 220111719  / Confirmed  Sleep Apnea / ICD-9-.57 / Confirmed   Medicare Assessments      Fall Risk Screen  Not recorded for selected visit.     Functional Assessment  Not recorded for selected visit.       Geriatric Depression Screen  Not recorded for selected visit.     Hearing Screen  Not recorded for selected visit.     Home Safety Screen  Not recorded for selected visit.     Vision Screen  (09/12/2017 02:55 pm)       Eye, Right Visual Acuity Evaluated:  20/30       Eye, Left Visual Acuity Evaluated:  20/40       Vision Screen Comments:  20/30 bothuncorrected     ADL's and Safety reviewed with patient.      Histories   Past Medical History:    Active  Sleep Apnea (ICD-9-.57): Onset on 6/28/2005 at 59 years.  Comments:  10/18/2013 CDT 3:10 PM CDT - Michel Fields MD  AHI 7.7 and REM AHI 22 in 2005 11/25/2013 CST 1:54 PM CST - Michel Fields MD  On 11/10/2013 AHI 18.3 with oximetry susanne 77%. Good/optimal CPAP titration to 12 with 6.5 minutes supine REM  Hyperlipemia (SNOMED CT 143479693)  DM Type 2 (Diabetes Mellitus, Type 2) (ICD-9-.00)  Coronary Artery Disease (ICD-9-.00)  BPH (benign prostatic hyperplasia) (SNOMED CT 307031911)  Depression, Recurrent (SNOMED CT 246950714)  Hypertension (ICD-9-.9)  Resolved  ACUTE MYOCARDIAL INFARCTION (ICD-9-):  Resolved in 2007 at 61 years.   Family History:    Stroke  Father     Procedure history:    Colonoscopy (SNOMED CT 005340992) performed by Shukri Arndt on 11/8/2013 at 68 Years.  Comments:  11/12/2013 1:13 PM - Germaine Lea RN  Sedation: MAC  Diverticulosis in the sigmoid colon, otherwise normal.  Repeat in 10 years.  Angioplasty (SNOMED CT 1109242) in the month of 5/2007 at 61 Years.  CABG - Coronary artery bypass graft (SNOMED CT 271565453) in the month of 2/2004 at 58 Years.  Colonoscopy (SNOMED CT 194722635) performed by Aashish Connolly MD on 4/4/2002 at 56 Years.  Comments:  12/9/2010 7:05 AM - Isabella Delgado  Repeat in 10  years.  ORIF right hand in 2001 at 56 Years.  Flexible sigmoidoscopy (SNOMED CT 40194968) performed by Castillo Anaya MD on 11/13/1995 at 50 Years.  Comments:  10/16/2013 9:17 AM - Gisele Arevalo  Negative.  ORIF left shoulder in 1975 at 30 Years.   Social History:        Alcohol Assessment: Denies Alcohol Use      Tobacco Assessment: Past      Exercise and Physical Activity Assessment: Occasional exercise        Physical Examination   VS/Measurements   General:  Alert and oriented, No acute distress.    Eye:  Pupils are equal, round and reactive to light, Normal conjunctiva.    HENT:  Tympanic membranes are clear, Oral mucosa is moist.    Neck:  Supple, Non-tender, No carotid bruit, No lymphadenopathy.    Respiratory:  Respirations are non-labored.    Cardiovascular:  Normal rate, Regular rhythm, No edema.    Gastrointestinal:  Soft, Non-tender, Non-distended.    Musculoskeletal:  Normal range of motion, Normal gait.    Integumentary:  Warm, No rash.    Neurologic:  Alert, Oriented, No focal deficits.    Cognition and Speech:  Oriented, Speech clear and coherent, Functional cognition intact.    Psychiatric:  Cooperative, Appropriate mood & affect, Normal judgment, Patient's GDS and CAGE questionnaire reviewed and discussed with patient. .       Impression and Plan   Course:  Progressing as expected.    Plan:  Discussed  preventative services.  Appropriate weight and diet discussed.  Discussed Advance Life Directives.    Preventative services needed::  Preventative services checklist reviewed, updated and copy given to patient.  Please see scanned document.         HIV risk screening: No.    Patient Instructions:          Limitations: Limit caffeine intake, Limit alcohol consumption.         Counseled: Patient, Regarding treatment, Regarding medications, Diet, Activity, Verbalized understanding.    Diagnosis     Medicare annual wellness visit, initial (MRC57-CZ Z00.00).

## 2022-02-16 NOTE — LETTER
(Inserted Image. Unable to display)   March 27, 2019      PIOTR CORREIA  [address] [place] [address] [id number]        Dear PIOTR,      Thank you for selecting [facility] ([facility]viously [facility]) for your healthcare needs.     Our records indicate you are due for the following services:    Diabetic Exam ~ Please bring your glucose meter and/or your blood glucose diary to your appointment.  Urine labs ~ Please be prepared to leave a urine specimen for evaluation.  Fasting Lab Tests ~ Please do not eat or drink anything 10 hours prior to your scheduled appointment time.                (Water and any medications that you may need are allowed unless directed otherwise.)    If you had your labs done at another facility or with [facility] at [facility], please bring in a copy of the results to your next visit, mail a copy, or drop off a copy of your results to your Healthcare Provider.    You are due for lab work and an office visit; please schedule the lab appointment 1 week before the office visit.  This will assure all results are available to discuss with your provider during your visit.    **It is very helpful if you bring your medication bottles to your appointment.  This assures we have all of your current medications, including strength and dosing information, documented accurately in your medical record.    To schedule an appointment or if you have further questions, please contact your primary clinic:   [facility]  [phone]   [facility]  [phone]             [facility]      [phone]      Powered by [employer]    Sincerely,    Dexter Silva M.D.

## 2022-02-16 NOTE — TELEPHONE ENCOUNTER
---------------------  From: Hazel Jack CMA (Phone Messages Pool ([id number]_[facility]))   To: PIOTR CORREIA    Sent: 7/3/2019 3:20:40 PM CDT  Subject: RE: Lisinopril       Brianda and Turner,    The Lisinopril was refilled for 90 days on 6/28/19, could the delay be due to the Holiday tomorrow?    Hazel Mcclelland CMA    ---------------------  From: BRIANDA CORREIA on behalf of PIOTR CORREIA  To: [facility]  Sent: 07/03/2019 03:13 p.m. CDT  Subject: Lisinopril  Turner has not received his new prescripion for Lisinopril. We got an email from [facility] indicating there is a delay. I wonder if the order was for 30 days rather than 90 days. [facility] mailing is onlly for 90 rx.

## 2022-02-16 NOTE — PROGRESS NOTES
Patient:   PIOTR CORREIA            MRN: [id number]            FIN: [id number]               Age:   75 years     Sex:  Male     :  1945   Associated Diagnoses:   Stable angina pectoris; Anemia   Author:   Dexter Silva MD      Chief Complaint   2021 2:57 PM CDT     TAVR follow up. Weak and fatigued.     Chief complaint and symptoms noted above confirmed with patient.      Interval History   Angina   Anginal episodes increased.  Change in activity tolerance decreased tolerance.  Prn use of nitroglycerin unchanged.  The course is worsening.  Associated symptoms characterized by edema: peripheral and shortness of breath.        Review of Systems   Constitutional:  Negative except as documented in history of present illness.    Cardiovascular:  Negative except as documented in history of present illness.    Gastrointestinal:  Negative.       Health Status   Allergies:    Allergic Reactions (Selected)  No Known Medication Allergies   Medications:  (Selected)   Prescriptions  Prescribed  CPAP 13: CPAP 13, See Instructions, Instructions: Use every night. Have a download in the sleep center in one month., Supply, # 1 EA, 0 Refill(s), Type: Maintenance  FLUoxetine 40 mg oral capsule: = 1 cap(s) ( 40 mg ), Oral, daily, # 90 cap(s), 0 Refill(s), Type: Maintenance, Pharmacy: [facility], 1 cap(s) Oral daily, 67, in, 20 8:57:00 CDT, Height Measured, 198, lb, 21 9:34:00 CDT, Weight Measured  FREESTYLE LITE      JUAN C: 1 EA, id, daily, # 50 EA, 11 Refill(s), Pharmacy: [id number]  MetFORMIN (Eqv-Glucophage XR) 500 mg oral tablet, extended release: = 2 tab(s) ( 1,000 mg ), Oral, bid, # 360 tab(s), 0 Refill(s), Type: Maintenance, Pharmacy: [facility], 2 tab(s) Oral bid, 67, in, 20 8:57:00 CDT, Height Measured, 198, lb, 21 9:34:00 CDT, Weight Measured  Norco 5 mg-325 mg oral tablet: 1 tab(s), PO, q4hr, PRN: for pain, # 24 tab(s), 0 Refill(s), Type: Maintenance,  Pharmacy: [facility], 1 tab(s) Oral q4 hrs,PRN:for pain  atorvastatin 40 mg oral tablet: = 1 tab(s) ( 40 mg ), Oral, daily, # 90 tab(s), 0 Refill(s), Type: Maintenance, Pharmacy: [facility], 1 tab(s) Oral daily, 67, in, 05/28/20 8:57:00 CDT, Height Measured, 198, lb, 04/02/21 9:34:00 CDT, Weight Measured  doxazosin 2 mg oral tablet: = 1 tab(s) ( 2 mg ), Oral, daily, # 90 tab(s), 0 Refill(s), Type: Maintenance, Pharmacy: [facility], 1 tab(s) Oral daily, 67, in, 05/28/20 8:57:00 CDT, Height Measured, 198, lb, 04/02/21 9:34:00 CDT, Weight Measured  isosorbide mononitrate 30 mg oral tablet, extended release: = 2 tab(s), Oral, qam, # 180 tab(s), 3 Refill(s), Type: Maintenance, Pharmacy: [facility], Due for appt in April, 2 tab(s) Oral qam, 67, in, 05/28/20 8:57:00 CDT, Height Measured, 197.2, lb, 05/21/20 9:22:00 CDT, Weight Measured  nitroglycerin 0.4 mg sublingual tablet: = 1 tab(s) ( 0.4 mg ), SL, q5min, Instructions: If chest pain not relieved in 5 minutes after first dose, seek immediate medical attention, PRN: for chest pain, # 100 tab(s), 3 Refill(s), Type: Maintenance, Pharmacy: [facility], This is a 3...  Documented Medications  Documented  Eliquis 5 mg oral tablet: ( 5 mg ), Oral, bid, # 60 tab(s), 0 Refill(s), Type: Maintenance  Fish Oil oral capsule: po, daily, 0 Refill(s), Type: Maintenance  Iron 100 Plus oral tablet: See Instructions, Instructions: 65mg bid Oral daily, 0 Refill(s), Type: Maintenance  Iron: Iron, Instructions: 325mg daily, 0 Refill(s), Type: Maintenance  Vitamin C: daily, 0 Refill(s), Type: Maintenance  amoxicillin 500 mg oral capsule: See Instructions, Instructions: Take 4 caps 1 hour prior to the procedure, 0 Refill(s), Type: Maintenance  aspirin: Instructions: 81mg tab po daily, 0 Refill(s), Type: Maintenance  insulin isophane (NPH) 100 units/mL human recombinant subcutaneous suspension: ( 4 units ), Subcutaneous, daily, 0 Refill(s), Type:  Maintenance  pantoprazole 40 mg oral delayed release tablet: See Instructions, Instructions: 0.5 tab(s) Oral daily, 0 Refill(s), Type: Maintenance  predniSONE 10 mg oral tablet: See Instructions, Instructions: 5mg po daily, 0 Refill(s), Type: Maintenance  sulfamethoxazole-trimethoprim 400 mg-80 mg oral tablet: 1 tab(s), Oral, daily, 0 Refill(s), Type: Maintenance   Problem list:    All Problems (Selected)  Obstructive sleep apnea (adult) (pediatric) / SNOMED CT 849998934 / Confirmed  Back pain, chronic / SNOMED CT 275467956 / Confirmed  Type 2 diabetes mellitus without complications / SNOMED CT 547550559 / Confirmed  Obesity, unspecified / SNOMED CT 2410718584 / Probable  History of DVT in adulthood / SNOMED CT 7780933814 / Confirmed  Atherosclerotic heart disease of native coronary artery without angina pectoris / SNOMED CT 8404371180 / Confirmed  Depression, Recurrent / SNOMED CT 114952531 / Confirmed  BPH (benign prostatic hyperplasia) / SNOMED CT 341384174 / Confirmed  Hyperlipemia / SNOMED CT 278701716 / Confirmed  Anemia / SNOMED CT 276238588 / Confirmed  Essential (primary) hypertension / SNOMED CT 23153237 / Confirmed      Histories   Past Medical History:    Active  Obstructive sleep apnea (adult) (pediatric) (SNOMED CT 890900308): Onset on 6/28/2005 at 59 years.  Comments:  10/18/2013 CDT 3:10 PM CDT Michel Tubbs MD  AHI 7.7 and REM AHI 22 in 2005 11/25/2013 CST 1:54 PM CST Michel Tubbs MD  On 11/10/2013 AHI 18.3 with oximetry susanne 77%. Good/optimal CPAP titration to 12 with 6.5 minutes supine REM  Hyperlipemia (SNOMED CT 031896347)  Type 2 diabetes mellitus without complications (SNOMED CT 538974259)  Atherosclerotic heart disease of native coronary artery without angina pectoris (SNOMED CT 8584784677)  BPH (benign prostatic hyperplasia) (SNOMED CT 834144210)  Depression, Recurrent (SNOMED CT 873419732)  Obesity, unspecified (SNOMED CT 9125163527)  Back pain, chronic (SNOMED CT  756684785)  Essential (primary) hypertension (SNOMED CT 30679874)  Resolved  Inpatient stay (SNOMED CT 545227284): Onset on 3/20/2021 at 75 years.  Resolved on 3/22/2021 at 75 years.  Comments:  3/25/2021 CDT 10:53 AM CDT - Isabella Delgaod  [facility] [place] [place] - Pneumonia of both lungs due to infectious organism.  ACUTE MYOCARDIAL INFARCTION (ICD-9-):  Resolved in  at 61 years.   Family History:    Hypertension  Father ()  Heart disease  Father ()  Alcohol abuse  Mother ()  Stroke  Father ()  Liver disease  Mother ()     Procedure history:    Angiogram (SNOMED CT 413946151) in 2017 at 72 Years.  Colonoscopy (SNOMED CT 859941075) performed by Shukri Arndt on 2013 at 68 Years.  Comments:  2013 1:13 PM CST - Leonora WILLS, Germaine  Sedation: MAC  Diverticulosis in the sigmoid colon, otherwise normal.  Repeat in 10 years.  Angioplasty (SNOMED CT 8130516) in the month of 2007 at 61 Years.  CABG - Coronary artery bypass graft (SNOMED CT 544555127) in the month of 2004 at 58 Years.  Colonoscopy (SNOMED CT 761155545) performed by Aashish Connolly MD on 2002 at 56 Years.  Comments:  2010 7:05 AM CST - Isabella Delgado  Repeat in 10 years.  ORIF right hand in  at 56 Years.  Flexible sigmoidoscopy (SNOMED CT 29451381) performed by Castillo Anaya MD on 1995 at 50 Years.  Comments:  10/16/2013 9:17 AM CDT - Gisele Arevalo  Negative.  ORIF left shoulder in  at 30 Years.      Physical Examination   Vital Signs   2021 2:57 PM CDT Peripheral Pulse Rate 83 bpm    Systolic Blood Pressure 110 mmHg    Diastolic Blood Pressure 52 mmHg  LOW    Mean Arterial Pressure 71 mmHg    Oxygen Saturation 97 %      Measurements from flowsheet : Measurements   2021 2:57 PM CDT Height/Length Estimated 67 in    Weight Measured - Standard 180 lb      General:  Alert and oriented, No acute distress.    Neck:  Supple.    Respiratory:  Lungs are clear to  auscultation, Respirations are non-labored.    Cardiovascular:  Normal rate, Regular rhythm.         Edema: Bilateral, 1+.       Review / Management   Results review:  HGB 7.7.    ECG interpretation:  Time  7/8/2021 3:59:00 PM, Rate  75  beats per minute, Normal sinus rhythm, 1st degree block, Abnormal EKG.       Impression and Plan   Diagnosis     Stable angina pectoris (UMB78-[place] I20.8).     Course:  Stable.    Diagnosis     Anemia (ILV66-PI D64.9).     Course:  Improving: none.    Orders     Continue iron.  Recheck on 7/20.

## 2022-02-16 NOTE — TELEPHONE ENCOUNTER
---------------------  From: Nelli Hunter RN (Phone Messages Pool ([id number]_[facility]))   To: PIOTR CORREIA    Sent: 10/1/2020 4:01:28 PM CDT  Subject: RE: Cardiologist Referral     [facility] -    Dr. Silva is out of clinic until Monday 10-5. I can send your message on to another provider to address if that is OK?  Let me know what you would prefer.     Thanks,    Nelli LACY RN      ---------------------  From: JIMENEZ CORREIA on behalf of PIOTR CORREIA  To: [facility]  Sent: 10/01/2020 03:53 p.m. CDT  Subject: Cardiologist Referral  Dr. Silva: Turner has not heard from Meri Win in response to your referral of May 29, 2020. Can your office follow up or should I call?    Turner continues to experience shortness of breath from any exertion.  This occurs when we are taking a leisurely walk and climbing stairs. He hasn t taken nitro for it; he just rests until the discomfort dissipates. Nevertheless, it worries us.    Turner said the cardiologist noted some valve problem. Could this be caused by that?

## 2022-02-16 NOTE — TELEPHONE ENCOUNTER
Entered by Deana French CMA on April 29, 2021 7:09:35 AM CDT  ---------------------  From: Deana French CMA   To: [facility]    Sent: 4/29/2021 7:09:35 AM CDT  Subject: Medication Management     ** Submitted: **  Order:metFORMIN (MetFORMIN (Eqv-Glucophage XR) 500 mg oral tablet, extended release)  2 tab(s)  Oral  bid  Qty:  360 tab(s)        Refills:  0          Substitutions Allowed     Route To Pharmacy - [facility]    Signed by Deana French CMA  4/29/2021 12:09:00 PM UT    ** Submitted: **  Complete:metFORMIN (MetFORMIN (Eqv-Glucophage XR) 500 mg oral tablet, extended release)   Signed by Deana French CMA  4/29/2021 12:09:00 PM UT    ** Not Approved:  **  metFORMIN (METFORMIN ER 500MG TABLET)  TAKE 2 TABLETS BY MOUTH  TWICE DAILY  Qty:  360 tab(s)        Days Supply:  90        Refills:  3          Substitutions Allowed     Route To Pharmacy - [facility]   Note from Pharmacy:  Requesting 1 year supply  Signed by Deana French CMA            ** Submitted: **  Order:FLUoxetine (FLUoxetine 40 mg oral capsule)  1 cap(s)  Oral  daily  Qty:  90 cap(s)        Refills:  0          Substitutions Allowed     Route To Pharmacy - [facility]    Signed by Deana French CMA  4/29/2021 12:08:00 PM UT    ** Submitted: **  Complete:FLUoxetine (FLUoxetine 40 mg oral capsule)   Signed by Deana French CMA  4/29/2021 12:09:00 PM UT    ** Submitted: **  Complete:FLUoxetine (FLUoxetine 40 mg oral capsule)   Signed by Deana French CMA  4/29/2021 12:09:00 PM UT    ** Not Approved:  **  FLUoxetine (FLUOXETINE  40MG  CAP)  TAKE 1 CAPSULE BY MOUTH  DAILY  Qty:  90 cap(s)        Days Supply:  90        Refills:  3          Substitutions Allowed     Route To Pharmacy - [facility]   Note from Pharmacy:  Requesting 1 year supply  Signed by Deana French CMA            ** Submitted: **  Order:doxazosin (doxazosin 2 mg oral tablet)  1 tab(s)  Oral  daily  Qty:  90 tab(s)        Refills:  0           Substitutions Allowed     Route To Pharmacy - [facility]    Signed by Deana French CMA  4/29/2021 12:08:00 PM UTC    ** Submitted: **  Complete:doxazosin (doxazosin 2 mg oral tablet)   Signed by Deana French CMA  4/29/2021 12:08:00 PM UTC    ** Submitted: **  Complete:doxazosin (doxazosin 2 mg oral tablet)   Signed by Deana French CMA  4/29/2021 12:08:00 PM UT    ** Not Approved:  **  doxazosin (DOXAZOSIN  2MG  TAB  MES)  TAKE 1 TABLET BY MOUTH  DAILY  Qty:  90 tab(s)        Days Supply:  90        Refills:  3          Substitutions Allowed     Route To Pharmacy - [facility]   Note from Pharmacy:  Requesting 1 year supply  Signed by Deana French CMA            ** Submitted: **  Order:atorvastatin (atorvastatin 40 mg oral tablet)  1 tab(s)  Oral  daily  Qty:  90 tab(s)        Refills:  0          Substitutions Allowed     Route To Pharmacy - [facility]    Signed by Deana French CMA  4/29/2021 12:07:00 PM UT    ** Submitted: **  Complete:atorvastatin (atorvastatin 40 mg oral tablet)   Signed by Deana French CMA  4/29/2021 12:08:00 PM UTC    ** Submitted: **  Complete:atorvastatin (atorvastatin 40 mg oral tablet)   Signed by Deana rFench CMA  4/29/2021 12:08:00 PM UTC    ** Not Approved:  **  atorvastatin (ATORVASTATIN  40MG  TAB)  TAKE 1 TABLET BY MOUTH  DAILY  Qty:  90 tab(s)        Days Supply:  90        Refills:  3          Substitutions Allowed     Route To Pharmacy - [facility]   Note from Pharmacy:  Requesting 1 year supply  Signed by Deana French CMA            ** Patient matched by Deana French CMA on 4/29/2021 7:06:30 AM CDT **      ------------------------------------------  From: [facility]  To: Dexter Silva MD  Sent: April 29, 2021 3:18:07 AM CDT  Subject: Medication Management  Due: April 27, 2021 12:41:41 AM CDT     ** On Hold Pending Signature **     Drug: atorvastatin (atorvastatin 40 mg oral tablet), TAKE 1 TABLET BY MOUTH  DAILY  Quantity: 90 tab(s)  Days Supply: 90  Refills: 3  Substitutions Allowed  Notes from Pharmacy: - First Attempt Ref: [id number]     Dispensed Drug: atorvastatin (atorvastatin 40 mg oral tablet), TAKE 1 TABLET BY MOUTH DAILY  Quantity: 90 tab(s)  Days Supply: 90  Refills: 3  Substitutions Allowed  Notes from Pharmacy: Requesting 1 year supply     ** On Hold Pending Signature **     Drug: metFORMIN (MetFORMIN (Eqv-Glucophage XR) 500 mg oral tablet, extended release), TAKE 2 TABLETS BY MOUTH TWICE DAILY  Quantity: 360 tab(s)  Days Supply: 90  Refills: 3  Substitutions Allowed  Notes from Pharmacy: - First Attempt Ref: [id number]     Dispensed Drug: metFORMIN (MetFORMIN (Eqv-Glucophage XR) 500 mg oral tablet, extended release), TAKE 2 TABLETS BY MOUTH TWICE DAILY  Quantity: 360 tab(s)  Days Supply: 90  Refills: 3  Substitutions Allowed  Notes from Pharmacy: Requesting 1 year supply     ** On Hold Pending Signature **     Drug: FLUoxetine (FLUoxetine 40 mg oral capsule), TAKE 1 CAPSULE BY MOUTH DAILY  Quantity: 90 cap(s)  Days Supply: 90  Refills: 3  Substitutions Allowed  Notes from Pharmacy: - First Attempt Ref: [id number]     Dispensed Drug: FLUoxetine (FLUoxetine 40 mg oral capsule), TAKE 1 CAPSULE BY MOUTH DAILY  Quantity: 90 cap(s)  Days Supply: 90  Refills: 3  Substitutions Allowed  Notes from Pharmacy: Requesting 1 year supply     ** On Hold Pending Signature **     Drug: doxazosin (doxazosin 2 mg oral tablet), TAKE 1 TABLET BY MOUTH DAILY  Quantity: 90 tab(s)  Days Supply: 90  Refills: 3  Substitutions Allowed  Notes from Pharmacy: - First Attempt Ref: [id number]     Dispensed Drug: doxazosin (doxazosin 2 mg oral tablet), TAKE 1 TABLET BY MOUTH DAILY  Quantity: 90 tab(s)  Days Supply: 90  Refills: 3  Substitutions Allowed  Notes from Pharmacy: Requesting 1 year supply  ------------------------------------------4/8/21 pneumonia  10/23/20 [facility] October

## 2022-02-16 NOTE — TELEPHONE ENCOUNTER
Entered by Margo Carrasco CMA on April 28, 2021 11:58:34 AM CDT  Noted      ---------------------  From: Mary Wallace   To: [facility] Message Pool ([id number]);     Sent: 4/28/2021 9:14:12 AM CDT  Subject: General Message     Pt cancelled appt for tomorrow at 1020 for [facility] f-u - did not reschedule at this time

## 2022-02-16 NOTE — TELEPHONE ENCOUNTER
Entered by Deana French CMA on March 09, 2020 8:23:37 AM CDT  ---------------------  From: Deana French CMA   To: [facility]    Sent: 3/9/2020 8:23:37 AM CDT  Subject: Medication Management     ** Submitted: **  Order:isosorbide mononitrate (isosorbide mononitrate 30 mg oral tablet, extended release)  2 tab(s)  Oral  qam  Qty:  180 tab(s)        Days Supply:  90        Refills:  0          Substitutions Allowed     Route To Pharmacy - [facility]    Signed by Deana French CMA  3/9/2020 8:23:00 AM    ** Submitted: **  Complete:isosorbide mononitrate (isosorbide mononitrate 30 mg oral tablet, extended release)   Signed by Deana French CMA  3/9/2020 8:23:00 AM    ** Not Approved:  **  isosorbide mononitrate (ISOSORBIDE MONONITRATE  30MG  TAB  EXTENDED RELEASE)  TAKE 2 TABLETS BY MOUTH  EVERY MORNING  Qty:  180 tab(s)        Days Supply:  90        Refills:  0          Substitutions Allowed     Route To Pharmacy - [facility]   Signed by Deana French CMA            ** Submitted: **  Order:metFORMIN (metFORMIN 500 mg oral tablet, extended release)  2 tab(s)  Oral  bid  Qty:  360 tab(s)        Days Supply:  90        Refills:  0          Substitutions Allowed     Route To Pharmacy - [facility]    Signed by Deana French CMA  3/9/2020 8:22:00 AM    ** Submitted: **  Complete:metFORMIN (metFORMIN 500 mg oral tablet, extended release)   Signed by Deana French CMA  3/9/2020 8:23:00 AM    ** Not Approved:  **  metFORMIN (METFORMIN ER 500MG TABLET)  TAKE 2 TABLETS BY MOUTH TWO TIMES DAILY  Qty:  360 tab(s)        Days Supply:  90        Refills:  0          Substitutions Allowed     Route To Pharmacy - [facility]   Signed by Deana French CMA              ** Submitted: **  Order:isosorbide mononitrate (isosorbide mononitrate 30 mg oral tablet, extended release)  2 tab(s)  Oral  qam  Qty:  180 tab(s)        Days Supply:  90        Refills:  0          Substitutions Allowed     Route  To Pharmacy - [facility]    Signed by Deana French CMA  3/9/2020 8:22:00 AM    ** Submitted: **  Complete:isosorbide mononitrate (isosorbide mononitrate 30 mg oral tablet, extended release)   Signed by Deana French CMA  3/9/2020 8:22:00 AM    ** Not Approved:  **  isosorbide mononitrate (ISOSORBIDE MONONITRATE  30MG  TAB  EXTENDED RELEASE)  TAKE 2 TABLETS BY MOUTH  EVERY MORNING  Qty:  180 tab(s)        Days Supply:  90        Refills:  0          Substitutions Allowed     Route To Pharmacy - [facility]   Signed by Deana French CMA            ** Submitted: **  Order:FLUoxetine (FLUoxetine 40 mg oral capsule)  1 cap(s)  Oral  daily  Qty:  90 cap(s)        Days Supply:  90        Refills:  0          Substitutions Allowed     Route To Pharmacy - [facility]    Signed by Deana French CMA  3/9/2020 8:22:00 AM    ** Submitted: **  Complete:FLUoxetine (FLUoxetine 40 mg oral capsule)   Signed by Deana French CMA  3/9/2020 8:22:00 AM    ** Not Approved:  **  FLUoxetine (FLUOXETINE  40MG  CAP)  TAKE 1 CAPSULE BY MOUTH  DAILY  Qty:  90 cap(s)        Days Supply:  90        Refills:  0          Substitutions Allowed     Route To Pharmacy - [facility]   Signed by Deana French CMA            ** Submitted: **  Order:doxazosin (doxazosin 2 mg oral tablet)  1 tab(s)  Oral  daily  Qty:  90 tab(s)        Days Supply:  90        Refills:  0          Substitutions Allowed     Route To Pharmacy - [facility]    Signed by Deana French CMA  3/9/2020 8:21:00 AM    ** Submitted: **  Complete:doxazosin (doxazosin 2 mg oral tablet)   Signed by Deana French CMA  3/9/2020 8:22:00 AM    ** Not Approved:  **  doxazosin (DOXAZOSIN  2MG  TAB  MES)  TAKE 1 TABLET BY MOUTH  DAILY  Qty:  90 tab(s)        Days Supply:  90        Refills:  0          Substitutions Allowed     Route To Pharmacy - [facility]   Signed by Deana French CMA            ** Submitted: **  Order:atorvastatin (atorvastatin  40 mg oral tablet)  1 tab(s)  Oral  daily  Qty:  90 tab(s)        Days Supply:  90        Refills:  0          Substitutions Allowed     Route To Pharmacy - [facility]    Signed by Deana French CMA  3/9/2020 8:21:00 AM    ** Submitted: **  Complete:atorvastatin (atorvastatin 40 mg oral tablet)   Signed by Deana French CMA  3/9/2020 8:21:00 AM    ** Not Approved:  **  atorvastatin (ATORVASTATIN  40MG  TAB)  TAKE 1 TABLET BY MOUTH  DAILY  Qty:  90 tab(s)        Days Supply:  90        Refills:  0          Substitutions Allowed     Route To Pharmacy - [facility]   Signed by Deana French CMA            ------------------------------------------  From[facility]  To: Dexter Silva MD  Sent: March 7, 2020 3:38:50 AM CST  Subject: Medication Management  Due: March 8, 2020 4:38:50 AM CDT    ** On Hold Pending Signature **  Drug: FLUoxetine (FLUoxetine 40 mg oral capsule)  TAKE 1 CAPSULE BY MOUTH  DAILY  Quantity: 90 cap(s)  Days Supply: 90  Refills: 0  Substitutions Allowed  Notes from Pharmacy: - First Attempt Ref: [id number]    Dispensed Drug: FLUoxetine (FLUoxetine 40 mg oral capsule)  TAKE 1 CAPSULE BY MOUTH  DAILY  Quantity: 90 cap(s)  Days Supply: 90  Refills: 0  Substitutions Allowed  Notes from Pharmacy:     ** On Hold Pending Signature **  Drug: metFORMIN (metFORMIN 500 mg oral tablet, extended release)  TAKE 2 TABLETS BY MOUTH TWO TIMES DAILY  Quantity: 360 tab(s)  Days Supply: 90  Refills: 0  Substitutions Allowed  Notes from Pharmacy: - First Attempt Ref: [id number]    Dispensed Drug: metFORMIN (metFORMIN 500 mg oral tablet, extended release)  TAKE 2 TABLETS BY MOUTH TWO TIMES DAILY  Quantity: 360 tab(s)  Days Supply: 90  Refills: 0  Substitutions Allowed  Notes from Pharmacy:     ** On Hold Pending Signature **  Drug: atorvastatin (atorvastatin 40 mg oral tablet)  TAKE 1 TABLET BY MOUTH  DAILY  Quantity: 90 tab(s)  Days Supply: 90  Refills: 0  Substitutions Allowed  Notes  from Pharmacy: - First Attempt Ref: [id number]    Dispensed Drug: atorvastatin (atorvastatin 40 mg oral tablet)  TAKE 1 TABLET BY MOUTH  DAILY  Quantity: 90 tab(s)  Days Supply: 90  Refills: 0  Substitutions Allowed  Notes from Pharmacy:     ** On Hold Pending Signature **  Drug: isosorbide mononitrate (Imdur 30 mg oral tablet, extended release)  TAKE 2 TABLETS BY MOUTH  EVERY MORNING  Quantity: 180 tab(s)  Days Supply: 90  Refills: 0  Substitutions Allowed  Notes from Pharmacy: - First Attempt Ref: [id number]    Dispensed Drug: isosorbide mononitrate (isosorbide mononitrate 30 mg oral tablet, extended release)  TAKE 2 TABLETS BY MOUTH  EVERY MORNING  Quantity: 180 tab(s)  Days Supply: 90  Refills: 0  Substitutions Allowed  Notes from Pharmacy:     ** On Hold Pending Signature **  Drug: doxazosin (doxazosin 2 mg oral tablet)  TAKE 1 TABLET BY MOUTH  DAILY  Quantity: 90 tab(s)  Days Supply: 90  Refills: 0  Substitutions Allowed  Notes from Pharmacy: - First Attempt Ref: [id number]    Dispensed Drug: doxazosin (doxazosin 2 mg oral tablet)  TAKE 1 TABLET BY MOUTH  DAILY  Quantity: 90 tab(s)  Days Supply: 90  Refills: 0  Substitutions Allowed  Notes from Pharmacy:   ------------------------------------------Med Refill      Date of last office visit and reason:  10/11/19; AWV / DM      Date of last Med Check / Px:   10/11/19  Date of last labs pertaining to med:  10/11/19    RTC order in chart:  yes; April    For Protocol refill, has patient been contacted:   n/a

## 2022-02-16 NOTE — NURSING NOTE
Comprehensive Intake Entered On:  4/2/2021 9:44 AM CDT    Performed On:  4/2/2021 9:34 AM CDT by Margo Carrasco CMA               Summary   Chief Complaint :   Follow up pneumonia.    Advance Directive :   Yes   Menstrual Status :   N/A   Weight Measured :   198 lb(Converted to: 198 lb 0 oz, 89.811 kg)    Height/Length Estimated :   67 in(Converted to: 5 ft 7 in, 170.18 cm)    Systolic Blood Pressure :   116 mmHg   Diastolic Blood Pressure :   58 mmHg (LOW)    Mean Arterial Pressure :   77 mmHg   Peripheral Pulse Rate :   92 bpm   BP Site :   Right arm   BP Method :   Manual   Temperature Tympanic :   98.6 DegF(Converted to: 37.0 DegC)    Respiratory Rate :   20 br/min   Oxygen Saturation :   88 % (LOW)    Margo Carrasco CMA - 4/2/2021 9:34 AM CDT   Health Status   Allergies Verified? :   Yes   Medication History Verified? :   Yes   Immunizations Current :   Yes   Medical History Verified? :   Yes   Pre-Visit Planning Status :   Completed   Tobacco Use? :   Former smoker   Margo Carrasco CMA - 4/2/2021 9:34 AM CDT   Consents   Consent for Immunization Exchange :   Consent Granted   Consent for Immunizations to Providers :   Consent Granted   Margo Carrasco CMA - 4/2/2021 9:34 AM CDT   Meds / Allergies   (As Of: 4/2/2021 9:44:46 AM CDT)   Allergies (Active)   No Known Medication Allergies  Estimated Onset Date:   Unspecified ; Created By:   Deana French CMA; Reaction Status:   Active ; Category:   Drug ; Substance:   No Known Medication Allergies ; Type:   Allergy ; Updated By:   Deana French CMA; Reviewed Date:   4/2/2021 9:44 AM CDT        Medication List   (As Of: 4/2/2021 9:44:46 AM CDT)   Prescription/Discharge Order    acetaminophen-hydrocodone  :   acetaminophen-hydrocodone ; Status:   Prescribed ; Ordered As Mnemonic:   Norco 5 mg-325 mg oral tablet ; Simple Display Line:   1 tab(s), PO, q4hr, PRN: for pain, 24 tab(s), 0 Refill(s) ; Ordering Provider:   Dexter Silva MD; Catalog Code:    acetaminophen-hydrocodone ; Order Dt/Tm:   3/24/2020 1:06:36 PM CDT          atorvastatin  :   atorvastatin ; Status:   Prescribed ; Ordered As Mnemonic:   atorvastatin 40 mg oral tablet ; Simple Display Line:   See Instructions, TAKE 1 TABLET BY MOUTH  DAILY, 90 tab(s), 3 Refill(s) ; Ordering Provider:   Dexter Silva MD; Catalog Code:   atorvastatin ; Order Dt/Tm:   5/28/2020 9:41:31 AM CDT          atorvastatin  :   atorvastatin ; Status:   Prescribed ; Ordered As Mnemonic:   atorvastatin 40 mg oral tablet ; Simple Display Line:   1 tab(s), Oral, daily, 90 tab(s), 0 Refill(s) ; Ordering Provider:   Dexter Silva MD; Catalog Code:   atorvastatin ; Order Dt/Tm:   3/9/2020 8:21:22 AM CDT          doxazosin  :   doxazosin ; Status:   Prescribed ; Ordered As Mnemonic:   doxazosin 2 mg oral tablet ; Simple Display Line:   See Instructions, TAKE 1 TABLET BY MOUTH  DAILY, 90 tab(s), 3 Refill(s) ; Ordering Provider:   Dexter Silva MD; Catalog Code:   doxazosin ; Order Dt/Tm:   5/28/2020 9:41:32 AM CDT          doxazosin  :   doxazosin ; Status:   Prescribed ; Ordered As Mnemonic:   doxazosin 2 mg oral tablet ; Simple Display Line:   1 tab(s), Oral, daily, 90 tab(s), 0 Refill(s) ; Ordering Provider:   Dexter Silva MD; Catalog Code:   doxazosin ; Order Dt/Tm:   3/9/2020 8:21:47 AM CDT          FLUoxetine  :   FLUoxetine ; Status:   Prescribed ; Ordered As Mnemonic:   FLUoxetine 40 mg oral capsule ; Simple Display Line:   See Instructions, TAKE 1 CAPSULE BY MOUTH  DAILY, 90 cap(s), 3 Refill(s) ; Ordering Provider:   Dexter Silva MD; Catalog Code:   FLUoxetine ; Order Dt/Tm:   5/28/2020 9:41:33 AM CDT          FLUoxetine  :   FLUoxetine ; Status:   Prescribed ; Ordered As Mnemonic:   FLUoxetine 40 mg oral capsule ; Simple Display Line:   1 cap(s), Oral, daily, 90 cap(s), 0 Refill(s) ; Ordering Provider:   Dexter Silva MD; Catalog Code:   FLUoxetine ; Order Dt/Tm:   3/9/2020  8:22:03 AM CDT          isosorbide mononitrate  :   isosorbide mononitrate ; Status:   Prescribed ; Ordered As Mnemonic:   isosorbide mononitrate 30 mg oral tablet, extended release ; Simple Display Line:   2 tab(s), Oral, qam, 180 tab(s), 3 Refill(s) ; Ordering Provider:   Dexter Silva MD; Catalog Code:   isosorbide mononitrate ; Order Dt/Tm:   5/28/2020 9:43:22 AM CDT          lisinopril  :   lisinopril ; Status:   Prescribed ; Ordered As Mnemonic:   lisinopril 10 mg oral tablet ; Simple Display Line:   10 mg, 1 tab(s), Oral, daily, 90 tab(s), 3 Refill(s) ; Ordering Provider:   Dexter Silva MD; Catalog Code:   lisinopril ; Order Dt/Tm:   10/23/2020 9:21:34 AM CDT          metFORMIN  :   metFORMIN ; Status:   Prescribed ; Ordered As Mnemonic:   MetFORMIN (Eqv-Glucophage XR) 500 mg oral tablet, extended release ; Simple Display Line:   See Instructions, TAKE 2 TABLETS BY MOUTH  TWICE DAILY, 360 tab(s), 3 Refill(s) ; Ordering Provider:   Dexter Silva MD; Catalog Code:   metFORMIN ; Order Dt/Tm:   5/28/2020 9:41:34 AM CDT          Miscellaneous Prescription  :   Miscellaneous Prescription ; Status:   Prescribed ; Ordered As Mnemonic:   FREESTYLE LITE      JUAN C ; Simple Display Line:   1 EA, id, daily, 50 EA ; Ordering Provider:   Dexter Silva MD; Catalog Code:   Miscellaneous Prescription ; Order Dt/Tm:   4/9/2010 10:09:35 AM CDT          Miscellaneous Rx Supply  :   Miscellaneous Rx Supply ; Status:   Prescribed ; Ordered As Mnemonic:   CPAP 13 ; Simple Display Line:   See Instructions, Use every night. Have a download in the sleep center in one month., 1 EA ; Ordering Provider:   Michel Fields MD; Catalog Code:   Miscellaneous Rx Supply ; Order Dt/Tm:   1/17/2014 10:40:19 AM CST          nitroglycerin  :   nitroglycerin ; Status:   Prescribed ; Ordered As Mnemonic:   nitroglycerin 0.4 mg sublingual tablet ; Simple Display Line:   0.4 mg, 1 tab(s), SL, q5min, If chest pain not  relieved in 5 minutes after first dose, seek immediate medical attention, PRN: for chest pain, 100 tab(s), 3 Refill(s) ; Ordering Provider:   Dexter Silva MD; Catalog Code:   nitroglycerin ; Order Dt/Tm:   10/23/2020 9:05:52 AM CDT            Home Meds    ascorbic acid  :   ascorbic acid ; Status:   Documented ; Ordered As Mnemonic:   Vitamin C ; Simple Display Line:   daily, 0 Refill(s) ; Catalog Code:   ascorbic acid ; Order Dt/Tm:   10/23/2020 8:48:26 AM CDT          aspirin  :   aspirin ; Status:   Documented ; Ordered As Mnemonic:   aspirin ; Simple Display Line:   81mg tab po daily, 0 Refill(s) ; Catalog Code:   aspirin ; Order Dt/Tm:   3/19/2021 2:43:15 PM CDT          cefdinir  :   cefdinir ; Status:   Completed ; Ordered As Mnemonic:   cefdinir 300 mg oral capsule ; Simple Display Line:   600 mg, 2 cap(s), for 5 day(s), 0 Refill(s) ; Catalog Code:   cefdinir ; Order Dt/Tm:   3/26/2021 9:39:07 AM CDT          doxycycline  :   doxycycline ; Status:   Processing ; Ordered As Mnemonic:   doxycycline monohydrate 100 mg oral capsule ; Action Display:   Complete ; Catalog Code:   doxycycline ; Order Dt/Tm:   4/2/2021 9:41:00 AM CDT          Miscellaneous Prescription  :   Miscellaneous Prescription ; Status:   Documented ; Ordered As Mnemonic:   Iron ; Simple Display Line:   325mg daily, 0 Refill(s) ; Catalog Code:   Miscellaneous Prescription ; Order Dt/Tm:   3/26/2021 10:27:51 AM CDT          Miscellaneous Prescription  :   Miscellaneous Prescription ; Status:   Documented ; Ordered As Mnemonic:   Oxygen Air Delivery System ; Simple Display Line:   2 Liters. Length of need: 3 months, 0 Refill(s) ; Catalog Code:   Miscellaneous Prescription ; Order Dt/Tm:   3/26/2021 9:39:48 AM CDT          omega-3 polyunsaturated fatty acids  :   omega-3 polyunsaturated fatty acids ; Status:   Documented ; Ordered As Mnemonic:   Fish Oil oral capsule ; Simple Display Line:   po, daily, 0 Refill(s) ; Catalog Code:    omega-3 polyunsaturated fatty acids ; Order Dt/Tm:   7/19/2016 8:35:56 AM CDT            ID Risk Screen   Recent Travel History :   No recent travel   Family Member Travel History :   No recent travel   Other Exposure to Infectious Disease :   Unknown   COVID-19 Testing Status :   No positive COVID-19 test   Margo Carrasco CMA - 4/2/2021 9:34 AM CDT

## 2022-02-16 NOTE — PROGRESS NOTES
Patient:   PIOTR CORREIA            MRN: [id number]            FIN: [id number]               Age:   75 years     Sex:  Male     :  1945   Associated Diagnoses:   Anemia   Author:   Dexter Silva MD      Chief Complaint   2021 10:11 AM CDT   Anemia follow up.        Interval History   Anemia   The course is improving.  The effect on daily activities is no change in activity level.  Associated symptoms characterized by dizziness.        Review of Systems   Constitutional:  Negative.    Respiratory:  Negative.    Cardiovascular:  Negative.       Health Status   Allergies:    Allergic Reactions (Selected)  No Known Medication Allergies   Medications:  (Selected)   Prescriptions  Prescribed  CPAP 13: CPAP 13, See Instructions, Instructions: Use every night. Have a download in the sleep center in one month., Supply, # 1 EA, 0 Refill(s), Type: Maintenance  Eliquis 5 mg oral tablet: = 1 tab(s) ( 5 mg ), Oral, bid, # 180 tab(s), 0 Refill(s), Type: Maintenance, Pharmacy: [facility], 1 tab(s) Oral bid,x90 day(s), , in, 20 8:57:00 CDT, Height Measured, 181, lb, 21 10:17:00 CDT, Weight Measured  FLUoxetine 40 mg oral capsule: = 1 cap(s) ( 40 mg ), Oral, daily, # 90 cap(s), 0 Refill(s), Type: Maintenance, Pharmacy: [facility], 1 cap(s) Oral daily, 67, in, 20 8:57:00 CDT, Height Measured, 198, lb, 21 9:34:00 CDT, Weight Measured  FREESTYLE LITE      JUAN C: 1 EA, id, daily, # 50 EA, 11 Refill(s), Pharmacy: [id number]  MetFORMIN (Eqv-Glucophage XR) 500 mg oral tablet, extended release: = 2 tab(s) ( 1,000 mg ), Oral, bid, # 360 tab(s), 0 Refill(s), Type: Maintenance, Pharmacy: [facility], 2 tab(s) Oral bid, 67, in, 20 8:57:00 CDT, Height Measured, 198, lb, 21 9:34:00 CDT, Weight Measured  Norco 5 mg-325 mg oral tablet: 1 tab(s), PO, q4hr, PRN: for pain, # 24 tab(s), 0 Refill(s), Type: Maintenance, Pharmacy: [facility], 1  tab(s) Oral q4 hrs,PRN:for pain  Oxygen: Oxygen, See Instructions, Instructions: D/C Oxygen, Supply, # 1 unknown unit, 0 Refill(s), Type: Maintenance  atorvastatin 40 mg oral tablet: = 1 tab(s) ( 40 mg ), Oral, daily, # 90 tab(s), 0 Refill(s), Type: Maintenance, Pharmacy: [facility], 1 tab(s) Oral daily, 67, in, 05/28/20 8:57:00 CDT, Height Measured, 198, lb, 04/02/21 9:34:00 CDT, Weight Measured  doxazosin 2 mg oral tablet: = 1 tab(s) ( 2 mg ), Oral, daily, # 90 tab(s), 0 Refill(s), Type: Maintenance, Pharmacy: [facility], 1 tab(s) Oral daily, 67, in, 05/28/20 8:57:00 CDT, Height Measured, 198, lb, 04/02/21 9:34:00 CDT, Weight Measured  isosorbide mononitrate 30 mg oral tablet, extended release: = 2 tab(s), Oral, qam, # 180 tab(s), 3 Refill(s), Type: Maintenance, Pharmacy: [facility], Due for appt in April, 2 tab(s) Oral qam, 67, in, 05/28/20 8:57:00 CDT, Height Measured, 197.2, lb, 05/21/20 9:22:00 CDT, Weight Measured  lisinopril 10 mg oral tablet: = 1 tab(s) ( 10 mg ), Oral, daily, # 90 tab(s), 3 Refill(s), Type: Maintenance, Pharmacy: [facility], 1 tab(s) Oral daily, 67, in, 05/28/20 8:57:00 CDT, Height Measured, 201, lb, 10/23/20 8:44:00 CDT, Weight Measured  nitroglycerin 0.4 mg sublingual tablet: = 1 tab(s) ( 0.4 mg ), SL, q5min, Instructions: If chest pain not relieved in 5 minutes after first dose, seek immediate medical attention, PRN: for chest pain, # 100 tab(s), 3 Refill(s), Type: Maintenance, Pharmacy: [email or web address], This is a 3...  predniSONE 10 mg oral tablet: = 3 tab(s) ( 30 mg ), Oral, daily, # 63 tab(s), 0 Refill(s), Type: Maintenance, Pharmacy: [id number], 3 tab(s) Oral daily,x21 day(s), 67, in, 05/28/20 8:57:00 CDT, Height Measured, 181, lb, 05/14/21 10:17:00 CDT, Weight Measured  Documented Medications  Documented  Fish Oil oral capsule: po, daily, 0 Refill(s), Type: Maintenance  Iron: Iron, Instructions: 325mg daily, 0 Refill(s),  Type: Maintenance  Oxygen Air Delivery System: Oxygen Air Delivery System, Instructions: 2 Liters. Length of need: 3 months, 0 Refill(s), Type: Maintenance  Vitamin C: daily, 0 Refill(s), Type: Maintenance  aspirin: Instructions: 81mg tab po daily, 0 Refill(s), Type: Maintenance  fluconazole 200 mg oral tablet: = 1 tab(s) ( 200 mg ), Oral, daily, 0 Refill(s), Type: Maintenance  insulin isophane (NPH) 100 units/mL human recombinant subcutaneous suspension: ( 6 units ), Subcutaneous, daily, 0 Refill(s), Type: Maintenance  pantoprazole 40 mg oral delayed release tablet: = 1 tab(s) ( 40 mg ), Oral, daily, # 90 tab(s), 0 Refill(s), Type: Maintenance  sulfamethoxazole-trimethoprim 400 mg-80 mg oral tablet: ( 80 mg ), Oral, daily, 0 Refill(s), Type: Maintenance   Problem list:    All Problems (Selected)  Obstructive sleep apnea (adult) (pediatric) / SNOMED CT 109042514 / Confirmed  Back pain, chronic / SNOMED CT 278737667 / Confirmed  Type 2 diabetes mellitus without complications / SNOMED CT 882918292 / Confirmed  Obesity, unspecified / SNOMED CT 0602765398 / Probable  History of DVT in adulthood / SNOMED CT 1687730092 / Confirmed  Atherosclerotic heart disease of native coronary artery without angina pectoris / SNOMED CT 7251830295 / Confirmed  Depression, Recurrent / SNOMED CT 794869384 / Confirmed  BPH (benign prostatic hyperplasia) / SNOMED CT 833666304 / Confirmed  Hyperlipemia / SNOMED CT 153997194 / Confirmed  Anemia / SNOMED CT 066808298 / Confirmed  Essential (primary) hypertension / SNOMED CT 08438264 / Confirmed      Histories   Past Medical History:    Active  Obstructive sleep apnea (adult) (pediatric) (SNOMED CT 283540719): Onset on 6/28/2005 at 59 years.  Comments:  10/18/2013 CDT 3:10 PM CDT Michel Tubbs MD  AHI 7.7 and REM AHI 22 in 2005 11/25/2013 CST 1:54 PM CST Michel Tubbs MD  On 11/10/2013 AHI 18.3 with oximetry susanne 77%. Good/optimal CPAP titration to 12 with 6.5 minutes supine  REM  Hyperlipemia (SNOMED CT 128983780)  Type 2 diabetes mellitus without complications (SNOMED CT 668328736)  Atherosclerotic heart disease of native coronary artery without angina pectoris (SNOMED CT 0827028550)  BPH (benign prostatic hyperplasia) (SNOMED CT 145585502)  Depression, Recurrent (SNOMED CT 140080838)  Obesity, unspecified (SNOMED CT 0032622865)  Back pain, chronic (SNOMED CT 679313669)  Essential (primary) hypertension (SNOMED CT 32349215)  Resolved  Inpatient stay (SNOMED CT 201229979): Onset on 3/20/2021 at 75 years.  Resolved on 3/22/2021 at 75 years.  Comments:  3/25/2021 CDT 10:53 AM CDT - Isabella Delgado  [facility], [place] - Pneumonia of both lungs due to infectious organism.  ACUTE MYOCARDIAL INFARCTION (ICD-9-):  Resolved in  at 61 years.   Family History:    Hypertension  Father ()  Heart disease  Father ()  Alcohol abuse  Mother ()  Stroke  Father ()  Liver disease  Mother ()     Procedure history:    Angiogram (SNOMED CT 037694059) in 2017 at 72 Years.  Colonoscopy (SNOMED CT 807736083) performed by Shukri Arndt on 2013 at 68 Years.  Comments:  2013 1:13 PM CST - Germaine Lea RN  Sedation: MAC  Diverticulosis in the sigmoid colon, otherwise normal.  Repeat in 10 years.  Angioplasty (SNOMED CT 9100252) in the month of 2007 at 61 Years.  CABG - Coronary artery bypass graft (SNOMED CT 032962984) in the month of 2004 at 58 Years.  Colonoscopy (SNOMED CT 183035576) performed by Aashish Connolly MD on 2002 at 56 Years.  Comments:  2010 7:05 AM CST - Isabella Delgado  Repeat in 10 years.  ORIF right hand in  at 56 Years.  Flexible sigmoidoscopy (SNOMED CT 13520368) performed by Castillo Anaya MD on 1995 at 50 Years.  Comments:  10/16/2013 9:17 AM CDT - Gisele Arevalo  Negative.  ORIF left shoulder in  at 30 Years.   Social History:        Electronic Cigarette/Vaping Assessment            Electronic  Cigarette Use: Former use, quit more than 90 days ago.      Alcohol Assessment: Past            Quit 1974      Tobacco Assessment: Past            Quit 1972            Former smoker, quit more than 30 days ago      Substance Abuse Assessment: Denies Substance Abuse            Never      Employment and Education Assessment            Retired, Work/School description: .  Highest education level: High school.      Home and Environment Assessment            Marital status: .  Spouse/Partner name: Brianda.  Living situation: Home/Independent.               Injuries/Abuse/Neglect in household: No.  Feels unsafe at home: No.  Family/Friends available for support:               Yes.      Nutrition and Health Assessment            Type of diet: Regular.  Wants to lose weight: Yes.  Sleeping concerns: No.  Feels highly stressed: No.      Exercise and Physical Activity Assessment: Occasional exercise            Exercise frequency: 1-2 times/week.  Exercise type: Walking.      Sexual Assessment            Sexually active: No.  Identifies as male, History of STD: No.  History of sexual abuse: No.      Other Assessment            Hearing impairment.        Physical Examination   Vital Signs   6/22/2021 10:11 AM CDT Peripheral Pulse Rate 80 bpm    Systolic Blood Pressure 128 mmHg    Diastolic Blood Pressure 64 mmHg    Mean Arterial Pressure 85 mmHg      Measurements from flowsheet : Measurements   6/22/2021 10:11 AM CDT   Height/Length Estimated   67 in     General:  Alert and oriented, No acute distress.    HENT:  Normocephalic, Tympanic membranes are clear.    Neck:  Supple, Non-tender.    Respiratory:  Lungs are clear to auscultation, Respirations are non-labored.    Cardiovascular:  Normal rate, Regular rhythm, No murmur.    Gastrointestinal:  Soft, Non-tender, Non-distended.       Review / Management   Results review:  HGB 8.0.       Impression and Plan   Diagnosis     Anemia ([id number] D64.9).     Course:   Suspect GI blood loss somewhere, Not improving, Not progressing as expected.    Orders     Will continue iron to BID..

## 2022-02-16 NOTE — LETTER
(Inserted Image. Unable to display)     [address] [place] [address] [phone] (phone)  [phone] (fax)  PIOTR CORREIA    [address] [place] [id number]        Dear PIOTR,    Thank you for selecting our practice as your care provider. Below you will find the results and recent diagnostic testings outcomes we have reviewed.       These are acceptable, please keep scheduled follow up appointments.      Result Name Current Result Reference Range   PSA (ng/mL)  1.2 3/7/2019  - < OR = 4.0       Please contact my practice at the number listed above if you have any questions or concerns.     Sincerely,        Dexter Silva MD, FAAFP

## 2022-02-16 NOTE — TELEPHONE ENCOUNTER
---------------------  From: Reina Reece RN (Phone Messages Pool ([id number]_[facility]))   To: [facility] Message Pool ([id number]_[facility]);     Sent: 11/16/2021 2:59:41 PM CST  Subject: General Message       PCP:  CHT      Time of Call:  2:54pm       Person Calling:  Brianda  Phone number:  [phone]    Note:   Brianda called in, stated pt has an appt with CHT on 11/17/21 for lab f/u. Brianda stated she received a letter pt is due for an annual wellness visit. Brianda asking if visit can be combined?    Please advise.    Last office visit and reason:  10/26/21 covid KAH---------------------  From: Margo Carrasco CMA (CHT Message Pool ([id number]_[facility]))   To: Appointment Pool ([id number]_[place]);     Sent: 11/16/2021 3:15:11 PM CST  Subject: FW: General Message     We will need 40 minutes for AWV per CHT. Please reschedule or will need to be individual visits if patient wants to keep upcoming visit.

## 2022-02-16 NOTE — TELEPHONE ENCOUNTER
---------------------  From: Fannie Miller LPN (Phone Messages Pool ([id number]_[facility]))   To: [facility] Message Pool ([id number]_[facility]);     Sent: 8/31/2021 8:58:50 AM CDT  Subject: [facility] recommendation     Phone Message    PCP:   CHT      Time of Call:  8:53am       Person Calling:  pt wife  Phone number:  [phone]    Note:   Pt's wife calling stating the hematologist at [facility] recommended doing a fecal occult blood test. She is asking [facility] to order this.    Pt's wife said she did not think hematology was going to order this and she thought they were suppose to go to PCP.    Last office visit and reason:  8/20/21 Anemia---------------------  From: Margo Carrasco CMA ([facility] Message Pool ([id number]_[facility]))   To: Dexter Silva MD;     Sent: 8/31/2021 9:02:14 AM CDT  Subject: FW: [facility] recommendation---------------------  From: Dexter Silva MD   To: [facility] Message Pool ([id number]_[facility]);     Sent: 8/31/2021 9:03:51 AM CDT  Subject: RE: [facility] recommendation     Please send him a FIT test.Patient/wife notified. Requesting to be put at  for . Done.

## 2022-02-16 NOTE — LETTER
(Inserted Image. Unable to display)     [address] [place], [place] [id number] [phone] (phone)  [phone] (fax)  PIOTR CORREIA    [address] [place] [phone]        Dear PIOTR,    Thank you for selecting our practice as your care provider. Below you will find the results and recent diagnostic testings outcomes we have reviewed.        These are stable, please keep scheduled follow up appointments.      Result Name Current Result Previous Result Reference Range   Hgb A1c ((H)) 6.3 10/23/2020 ((H)) 6.0 5/21/2020  ((H)) 6.0 10/11/2019  ((H)) 6.2 4/19/2019  - <5.7       Please contact my practice at the number listed above if you have any questions or concerns.     Sincerely,        Dexter Silva MD, FAAFP

## 2022-02-16 NOTE — TELEPHONE ENCOUNTER
---------------------  From: Khalida Lott MD   To: BREN PIOTR QUANG    Sent: 4/22/2019 8:00:50 AM CDT    Bill,  Your labs are good. You can review these with Dr. Silva at your visit on the 26th.  Thanks,  Ana Lott    Results:  Date Result Name Ind Value Ref Range   4/19/2019 10:00 AM Hgb A1c ((H)) 6.2 ( - <5.7)   4/19/2019 10:00 AM Cholesterol  120 mg/dL ( - <200)   4/19/2019 10:00 AM Non-HDL Cholesterol  74 ( - <130)   4/19/2019 10:00 AM HDL  46 mg/dL (>40 - )   4/19/2019 10:00 AM Cholesterol/HDL Ratio  2.6 ( - <5.0)   4/19/2019 10:00 AM LDL  60    4/19/2019 10:00 AM Triglyceride  48 mg/dL ( - <150)   4/19/2019 10:00 AM U Microalbumin  1.0 mg/dL (See Note: - )   4/19/2019 10:00 AM Microalbumin Comment  See comment    4/19/2019 10:00 AM WBC  5.0 (3.8 - 10.8)   4/19/2019 10:00 AM RBC  4.47 (4.20 - 5.80)   4/19/2019 10:00 AM Hgb  13.3 gm/dL (13.2 - 17.1)   4/19/2019 10:00 AM Hct ((L)) 38.4 % (38.5 - 50.0)   4/19/2019 10:00 AM MCV  85.9 fL (80.0 - 100.0)   4/19/2019 10:00 AM MCH  29.8 pg (27.0 - 33.0)   4/19/2019 10:00 AM MCHC  34.6 gm/dL (32.0 - 36.0)   4/19/2019 10:00 AM RDW  13.1 % (11.0 - 15.0)   4/19/2019 10:00 AM Platelet  168 (140 - 400)   4/19/2019 10:00 AM MPV  9.8 fL (7.5 - 12.5)

## 2022-02-16 NOTE — NURSING NOTE
Comprehensive Intake Entered On:  5/6/2021 9:56 AM CDT    Performed On:  5/6/2021 9:42 AM CDT by Margo Carrasco CMA               Summary   Chief Complaint :   Hospital follow up, [facility]   Advance Directive :   Yes   Menstrual Status :   N/A   Weight Measured :   185 lb(Converted to: 185 lb 0 oz, 83.915 kg)    Height/Length Estimated :   67 in(Converted to: 5 ft 7 in, 170.18 cm)    Systolic Blood Pressure :   118 mmHg   Diastolic Blood Pressure :   52 mmHg (LOW)    Mean Arterial Pressure :   74 mmHg   Peripheral Pulse Rate :   10 bpm (LOW)    BP Site :   Right arm   BP Method :   Manual   Temperature Tympanic :   98.8 DegF(Converted to: 37.1 DegC)    Respiratory Rate :   20 br/min   Oxygen Saturation :   92 % (LOW)    Margo Carrasco CMA - 5/6/2021 9:42 AM CDT   Health Status   Allergies Verified? :   Yes   Medication History Verified? :   Yes   Immunizations Current :   Yes   Medical History Verified? :   Yes   Pre-Visit Planning Status :   Completed   Tobacco Use? :   Former smoker   Margo Carrasco CMA - 5/6/2021 9:42 AM CDT   Consents   Consent for Immunization Exchange :   Consent Granted   Consent for Immunizations to Providers :   Consent Granted   Margo Carracso CMA - 5/6/2021 9:42 AM CDT   Meds / Allergies   (As Of: 5/6/2021 9:56:13 AM CDT)   Allergies (Active)   No Known Medication Allergies  Estimated Onset Date:   Unspecified ; Created By:   Deana French CMA; Reaction Status:   Active ; Category:   Drug ; Substance:   No Known Medication Allergies ; Type:   Allergy ; Updated By:   Deana French CMA; Reviewed Date:   5/6/2021 9:48 AM CDT        Medication List   (As Of: 5/6/2021 9:56:13 AM CDT)   Prescription/Discharge Order    acetaminophen-hydrocodone  :   acetaminophen-hydrocodone ; Status:   Prescribed ; Ordered As Mnemonic:   Norco 5 mg-325 mg oral tablet ; Simple Display Line:   1 tab(s), PO, q4hr, PRN: for pain, 24 tab(s), 0 Refill(s) ; Ordering Provider:   Dexter Silva MD; Catalog  Code:   acetaminophen-hydrocodone ; Order Dt/Tm:   3/24/2020 1:06:36 PM CDT          atorvastatin  :   atorvastatin ; Status:   Prescribed ; Ordered As Mnemonic:   atorvastatin 40 mg oral tablet ; Simple Display Line:   40 mg, 1 tab(s), Oral, daily, 90 tab(s), 0 Refill(s) ; Ordering Provider:   Dexter Silva MD; Catalog Code:   atorvastatin ; Order Dt/Tm:   4/29/2021 7:07:58 AM CDT          doxazosin  :   doxazosin ; Status:   Prescribed ; Ordered As Mnemonic:   doxazosin 2 mg oral tablet ; Simple Display Line:   2 mg, 1 tab(s), Oral, daily, 90 tab(s), 0 Refill(s) ; Ordering Provider:   Dexter Silva MD; Catalog Code:   doxazosin ; Order Dt/Tm:   4/29/2021 7:08:22 AM CDT          FLUoxetine  :   FLUoxetine ; Status:   Prescribed ; Ordered As Mnemonic:   FLUoxetine 40 mg oral capsule ; Simple Display Line:   40 mg, 1 cap(s), Oral, daily, 90 cap(s), 0 Refill(s) ; Ordering Provider:   Dexter Silva MD; Catalog Code:   FLUoxetine ; Order Dt/Tm:   4/29/2021 7:08:50 AM CDT          isosorbide mononitrate  :   isosorbide mononitrate ; Status:   Prescribed ; Ordered As Mnemonic:   isosorbide mononitrate 30 mg oral tablet, extended release ; Simple Display Line:   2 tab(s), Oral, qam, 180 tab(s), 3 Refill(s) ; Ordering Provider:   Dexter Silva MD; Catalog Code:   isosorbide mononitrate ; Order Dt/Tm:   5/28/2020 9:43:22 AM CDT          lisinopril  :   lisinopril ; Status:   Prescribed ; Ordered As Mnemonic:   lisinopril 10 mg oral tablet ; Simple Display Line:   10 mg, 1 tab(s), Oral, daily, 90 tab(s), 3 Refill(s) ; Ordering Provider:   Dexter Silva MD; Catalog Code:   lisinopril ; Order Dt/Tm:   10/23/2020 9:21:34 AM CDT          metFORMIN  :   metFORMIN ; Status:   Prescribed ; Ordered As Mnemonic:   MetFORMIN (Eqv-Glucophage XR) 500 mg oral tablet, extended release ; Simple Display Line:   1,000 mg, 2 tab(s), Oral, bid, 360 tab(s), 0 Refill(s) ; Ordering Provider:   Shira BURK,  Dexter; Catalog Code:   metFORMIN ; Order Dt/Tm:   4/29/2021 7:09:14 AM CDT          Miscellaneous Prescription  :   Miscellaneous Prescription ; Status:   Prescribed ; Ordered As Mnemonic:   FREESTYLE LITE      JUAN C ; Simple Display Line:   1 EA, id, daily, 50 EA ; Ordering Provider:   Dexter Silva MD; Catalog Code:   Miscellaneous Prescription ; Order Dt/Tm:   4/9/2010 10:09:35 AM CDT          Miscellaneous Rx Supply  :   Miscellaneous Rx Supply ; Status:   Prescribed ; Ordered As Mnemonic:   CPAP 13 ; Simple Display Line:   See Instructions, Use every night. Have a download in the sleep center in one month., 1 EA ; Ordering Provider:   Michel Fields MD; Catalog Code:   Miscellaneous Rx Supply ; Order Dt/Tm:   1/17/2014 10:40:19 AM CST          nitroglycerin  :   nitroglycerin ; Status:   Prescribed ; Ordered As Mnemonic:   nitroglycerin 0.4 mg sublingual tablet ; Simple Display Line:   0.4 mg, 1 tab(s), SL, q5min, If chest pain not relieved in 5 minutes after first dose, seek immediate medical attention, PRN: for chest pain, 100 tab(s), 3 Refill(s) ; Ordering Provider:   Dexter Silva MD; Catalog Code:   nitroglycerin ; Order Dt/Tm:   10/23/2020 9:05:52 AM CDT            Home Meds    apixaban  :   apixaban ; Status:   Documented ; Ordered As Mnemonic:   Eliquis 5 mg oral tablet ; Simple Display Line:   5 mg, Oral, bid, 60 tab(s), 0 Refill(s) ; Catalog Code:   apixaban ; Order Dt/Tm:   5/6/2021 9:51:12 AM CDT          ascorbic acid  :   ascorbic acid ; Status:   Documented ; Ordered As Mnemonic:   Vitamin C ; Simple Display Line:   daily, 0 Refill(s) ; Catalog Code:   ascorbic acid ; Order Dt/Tm:   10/23/2020 8:48:26 AM CDT          aspirin  :   aspirin ; Status:   Documented ; Ordered As Mnemonic:   aspirin ; Simple Display Line:   81mg tab po daily, 0 Refill(s) ; Catalog Code:   aspirin ; Order Dt/Tm:   3/19/2021 2:43:15 PM CDT          Miscellaneous Prescription  :   Miscellaneous  Prescription ; Status:   Documented ; Ordered As Mnemonic:   Iron ; Simple Display Line:   325mg daily, 0 Refill(s) ; Catalog Code:   Miscellaneous Prescription ; Order Dt/Tm:   3/26/2021 10:27:51 AM CDT          Miscellaneous Prescription  :   Miscellaneous Prescription ; Status:   Documented ; Ordered As Mnemonic:   Oxygen Air Delivery System ; Simple Display Line:   2 Liters. Length of need: 3 months, 0 Refill(s) ; Catalog Code:   Miscellaneous Prescription ; Order Dt/Tm:   3/26/2021 9:39:48 AM CDT          omega-3 polyunsaturated fatty acids  :   omega-3 polyunsaturated fatty acids ; Status:   Documented ; Ordered As Mnemonic:   Fish Oil oral capsule ; Simple Display Line:   po, daily, 0 Refill(s) ; Catalog Code:   omega-3 polyunsaturated fatty acids ; Order Dt/Tm:   7/19/2016 8:35:56 AM CDT          predniSONE  :   predniSONE ; Status:   Documented ; Ordered As Mnemonic:   predniSONE 20 mg oral tablet ; Simple Display Line:   40 mg, 2 tab(s), 0 Refill(s) ; Catalog Code:   predniSONE ; Order Dt/Tm:   5/6/2021 9:51:12 AM CDT          sulfamethoxazole-trimethoprim  :   sulfamethoxazole-trimethoprim ; Status:   Documented ; Ordered As Mnemonic:   sulfamethoxazole-trimethoprim 400 mg-80 mg oral tablet ; Simple Display Line:   80 mg, Oral, daily, 0 Refill(s) ; Catalog Code:   sulfamethoxazole-trimethoprim ; Order Dt/Tm:   5/6/2021 9:50:14 AM CDT            ID Risk Screen   Recent Travel History :   No recent travel   Family Member Travel History :   No recent travel   Other Exposure to Infectious Disease :   Unknown   COVID-19 Testing Status :   No positive COVID-19 test   Luna BURKETTMargo - 5/6/2021 9:42 AM CDT   Social History   Social History   (As Of: 5/6/2021 9:56:13 AM CDT)   Alcohol:  Past      Quit 1974   (Last Updated: 4/13/2020 2:45:59 PM CDT by Isabella Delgado)          Tobacco:  Past      Quit 1972   (Last Updated: 4/13/2020 2:45:42 PM CDT by Isabella Delgado)   Former smoker, quit more than 30 days ago    (Last Updated: 5/6/2021 9:43:02 AM CDT by Margo Carrasco CMA)          Electronic Cigarette/Vaping:        Electronic Cigarette Use: Former use, quit more than 90 days ago.   (Last Updated: 11/27/2020 12:41:16 PM CST by Margo Carrasco CMA)          Substance Abuse:  Denies Substance Abuse      Never   (Last Updated: 9/28/2018 2:13:39 PM CDT by Isabella Delgado)          Employment/School:        Retired, Work/School description: .  Highest education level: High school.   (Last Updated: 3/25/2021 2:18:18 PM CDT by Isabella Delgado)          Home/Environment:        Marital status: .  Spouse/Partner name: Brianda.  Living situation: Home/Independent.  Injuries/Abuse/Neglect in household: No.  Feels unsafe at home: No.  Family/Friends available for support: Yes.   (Last Updated: 4/13/2020 2:46:58 PM CDT by Isabella Delgado)          Nutrition/Health:        Type of diet: Regular.  Wants to lose weight: Yes.  Sleeping concerns: No.  Feels highly stressed: No.   (Last Updated: 4/13/2020 2:46:17 PM CDT by Isabella Delgado)          Exercise:  Occasional exercise      Exercise frequency: 1-2 times/week.  Exercise type: Walking.   (Last Updated: 4/13/2020 2:46:41 PM CDT by Isabella Delgado)          Sexual:        Sexually active: No.  Identifies as male, History of STD: No.  History of sexual abuse: No.   (Last Updated: 4/13/2020 2:48:58 PM CDT by Isabella Delgado)          Other:        Hearing impairment.   (Last Updated: 4/13/2020 2:47:18 PM CDT by Isabella Delgado)

## 2022-02-16 NOTE — NURSING NOTE
Comprehensive Intake Entered On:  7/20/2021 10:49 AM CDT    Performed On:  7/20/2021 10:41 AM CDT by Margo Carrasco CMA               Summary   Chief Complaint :   Anemia follow up. Increased fatigue. DM labs due also   Advance Directive :   Yes   Menstrual Status :   N/A   Height/Length Estimated :   67 in(Converted to: 5 ft 7 in, 170.18 cm)    Systolic Blood Pressure :   126 mmHg   Diastolic Blood Pressure :   58 mmHg (LOW)    Mean Arterial Pressure :   81 mmHg   Peripheral Pulse Rate :   80 bpm   BP Site :   Right arm   BP Method :   Manual   Temperature Tympanic :   98.3 DegF(Converted to: 36.8 DegC)    Oxygen Saturation :   93 % (LOW)    Margo Carrasco CMA - 7/20/2021 10:41 AM CDT   Health Status   Allergies Verified? :   Yes   Medication History Verified? :   Yes   Immunizations Current :   Yes   Medical History Verified? :   Yes   Pre-Visit Planning Status :   Completed   Tobacco Use? :   Former smoker   Margo Carrasco CMA - 7/20/2021 10:41 AM CDT   Consents   Consent for Immunization Exchange :   Consent Granted   Consent for Immunizations to Providers :   Consent Granted   Margo Carrasco CMA - 7/20/2021 10:41 AM CDT   Meds / Allergies   (As Of: 7/20/2021 10:49:43 AM CDT)   Allergies (Active)   No Known Medication Allergies  Estimated Onset Date:   Unspecified ; Created By:   Deana French CMA; Reaction Status:   Active ; Category:   Drug ; Substance:   No Known Medication Allergies ; Type:   Allergy ; Updated By:   Deana French CMA; Reviewed Date:   7/20/2021 10:46 AM CDT        Medication List   (As Of: 7/20/2021 10:49:43 AM CDT)   Prescription/Discharge Order    acetaminophen-hydrocodone  :   acetaminophen-hydrocodone ; Status:   Prescribed ; Ordered As Mnemonic:   Norco 5 mg-325 mg oral tablet ; Simple Display Line:   1 tab(s), PO, q4hr, PRN: for pain, 24 tab(s), 0 Refill(s) ; Ordering Provider:   Dexter Silva MD; Catalog Code:   acetaminophen-hydrocodone ; Order Dt/Tm:   3/24/2020 1:06:36 PM  CDT          atorvastatin  :   atorvastatin ; Status:   Prescribed ; Ordered As Mnemonic:   atorvastatin 40 mg oral tablet ; Simple Display Line:   40 mg, 1 tab(s), Oral, daily, 90 tab(s), 0 Refill(s) ; Ordering Provider:   Dexter Silva MD; Catalog Code:   atorvastatin ; Order Dt/Tm:   4/29/2021 7:07:58 AM CDT          doxazosin  :   doxazosin ; Status:   Prescribed ; Ordered As Mnemonic:   doxazosin 2 mg oral tablet ; Simple Display Line:   2 mg, 1 tab(s), Oral, daily, 90 tab(s), 0 Refill(s) ; Ordering Provider:   Dexter Silva MD; Catalog Code:   doxazosin ; Order Dt/Tm:   4/29/2021 7:08:22 AM CDT          FLUoxetine  :   FLUoxetine ; Status:   Prescribed ; Ordered As Mnemonic:   FLUoxetine 40 mg oral capsule ; Simple Display Line:   40 mg, 1 cap(s), Oral, daily, 90 cap(s), 0 Refill(s) ; Ordering Provider:   Dexter Silva MD; Catalog Code:   FLUoxetine ; Order Dt/Tm:   4/29/2021 7:08:50 AM CDT          insulin isophane (NPH)  :   insulin isophane (NPH) ; Status:   Prescribed ; Ordered As Mnemonic:   insulin isophane (NPH) 100 units/mL human recombinant subcutaneous suspension ; Simple Display Line:   4 units, Subcutaneous, daily, 3 pen_needle, 1 Refill(s) ; Ordering Provider:   Dexter Silva MD; Catalog Code:   insulin isophane (NPH) ; Order Dt/Tm:   7/8/2021 3:55:05 PM CDT          isosorbide mononitrate  :   isosorbide mononitrate ; Status:   Prescribed ; Ordered As Mnemonic:   isosorbide mononitrate 30 mg oral tablet, extended release ; Simple Display Line:   2 tab(s), Oral, qam, 180 tab(s), 3 Refill(s) ; Ordering Provider:   Dexter Silva MD; Catalog Code:   isosorbide mononitrate ; Order Dt/Tm:   5/28/2020 9:43:22 AM CDT          metFORMIN  :   metFORMIN ; Status:   Prescribed ; Ordered As Mnemonic:   MetFORMIN (Eqv-Glucophage XR) 500 mg oral tablet, extended release ; Simple Display Line:   1,000 mg, 2 tab(s), Oral, bid, 360 tab(s), 0 Refill(s) ; Ordering Provider:    Dexter Silva MD; Catalog Code:   metFORMIN ; Order Dt/Tm:   4/29/2021 7:09:14 AM CDT          Miscellaneous Prescription  :   Miscellaneous Prescription ; Status:   Prescribed ; Ordered As Mnemonic:   FREESTYLE LITE      JUAN C ; Simple Display Line:   1 EA, id, daily, 50 EA ; Ordering Provider:   Dexter Silva MD; Catalog Code:   Miscellaneous Prescription ; Order Dt/Tm:   4/9/2010 10:09:35 AM CDT          Miscellaneous Rx Supply  :   Miscellaneous Rx Supply ; Status:   Prescribed ; Ordered As Mnemonic:   CPAP 13 ; Simple Display Line:   See Instructions, Use every night. Have a download in the sleep center in one month., 1 EA ; Ordering Provider:   Michel Fields MD; Catalog Code:   Miscellaneous Rx Supply ; Order Dt/Tm:   1/17/2014 10:40:19 AM CST          nitroglycerin  :   nitroglycerin ; Status:   Prescribed ; Ordered As Mnemonic:   nitroglycerin 0.4 mg sublingual tablet ; Simple Display Line:   0.4 mg, 1 tab(s), SL, q5min, If chest pain not relieved in 5 minutes after first dose, seek immediate medical attention, PRN: for chest pain, 100 tab(s), 3 Refill(s) ; Ordering Provider:   Dexter Silva MD; Catalog Code:   nitroglycerin ; Order Dt/Tm:   10/23/2020 9:05:52 AM CDT            Home Meds    amoxicillin  :   amoxicillin ; Status:   Documented ; Ordered As Mnemonic:   amoxicillin 500 mg oral capsule ; Simple Display Line:   See Instructions, Take 4 caps 1 hour prior to the procedure, 0 Refill(s) ; Catalog Code:   amoxicillin ; Order Dt/Tm:   7/8/2021 3:08:06 PM CDT          apixaban  :   apixaban ; Status:   Documented ; Ordered As Mnemonic:   Eliquis 5 mg oral tablet ; Simple Display Line:   5 mg, Oral, bid, 60 tab(s), 0 Refill(s) ; Catalog Code:   apixaban ; Order Dt/Tm:   7/8/2021 3:03:53 PM CDT          ascorbic acid  :   ascorbic acid ; Status:   Documented ; Ordered As Mnemonic:   Vitamin C ; Simple Display Line:   daily, 0 Refill(s) ; Catalog Code:   ascorbic acid ; Order Dt/Tm:    10/23/2020 8:48:26 AM CDT          aspirin  :   aspirin ; Status:   Documented ; Ordered As Mnemonic:   aspirin ; Simple Display Line:   81mg tab po daily, 0 Refill(s) ; Catalog Code:   aspirin ; Order Dt/Tm:   3/19/2021 2:43:15 PM CDT          Miscellaneous Prescription  :   Miscellaneous Prescription ; Status:   Documented ; Ordered As Mnemonic:   Blood Builder ; Simple Display Line:   1 tab po daily, 0 Refill(s) ; Catalog Code:   Miscellaneous Prescription ; Order Dt/Tm:   7/20/2021 10:46:35 AM CDT          Miscellaneous Prescription  :   Miscellaneous Prescription ; Status:   Documented ; Ordered As Mnemonic:   Iron ; Simple Display Line:   325mg daily, 0 Refill(s) ; Catalog Code:   Miscellaneous Prescription ; Order Dt/Tm:   3/26/2021 10:27:51 AM CDT          multivitamin with iron  :   multivitamin with iron ; Status:   Documented ; Ordered As Mnemonic:   Iron 100 Plus oral tablet ; Simple Display Line:   See Instructions, 65mg bid Oral daily, 0 Refill(s) ; Catalog Code:   multivitamin with iron ; Order Dt/Tm:   7/8/2021 3:06:10 PM CDT          omega-3 polyunsaturated fatty acids  :   omega-3 polyunsaturated fatty acids ; Status:   Documented ; Ordered As Mnemonic:   Fish Oil oral capsule ; Simple Display Line:   po, daily, 0 Refill(s) ; Catalog Code:   omega-3 polyunsaturated fatty acids ; Order Dt/Tm:   7/19/2016 8:35:56 AM CDT          pantoprazole  :   pantoprazole ; Status:   Documented ; Ordered As Mnemonic:   pantoprazole 40 mg oral delayed release tablet ; Simple Display Line:   See Instructions, 0.5 tab(s) Oral daily, 0 Refill(s) ; Catalog Code:   pantoprazole ; Order Dt/Tm:   5/14/2021 10:23:42 AM CDT          predniSONE  :   predniSONE ; Status:   Completed ; Ordered As Mnemonic:   predniSONE 10 mg oral tablet ; Simple Display Line:   See Instructions, 5mg po daily, 0 Refill(s) ; Catalog Code:   predniSONE ; Order Dt/Tm:   7/8/2021 3:07:23 PM CDT          sulfamethoxazole-trimethoprim  :    sulfamethoxazole-trimethoprim ; Status:   Completed ; Ordered As Mnemonic:   sulfamethoxazole-trimethoprim 400 mg-80 mg oral tablet ; Simple Display Line:   1 tab(s), Oral, daily, 0 Refill(s) ; Catalog Code:   sulfamethoxazole-trimethoprim ; Order Dt/Tm:   7/8/2021 3:10:01 PM CDT

## 2022-02-16 NOTE — TELEPHONE ENCOUNTER
Entered by Rubi Wallace MA on January 21, 2019 9:01:26 AM CST  ---------------------  From: Rubi Wallace MA   To: [facility]    Sent: 1/21/2019 9:01:26 AM CST  Subject: Medication Management     ** Not Approved: Refill not appropriate, Filled for one year 3/19/18 will fill at appointment. **  clopidogrel (CLOPIDOGREL  75MG  TAB)  TAKE 1 TABLET BY MOUTH  DAILY  Qty:  90 tab(s)        Days Supply:  90        Refills:  0          BREEZY     Route To Pharmacy - [facility]   Signed by Rubi Wallace MA            ------------------------------------------  From: [facility]  To: Dexter Silva MD  Sent: January 20, 2019 11:33:45 AM CST  Subject: Medication Management  Due: January 21, 2019 11:33:45 AM CST    ** On Hold Pending Signature **  Drug: clopidogrel (Plavix 75 mg oral tablet)  TAKE 1 TABLET BY MOUTH  DAILY  Quantity: 90 tab(s)     Days Supply: 90        Refills: 0  Substitutions Allowed  Notes from Pharmacy: - Ref: [id number]    Dispensed Drug: clopidogrel (clopidogrel 75 mg oral tablet)  TAKE 1 TABLET BY MOUTH  DAILY  Quantity: 90 tab(s)     Days Supply: 90        Refills: 0  Substitutions Allowed  Notes from Pharmacy:   ------------------------------------------

## 2022-02-16 NOTE — LETTER
(Inserted Image. Unable to display)   October 27, 2021    PIOTR CORREIA  [address] [place] [id number]            Dear PIOTR,      Thank you for selecting [facility] for your healthcare needs.    Our records indicate you are due for the following services:     Medicare Annual Wellness Visit.    (FYI   Regarding office visits: In some instances, a video visit or telephone visit may be offered as an option.)      To schedule an appointment or if you have further questions, please contact your clinic at [phone].      Powered by Player X    Sincerely,    Dexter Silva MD

## 2022-02-16 NOTE — TELEPHONE ENCOUNTER
---------------------  From: Noah Bernal PA-C   To: PIOTR CORRIEA    Sent: 9/9/2021 6:59:02 AM CDT  Subject: General Message       Your stool for blood was negative/normal.    Results:  Date Result Name Value   9/7/2021 2:11 PM Fecal Globin See comment

## 2022-02-18 ENCOUNTER — OFFICE VISIT - RIVER FALLS (OUTPATIENT)
Dept: FAMILY MEDICINE | Facility: CLINIC | Age: 77
End: 2022-02-18
Payer: COMMERCIAL

## 2022-02-18 ENCOUNTER — COMMUNICATION - RIVER FALLS (OUTPATIENT)
Dept: FAMILY MEDICINE | Facility: CLINIC | Age: 77
End: 2022-02-18
Payer: COMMERCIAL

## 2022-02-24 LAB — HGB BLD-MCNC: 11.4 G/DL

## 2022-02-24 NOTE — TELEPHONE ENCOUNTER
---------------------  From: Rubi Wallace MA (Phone [employer] [facility] ([id number]_[facility]))   To: Noah Bernal PA-C;     Sent: 3/11/2019 8:00:30 AM CDT  Subject: CONSUMER MESSAGE: RX provider     All patients medications were sent to wrong pharmacy. I sent them all to the correct one except for the Norco. Can you send it to [facility] please.       ---------------------  From: JIMENEZ CORREIA on behalf of PIOTR CORREIA  To: [facility]  Sent: 03/09/2019 09:20 a.m. CST  Subject: RX provider  Turner got a call from [employer] yesterday but he didn t complete the conversation. We are concerned that his prescriptions were sent to the wrong company. All of our prescriptions are to be filled at [facility] through our [facility] coverage. Please contact [employer] as soon as possible to avoid an unnecessary shipment and expense. The RX should be sent to [facility]. Call if you have any questions. We haven t used [employer] for a number of years.---------------------  From: Noah Bernal PA-C   To: Phone Messages Pool ([id number]_[facility]);     Sent: 3/11/2019 8:03:30 AM CDT  Subject: RE: CONSUMER MESSAGE: RX provider     Done    KAH---------------------  From: Rubi Wallace MA (Phone [employer] [facility] ([id number]_[facility]))   To: PIOTR CORREIA    Sent: 3/11/2019 8:06:55 AM CDT  Subject: RE: CONSUMER MESSAGE: RX provider     Hello,   I took care of this for you, everything has been sent to [facility].   Let us know if you need anything else.   Thanks,   Sameer   Impression: Type 2 diabetes mellitus without complications: Q22.0. Plan: Diabetes Mellitus Type II without signs of diabetic retinopathy either eye - Discussed the pathophysiology of diabetes and its effect on the eye. Stressed the importance of strong glucose control. Advised of importance of at least annual dilated examinations, and to contact us immediately for any problems or concerns.

## 2022-03-02 VITALS
DIASTOLIC BLOOD PRESSURE: 64 MMHG | SYSTOLIC BLOOD PRESSURE: 128 MMHG | SYSTOLIC BLOOD PRESSURE: 128 MMHG | HEART RATE: 82 BPM | DIASTOLIC BLOOD PRESSURE: 60 MMHG | TEMPERATURE: 97.2 F | WEIGHT: 188.9 LBS | HEART RATE: 76 BPM | HEIGHT: 67 IN | BODY MASS INDEX: 29.65 KG/M2

## 2022-03-02 NOTE — PROGRESS NOTES
Chief Complaint    c/o pain and swelling in left lower leg for the past 2-3 weeks  History of Present Illness       Patient is a 76-year-old male who comes in with left lower leg swelling which is worse today.       He does have some chronic lower extremity edema bilaterally but the left leg is painful and more swollen today.       He denies any chest pain or shortness of breath       No injury       He takes Eliquis 5 mg twice daily for history of a blood clot       Patient does report that he sleeps in a recliner at night because he has to the aches and pains to sleep in a bed.  His recliner is shorter than his legs and his feet do not always stay on.  So sometimes his foot will be hanging off the edge of the recliner.  Review of Systems       Negative except as listed in HPI   Physical Exam   Vitals & Measurements    T: 97.2  F (Tympanic)  HR: 76 (Peripheral)  BP: 128/64     HT: 67 in  HT: 67 in  WT: 188.9 lb  BMI: 29.58        Vitals noted and within normal limits       In general he is alert, oriented, and in no acute distress       Breathing not labored       Right lower extremity with trace to 1+ edema       Left lower extremity with 2+ pedal edema standing up to just below the knee.  There is no erythema, warmth.  Tenderness to palpation on the distal lateral lower leg.  No point tenderness or bony tenderness.       Lower extremity ultrasound left: no DVT  Assessment/Plan       Pain and swelling of left lower leg (M79.662)         ace wrap to left lower extremity to help get swelling down - demonstrated how to wrap in the clinic        elevated legs above the level of the heart        try to bumper the recliner somehow so that your feet will stay on        Follow up if not improving as anticipated.          Ordered:          Review Orders for Potential Authorizations, 02/18/22 11:30:23 CST          US Lower Extremity Venous Doppler (Request), Priority: STAT, Instructions: Left, Pain and swelling of left  lower leg           Patient Information     Name:PIOTR CORREIA      Address:      [address] [place] [id number]     Sex:Male     YOB: 1945     [phone]     Emergency Contact:JIMENEZ CORREIA     MRN:[id number]     FIN:[id number]     Location:[facility]     Date of Service:02/18/2022      Primary Care Physician:       Dexter Silva MD, [phone]      Attending Physician:       Michelle Woods MD, [phone]  Problem List/Past Medical History    Ongoing     Anemia     Atherosclerotic heart disease of native coronary artery without angina pectoris     Back pain, chronic     BPH (benign prostatic hyperplasia)     Depression, Recurrent     Essential (primary) hypertension     History of DVT in adulthood     Hyperlipemia     Obesity, unspecified     Obstructive sleep apnea (adult) (pediatric)       Comments: On 11/10/2013 AHI 18.3 with oximetry susanne 77%. Good/optimal CPAP titration to 12 with 6.5 minutes supine REM AHI 7.7 and REM AHI 22 in 2005     Type 2 diabetes mellitus without complications    Historical     ACUTE MYOCARDIAL INFARCTION     Inpatient stay       Comments: [facility] [place] [place] - Pneumonia of both lungs due to infectious organism.  Procedure/Surgical History     Angiogram (2017)     Colonoscopy (11/08/2013)      Comments: Sedation: MAC      Diverticulosis in the sigmoid colon, otherwise normal.      Repeat in 10 years..     Angioplasty (05/2007)     CABG - Coronary artery bypass graft x 3 (02/2004)     Colonoscopy (04/04/2002)      Comments: Repeat in 10 years..     ORIF right hand (2001)     Flexible sigmoidoscopy (11/13/1995)      Comments: Negative..     ORIF left shoulder (1975)  Medications    amoxicillin 500 mg oral capsule, See Instructions    aspirin    atorvastatin 40 mg oral tablet, 40 mg= 1 tab(s), Oral, daily, 3 refills    Blood Builder    CPAP 13, See Instructions    doxazosin 2 mg oral tablet, 2 mg= 1 tab(s), Oral, daily,  3 refills    Eliquis 5 mg oral tablet, 1 tab(s), Oral, bid    Fish Oil oral capsule, Oral, daily    FLUoxetine 40 mg oral capsule, 40 mg= 1 cap(s), Oral, daily, 3 refills    FREESTYLE LITE JUAN C, 1 EA, ID, daily, 11 refills    insulin isophane (NPH) 100 units/mL human recombinant subcutaneous suspension, 4 units, Subcutaneous, daily, 1 refills    Iron    Iron 100 Plus oral tablet, See Instructions    isosorbide mononitrate 30 mg oral tablet, extended release, 2 tab(s), Oral, qam, 3 refills    lisinopril 10 mg oral tablet, 10 mg= 1 tab(s), Oral, daily    MetFORMIN (Eqv-Glucophage XR) 500 mg oral tablet, extended release, 1000 mg= 2 tab(s), Oral, bid, 3 refills    nitroglycerin 0.4 mg sublingual tablet, 0.4 mg= 1 tab(s), SL, q5 min, PRN, 3 refills    Norco 5 mg-325 mg oral tablet, 1 tab(s), Oral, q4 hrs, PRN    pantoprazole 40 mg oral delayed release tablet, 40 mg= 1 tab(s)    Pen Needles, See Instructions    predniSONE 20 mg oral tablet, See Instructions    sulfamethoxazole-trimethoprim 400 mg-80 mg oral tablet, 1 tab(s), Oral, daily    Vitamin C, daily  Allergies    No Known Medication Allergies  Social History    Smoking Status     Former smoker     Alcohol - Past      Quit 1974     Electronic Cigarette/Vaping      Electronic Cigarette Use: Former use, quit more than 90 days ago.     Employment/School      Retired, Work/School description: . Highest education level: High school.     Exercise - Occasional exercise      Exercise frequency: 1-2 times/week. Exercise type: Walking.     Home/Environment      Marital status: . Spouse/Partner name: Brianda. Living situation: Home/Independent. Injuries/Abuse/Neglect in household: No. Feels unsafe at home: No. Family/Friends available for support: Yes.     Nutrition/Health      Type of diet: Regular. Wants to lose weight: Yes. Sleeping concerns: No. Feels highly stressed: No.     Other      Hearing impairment.     Sexual      Sexually active: No.  Identifies as male, History of STD: No. History of sexual abuse: No.     Substance Abuse - Denies Substance Abuse      Never     Tobacco - Past      Former smoker, quit more than 30 days ago  Family History    Alcohol abuse: Mother.    Heart disease: Father.    Hypertension: Father.    Liver disease: Mother.    Stroke: Father.  Immunizations       Scheduled Immunizations       Dose Date(s)       influenza virus vaccine, inactivated       09/21/2011       SARS-CoV-2 (COVID-19) Moderna-1273       02/26/2021, 04/02/2021       Other Immunizations               influenza       10/01/2010, 10/01/2011       influenza virus vaccine, inactivated       10/25/2012, 10/10/2013, 10/23/2014, 10/29/2015, 10/10/2016, 09/12/2017, 10/11/2019, 10/06/2020, 09/24/2021       pneumococcal (PPSV23)       09/05/2007       Td       11/09/2006       ZOS, shingles       10/24/2008       pneumococcal (PCV13)       07/15/2015       SARS-CoV-2 (COVID-19) Moderna-1273       11/17/2021

## 2022-03-02 NOTE — PROGRESS NOTES
Patient:   PIOTR CORREIA            MRN: [id number]            FIN: [id number]               Age:   76 years     Sex:  Male     :  1945   Associated Diagnoses:   Anemia   Author:   Dexter Silva MD      Chief Complaint   Fatigue, lethargy, anemia      Interval History   Anemia   The course is improving.  The effect on daily activities is change in activity level.        Review of Systems   Constitutional:  No fever, No fatigue, No decreased activity.    Respiratory:  Negative.    Cardiovascular:  Negative.       Health Status   Allergies:    Allergic Reactions (Selected)  No Known Medication Allergies   Medications:  (Selected)   Prescriptions  Prescribed  CPAP 13: CPAP 13, See Instructions, Instructions: Use every night. Have a download in the sleep center in one month., Supply, # 1 EA, 0 Refill(s), Type: Maintenance  Eliquis 5 mg oral tablet: = 1 tab(s), Oral, bid, # 180 tab(s), 3 Refill(s), Pharmacy: [facility], TAKE 1 TABLET BY MOUTH  TWICE DAILY, 67, in, 20 8:57:00 CDT, Height Measured, 177.5, lb, 21 14:42:00 CST, Weight Measured  FLUoxetine 40 mg oral capsule: = 1 cap(s) ( 40 mg ), Oral, daily, # 90 cap(s), 3 Refill(s), Type: Maintenance, Pharmacy: [facility], Do not fill until patient calls, 1 cap(s) Oral daily, 67, in, 20 8:57:00 CDT, Height Measured, 176.1, lb, 21 13:20:00 CDT, Shlomo...  FREESTYLE LITE      JUAN C: 1 EA, id, daily, # 50 EA, 11 Refill(s), Pharmacy: [id number]  MetFORMIN (Eqv-Glucophage XR) 500 mg oral tablet, extended release: = 2 tab(s) ( 1,000 mg ), Oral, bid, # 360 tab(s), 3 Refill(s), Type: Maintenance, Pharmacy: [facility], Do not fill until patient calls, 2 tab(s) Oral bid, 67, in, 20 8:57:00 CDT, Height Measured, 176.1, lb, 09/24/21 13:20:00 CDT, Shlomo...  Norco 5 mg-325 mg oral tablet: 1 tab(s), PO, q4hr, PRN: for pain, # 24 tab(s), 0 Refill(s), Type: Maintenance, Pharmacy: [facility], 1  tab(s) Oral q4 hrs,PRN:for pain  Pen Needles: Pen Needles, See Instructions, Instructions: Use as directed with insulin, Supply, # 100 EA, 0 Refill(s), Type: Maintenance, Pharmacy: [facility] #[id number], Use as directed with insulin, 67, in, 05/28/20 8:57:00 CDT, Height Measured, 180, lb, 0...  amoxicillin 500 mg oral capsule: See Instructions, Instructions: Take 4 caps 1 hour prior to the dental procedure, # 12 EA, 0 Refill(s), Type: Maintenance, Pharmacy: [facility], Take 4 caps 1 hour prior to the dental procedu...  atorvastatin 40 mg oral tablet: = 1 tab(s) ( 40 mg ), Oral, daily, # 90 tab(s), 3 Refill(s), Type: Maintenance, Pharmacy: [facility], Do not fill until patient calls, 1 tab(s) Oral daily, 67, in, 05/28/20 8:57:00 CDT, Height Measured, 176.1, lb, 09/24/21 13:20:00 CDT, Weig...  doxazosin 2 mg oral tablet: = 1 tab(s) ( 2 mg ), Oral, daily, # 90 tab(s), 3 Refill(s), Type: Maintenance, Pharmacy: [facility], Do not fill until patient calls, 1 tab(s) Oral daily, 67, in, 05/28/20 8:57:00 CDT, Height Measured, 176.1, lb, 09/24/21 13:20:00 CDT, Weigh...  insulin isophane (NPH) 100 units/mL human recombinant subcutaneous suspension: ( 4 units ), Subcutaneous, daily, # 3 pen_needle, 1 Refill(s), Type: Maintenance, Pharmacy: [facility] #[id number], 4 units Subcutaneous daily, 67, in, 05/28/20 8:57:00 CDT, Height Measured, 180, lb, 07/08/21 14:57:00 CDT, Weight Measured  isosorbide mononitrate 30 mg oral tablet, extended release: = 2 tab(s), Oral, qam, # 180 tab(s), 3 Refill(s), Type: Maintenance, Pharmacy: [facility], Due for appt in April, 2 tab(s) Oral qam, 67, in, 05/28/20 8:57:00 CDT, Height Measured, 176.1, lb, 09/24/21 13:20:00 CDT, Weight Measured  lisinopril 10 mg oral tablet: = 1 tab(s) ( 10 mg ), Oral, daily, # 90 tab(s), 0 Refill(s), Type: Maintenance, Pharmacy: [facility] [place], 1 tab(s) Oral daily, 67, in, 05/28/20 8:57:00 CDT, Height Measured, 177.5, lb, 11/17/21 1...  nitroglycerin 0.4 mg sublingual tablet: = 1 tab(s) ( 0.4 mg ), SL, q5min, Instructions: If chest pain not relieved in 5 minutes after first dose, seek immediate medical attention, PRN: for chest pain, # 100 tab(s), 3 Refill(s), Type: Maintenance, Pharmacy: [facility], This is a 3...  Documented Medications  Documented  Blood Builder: Blood Builder, Instructions: 1 tab po daily, 0 Refill(s), Type: Maintenance  Fish Oil oral capsule: po, daily, 0 Refill(s), Type: Maintenance  Iron 100 Plus oral tablet: See Instructions, Instructions: 65mg tid Oral daily, 0 Refill(s), Type: Maintenance  Iron: Iron, Instructions: 325mg daily, 0 Refill(s), Type: Maintenance  Vitamin C: daily, 0 Refill(s), Type: Maintenance  aspirin: Instructions: 81mg tab po daily, 0 Refill(s), Type: Maintenance  pantoprazole 40 mg oral delayed release tablet: = 1 tab(s) ( 40 mg ), 0 Refill(s), Type: Maintenance  predniSONE 20 mg oral tablet: See Instructions, Instructions: 1.5 tab(s) Oral daily 10 day(s), 0 Refill(s), Type: Maintenance  sulfamethoxazole-trimethoprim 400 mg-80 mg oral tablet: 1 tab(s), Oral, daily, 0 Refill(s), Type: Maintenance   Problem list:    All Problems (Selected)  Obstructive sleep apnea (adult) (pediatric) / SNOMED CT 163758202 / Confirmed  Back pain, chronic / SNOMED CT 252061553 / Confirmed  Type 2 diabetes mellitus without complications / SNOMED CT 800115259 / Confirmed  Obesity, unspecified / SNOMED CT 2239606154 / Probable  History of DVT in adulthood / SNOMED CT 9468644718 / Confirmed  Atherosclerotic heart disease of native coronary artery without angina pectoris / SNOMED CT 0163727645 / Confirmed  Depression, Recurrent / SNOMED CT 823935933 / Confirmed  BPH (benign prostatic hyperplasia) / SNOMED CT 046373905 / Confirmed  Hyperlipemia / SNOMED CT 154638417 / Confirmed  Anemia /  SNOMED CT 567020050 / Confirmed  Essential (primary) hypertension / SNOMED CT 17840149 / Confirmed      Histories   Past Medical History:    Active  Obstructive sleep apnea (adult) (pediatric) (SNOMED CT 314592588): Onset on 2005 at 59 years.  Comments:  10/18/2013 CDT 3:10 PM CDT - Michel Fields MD  AHI 7.7 and REM AHI 22 in 2013 CST 1:54 PM CST - Michel Fields MD  On 11/10/2013 AHI 18.3 with oximetry susanne 77%. Good/optimal CPAP titration to 12 with 6.5 minutes supine REM  Hyperlipemia (SNOMED CT 210330035)  Type 2 diabetes mellitus without complications (SNOMED CT 922163529)  Atherosclerotic heart disease of native coronary artery without angina pectoris (SNOMED CT 6913344151)  BPH (benign prostatic hyperplasia) (SNOMED CT 339186513)  Depression, Recurrent (SNOMED CT 169222093)  Obesity, unspecified (SNOMED CT 9532413682)  Back pain, chronic (SNOMED CT 948430933)  Essential (primary) hypertension (SNOMED CT 48745654)  Anemia (SNOMED CT 110979901)  Resolved  Inpatient stay (SNOMED CT 690131219): Onset on 3/20/2021 at 75 years.  Resolved on 3/22/2021 at 75 years.  Comments:  3/25/2021 CDT 10:53 AM CDT - Danny , [facility], [place] - Pneumonia of both lungs due to infectious organism.  ACUTE MYOCARDIAL INFARCTION (ICD-9-):  Resolved in  at 61 years.   Family History:    Hypertension  Father ()  Heart disease  Father ()  Alcohol abuse  Mother ()  Stroke  Father ()  Liver disease  Mother ()     Procedure history:    Angiogram (SNOMED CT 503716761) in 2017 at 72 Years.  Colonoscopy (SNOMED CT 480621286) performed by Shukri Arndt on 2013 at 68 Years.  Comments:  2013 1:13 PM CST - Leonora WILLS, Germaine  Sedation: MAC  Diverticulosis in the sigmoid colon, otherwise normal.  Repeat in 10 years.  Angioplasty (SNOMED CT 4745145) in the month of 2007 at 61 Years.  CABG - Coronary artery bypass graft x 3 (SNOMED CT  061671760) in the month of 2/2004 at 58 Years.  Colonoscopy (SNOMED CT 111485625) performed by Aashish Connolly MD on 4/4/2002 at 56 Years.  Comments:  12/9/2010 7:05 AM CST - Isabella Delgado  Repeat in 10 years.  ORIF right hand in 2001 at 56 Years.  Flexible sigmoidoscopy (SNOMED CT 98114528) performed by Castillo Anaya MD on 11/13/1995 at 50 Years.  Comments:  10/16/2013 9:17 AM CDT - Gisele Arevalo  Negative.  ORIF left shoulder in 1975 at 30 Years.   Social History:        Electronic Cigarette/Vaping Assessment            Electronic Cigarette Use: Former use, quit more than 90 days ago.      Alcohol Assessment: Past            Quit 1974      Tobacco Assessment: Past            Quit 1972            Former smoker, quit more than 30 days ago      Substance Abuse Assessment: Denies Substance Abuse            Never      Employment and Education Assessment            Retired, Work/School description: .  Highest education level: High school.      Home and Environment Assessment            Marital status: .  Spouse/Partner name: Brianda.  Living situation: Home/Independent.               Injuries/Abuse/Neglect in household: No.  Feels unsafe at home: No.  Family/Friends available for support:               Yes.      Nutrition and Health Assessment            Type of diet: Regular.  Wants to lose weight: Yes.  Sleeping concerns: No.  Feels highly stressed: No.      Exercise and Physical Activity Assessment: Occasional exercise            Exercise frequency: 1-2 times/week.  Exercise type: Walking.      Sexual Assessment            Sexually active: No.  Identifies as male, History of STD: No.  History of sexual abuse: No.      Other Assessment            Hearing impairment.        Physical Examination   Vital Signs   2/15/2022 2:35 PM CST Peripheral Pulse Rate 82 bpm    Systolic Blood Pressure 128 mmHg    Diastolic Blood Pressure 60 mmHg    Mean Arterial Pressure 83 mmHg      Vital Signs Stable per flow sheet .    Respiratory:  Lungs are clear to auscultation.    Cardiovascular:  Normal rate, Regular rhythm.       Review / Management   Results review:  Hgb 11.4 up since iron infusions.       Impression and Plan   Diagnosis     Anemia (HCI96-IR D64.9).     Course:  Improving.    Orders     Continue iron f/u 1 month.

## 2022-03-02 NOTE — TELEPHONE ENCOUNTER
---------------------  From: Reina Reece RN (Phone Messages Pool ([id number]_[facility]))   To: T Message Pool ([id number]_[facility]);     Sent: 2/4/2022 10:07:06 AM CST  Subject: BP increasing       PCP:  CHT      Time of Call:  9:48am       Person Calling:  pt  Phone number:  [phone]    Note:   VM received, stating since stopping Lisinopril, pt's BP is rising. Requesting [facility] to send in a prescription of Lisinopril to [facility].    Please advise.     Last office visit and reason:  11/17/21 anemia CHT---------------------  From: Margo Carrasco CMA (T Message Pool ([id number]_[facility]))   To: Dexter Silva MD;     Sent: 2/4/2022 10:16:48 AM CST  Subject: FW: BP increasing     Called patient. His BP has been elevated for roughly 3 weeks. Yesterday's reading was 194/84 and today 174/74. Confirmed he is not taking any BP medications. Iron infusions have been completed. Advise  ** Submitted: **  Order:lisinopril (lisinopril 10 mg oral tablet)  1 tab(s)  Oral  daily  Qty:  90 tab(s)        Refills:  0          Substitutions Allowed     [facility] [place] [facility] [place] [facility]    Signed by Dexter Silva MD  2/4/2022 4:48:00 PM [facility]

## 2022-03-02 NOTE — TELEPHONE ENCOUNTER
---------------------  From: Ronald/Janae FOX (Phone Messages Pool ([id number]_[facility]))   Sent: 2/18/2022 9:37:44 AM CST  Subject: L leg swelling/pain     Phone Message    PCP: CHT    Time of Call: 0934    Note: Call received from wife stating that pt has swelling in his L calf area. Patient does say it is painful. Wondering what to do.    Advised in clinic visit today. Wife agreed and was transferred to scheduling.

## 2022-03-02 NOTE — NURSING NOTE
Comprehensive Intake Entered On:  2/18/2022 11:19 AM CST    Performed On:  2/18/2022 11:12 AM CST by Francoise Goldberg CMA               Summary   Chief Complaint :   c/o pain and swelling in left lower leg for the past 2-3 weeks    Advance Directive :   Yes   Menstrual Status :   N/A   Weight Measured :   188.9 lb(Converted to: 188 lb 14 oz, 85.684 kg)    Height Measured :   67 in(Converted to: 5 ft 7 in, 170.18 cm)    Body Mass Index :   29.58 kg/m2 (HI)    Body Surface Area :   2.01 m2   Height/Length Estimated :   67 in(Converted to: 5 ft 7 in, 170.18 cm)    Systolic Blood Pressure :   128 mmHg   Diastolic Blood Pressure :   64 mmHg   Mean Arterial Pressure :   85 mmHg   Peripheral Pulse Rate :   76 bpm   BP Site :   Right arm   Pulse Site :   Radial artery   BP Method :   Manual   HR Method :   Manual   Temperature Tympanic :   97.2 DegF(Converted to: 36.2 DegC)  (LOW)    Farncoise Goldberg CMA - 2/18/2022 11:12 AM CST   Health Status   Allergies Verified? :   Yes   Medication History Verified? :   Yes   Immunizations Current :   Yes   Medical History Verified? :   No   Pre-Visit Planning Status :   Not completed   Tobacco Use? :   Former smoker   Francoise Goldberg CMA - 2/18/2022 11:12 AM CST   Consents   Consent for Immunization Exchange :   Consent Granted   Consent for Immunizations to Providers :   Consent Granted   Francoise Goldberg CMA - 2/18/2022 11:12 AM CST   Problems   (As Of: 2/18/2022 11:19:42 AM CST)   Problems(Active)    Anemia (SNOMED CT  :013780597 )  Name of Problem:   Anemia ; Recorder:   Aimee Eubanks RN; Confirmation:   Confirmed ; Classification:   Medical ; Code:   [id number] ; Contributor System:   PowerChart ; Last Updated:   8/31/2021 8:57 AM CDT ; Life Cycle Status:   Active ; Vocabulary:   SNOMED CT        Atherosclerotic heart disease of native coronary artery without angina pectoris (SNOMED CT  :7895665229 )  Name of Problem:   Atherosclerotic heart disease of native coronary artery without  angina pectoris ; Recorder:   Vane Aly; Confirmation:   Confirmed ; Classification:   Medical ; Code:   [id number] ; Contributor System:   PowerChart ; Last Updated:   4/13/2020 2:45 PM CDT ; Life Cycle Status:   Active ; Vocabulary:   SNOMED CT        Back pain, chronic (SNOMED CT  :581575130 )  Name of Problem:   Back pain, chronic ; Recorder:   Dexter Silva MD; Confirmation:   Confirmed ; Classification:   Medical ; Code:   [id number] ; Contributor System:   PowerChart ; Last Updated:   9/28/2018 2:11 PM CDT ; Life Cycle Status:   Active ; Responsible Provider:   Dexter Silva MD; Vocabulary:   SNOMED CT        BPH (benign prostatic hyperplasia) (SNOMED CT  :957846584 )  Name of Problem:   BPH (benign prostatic hyperplasia) ; Recorder:   Vane Aly; Confirmation:   Confirmed ; Classification:   Medical ; Code:   [id number] ; Contributor System:   PowerChart ; Last Updated:   3/2/2015 11:37 AM CST ; Life Cycle Date:   3/23/2010 ; Life Cycle Status:   Active ; Vocabulary:   SNOMED CT        Depression, Recurrent (SNOMED CT  :307462752 )  Name of Problem:   Depression, Recurrent ; Recorder:   Vane Aly; Confirmation:   Confirmed ; Classification:   Medical ; Code:   [id number] ; Contributor System:   PowerChart ; Last Updated:   1/5/2016 11:47 AM CST ; Life Cycle Date:   3/23/2010 ; Life Cycle Status:   Active ; Vocabulary:   SNOMED CT        Essential (primary) hypertension (SNOMED CT  :33436225 )  Name of Problem:   Essential (primary) hypertension ; Recorder:   Gisele Arevalo; Confirmation:   Confirmed ; Classification:   Medical ; Code:   [id number] ; Contributor System:   PowerChart ; Last Updated:   4/13/2020 2:43 PM CDT ; Life Cycle Status:   Active ; Vocabulary:   SNOMED CT        History of DVT in adulthood (SNOMED CT  :3916580605 )  Name of Problem:   History of DVT in adulthood ; Recorder:   Dexter Silva MD; Confirmation:   Confirmed ; Classification:   Medical ; Code:    [id number] ; Contributor System:   PowerChart ; Last Updated:   6/8/2021 11:34 AM CDT ; Life Cycle Status:   Active ; Responsible Provider:   Dexter Silva MD; Vocabulary:   SNOMED CT        Hyperlipemia (SNOMED CT  :406711568 )  Name of Problem:   Hyperlipemia ; Recorder:   Vane Aly; Confirmation:   Confirmed ; Classification:   Medical ; Code:   [id number] ; Contributor System:   PowerChart ; Last Updated:   1/5/2016 11:47 AM CST ; Life Cycle Date:   3/23/2010 ; Life Cycle Status:   Active ; Vocabulary:   SNOMED CT        Obesity, unspecified (SNOMED CT  :4932219987 )  Name of Problem:   Obesity, unspecified ; Recorder:   SYSTEM; Confirmation:   Probable ; Classification:   Medical ; Code:   [id number] ; Contributor System:   PowerChart ; Last Updated:   3/25/2021 2:09 PM CDT ; Life Cycle Status:   Active ; Vocabulary:   SNOMED CT        Obstructive sleep apnea (adult) (pediatric) (SNOMED CT  :235115621 )  Name of Problem:   Obstructive sleep apnea (adult) (pediatric) ; Onset Date:   6/28/2005 ; Recorder:   Isabella Delgado; Confirmation:   Confirmed ; Classification:   Medical ; Code:   [id number] ; Contributor System:   PowerChart ; Last Updated:   4/13/2020 2:43 PM CDT ; Life Cycle Status:   Active ; Vocabulary:   SNOMED CT   ; Comments:        10/18/2013 3:10 PM - Michel Fields MD  AHI 7.7 and REM AHI 22 in 2005 11/25/2013 1:54 PM - Michel Fields MD  On 11/10/2013 AHI 18.3 with oximetry susanne 77%. Good/optimal CPAP titration to 12 with 6.5 minutes supine REM      Type 2 diabetes mellitus without complications (SNOMED CT  :813225045 )  Name of Problem:   Type 2 diabetes mellitus without complications ; Recorder:   Vane Aly; Confirmation:   Confirmed ; Classification:   Medical ; Code:   [id number] ; Contributor System:   PowerChart ; Last Updated:   4/13/2020 2:44 PM CDT ; Life Cycle Status:   Active ; Vocabulary:   SNOMED CT          Meds / Allergies   (As Of: 2/18/2022 11:19:42 AM CST)    Allergies (Active)   No Known Medication Allergies  Estimated Onset Date:   Unspecified ; Created By:   Deana French CMA; Reaction Status:   Active ; Category:   Drug ; Substance:   No Known Medication Allergies ; Type:   Allergy ; Updated By:   Deana French CMA; Reviewed Date:   2/18/2022 11:16 AM CST        Medication List   (As Of: 2/18/2022 11:19:42 AM CST)   Prescription/Discharge Order    acetaminophen-hydrocodone  :   acetaminophen-hydrocodone ; Status:   Prescribed ; Ordered As Mnemonic:   Norco 5 mg-325 mg oral tablet ; Simple Display Line:   1 tab(s), PO, q4hr, PRN: for pain, 24 tab(s), 0 Refill(s) ; Ordering Provider:   Dexter Silva MD; Catalog Code:   acetaminophen-hydrocodone ; Order Dt/Tm:   3/24/2020 1:06:36 PM CDT          amoxicillin  :   amoxicillin ; Status:   Prescribed ; Ordered As Mnemonic:   amoxicillin 500 mg oral capsule ; Simple Display Line:   See Instructions, Take 4 caps 1 hour prior to the dental procedure, 12 EA, 0 Refill(s) ; Ordering Provider:   Biju Givens PA-C; Catalog Code:   amoxicillin ; Order Dt/Tm:   11/30/2021 10:13:37 AM CST          apixaban  :   apixaban ; Status:   Prescribed ; Ordered As Mnemonic:   Eliquis 5 mg oral tablet ; Simple Display Line:   1 tab(s), Oral, bid, 180 tab(s), 3 Refill(s) ; Ordering Provider:   Dexter Silva MD; Catalog Code:   apixaban ; Order Dt/Tm:   11/23/2021 8:06:07 AM CST          atorvastatin  :   atorvastatin ; Status:   Prescribed ; Ordered As Mnemonic:   atorvastatin 40 mg oral tablet ; Simple Display Line:   40 mg, 1 tab(s), Oral, daily, 90 tab(s), 3 Refill(s) ; Ordering Provider:   Dexter Silva MD; Catalog Code:   atorvastatin ; Order Dt/Tm:   9/24/2021 1:42:05 PM CDT          doxazosin  :   doxazosin ; Status:   Prescribed ; Ordered As Mnemonic:   doxazosin 2 mg oral tablet ; Simple Display Line:   2 mg, 1 tab(s), Oral, daily, 90 tab(s), 3 Refill(s) ; Ordering Provider:   Dexter Silva MD; Catalog  Code:   doxazosin ; Order Dt/Tm:   9/24/2021 1:42:06 PM CDT          FLUoxetine  :   FLUoxetine ; Status:   Prescribed ; Ordered As Mnemonic:   FLUoxetine 40 mg oral capsule ; Simple Display Line:   40 mg, 1 cap(s), Oral, daily, 90 cap(s), 3 Refill(s) ; Ordering Provider:   Dexter Silva MD; Catalog Code:   FLUoxetine ; Order Dt/Tm:   9/24/2021 1:42:05 PM CDT          insulin isophane (NPH)  :   insulin isophane (NPH) ; Status:   Prescribed ; Ordered As Mnemonic:   insulin isophane (NPH) 100 units/mL human recombinant subcutaneous suspension ; Simple Display Line:   4 units, Subcutaneous, daily, 3 pen_needle, 1 Refill(s) ; Ordering Provider:   Dexter Silva MD; Catalog Code:   insulin isophane (NPH) ; Order Dt/Tm:   7/8/2021 3:55:05 PM CDT          isosorbide mononitrate  :   isosorbide mononitrate ; Status:   Prescribed ; Ordered As Mnemonic:   isosorbide mononitrate 30 mg oral tablet, extended release ; Simple Display Line:   2 tab(s), Oral, qam, 180 tab(s), 3 Refill(s) ; Ordering Provider:   Dexter Silva MD; Catalog Code:   isosorbide mononitrate ; Order Dt/Tm:   9/24/2021 1:45:52 PM CDT          lisinopril  :   lisinopril ; Status:   Prescribed ; Ordered As Mnemonic:   lisinopril 10 mg oral tablet ; Simple Display Line:   10 mg, 1 tab(s), Oral, daily, 90 tab(s), 0 Refill(s) ; Ordering Provider:   Dexter Silva MD; Catalog Code:   lisinopril ; Order Dt/Tm:   2/4/2022 10:48:08 AM CST          metFORMIN  :   metFORMIN ; Status:   Prescribed ; Ordered As Mnemonic:   MetFORMIN (Eqv-Glucophage XR) 500 mg oral tablet, extended release ; Simple Display Line:   1,000 mg, 2 tab(s), Oral, bid, 360 tab(s), 3 Refill(s) ; Ordering Provider:   Dexter Silva MD; Catalog Code:   metFORMIN ; Order Dt/Tm:   9/24/2021 1:42:04 PM CDT          Miscellaneous Prescription  :   Miscellaneous Prescription ; Status:   Prescribed ; Ordered As Mnemonic:   FREESTYLE LITE      JUAN C ; Simple Display  Line:   1 EA, id, daily, 50 EA ; Ordering Provider:   Dexter Silva MD; Catalog Code:   Miscellaneous Prescription ; Order Dt/Tm:   4/9/2010 10:09:35 AM CDT          Miscellaneous Rx Supply  :   Miscellaneous Rx Supply ; Status:   Prescribed ; Ordered As Mnemonic:   CPAP 13 ; Simple Display Line:   See Instructions, Use every night. Have a download in the sleep center in one month., 1 EA ; Ordering Provider:   Michel Fields MD; Catalog Code:   Miscellaneous Rx Supply ; Order Dt/Tm:   1/17/2014 10:40:19 AM CST          Miscellaneous Rx Supply  :   Miscellaneous Rx Supply ; Status:   Prescribed ; Ordered As Mnemonic:   Pen Needles ; Simple Display Line:   See Instructions, Use as directed with insulin, 100 EA, 0 Refill(s) ; Ordering Provider:   Dexter Silva MD; Catalog Code:   Miscellaneous Rx Supply ; Order Dt/Tm:   7/28/2021 2:47:25 PM CDT          nitroglycerin  :   nitroglycerin ; Status:   Prescribed ; Ordered As Mnemonic:   nitroglycerin 0.4 mg sublingual tablet ; Simple Display Line:   0.4 mg, 1 tab(s), SL, q5min, If chest pain not relieved in 5 minutes after first dose, seek immediate medical attention, PRN: for chest pain, 100 tab(s), 3 Refill(s) ; Ordering Provider:   Dexter Silva MD; Catalog Code:   nitroglycerin ; Order Dt/Tm:   9/24/2021 1:42:06 PM CDT            Home Meds    ascorbic acid  :   ascorbic acid ; Status:   Documented ; Ordered As Mnemonic:   Vitamin C ; Simple Display Line:   daily, 0 Refill(s) ; Catalog Code:   ascorbic acid ; Order Dt/Tm:   10/23/2020 8:48:26 AM CDT          aspirin  :   aspirin ; Status:   Documented ; Ordered As Mnemonic:   aspirin ; Simple Display Line:   81mg tab po daily, 0 Refill(s) ; Catalog Code:   aspirin ; Order Dt/Tm:   3/19/2021 2:43:15 PM CDT          Miscellaneous Prescription  :   Miscellaneous Prescription ; Status:   Documented ; Ordered As Mnemonic:   Blood Builder ; Simple Display Line:   1 tab po daily, 0 Refill(s) ; Catalog  Code:   Miscellaneous Prescription ; Order Dt/Tm:   7/20/2021 10:46:35 AM CDT          Miscellaneous Prescription  :   Miscellaneous Prescription ; Status:   Documented ; Ordered As Mnemonic:   Iron ; Simple Display Line:   325mg daily, 0 Refill(s) ; Catalog Code:   Miscellaneous Prescription ; Order Dt/Tm:   3/26/2021 10:27:51 AM CDT          multivitamin with iron  :   multivitamin with iron ; Status:   Documented ; Ordered As Mnemonic:   Iron 100 Plus oral tablet ; Simple Display Line:   See Instructions, 65mg tid Oral daily, 0 Refill(s) ; Catalog Code:   multivitamin with iron ; Order Dt/Tm:   7/8/2021 3:06:10 PM CDT          omega-3 polyunsaturated fatty acids  :   omega-3 polyunsaturated fatty acids ; Status:   Documented ; Ordered As Mnemonic:   Fish Oil oral capsule ; Simple Display Line:   po, daily, 0 Refill(s) ; Catalog Code:   omega-3 polyunsaturated fatty acids ; Order Dt/Tm:   7/19/2016 8:35:56 AM CDT          pantoprazole  :   pantoprazole ; Status:   Documented ; Ordered As Mnemonic:   pantoprazole 40 mg oral delayed release tablet ; Simple Display Line:   40 mg, 1 tab(s), 0 Refill(s) ; Catalog Code:   pantoprazole ; Order Dt/Tm:   5/14/2021 10:23:42 AM CDT          predniSONE  :   predniSONE ; Status:   Documented ; Ordered As Mnemonic:   predniSONE 20 mg oral tablet ; Simple Display Line:   See Instructions, 1.5 tab(s) Oral daily 10 day(s), 0 Refill(s) ; Catalog Code:   predniSONE ; Order Dt/Tm:   9/2/2021 9:19:30 AM CDT          sulfamethoxazole-trimethoprim  :   sulfamethoxazole-trimethoprim ; Status:   Documented ; Ordered As Mnemonic:   sulfamethoxazole-trimethoprim 400 mg-80 mg oral tablet ; Simple Display Line:   1 tab(s), Oral, daily, 0 Refill(s) ; Catalog Code:   sulfamethoxazole-trimethoprim ; Order Dt/Tm:   9/24/2021 1:29:38 PM CDT            Social History   Social History   (As Of: 2/18/2022 11:19:42 AM CST)   Alcohol:  Past      Quit 1974   (Last Updated: 4/13/2020 2:45:59 PM CDT by  Isabella Delgado)          Tobacco:  Past      Quit 1972   (Last Updated: 4/13/2020 2:45:42 PM CDT by Isabella Delgado)   Former smoker, quit more than 30 days ago   (Last Updated: 5/6/2021 9:43:02 AM CDT by Margo Carrasco CMA)          Electronic Cigarette/Vaping:        Electronic Cigarette Use: Former use, quit more than 90 days ago.   (Last Updated: 2/18/2022 11:13:00 AM CST by Francoise Goldberg CMA)          Substance Abuse:  Denies Substance Abuse      Never   (Last Updated: 9/28/2018 2:13:39 PM CDT by Isabella Delgado)          Employment/School:        Retired, Work/School description: .  Highest education level: High school.   (Last Updated: 3/25/2021 2:18:18 PM CDT by Isabella Delgado)          Home/Environment:        Marital status: .  Spouse/Partner name: Brianda.  Living situation: Home/Independent.  Injuries/Abuse/Neglect in household: No.  Feels unsafe at home: No.  Family/Friends available for support: Yes.   (Last Updated: 4/13/2020 2:46:58 PM CDT by Isabella Delgado)          Nutrition/Health:        Type of diet: Regular.  Wants to lose weight: Yes.  Sleeping concerns: No.  Feels highly stressed: No.   (Last Updated: 4/13/2020 2:46:17 PM CDT by Isabella Delgado)          Exercise:  Occasional exercise      Exercise frequency: 1-2 times/week.  Exercise type: Walking.   (Last Updated: 4/13/2020 2:46:41 PM CDT by Isabella Delgado)          Sexual:        Sexually active: No.  Identifies as male, History of STD: No.  History of sexual abuse: No.   (Last Updated: 4/13/2020 2:48:58 PM CDT by Isabella Delgado)          Other:        Hearing impairment.   (Last Updated: 4/13/2020 2:47:18 PM CDT by Isabella Delgado)

## 2022-03-04 ENCOUNTER — DOCUMENTATION ONLY (OUTPATIENT)
Dept: LAB | Facility: CLINIC | Age: 77
End: 2022-03-04
Payer: COMMERCIAL

## 2022-03-04 DIAGNOSIS — D50.9 IRON DEFICIENCY ANEMIA, UNSPECIFIED IRON DEFICIENCY ANEMIA TYPE: Primary | ICD-10-CM

## 2022-03-04 NOTE — PROGRESS NOTES
Lab orders placed. I would like this done same day at time of appointment.  What we used to call stat.  
independent

## 2022-03-10 ENCOUNTER — DOCUMENTATION ONLY (OUTPATIENT)
Dept: OTHER | Facility: CLINIC | Age: 77
End: 2022-03-10
Payer: COMMERCIAL

## 2022-03-11 ENCOUNTER — LAB (OUTPATIENT)
Dept: LAB | Facility: CLINIC | Age: 77
End: 2022-03-11
Payer: COMMERCIAL

## 2022-03-11 DIAGNOSIS — D50.9 IRON DEFICIENCY ANEMIA, UNSPECIFIED IRON DEFICIENCY ANEMIA TYPE: ICD-10-CM

## 2022-03-11 LAB
ERYTHROCYTE [DISTWIDTH] IN BLOOD BY AUTOMATED COUNT: 14.4 % (ref 10–15)
HCT VFR BLD AUTO: 40.2 % (ref 40–53)
HGB BLD-MCNC: 12.6 G/DL (ref 13.3–17.7)
MCH RBC QN AUTO: 27.4 PG (ref 26.5–33)
MCHC RBC AUTO-ENTMCNC: 31.3 G/DL (ref 31.5–36.5)
MCV RBC AUTO: 87 FL (ref 78–100)
PLATELET # BLD AUTO: 155 10E3/UL (ref 150–450)
RBC # BLD AUTO: 4.6 10E6/UL (ref 4.4–5.9)
WBC # BLD AUTO: 5.6 10E3/UL (ref 4–11)

## 2022-03-11 PROCEDURE — 36415 COLL VENOUS BLD VENIPUNCTURE: CPT

## 2022-03-11 PROCEDURE — 85027 COMPLETE CBC AUTOMATED: CPT

## 2022-03-14 PROBLEM — J84.116 CRYPTOGENIC ORGANIZING PNEUMONIA (H): Status: ACTIVE | Noted: 2021-04-13

## 2022-03-14 PROBLEM — E88.09 HYPOALBUMINEMIA: Status: ACTIVE | Noted: 2021-04-13

## 2022-03-14 PROBLEM — Z86.718 HISTORY OF DEEP VENOUS THROMBOSIS: Status: ACTIVE | Noted: 2022-03-14

## 2022-03-14 PROBLEM — E66.9 OBESITY: Status: ACTIVE | Noted: 2022-03-14

## 2022-03-14 PROBLEM — F33.1 MODERATE EPISODE OF RECURRENT MAJOR DEPRESSIVE DISORDER (H): Status: ACTIVE | Noted: 2022-03-14

## 2022-03-14 PROBLEM — D50.9 IRON DEFICIENCY ANEMIA: Status: ACTIVE | Noted: 2021-03-20

## 2022-03-14 PROBLEM — E78.00 PURE HYPERCHOLESTEROLEMIA: Status: ACTIVE | Noted: 2022-03-14

## 2022-03-14 PROBLEM — K92.2 GASTROINTESTINAL HEMORRHAGE: Status: ACTIVE | Noted: 2021-05-30

## 2022-03-14 PROBLEM — R63.0 ANOREXIA: Status: ACTIVE | Noted: 2021-04-13

## 2022-03-14 PROBLEM — D64.9 ANEMIA: Status: ACTIVE | Noted: 2021-05-08

## 2022-03-14 PROBLEM — J44.9 COPD (CHRONIC OBSTRUCTIVE PULMONARY DISEASE) (H): Status: ACTIVE | Noted: 2021-03-22

## 2022-03-14 PROBLEM — Z86.711 HISTORY OF PULMONARY EMBOLISM: Status: ACTIVE | Noted: 2021-04-13

## 2022-03-14 PROBLEM — J18.9 PNEUMONIA OF BOTH LUNGS DUE TO INFECTIOUS ORGANISM: Status: ACTIVE | Noted: 2021-03-20

## 2022-03-14 PROBLEM — I50.32 CHRONIC DIASTOLIC (CONGESTIVE) HEART FAILURE (H): Status: ACTIVE | Noted: 2021-06-26

## 2022-03-14 PROBLEM — U07.1 DISEASE DUE TO SEVERE ACUTE RESPIRATORY SYNDROME CORONAVIRUS 2 (SARS-COV-2): Status: ACTIVE | Noted: 2021-11-05

## 2022-03-15 ENCOUNTER — OFFICE VISIT (OUTPATIENT)
Dept: FAMILY MEDICINE | Facility: CLINIC | Age: 77
End: 2022-03-15
Payer: COMMERCIAL

## 2022-03-15 VITALS
BODY MASS INDEX: 28.82 KG/M2 | HEART RATE: 66 BPM | RESPIRATION RATE: 16 BRPM | SYSTOLIC BLOOD PRESSURE: 134 MMHG | WEIGHT: 184 LBS | OXYGEN SATURATION: 96 % | DIASTOLIC BLOOD PRESSURE: 66 MMHG

## 2022-03-15 DIAGNOSIS — I50.32 CHRONIC DIASTOLIC (CONGESTIVE) HEART FAILURE (H): ICD-10-CM

## 2022-03-15 DIAGNOSIS — E11.9 TYPE 2 DIABETES MELLITUS WITHOUT COMPLICATION, WITHOUT LONG-TERM CURRENT USE OF INSULIN (H): ICD-10-CM

## 2022-03-15 DIAGNOSIS — D50.8 IRON DEFICIENCY ANEMIA SECONDARY TO INADEQUATE DIETARY IRON INTAKE: Primary | ICD-10-CM

## 2022-03-15 DIAGNOSIS — I25.118 CORONARY ARTERY DISEASE OF NATIVE ARTERY OF NATIVE HEART WITH STABLE ANGINA PECTORIS (H): ICD-10-CM

## 2022-03-15 DIAGNOSIS — J44.9 CHRONIC OBSTRUCTIVE PULMONARY DISEASE, UNSPECIFIED COPD TYPE (H): ICD-10-CM

## 2022-03-15 DIAGNOSIS — F33.1 MODERATE EPISODE OF RECURRENT MAJOR DEPRESSIVE DISORDER (H): ICD-10-CM

## 2022-03-15 PROCEDURE — 99214 OFFICE O/P EST MOD 30 MIN: CPT | Performed by: FAMILY MEDICINE

## 2022-03-15 RX ORDER — BLOOD SUGAR DIAGNOSTIC
STRIP MISCELLANEOUS
COMMUNITY
Start: 2022-02-15 | End: 2022-09-15

## 2022-03-15 RX ORDER — FLASH GLUCOSE SENSOR
1 KIT MISCELLANEOUS
Qty: 2 EACH | Refills: 5 | Status: SHIPPED | OUTPATIENT
Start: 2022-03-15 | End: 2022-08-16

## 2022-03-15 RX ORDER — FERROUS SULFATE 325(65) MG
65 TABLET ORAL 2 TIMES DAILY
COMMUNITY
Start: 2021-04-30 | End: 2022-09-15

## 2022-03-15 RX ORDER — LISINOPRIL 10 MG/1
10 TABLET ORAL
COMMUNITY
Start: 2022-02-04 | End: 2022-04-26

## 2022-03-15 RX ORDER — PANTOPRAZOLE SODIUM 40 MG/1
0.5 TABLET, DELAYED RELEASE ORAL
COMMUNITY
Start: 2021-05-12 | End: 2022-09-15

## 2022-03-15 RX ORDER — FLURBIPROFEN SODIUM 0.3 MG/ML
SOLUTION/ DROPS OPHTHALMIC
COMMUNITY
Start: 2021-07-28 | End: 2022-09-15

## 2022-03-15 RX ORDER — METFORMIN HCL 500 MG
1000 TABLET, EXTENDED RELEASE 24 HR ORAL
COMMUNITY
Start: 2021-06-30 | End: 2022-09-15

## 2022-03-15 RX ORDER — ISOSORBIDE MONONITRATE 60 MG/1
60 TABLET, EXTENDED RELEASE ORAL
COMMUNITY
Start: 2021-02-15 | End: 2022-09-15

## 2022-03-15 RX ORDER — AMOXICILLIN 250 MG
1 CAPSULE ORAL DAILY
COMMUNITY
Start: 2021-04-30

## 2022-03-15 RX ORDER — ASCORBIC ACID 500 MG
500 TABLET ORAL
COMMUNITY
End: 2022-05-17

## 2022-03-15 RX ORDER — DOXAZOSIN 2 MG/1
2 TABLET ORAL AT BEDTIME
COMMUNITY
Start: 2021-09-24 | End: 2022-09-09

## 2022-03-15 RX ORDER — PREDNISONE 1 MG/1
TABLET ORAL
COMMUNITY
Start: 2021-12-01 | End: 2022-04-06

## 2022-03-15 RX ORDER — ALBUTEROL SULFATE 90 UG/1
AEROSOL, METERED RESPIRATORY (INHALATION)
COMMUNITY
Start: 2021-11-03 | End: 2022-06-14

## 2022-03-15 RX ORDER — FLUOXETINE 40 MG/1
40 CAPSULE ORAL DAILY
COMMUNITY
Start: 2021-09-24 | End: 2022-09-15

## 2022-03-15 RX ORDER — ATORVASTATIN CALCIUM 40 MG/1
40 TABLET, FILM COATED ORAL DAILY
COMMUNITY
Start: 2021-09-24 | End: 2022-09-09

## 2022-03-15 RX ORDER — FLASH GLUCOSE SCANNING READER
1 EACH MISCELLANEOUS ONCE
Qty: 1 EACH | Refills: 0 | Status: SHIPPED | OUTPATIENT
Start: 2022-03-15 | End: 2022-03-15

## 2022-03-15 RX ORDER — BLOOD GLUCOSE CONTROL HIGH,LOW
EACH MISCELLANEOUS SEE ADMIN INSTRUCTIONS
COMMUNITY
Start: 2021-04-30 | End: 2022-06-14

## 2022-03-15 RX ORDER — BLOOD-GLUCOSE METER
EACH MISCELLANEOUS SEE ADMIN INSTRUCTIONS
COMMUNITY
Start: 2021-04-30 | End: 2022-09-15

## 2022-03-15 RX ORDER — LANCETS
EACH MISCELLANEOUS
COMMUNITY
Start: 2021-04-30 | End: 2022-09-15

## 2022-03-15 RX ORDER — NITROGLYCERIN 0.4 MG/1
0.4 TABLET SUBLINGUAL
COMMUNITY
Start: 2021-09-24 | End: 2022-09-15

## 2022-03-15 NOTE — PROGRESS NOTES
"  Assessment & Plan     Chronic diastolic (congestive) heart failure (H)    - BETA BLOCKER NOT PRESCRIBED (INTENTIONAL); Please choose reason not prescribed from choices below.  COPD  Moderate episode of recurrent major depressive disorder (H)  Stable    Coronary artery disease of native artery of native heart with stable angina pectoris (H)  Stable    Type 2 diabetes mellitus without complication, without long-term current use of insulin (H)  Stable  Would like CGM    Patient has met criteria for CGM coverage;   - patient has type two diabetes mellitus; and   - patient has been using a blood glucose monitor and performing four or more blood glucose test per day; and   - within six months prior to ordering the CGM, the patient has had an in-person visit with the practitioner; and determined that criteria (1-4) above are met; and   - every six months following the initial prescription of the CGM, the treating practitioner has an in-person visit with the patient to assess adherence to their CGM regimen and diabetes treatment plan       Chronic obstructive pulmonary disease, unspecified COPD type (H)    - BETA BLOCKER NOT PRESCRIBED (INTENTIONAL); Please choose reason not prescribed from choices below.    Iron deficiency anemia secondary to inadequate dietary iron intake  Improved on iron               BMI:   Estimated body mass index is 28.82 kg/m  as calculated from the following:    Height as of 2/18/22: 1.702 m (5' 7\").    Weight as of this encounter: 83.5 kg (184 lb).         Dexter Silva MD  [facility]    Jose Tompkins is a 76 year old who presents for the following health issues: Anemia follow up and is  accompanied by his spouse.    History of Present Illness       Reason for visit:  Followup  Symptom onset:  More than a month  Symptoms include:  Wellness checkup. Anemia    He eats 2-3 servings of fruits and vegetables daily.He consumes 1 sweetened beverage(s) daily.He " exercises with enough effort to increase his heart rate 30 to 60 minutes per day.  He exercises with enough effort to increase his heart rate 3 or less days per week.   He is taking medications regularly.       Diabetes Follow-up    How often are you checking your blood sugar? Four times daily  Blood sugar testing frequency justification:  Adjustment of medication(s) and Risk of hypoglycemia with medication(s)  What time of day are you checking your blood sugars (select all that apply)?  Before meals, After meals and At bedtime  Have you had any blood sugars above 200?  Yes Rarely  Have you had any blood sugars below 70?  No    What symptoms do you notice when your blood sugar is low?  Shaky, Dizzy and Weak    What concerns do you have today about your diabetes? None and Other: Monitoring     Do you have any of these symptoms? (Select all that apply)  No numbness or tingling in feet.  No redness, sores or blisters on feet.  No complaints of excessive thirst.  No reports of blurry vision.  No significant changes to weight.    Have you had a diabetic eye exam in the last 12 months? Yes- Date of last eye exam: 6 months ago        BP Readings from Last 2 Encounters:   03/15/22 134/66   02/18/22 128/64     LDL Cholesterol Calculated (mg/dL)   Date Value   03/01/2021 58                   BP Readings from Last 2 Encounters:   03/15/22 134/66   02/18/22 128/64     LDL Cholesterol Calculated (mg/dL)   Date Value   03/01/2021 58                 Review of Systems   Constitutional, HEENT, cardiovascular, pulmonary, gi and gu systems are negative, except as otherwise noted.      Objective    /66 (BP Location: Right arm, Patient Position: Sitting)   Pulse 66   Resp 16   Wt 83.5 kg (184 lb)   SpO2 96%   BMI 28.82 kg/m    Body mass index is 28.82 kg/m .  Physical Exam   GENERAL: healthy, alert and no distress  NECK: no adenopathy, no asymmetry, masses, or scars and thyroid normal to palpation  RESP: lungs clear to  auscultation - no rales, rhonchi or wheezes  CV: regular rate and rhythm, normal S1 S2, no S3 or S4, no murmur, click or rub, no peripheral edema and peripheral pulses strong  ABDOMEN: soft, nontender, no hepatosplenomegaly, no masses and bowel sounds normal  MS: no gross musculoskeletal defects noted, no edema    Lab on 03/11/2022   Component Date Value Ref Range Status     WBC Count 03/11/2022 5.6  4.0 - 11.0 10e3/uL Final     RBC Count 03/11/2022 4.60  4.40 - 5.90 10e6/uL Final     Hemoglobin 03/11/2022 12.6 (A) 13.3 - 17.7 g/dL Final     Hematocrit 03/11/2022 40.2  40.0 - 53.0 % Final     MCV 03/11/2022 87  78 - 100 fL Final     MCH 03/11/2022 27.4  26.5 - 33.0 pg Final     MCHC 03/11/2022 31.3 (A) 31.5 - 36.5 g/dL Final     RDW 03/11/2022 14.4  10.0 - 15.0 % Final     Platelet Count 03/11/2022 155  150 - 450 10e3/uL Final

## 2022-04-03 ENCOUNTER — HEALTH MAINTENANCE LETTER (OUTPATIENT)
Age: 77
End: 2022-04-03

## 2022-04-26 DIAGNOSIS — I10 BENIGN ESSENTIAL HYPERTENSION: Primary | ICD-10-CM

## 2022-04-28 RX ORDER — LISINOPRIL 10 MG/1
10 TABLET ORAL DAILY
Qty: 90 TABLET | Refills: 0 | Status: SHIPPED | OUTPATIENT
Start: 2022-04-28 | End: 2022-06-14

## 2022-04-28 NOTE — TELEPHONE ENCOUNTER
"Prescription approved per [facility] Refill Protocol.    Last Written Prescription Date:  2//2022  Last Fill Quantity: 90,  # refills: 0   Last office visit provider:  3/15/2022     Requested Prescriptions   Pending Prescriptions Disp Refills     lisinopril (ZESTRIL) 10 MG tablet 90 tablet 0     Sig: Take 1 tablet (10 mg) by mouth daily       ACE Inhibitors (Including Combos) Protocol Failed - 4/26/2022  3:13 PM        Failed - Normal serum creatinine on file in past 12 months     Recent Labs   Lab Test 03/01/21  0626   CR 0.80       Ok to refill medication if creatinine is low     PASS - 7/8/2021     Failed - Normal serum potassium on file in past 12 months     Recent Labs   Lab Test 03/01/21  0626   POTASSIUM 3.7        PASS - 7/8/2021     Passed - Blood pressure under 140/90 in past 12 months     BP Readings from Last 3 Encounters:   03/15/22 134/66   02/18/22 128/64   02/15/22 128/60                 Passed - Recent (12 mo) or future (30 days) visit within the authorizing provider's specialty     Patient has had an office visit with the authorizing provider or a provider within the authorizing providers department within the previous 12 mos or has a future within next 30 days. See \"Patient Info\" tab in inbasket, or \"Choose Columns\" in Meds & Orders section of the refill encounter.              Passed - Medication is active on med list        Passed - Patient is age 18 or older             Alfonso Gonzalez RN 04/28/22 11:20 AM    "

## 2022-05-17 ENCOUNTER — OFFICE VISIT (OUTPATIENT)
Dept: FAMILY MEDICINE | Facility: CLINIC | Age: 77
End: 2022-05-17
Payer: COMMERCIAL

## 2022-05-17 VITALS
HEART RATE: 71 BPM | WEIGHT: 193 LBS | DIASTOLIC BLOOD PRESSURE: 60 MMHG | RESPIRATION RATE: 16 BRPM | BODY MASS INDEX: 30.23 KG/M2 | SYSTOLIC BLOOD PRESSURE: 130 MMHG | TEMPERATURE: 97.9 F | OXYGEN SATURATION: 96 %

## 2022-05-17 DIAGNOSIS — Z01.818 PREOPERATIVE EXAMINATION: ICD-10-CM

## 2022-05-17 DIAGNOSIS — M54.50 CHRONIC BILATERAL LOW BACK PAIN WITHOUT SCIATICA: Primary | ICD-10-CM

## 2022-05-17 DIAGNOSIS — I10 BENIGN ESSENTIAL HYPERTENSION: ICD-10-CM

## 2022-05-17 DIAGNOSIS — H25.9 SENILE CATARACT OF RIGHT EYE, UNSPECIFIED AGE-RELATED CATARACT TYPE: Primary | ICD-10-CM

## 2022-05-17 DIAGNOSIS — G89.29 CHRONIC BILATERAL LOW BACK PAIN WITHOUT SCIATICA: Primary | ICD-10-CM

## 2022-05-17 DIAGNOSIS — I25.118 CORONARY ARTERY DISEASE OF NATIVE ARTERY OF NATIVE HEART WITH STABLE ANGINA PECTORIS (H): ICD-10-CM

## 2022-05-17 DIAGNOSIS — Z86.711 HISTORY OF PULMONARY EMBOLISM: ICD-10-CM

## 2022-05-17 DIAGNOSIS — I20.89 STABLE ANGINA (H): ICD-10-CM

## 2022-05-17 DIAGNOSIS — J44.9 CHRONIC OBSTRUCTIVE PULMONARY DISEASE, UNSPECIFIED COPD TYPE (H): ICD-10-CM

## 2022-05-17 DIAGNOSIS — E11.9 TYPE 2 DIABETES MELLITUS WITHOUT COMPLICATION, WITHOUT LONG-TERM CURRENT USE OF INSULIN (H): ICD-10-CM

## 2022-05-17 DIAGNOSIS — H25.9 SENILE CATARACT OF LEFT EYE, UNSPECIFIED AGE-RELATED CATARACT TYPE: ICD-10-CM

## 2022-05-17 LAB
ANION GAP SERPL CALCULATED.3IONS-SCNC: 6 MMOL/L (ref 3–14)
BUN SERPL-MCNC: 14 MG/DL (ref 7–30)
CALCIUM SERPL-MCNC: 9.4 MG/DL (ref 8.5–10.1)
CHLORIDE BLD-SCNC: 106 MMOL/L (ref 94–109)
CO2 SERPL-SCNC: 29 MMOL/L (ref 20–32)
CREAT SERPL-MCNC: 0.78 MG/DL (ref 0.66–1.25)
ERYTHROCYTE [DISTWIDTH] IN BLOOD BY AUTOMATED COUNT: 14.5 % (ref 10–15)
GFR SERPL CREATININE-BSD FRML MDRD: >90 ML/MIN/1.73M2
GLUCOSE BLD-MCNC: 130 MG/DL (ref 70–99)
HCT VFR BLD AUTO: 37.6 % (ref 40–53)
HGB BLD-MCNC: 11.8 G/DL (ref 13.3–17.7)
MCH RBC QN AUTO: 27.3 PG (ref 26.5–33)
MCHC RBC AUTO-ENTMCNC: 31.4 G/DL (ref 31.5–36.5)
MCV RBC AUTO: 87 FL (ref 78–100)
PLATELET # BLD AUTO: 195 10E3/UL (ref 150–450)
POTASSIUM BLD-SCNC: 4 MMOL/L (ref 3.4–5.3)
RBC # BLD AUTO: 4.32 10E6/UL (ref 4.4–5.9)
SODIUM SERPL-SCNC: 141 MMOL/L (ref 133–144)
WBC # BLD AUTO: 6.1 10E3/UL (ref 4–11)

## 2022-05-17 PROCEDURE — 36415 COLL VENOUS BLD VENIPUNCTURE: CPT | Performed by: FAMILY MEDICINE

## 2022-05-17 PROCEDURE — 85027 COMPLETE CBC AUTOMATED: CPT | Performed by: FAMILY MEDICINE

## 2022-05-17 PROCEDURE — 80048 BASIC METABOLIC PNL TOTAL CA: CPT | Performed by: FAMILY MEDICINE

## 2022-05-17 PROCEDURE — 99214 OFFICE O/P EST MOD 30 MIN: CPT | Performed by: FAMILY MEDICINE

## 2022-05-17 RX ORDER — HYDROCODONE BITARTRATE AND ACETAMINOPHEN 5; 325 MG/1; MG/1
1 TABLET ORAL EVERY 6 HOURS PRN
Qty: 24 TABLET | Refills: 0 | Status: SHIPPED | OUTPATIENT
Start: 2022-05-17 | End: 2022-09-15

## 2022-05-17 NOTE — PROGRESS NOTES
[address] [address]  Phone: [phone]  Fax: [phone]  Primary Provider: Nancy Silva  Pre-op Performing Provider: NANCY SILVA      PREOPERATIVE EVALUATION:  Today's date: 5/17/2022    Prashant Clifton is a 76 year old male who presents for a preoperative evaluation.    Surgical Information:  Surgery/Procedure: Bilateral Cataracts  Surgery Location: [facility]  Surgeon: Mariia  Surgery Date: 05/24/2022 and 06/08/2022  Time of Surgery: Unknown  Where patient plans to recover: At home with family  Fax number for surgical facility: Note does not need to be faxed, will be available electronically in Epic.    Type of Anesthesia Anticipated: Local with MAC    Assessment & Plan     The proposed surgical procedure is considered LOW risk.    Senile cataract of right eye, unspecified age-related cataract type  Senile cataract of left eye, unspecified age-related cataract type    Benign essential hypertension  Coronary artery disease of native artery of native heart with stable angina pectoris (H)  Type 2 diabetes mellitus without complication, without long-term current use of insulin (H)  Stable angina (H)  Chronic obstructive pulmonary disease, unspecified COPD type (H)  History of pulmonary embolism  PATIENT ON ELIQUIS       Implanted Device:  NONE    Risks and Recommendations:  The patient has the following additional risks and recommendations for perioperative complications:   - No identified additional risk factors other than previously addressed    Medication Instructions:  Patient is to take all scheduled medications on the day of surgery    RECOMMENDATION:  APPROVAL GIVEN to proceed with proposed procedure, without further diagnostic evaluation.          Subjective     HPI related to upcoming procedure: Cataract R>L    Preop Questions 5/10/2022   1. Have you ever had a heart attack or stroke? No   2. Have you ever had surgery on  your heart or blood vessels, such as a stent placement, a coronary artery bypass, or surgery on an artery in your head, neck, heart, or legs? YES - TAVR, Stents   3. Do you have chest pain with activity? No   4. Do you have a history of  heart failure? No   5. Do you currently have a cold, bronchitis or symptoms of other infection? No   6. Do you have a cough, shortness of breath, or wheezing? No   7. Do you or anyone in your family have previous history of blood clots? No   8. Do you or does anyone in your family have a serious bleeding problem such as prolonged bleeding following surgeries or cuts? No   9. Have you ever had problems with anemia or been told to take iron pills? YES - Checking today   10. Have you had any abnormal blood loss such as black, tarry or bloody stools? No   11. Have you ever had a blood transfusion? YES -    11a. Have you ever had a transfusion reaction? No   12. Are you willing to have a blood transfusion if it is medically needed before, during, or after your surgery? Yes   13. Have you or any of your relatives ever had problems with anesthesia? No   14. Do you have sleep apnea, excessive snoring or daytime drowsiness? YES - Has CPAP   14a. Do you have a CPAP machine? Yes   15. Do you have any artifical heart valves or other implanted medical devices like a pacemaker, defibrillator, or continuous glucose monitor? YES - TAVR   15a. What type of device do you have? Bovine aortic valve   15b. Name of the clinic that manages your device:  [facility]   16. Do you have artificial joints? No   17. Are you allergic to latex? No       Health Care Directive:  Patient has a Health Care Directive on file      Preoperative Review of :   reviewed - no record of controlled substances prescribed.      Status of Chronic Conditions:  See problem list for active medical problems.  Problems all longstanding and stable, except as noted/documented.  See ROS for pertinent symptoms related to these  conditions.      Review of Systems  CONSTITUTIONAL: NEGATIVE for fever, chills, change in weight  ENT/MOUTH: NEGATIVE for ear, mouth and throat problems  RESP: NEGATIVE for significant cough or SOB  CV: NEGATIVE for chest pain, palpitations or peripheral edema    Patient Active Problem List    Diagnosis Date Noted     History of deep venous thrombosis 03/14/2022     Priority: Medium     Moderate episode of recurrent major depressive disorder (H) 03/14/2022     Priority: Medium     Obesity 03/14/2022     Priority: Medium     Pure hypercholesterolemia 03/14/2022     Priority: Medium     Disease due to severe acute respiratory syndrome coronavirus 2 (SARS-CoV-2) 11/05/2021     Priority: Medium     Chronic diastolic (congestive) heart failure (H) 06/26/2021     Priority: Medium     Excessive anticoagulation 05/30/2021     Priority: Medium     Gastrointestinal hemorrhage 05/30/2021     Priority: Medium     Anemia 05/08/2021     Priority: Medium     Anorexia 04/13/2021     Priority: Medium     Cryptogenic organizing pneumonia (H) 04/13/2021     Priority: Medium     Formatting of this note might be different from the original.  Vs acute eosinophilic pneumonia       History of pulmonary embolism 04/13/2021     Priority: Medium     Hypoalbuminemia 04/13/2021     Priority: Medium     Type 2 diabetes mellitus without complication (H) 04/05/2021     Priority: Medium     Other and unspecified hyperlipidemia 04/05/2021     Priority: Medium     Sensorineural hearing loss (SNHL) of both ears 04/05/2021     Priority: Medium     COPD (chronic obstructive pulmonary disease) (H) 03/22/2021     Priority: Medium     Iron deficiency anemia 03/20/2021     Priority: Medium     Pneumonia of both lungs due to infectious organism 03/20/2021     Priority: Medium     Nonrheumatic aortic valve stenosis 02/02/2021     Priority: Medium     Coronary artery disease of native artery of native heart with stable angina pectoris (H) 02/02/2021      Priority: Medium     S/P CABG (coronary artery bypass graft) 02/02/2021     Priority: Medium     Benign essential hypertension 02/02/2021     Priority: Medium     SOB (shortness of breath) 09/22/2015     Priority: Medium     Stable angina (H) 09/22/2015     Priority: Medium     BPH (benign prostatic hyperplasia) 07/30/2015     Priority: Medium     Dyslipidemia 07/30/2015     Priority: Medium     JOY on CPAP 07/30/2015     Priority: Medium      Past Medical History:   Diagnosis Date     Aortic stenosis      Coronary artery disease      Diabetes mellitus (H)      Heart murmur      Hyperlipidemia      Hypertension      JOY (obstructive sleep apnea)      Past Surgical History:   Procedure Laterality Date     ANGIOPLASTY       BYPASS GRAFT ARTERY CORONARY  2002     CV CORONARY ANGIOGRAM N/A 3/1/2021    Procedure: Coronary Angiogram;  Surgeon: Yang Ambrocio MD;  Location: [facility];  Service: Cardiology     Current Outpatient Medications   Medication Sig Dispense Refill     ACCU-CHEK GUIDE test strip        albuterol (PROAIR HFA/PROVENTIL HFA/VENTOLIN HFA) 108 (90 Base) MCG/ACT inhaler Inhale 1 puff every 4 (four) hours as needed for wheezing.       apixaban ANTICOAGULANT (ELIQUIS) 5 MG tablet Take 1 tablet by mouth 2 times daily       atorvastatin (LIPITOR) 40 MG tablet Take 40 mg by mouth daily       B-D U/F insulin pen needle INJECT WITH INSULIN TWICE DAILY       Blood Glucose Calibration (ACCU-CHEK GUIDE CONTROL) LIQD See Admin Instructions       blood glucose monitoring (SOFTCLIX) lancets        Blood Glucose Monitoring Suppl (ACCU-CHEK GUIDE ME) w/Device KIT See Admin Instructions       calcium gluconate 500 MG tablet Take 500 mg by mouth 2 times daily       Continuous Blood Gluc Sensor (FREESTYLE MARILOU 14 DAY SENSOR) MISC 1 each every 14 days Change every 14 days. 2 each 5     doxazosin (CARDURA) 2 MG tablet Take 2 mg by mouth At Bedtime       ferrous sulfate (FEROSUL) 325 (65 Fe) MG  tablet Take 65 mg of iron by mouth 2 times daily       FLUoxetine (PROZAC) 40 MG capsule Take 40 mg by mouth       isosorbide mononitrate (IMDUR) 60 MG 24 hr tablet Take 60 mg by mouth       lisinopril (ZESTRIL) 10 MG tablet Take 1 tablet (10 mg) by mouth daily 90 tablet 0     metFORMIN (GLUCOPHAGE-XR) 500 MG 24 hr tablet Take 1,000 mg by mouth       nitroGLYcerin (NITROSTAT) 0.4 MG sublingual tablet 0.4 mg       pantoprazole (PROTONIX) 40 MG EC tablet Take 0.5 tablets by mouth       senna-docusate (SENOKOT-S/PERICOLACE) 8.6-50 MG tablet Take 1 tablet by mouth         No Known Allergies     Social History     Tobacco Use     Smoking status: Former Smoker     Quit date: 3/1/1979     Years since quittin.2     Smokeless tobacco: Never Used   Substance Use Topics     Alcohol use: Not Currently       History   Drug Use Unknown         Objective     /60 (BP Location: Right arm, Patient Position: Sitting)   Pulse 71   Temp 97.9  F (36.6  C) (Tympanic)   Resp 16   Wt 87.5 kg (193 lb)   SpO2 96%   BMI 30.23 kg/m      Physical Exam  GENERAL APPEARANCE: healthy, alert and no distress  EYES: Eyes grossly normal to inspection and Cataract R>L  HENT: ear canals and TM's normal and nose and mouth without ulcers or lesions  RESP: lungs clear to auscultation - no rales, rhonchi or wheezes  CV: regular rate and rhythm, normal S1 S2, no S3 or S4 and no murmur, click or rub   ABDOMEN: soft, nontender, no HSM or masses and bowel sounds normal  NEURO: Normal strength and tone, sensory exam grossly normal, mentation intact and speech normal    Recent Labs   Lab Test 22  0933 21  0626 21  0626   HGB 12.6* 8.8*  --     228  --    NA  --   --  141   POTASSIUM  --   --  3.7   CR  --   --  0.80         Diagnostics:  Recent Results (from the past 24 hour(s))   CBC with platelets    Collection Time: 22 11:51 AM   Result Value Ref Range    WBC Count 6.1 4.0 - 11.0 10e3/uL    RBC Count 4.32 (L) 4.40  - 5.90 10e6/uL    Hemoglobin 11.8 (L) 13.3 - 17.7 g/dL    Hematocrit 37.6 (L) 40.0 - 53.0 %    MCV 87 78 - 100 fL    MCH 27.3 26.5 - 33.0 pg    MCHC 31.4 (L) 31.5 - 36.5 g/dL    RDW 14.5 10.0 - 15.0 %    Platelet Count 195 150 - 450 10e3/uL          Revised Cardiac Risk Index (RCRI):  The patient has the following serious cardiovascular risks for perioperative complications:   - No serious cardiac risks = 0 points     RCRI Interpretation: 0 points: Class I (very low risk - 0.4% complication rate)           Signed Electronically by: Dexter Silva MD  Copy of this evaluation report is provided to requesting physician.

## 2022-06-01 ENCOUNTER — TRANSFERRED RECORDS (OUTPATIENT)
Dept: MULTI SPECIALTY CLINIC | Facility: CLINIC | Age: 77
End: 2022-06-01

## 2022-06-01 LAB — RETINOPATHY: NORMAL

## 2022-06-10 ASSESSMENT — PATIENT HEALTH QUESTIONNAIRE - PHQ9
SUM OF ALL RESPONSES TO PHQ QUESTIONS 1-9: 3
10. IF YOU CHECKED OFF ANY PROBLEMS, HOW DIFFICULT HAVE THESE PROBLEMS MADE IT FOR YOU TO DO YOUR WORK, TAKE CARE OF THINGS AT HOME, OR GET ALONG WITH OTHER PEOPLE: NOT DIFFICULT AT ALL
SUM OF ALL RESPONSES TO PHQ QUESTIONS 1-9: 3

## 2022-06-14 ENCOUNTER — OFFICE VISIT (OUTPATIENT)
Dept: FAMILY MEDICINE | Facility: CLINIC | Age: 77
End: 2022-06-14
Payer: COMMERCIAL

## 2022-06-14 VITALS
SYSTOLIC BLOOD PRESSURE: 134 MMHG | WEIGHT: 193 LBS | OXYGEN SATURATION: 95 % | BODY MASS INDEX: 30.23 KG/M2 | RESPIRATION RATE: 16 BRPM | HEART RATE: 70 BPM | DIASTOLIC BLOOD PRESSURE: 64 MMHG

## 2022-06-14 DIAGNOSIS — D50.9 IRON DEFICIENCY ANEMIA, UNSPECIFIED IRON DEFICIENCY ANEMIA TYPE: Primary | ICD-10-CM

## 2022-06-14 DIAGNOSIS — I50.32 CHRONIC DIASTOLIC (CONGESTIVE) HEART FAILURE (H): ICD-10-CM

## 2022-06-14 DIAGNOSIS — I10 BENIGN ESSENTIAL HYPERTENSION: ICD-10-CM

## 2022-06-14 PROBLEM — H25.811 COMBINED FORMS OF AGE-RELATED CATARACT OF RIGHT EYE: Status: ACTIVE | Noted: 2022-05-18

## 2022-06-14 PROBLEM — H25.812 COMBINED FORMS OF AGE-RELATED CATARACT OF LEFT EYE: Status: ACTIVE | Noted: 2022-06-01

## 2022-06-14 LAB
ERYTHROCYTE [DISTWIDTH] IN BLOOD BY AUTOMATED COUNT: 13.5 % (ref 10–15)
HCT VFR BLD AUTO: 37.4 % (ref 40–53)
HGB BLD-MCNC: 11.9 G/DL (ref 13.3–17.7)
MCH RBC QN AUTO: 27.2 PG (ref 26.5–33)
MCHC RBC AUTO-ENTMCNC: 31.8 G/DL (ref 31.5–36.5)
MCV RBC AUTO: 86 FL (ref 78–100)
PLATELET # BLD AUTO: 181 10E3/UL (ref 150–450)
RBC # BLD AUTO: 4.37 10E6/UL (ref 4.4–5.9)
WBC # BLD AUTO: 7 10E3/UL (ref 4–11)

## 2022-06-14 PROCEDURE — 85027 COMPLETE CBC AUTOMATED: CPT | Performed by: FAMILY MEDICINE

## 2022-06-14 PROCEDURE — 36415 COLL VENOUS BLD VENIPUNCTURE: CPT | Performed by: FAMILY MEDICINE

## 2022-06-14 PROCEDURE — 99214 OFFICE O/P EST MOD 30 MIN: CPT | Performed by: FAMILY MEDICINE

## 2022-06-14 RX ORDER — MOXIFLOXACIN 5 MG/ML
SOLUTION/ DROPS OPHTHALMIC
COMMUNITY
Start: 2022-06-03 | End: 2022-09-15

## 2022-06-14 RX ORDER — PREDNISOLONE ACETATE 10 MG/ML
SUSPENSION/ DROPS OPHTHALMIC
COMMUNITY
Start: 2022-06-03 | End: 2022-09-15

## 2022-06-14 RX ORDER — KETOROLAC TROMETHAMINE 5 MG/ML
SOLUTION OPHTHALMIC
COMMUNITY
Start: 2022-06-03 | End: 2022-09-15

## 2022-06-14 RX ORDER — FLASH GLUCOSE SCANNING READER
EACH MISCELLANEOUS
COMMUNITY
Start: 2022-03-15

## 2022-06-14 RX ORDER — LISINOPRIL 10 MG/1
10 TABLET ORAL DAILY
Qty: 90 TABLET | Refills: 3 | Status: SHIPPED | OUTPATIENT
Start: 2022-06-14 | End: 2022-09-15

## 2022-06-14 ASSESSMENT — PATIENT HEALTH QUESTIONNAIRE - PHQ9
10. IF YOU CHECKED OFF ANY PROBLEMS, HOW DIFFICULT HAVE THESE PROBLEMS MADE IT FOR YOU TO DO YOUR WORK, TAKE CARE OF THINGS AT HOME, OR GET ALONG WITH OTHER PEOPLE: NOT DIFFICULT AT ALL
SUM OF ALL RESPONSES TO PHQ QUESTIONS 1-9: 3

## 2022-06-14 NOTE — PROGRESS NOTES
"  Assessment & Plan     Iron deficiency anemia, unspecified iron deficiency anemia type    - CBC with platelets; Future  - CBC with platelets    30 minutes spent on the date of the encounter doing chart review, history and exam, documentation and further activities per the note       BMI:   Estimated body mass index is 30.23 kg/m  as calculated from the following:    Height as of 2/18/22: 1.702 m (5' 7\").    Weight as of this encounter: 87.5 kg (193 lb).   Weight management plan: Discussed healthy diet and exercise guidelines        Return in about 3 months (around 9/14/2022), or CBC Same Day, for Follow up.    Dexter Silva MD  [facility]    Jose Tompkins is a 76 year old who presents for the following health issues: Follow up anemia    History of Present Illness       Reason for visit:  Anemia    He eats 2-3 servings of fruits and vegetables daily.He consumes 0 sweetened beverage(s) daily.He exercises with enough effort to increase his heart rate 9 or less minutes per day.  He exercises with enough effort to increase his heart rate 3 or less days per week.   He is taking medications regularly.    Today's PHQ-9         PHQ-9 Total Score: 3    PHQ-9 Q9 Thoughts of better off dead/self-harm past 2 weeks :   Not at all    How difficult have these problems made it for you to do your work, take care of things at home, or get along with other people: Not difficult at all       ADAM Stable.  Last Iron infusion 1 year ago.    Review of Systems   Constitutional, HEENT, cardiovascular, pulmonary, gi and gu systems are negative, except as otherwise noted.      Objective    /64 (BP Location: Left arm, Patient Position: Sitting)   Pulse 70   Resp 16   Wt 87.5 kg (193 lb)   SpO2 95%   BMI 30.23 kg/m    Body mass index is 30.23 kg/m .  Physical Exam   GENERAL: healthy, alert and no distress  NECK: no adenopathy, no asymmetry, masses, or scars and thyroid normal to " palpation  RESP: lungs clear to auscultation - no rales, rhonchi or wheezes  CV: regular rate and rhythm, normal S1 S2, no S3 or S4, no murmur, click or rub, no peripheral edema and peripheral pulses strong  ABDOMEN: soft, nontender, no hepatosplenomegaly, no masses and bowel sounds normal  MS: no gross musculoskeletal defects noted, no edema    Recent Results (from the past 240 hour(s))   CBC with platelets    Collection Time: 06/14/22  9:43 AM   Result Value Ref Range    WBC Count 7.0 4.0 - 11.0 10e3/uL    RBC Count 4.37 (L) 4.40 - 5.90 10e6/uL    Hemoglobin 11.9 (L) 13.3 - 17.7 g/dL    Hematocrit 37.4 (L) 40.0 - 53.0 %    MCV 86 78 - 100 fL    MCH 27.2 26.5 - 33.0 pg    MCHC 31.8 31.5 - 36.5 g/dL    RDW 13.5 10.0 - 15.0 %    Platelet Count 181 150 - 450 10e3/uL

## 2022-06-15 ENCOUNTER — MYC MEDICAL ADVICE (OUTPATIENT)
Dept: FAMILY MEDICINE | Facility: CLINIC | Age: 77
End: 2022-06-15
Payer: COMMERCIAL

## 2022-06-15 DIAGNOSIS — I10 BENIGN ESSENTIAL HYPERTENSION: ICD-10-CM

## 2022-07-22 DIAGNOSIS — Z79.2 NEED FOR PROPHYLACTIC ANTIBIOTIC: Primary | ICD-10-CM

## 2022-07-22 RX ORDER — AMOXICILLIN 500 MG/1
CAPSULE ORAL
Qty: 12 CAPSULE | Refills: 0 | Status: SHIPPED | OUTPATIENT
Start: 2022-07-22

## 2022-07-22 NOTE — TELEPHONE ENCOUNTER
Routing refill request to provider for review/approval because:  Drug not on the [facility] refill protocol: Amoxicillin for dental appt. Last OV 6/14/22. Please advise on refill request.    Last written Prescription Date: 11/30/21  Last Fill Quantity: 12,  # refills: 0   Last office visit: 6/14/2022 with prescribing provider     Future Office Visit:   Next 5 appointments (look out 90 days)    Sep 15, 2022 10:30 AM  (Arrive by 10:00 AM)  Provider Visit with Dexter Silva MD  [facility] [address] [facility] [id number] [phone]

## 2022-07-24 ENCOUNTER — HEALTH MAINTENANCE LETTER (OUTPATIENT)
Age: 77
End: 2022-07-24

## 2022-08-16 DIAGNOSIS — E11.9 TYPE 2 DIABETES MELLITUS WITHOUT COMPLICATION, WITHOUT LONG-TERM CURRENT USE OF INSULIN (H): ICD-10-CM

## 2022-08-16 RX ORDER — FLASH GLUCOSE SENSOR
1 KIT MISCELLANEOUS
Qty: 2 EACH | Refills: 5 | Status: SHIPPED | OUTPATIENT
Start: 2022-08-16 | End: 2023-01-30

## 2022-09-09 DIAGNOSIS — E78.00 PURE HYPERCHOLESTEROLEMIA: ICD-10-CM

## 2022-09-09 DIAGNOSIS — R35.0 BENIGN PROSTATIC HYPERPLASIA WITH URINARY FREQUENCY: ICD-10-CM

## 2022-09-09 DIAGNOSIS — E78.5 DYSLIPIDEMIA: Primary | ICD-10-CM

## 2022-09-09 DIAGNOSIS — N40.1 BENIGN PROSTATIC HYPERPLASIA WITH URINARY FREQUENCY: ICD-10-CM

## 2022-09-09 RX ORDER — ATORVASTATIN CALCIUM 40 MG/1
40 TABLET, FILM COATED ORAL DAILY
Qty: 90 TABLET | Refills: 0 | Status: SHIPPED | OUTPATIENT
Start: 2022-09-09 | End: 2022-09-15

## 2022-09-09 RX ORDER — DOXAZOSIN 2 MG/1
2 TABLET ORAL AT BEDTIME
Qty: 90 TABLET | Refills: 0 | Status: SHIPPED | OUTPATIENT
Start: 2022-09-09 | End: 2022-11-14

## 2022-09-09 NOTE — TELEPHONE ENCOUNTER
"Routing refill request to provider for review/approval because:  Labs not current:  LDL  7/20/201  Medication is reported/historical    Last office visit provider:  6/14/2022     Requested Prescriptions   Pending Prescriptions Disp Refills     doxazosin (CARDURA) 2 MG tablet 90 tablet 0     Sig: Take 1 tablet (2 mg) by mouth At Bedtime       Alpha Blockers Passed - 9/9/2022  9:45 AM        Passed - Blood pressure under 140/90 in past 12 months     BP Readings from Last 3 Encounters:   06/14/22 134/64   05/17/22 130/60   03/15/22 134/66                 Passed - Recent (12 mo) or future (30 days) visit within the authorizing provider's specialty     Patient has had an office visit with the authorizing provider or a provider within the authorizing providers department within the previous 12 mos or has a future within next 30 days. See \"Patient Info\" tab in [employer], or \"Choose Columns\" in Meds & Orders section of the refill encounter.              Passed - Patient does not have Tadalafil, Vardenafil, or Sildenafil on their medication list        Passed - Medication is active on med list        Passed - Patient is 18 years of age or older       Antiadrenergic Antihypertensives Passed - 9/9/2022  9:45 AM        Passed - Blood pressure less than 140/90 in past 6 months     BP Readings from Last 3 Encounters:   06/14/22 134/64   05/17/22 130/60   03/15/22 134/66                 Passed - Medication is active on med list        Passed - Patient is age 18 or older        Passed - Normal serum creatinine on file in past 12 months     Recent Labs   Lab Test 05/17/22  1151   CR 0.78       Ok to refill medication if creatinine is low          Passed - Recent (6 mo) or future (30 days) visit within the authorizing provider's specialty     Patient had office visit in the last 6 months or has a visit in the next 30 days with authorizing provider or within the authorizing provider's specialty.  See \"Patient Info\" tab in inbasket, or " "\"Choose Columns\" in Meds & Orders section of the refill encounter.               atorvastatin (LIPITOR) 40 MG tablet 90 tablet 0     Sig: Take 1 tablet (40 mg) by mouth daily       Statins Protocol Failed - 9/9/2022  9:45 AM        Failed - LDL on file in past 12 months     Recent Labs   Lab Test 07/20/21  1057   LDL 60             Passed - No abnormal creatine kinase in past 12 months     No lab results found.             Passed - Recent (12 mo) or future (30 days) visit within the authorizing provider's specialty     Patient has had an office visit with the authorizing provider or a provider within the authorizing providers department within the previous 12 mos or has a future within next 30 days. See \"Patient Info\" tab in inbasket, or \"Choose Columns\" in Meds & Orders section of the refill encounter.              Passed - Medication is active on med list        Passed - Patient is age 18 or older             Alfonso Gonzalez RN 09/09/22 12:50 PM  "

## 2022-09-15 ENCOUNTER — OFFICE VISIT (OUTPATIENT)
Dept: FAMILY MEDICINE | Facility: CLINIC | Age: 77
End: 2022-09-15
Payer: COMMERCIAL

## 2022-09-15 VITALS
DIASTOLIC BLOOD PRESSURE: 60 MMHG | WEIGHT: 190 LBS | SYSTOLIC BLOOD PRESSURE: 134 MMHG | RESPIRATION RATE: 20 BRPM | BODY MASS INDEX: 29.76 KG/M2 | OXYGEN SATURATION: 95 % | HEART RATE: 63 BPM

## 2022-09-15 DIAGNOSIS — F33.1 MODERATE EPISODE OF RECURRENT MAJOR DEPRESSIVE DISORDER (H): ICD-10-CM

## 2022-09-15 DIAGNOSIS — Z11.59 NEED FOR HEPATITIS C SCREENING TEST: ICD-10-CM

## 2022-09-15 DIAGNOSIS — D50.9 IRON DEFICIENCY ANEMIA, UNSPECIFIED IRON DEFICIENCY ANEMIA TYPE: Primary | ICD-10-CM

## 2022-09-15 DIAGNOSIS — M54.50 CHRONIC BILATERAL LOW BACK PAIN WITHOUT SCIATICA: ICD-10-CM

## 2022-09-15 DIAGNOSIS — I10 BENIGN ESSENTIAL HYPERTENSION: ICD-10-CM

## 2022-09-15 DIAGNOSIS — G89.29 CHRONIC BILATERAL LOW BACK PAIN WITHOUT SCIATICA: ICD-10-CM

## 2022-09-15 DIAGNOSIS — Z13.29 THYROID DISORDER SCREENING: ICD-10-CM

## 2022-09-15 DIAGNOSIS — E11.9 TYPE 2 DIABETES MELLITUS WITHOUT COMPLICATION, WITHOUT LONG-TERM CURRENT USE OF INSULIN (H): ICD-10-CM

## 2022-09-15 DIAGNOSIS — I25.118 CORONARY ARTERY DISEASE OF NATIVE ARTERY OF NATIVE HEART WITH STABLE ANGINA PECTORIS (H): ICD-10-CM

## 2022-09-15 DIAGNOSIS — E78.00 PURE HYPERCHOLESTEROLEMIA: ICD-10-CM

## 2022-09-15 PROBLEM — R63.0 ANOREXIA: Status: RESOLVED | Noted: 2021-04-13 | Resolved: 2022-09-15

## 2022-09-15 LAB
ALT SERPL W P-5'-P-CCNC: 23 U/L (ref 10–50)
AMPHETAMINES UR QL: NOT DETECTED
BARBITURATES UR QL SCN: NOT DETECTED
BENZODIAZ UR QL SCN: NOT DETECTED
BUPRENORPHINE UR QL: NOT DETECTED
CANNABINOIDS UR QL: NOT DETECTED
CHOLEST SERPL-MCNC: 120 MG/DL
COCAINE UR QL SCN: NOT DETECTED
CREAT UR-MCNC: 59.5 MG/DL
D-METHAMPHET UR QL: NOT DETECTED
ERYTHROCYTE [DISTWIDTH] IN BLOOD BY AUTOMATED COUNT: 14.5 % (ref 10–15)
HBA1C MFR BLD: 6.9 % (ref 0–5.6)
HCT VFR BLD AUTO: 39.2 % (ref 40–53)
HCV AB SERPL QL IA: NONREACTIVE
HDLC SERPL-MCNC: 44 MG/DL
HGB BLD-MCNC: 12.7 G/DL (ref 13.3–17.7)
LDLC SERPL CALC-MCNC: 66 MG/DL
MCH RBC QN AUTO: 27.7 PG (ref 26.5–33)
MCHC RBC AUTO-ENTMCNC: 32.4 G/DL (ref 31.5–36.5)
MCV RBC AUTO: 85 FL (ref 78–100)
METHADONE UR QL SCN: NOT DETECTED
MICROALBUMIN UR-MCNC: <12 MG/L
MICROALBUMIN/CREAT UR: NORMAL MG/G{CREAT}
NONHDLC SERPL-MCNC: 76 MG/DL
OPIATES UR QL SCN: NOT DETECTED
OXYCODONE UR QL SCN: NOT DETECTED
PCP UR QL SCN: NOT DETECTED
PLATELET # BLD AUTO: 147 10E3/UL (ref 150–450)
PROPOXYPH UR QL: NOT DETECTED
RBC # BLD AUTO: 4.59 10E6/UL (ref 4.4–5.9)
T4 FREE SERPL-MCNC: 1 NG/DL (ref 0.9–1.7)
TRICYCLICS UR QL SCN: NOT DETECTED
TRIGL SERPL-MCNC: 49 MG/DL
TSH SERPL DL<=0.005 MIU/L-ACNC: 4.4 UIU/ML (ref 0.3–4.2)
WBC # BLD AUTO: 4.8 10E3/UL (ref 4–11)

## 2022-09-15 PROCEDURE — 99214 OFFICE O/P EST MOD 30 MIN: CPT | Mod: 25 | Performed by: FAMILY MEDICINE

## 2022-09-15 PROCEDURE — G0009 ADMIN PNEUMOCOCCAL VACCINE: HCPCS | Performed by: FAMILY MEDICINE

## 2022-09-15 PROCEDURE — 80306 DRUG TEST PRSMV INSTRMNT: CPT | Performed by: FAMILY MEDICINE

## 2022-09-15 PROCEDURE — 84460 ALANINE AMINO (ALT) (SGPT): CPT | Performed by: FAMILY MEDICINE

## 2022-09-15 PROCEDURE — 86803 HEPATITIS C AB TEST: CPT | Performed by: FAMILY MEDICINE

## 2022-09-15 PROCEDURE — 80061 LIPID PANEL: CPT | Performed by: FAMILY MEDICINE

## 2022-09-15 PROCEDURE — 83036 HEMOGLOBIN GLYCOSYLATED A1C: CPT | Performed by: FAMILY MEDICINE

## 2022-09-15 PROCEDURE — 85027 COMPLETE CBC AUTOMATED: CPT | Performed by: FAMILY MEDICINE

## 2022-09-15 PROCEDURE — 36415 COLL VENOUS BLD VENIPUNCTURE: CPT | Performed by: FAMILY MEDICINE

## 2022-09-15 PROCEDURE — 90677 PCV20 VACCINE IM: CPT | Performed by: FAMILY MEDICINE

## 2022-09-15 PROCEDURE — 84439 ASSAY OF FREE THYROXINE: CPT | Performed by: FAMILY MEDICINE

## 2022-09-15 PROCEDURE — 82043 UR ALBUMIN QUANTITATIVE: CPT | Performed by: FAMILY MEDICINE

## 2022-09-15 PROCEDURE — 84443 ASSAY THYROID STIM HORMONE: CPT | Performed by: FAMILY MEDICINE

## 2022-09-15 RX ORDER — METFORMIN HCL 500 MG
1000 TABLET, EXTENDED RELEASE 24 HR ORAL
Qty: 180 TABLET | Refills: 3 | Status: SHIPPED | OUTPATIENT
Start: 2022-09-15 | End: 2023-02-21

## 2022-09-15 RX ORDER — NITROGLYCERIN 0.4 MG/1
0.4 TABLET SUBLINGUAL EVERY 5 MIN PRN
Qty: 100 TABLET | Refills: 1 | Status: SHIPPED | OUTPATIENT
Start: 2022-09-15 | End: 2023-10-03

## 2022-09-15 RX ORDER — ATORVASTATIN CALCIUM 40 MG/1
40 TABLET, FILM COATED ORAL DAILY
Qty: 90 TABLET | Refills: 0 | Status: SHIPPED | OUTPATIENT
Start: 2022-09-15 | End: 2022-11-14

## 2022-09-15 RX ORDER — FLUOXETINE 40 MG/1
40 CAPSULE ORAL DAILY
Qty: 90 CAPSULE | Refills: 3 | Status: SHIPPED | OUTPATIENT
Start: 2022-09-15 | End: 2023-10-24

## 2022-09-15 RX ORDER — ISOSORBIDE MONONITRATE 30 MG/1
30 TABLET, EXTENDED RELEASE ORAL DAILY
Qty: 90 TABLET | Refills: 3 | Status: SHIPPED | OUTPATIENT
Start: 2022-09-15 | End: 2023-10-23

## 2022-09-15 RX ORDER — HYDROCODONE BITARTRATE AND ACETAMINOPHEN 5; 325 MG/1; MG/1
1 TABLET ORAL EVERY 6 HOURS PRN
Qty: 24 TABLET | Refills: 0 | Status: SHIPPED | OUTPATIENT
Start: 2022-09-15

## 2022-09-15 RX ORDER — ISOSORBIDE MONONITRATE 30 MG/1
1 TABLET, EXTENDED RELEASE ORAL DAILY
COMMUNITY
Start: 2022-09-06 | End: 2022-09-15

## 2022-09-15 RX ORDER — LISINOPRIL 10 MG/1
10 TABLET ORAL DAILY
Qty: 90 TABLET | Refills: 3 | Status: SHIPPED | OUTPATIENT
Start: 2022-09-15 | End: 2023-10-23

## 2022-09-15 ASSESSMENT — ANXIETY QUESTIONNAIRES
1. FEELING NERVOUS, ANXIOUS, OR ON EDGE: SEVERAL DAYS
GAD7 TOTAL SCORE: 2
5. BEING SO RESTLESS THAT IT IS HARD TO SIT STILL: SEVERAL DAYS
IF YOU CHECKED OFF ANY PROBLEMS ON THIS QUESTIONNAIRE, HOW DIFFICULT HAVE THESE PROBLEMS MADE IT FOR YOU TO DO YOUR WORK, TAKE CARE OF THINGS AT HOME, OR GET ALONG WITH OTHER PEOPLE: SOMEWHAT DIFFICULT
GAD7 TOTAL SCORE: 2
2. NOT BEING ABLE TO STOP OR CONTROL WORRYING: NOT AT ALL
6. BECOMING EASILY ANNOYED OR IRRITABLE: NOT AT ALL
3. WORRYING TOO MUCH ABOUT DIFFERENT THINGS: NOT AT ALL
7. FEELING AFRAID AS IF SOMETHING AWFUL MIGHT HAPPEN: NOT AT ALL

## 2022-09-15 ASSESSMENT — PATIENT HEALTH QUESTIONNAIRE - PHQ9
SUM OF ALL RESPONSES TO PHQ QUESTIONS 1-9: 6
5. POOR APPETITE OR OVEREATING: NOT AT ALL

## 2022-09-15 NOTE — LETTER
Opioid / Opioid Plus Controlled Substance Agreement    This is an agreement between you and your provider about the safe and appropriate use of controlled substance/opioids prescribed by your care team. Controlled substances are medicines that can cause physical and mental dependence (abuse).    There are strict laws about having and using these medicines. We here at [facility] are committing to working with you in your efforts to get better. To support you in this work, we ll help you schedule regular office appointments for medicine refills. If we must cancel or change your appointment for any reason, we ll make sure you have enough medicine to last until your next appointment.     As a Provider, I will:    Listen carefully to your concerns and treat you with respect.     Recommend a treatment plan that I believe is in your best interest. This plan may involve therapies other than opioid pain medication.     Talk with you often about the possible benefits, and the risk of harm of any medicine that we prescribe for you.     Provide a plan on how to taper (discontinue or go off) using this medicine if the decision is made to stop its use.    As a Patient, I understand that opioid(s):     Are a controlled substance prescribed by my care team to help me function or work and manage my condition(s).     Are strong medicines and can cause serious side effects such as:    Drowsiness, which can seriously affect my driving ability    A lower breathing rate, enough to cause death    Harm to my thinking ability     Depression     Abuse of and addiction to this medicine    Need to be taken exactly as prescribed. Combining opioids with certain medicines or chemicals (such as illegal drugs, sedatives, sleeping pills, and benzodiazepines) can be dangerous or even fatal. If I stop opioids suddenly, I may have severe withdrawal symptoms.    Do not work for all types of pain nor for all patients. If they re not helpful, I may  be asked to stop them.        The risks, benefits and side effects of these medicine(s) were explained to me. I agree that:  1. I will take part in other treatments as advised by my care team. This may be psychiatry or counseling, physical therapy, behavioral therapy, group treatment or a referral to a specialist.     2. I will keep all my appointments. I understand that this is part of the monitoring of opioids. My care team may require an office visit for EVERY opioid/controlled substance refill. If I miss appointments or don t follow instructions, my care team may stop my medicine.    3. I will take my medicines as prescribed. I will not change the dose or schedule unless my care team tells me to. There will be no refills if I run out early.     4. I may be asked to come to the clinic and complete a urine drug test or complete a pill count at any time. If I don t give a urine sample or participate in a pill count, the care team may stop my medicine.    5. I will only receive prescriptions from this clinic for chronic pain. If I am treated by another provider for acute pain issues, I will tell them that I am taking opioid pain medication for chronic pain and that I have a treatment agreement with this provider. I will inform my [facility] care team within one business day if I am given a prescription for any pain medication by another healthcare provider. My [facility] care team can contact other providers and pharmacists about my use of any medicines.    6. It is up to me to make sure that I don t run out of my medicines on weekends or holidays. If my care team is willing to refill my opioid prescription without a visit, I must request refills only during office hours. Refills may take up to 3 business days to process. I will use one pharmacy to fill all my opioid and other controlled substance prescriptions. I will notify the clinic about any changes to my insurance or medication  availability.    7. I am responsible for my prescriptions. If the medicine/prescription is lost, stolen or destroyed, it will not be replaced. I also agree not to share controlled substance medicines with anyone.    8. I am aware I should not use any illegal or recreational drugs. I agree not to drink alcohol unless my care team says I can.       9. If I enroll in the [place] [facility] program, I will tell my care team prior to my next refill.     10. I will tell my care team right away if I become pregnant, have a new medical problem treated outside of my regular clinic, or have a change in my medications.    11. I understand that this medicine can affect my thinking, judgment and reaction time. Alcohol and drugs affect the brain and body, which can affect the safety of my driving. Being under the influence of alcohol or drugs can affect my decision-making, behaviors, personal safety, and the safety of others. Driving while impaired (DWI) can occur if a person is driving, operating, or in physical control of a car, motorcycle, boat, snowmobile, ATV, motorbike, off-road vehicle, or any other motor vehicle ([place] Statute 169A.20). I understand the risk if I choose to drive or operate any vehicle or machinery.    I understand that if I do not follow any of the conditions above, my prescriptions or treatment may be stopped or changed.          Opioids  What You Need to Know    What are opioids?   Opioids are pain medicines that must be prescribed by a doctor. They are also known as narcotics.     Examples are:   1. morphine (MS Contin, Liat)  2. oxycodone (Oxycontin)  3. oxycodone and acetaminophen (Percocet)  4. hydrocodone and acetaminophen (Vicodin, Norco)   5. fentanyl patch (Duragesic)   6. hydromorphone (Dilaudid)   7. methadone  8. codeine (Tylenol #3)     What do opioids do well?   Opioids are best for severe short-term pain such as after a surgery or injury. They may work well for cancer pain. They may  help some people with long-lasting (chronic) pain.     What do opioids NOT do well?   Opioids never get rid of pain entirely, and they don t work well for most patients with chronic pain. Opioids don t reduce swelling, one of the causes of pain.                                    Other ways to manage chronic pain and improve function include:       Treat the health problem that may be causing pain    Anti-inflammation medicines, which reduce swelling and tenderness, such as ibuprofen (Advil, Motrin) or naproxen (Aleve)    Acetaminophen (Tylenol)    Antidepressants and anti-seizure medicines, especially for nerve pain    Topical treatments such as patches or creams    Injections or nerve blocks    Chiropractic or osteopathic treatment    Acupuncture, massage, deep breathing, meditation, visual imagery, aromatherapy    Use heat or ice at the pain site    Physical therapy     Exercise    Stop smoking    Take part in therapy       Risks and side effects     Talk to your doctor before you start or decide to keep taking opioids. Possible side effects include:      Lowering your breathing rate enough to cause death    Overdose, including death, especially if taking higher than prescribed doses    Worse depression symptoms; less pleasure in things you usually enjoy    Feeling tired or sluggish    Slower thoughts or cloudy thinking    Being more sensitive to pain over time; pain is harder to control    Trouble sleeping or restless sleep    Changes in hormone levels (for example, less testosterone)    Changes in sex drive or ability to have sex    Constipation    Unsafe driving    Itching and sweating    Dizziness    Nausea, throwing up and dry mouth    What else should I know about opioids?    Opioids may lead to dependence, tolerance, or addiction.      Dependence means that if you stop or reduce the medicine too quickly, you will have withdrawal symptoms. These include loose poop (diarrhea), jitters, flu-like symptoms,  nervousness and tremors. Dependence is not the same as addiction.                       Tolerance means needing higher doses over time to get the same effect. This may increase the chance of serious side effects.      Addiction is when people improperly use a substance that harms their body, their mind or their relations with others. Use of opiates can cause a relapse of addiction if you have a history of drug or alcohol abuse.      People who have used opioids for a long time may have a lower quality of life, worse depression, higher levels of pain and more visits to doctors.    You can overdose on opioids. Take these steps to lower your risk of overdose:    1. Recognize the signs:  Signs of overdose include decrease or loss of consciousness (blackout), slowed breathing, trouble waking up and blue lips. If someone is worried about overdose, they should call 911.    2. Talk to your doctor about Narcan (naloxone).   If you are at risk for overdose, you may be given a prescription for Narcan. This medicine very quickly reverses the effects of opioids.   If you overdose, a friend or family member can give you Narcan while waiting for the ambulance. They need to know the signs of overdose and how to give Narcan.     3. Don't use alcohol or street drugs.   Taking them with opioids can cause death.    4. Do not take any of these medicines unless your doctor says it s OK. Taking these with opioids can cause death:    Benzodiazepines, such as lorazepam (Ativan), alprazolam (Xanax) or diazepam (Valium)    Muscle relaxers, such as cyclobenzaprine (Flexeril)    Sleeping pills like zolpidem (Ambien)     Other opioids      How to keep you and other people safe while taking opioids:    1. Never share your opioids with others.  Opioid medicines are regulated by the Drug Enforcement Agency (JULIEN). Selling or sharing medications is a criminal act.    2. Be sure to store opioids in a secure place, locked up if possible. Young children  can easily swallow them and overdose.    3. When you are traveling with your medicines, keep them in the original bottles. If you use a pill box, be sure you also carry a copy of your medicine list from your clinic or pharmacy.    4. Safe disposal of opioids    Most pharmacies have places to get rid of medicine, called disposal kiosks. Medicine disposal options are also available in every [facility]. Search your county and  medication disposal  to find more options. You can find more details at:  [email or web address]     I agree that my provider, clinic care team, and pharmacy may work with any city, state or federal law enforcement agency that investigates the misuse, sale, or other diversion of my controlled medicine. I will allow my provider to discuss my care with, or share a copy of, this agreement with any other treating provider, pharmacy or emergency room where I receive care.    I have read this agreement and have asked questions about anything I did not understand.    _______________________________________________________  Patient Signature - Prashant Clifton _____________________                   Date     _______________________________________________________  Provider Signature - Dexter Silva MD   _____________________                   Date     _______________________________________________________  Witness Signature (required if provider not present while patient signing)   _____________________                   Date

## 2022-09-15 NOTE — PROGRESS NOTES
Assessment & Plan     Iron deficiency anemia, unspecified iron deficiency anemia type  Controlled  - CBC with platelets; Future  - CBC with platelets    Need for hepatitis C screening test  - Hepatitis C Screen Reflex to HCV RNA Quant and Genotype; Future    Type 2 diabetes mellitus without complication, without long-term current use of insulin (H)  Controlled by prescription medication prescribed by me and up to date.  - HEMOGLOBIN A1C; Future  - Albumin Random Urine Quantitative with Creat Ratio; Future  - HEMOGLOBIN A1C    Benign essential hypertension  Controlled by prescription medication prescribed by me and up to date.  Lisinopril    Pure hypercholesterolemia  Controlled by prescription medication prescribed by me and up to date.  Atorvastatin  - ALT; Future  - Lipid panel reflex to direct LDL Non-fasting; Future    Coronary artery disease of native artery of native heart with stable angina pectoris (H)  Controlled    Thyroid disorder screening  - TSH WITH FREE T4 REFLEX; Future    Chronic bilateral low back pain without sciatica  Norco uses about 24 q 3-4 months        F/U 6 months    Dexter Silva MD  [facility]    Jose Tompkins is a 76 year old accompanied by his spouse, presenting for the following health issues:  Follow Up (Anemia)      History of Present Illness       Reason for visit:  Follow-up    He eats 2-3 servings of fruits and vegetables daily.He consumes 0 sweetened beverage(s) daily.He exercises with enough effort to increase his heart rate 10 to 19 minutes per day.  He exercises with enough effort to increase his heart rate 3 or less days per week.   He is taking medications regularly.         Diabetes Follow-up    How often are you checking your blood sugar? A few times a month  What time of day are you checking your blood sugars (select all that apply)?  Before and after meals  Have you had any blood sugars above 200?  No  Have you had any blood  sugars below 70?  No    What symptoms do you notice when your blood sugar is low?  Shaky, Dizzy and Weak    What concerns do you have today about your diabetes? None     Do you have any of these symptoms? (Select all that apply)  No numbness or tingling in feet.  No redness, sores or blisters on feet.  No complaints of excessive thirst.  No reports of blurry vision.  No significant changes to weight.    Have you had a diabetic eye exam in the last 12 months? Yes- Date of last eye exam: 6/2022,  Location: june 2022                Hyperlipidemia Follow-Up      Are you regularly taking any medication or supplement to lower your cholesterol?   Yes- statin    Are you having muscle aches or other side effects that you think could be caused by your cholesterol lowering medication?  No    Hypertension Follow-up      Do you check your blood pressure regularly outside of the clinic? Yes     Are you following a low salt diet? Yes    Are your blood pressures ever more than 140 on the top number (systolic) OR more   than 90 on the bottom number (diastolic), for example 140/90? No    BP Readings from Last 2 Encounters:   09/15/22 134/60   06/14/22 134/64     Hemoglobin A1C (no units)   Date Value   07/20/2021 5.5   10/23/2020 6.3 (H)     LDL Cholesterol Calculated   Date Value   07/20/2021 60   03/01/2021 58 mg/dL     LDL Cholesterol Direct (mg/dL)   Date Value   02/07/2017 62   01/05/2016 94       Vascular Disease Follow-up      How often do you take nitroglycerin? Never    Do you take an aspirin every day? Yes    Depression Followup    How are you doing with your depression since your last visit? No change    Are you having other symptoms that might be associated with depression? No    Have you had a significant life event?  No     Are you feeling anxious or having panic attacks?   No    Do you have any concerns with your use of alcohol or other drugs? No    Social History     Tobacco Use     Smoking status: Former Smoker      Quit date: 3/1/1979     Years since quittin.5     Smokeless tobacco: Never Used   Substance Use Topics     Alcohol use: Not Currently     Drug use: Never     PHQ 6/10/2022 9/15/2022   PHQ-9 Total Score 3 6   Q9: Thoughts of better off dead/self-harm past 2 weeks Not at all Not at all     MARLI-7 SCORE 9/15/2022   Total Score 2     Last PHQ-9 9/15/2022   1.  Little interest or pleasure in doing things 1   2.  Feeling down, depressed, or hopeless 0   3.  Trouble falling or staying asleep, or sleeping too much 0   4.  Feeling tired or having little energy 2   5.  Poor appetite or overeating 0   6.  Feeling bad about yourself 0   7.  Trouble concentrating 3   8.  Moving slowly or restless 0   Q9: Thoughts of better off dead/self-harm past 2 weeks 0   PHQ-9 Total Score 6   Difficulty at work, home, or with people Somewhat difficult     MARLI-7  9/15/2022   1. Feeling nervous, anxious, or on edge 1   2. Not being able to stop or control worrying 0   3. Worrying too much about different things 0   4. Trouble relaxing 0   5. Being so restless that it is hard to sit still 1   6. Becoming easily annoyed or irritable 0   7. Feeling afraid, as if something awful might happen 0   MARLI-7 Total Score 2   If you checked any problems, how difficult have they made it for you to do your work, take care of things at home, or get along with other people? Somewhat difficult       Suicide Assessment Five-step Evaluation and Treatment (SAFE-T)      Review of Systems   Constitutional, HEENT, cardiovascular, pulmonary, gi and gu systems are negative, except as otherwise noted.      Objective    /60 (BP Location: Right arm, Patient Position: Sitting)   Pulse 63   Resp 20   Wt 86.2 kg (190 lb)   SpO2 95%   BMI 29.76 kg/m    Body mass index is 29.76 kg/m .  Physical Exam   GENERAL: healthy, alert and no distress  NECK: no adenopathy, no asymmetry, masses, or scars and thyroid normal to palpation  RESP: lungs clear to auscultation -  no rales, rhonchi or wheezes  CV: regular rate and rhythm, normal S1 S2, no S3 or S4, no murmur, click or rub, no peripheral edema and peripheral pulses strong  ABDOMEN: soft, nontender, no hepatosplenomegaly, no masses and bowel sounds normal  MS: no gross musculoskeletal defects noted, no edema  Sensory exam of the foot is normal, tested with the monofilament. Good pulses, no lesions or ulcers, good peripheral pulses.

## 2022-10-03 ENCOUNTER — HEALTH MAINTENANCE LETTER (OUTPATIENT)
Age: 77
End: 2022-10-03

## 2022-10-19 ENCOUNTER — IMMUNIZATION (OUTPATIENT)
Dept: FAMILY MEDICINE | Facility: CLINIC | Age: 77
End: 2022-10-19
Payer: COMMERCIAL

## 2022-10-19 DIAGNOSIS — Z23 NEED FOR VACCINATION: Primary | ICD-10-CM

## 2022-10-19 PROCEDURE — G0008 ADMIN INFLUENZA VIRUS VAC: HCPCS | Mod: 59

## 2022-10-19 PROCEDURE — 99207 PR NO CHARGE NURSE ONLY: CPT

## 2022-10-19 PROCEDURE — 0134A COVID-19,PF,MODERNA BIVALENT: CPT

## 2022-10-19 PROCEDURE — 91313 COVID-19,PF,MODERNA BIVALENT: CPT

## 2022-10-19 PROCEDURE — 90662 IIV NO PRSV INCREASED AG IM: CPT

## 2023-01-28 DIAGNOSIS — E11.9 TYPE 2 DIABETES MELLITUS WITHOUT COMPLICATION, WITHOUT LONG-TERM CURRENT USE OF INSULIN (H): ICD-10-CM

## 2023-01-30 RX ORDER — FLASH GLUCOSE SENSOR
KIT MISCELLANEOUS
Qty: 2 EACH | Refills: 5 | Status: SHIPPED | OUTPATIENT
Start: 2023-01-30 | End: 2023-07-07

## 2023-02-12 ENCOUNTER — HEALTH MAINTENANCE LETTER (OUTPATIENT)
Age: 78
End: 2023-02-12

## 2023-02-16 ASSESSMENT — ENCOUNTER SYMPTOMS
FREQUENCY: 1
ABDOMINAL PAIN: 0
NERVOUS/ANXIOUS: 0
COUGH: 0
JOINT SWELLING: 0
WEAKNESS: 0
NAUSEA: 0
HEADACHES: 0
DYSURIA: 0
HEARTBURN: 0
HEMATURIA: 0
SORE THROAT: 0
CONSTIPATION: 0
SHORTNESS OF BREATH: 0
MYALGIAS: 1
DIZZINESS: 0
DIARRHEA: 0
FEVER: 0
CHILLS: 0
ARTHRALGIAS: 1
PALPITATIONS: 0
PARESTHESIAS: 0
EYE PAIN: 0
HEMATOCHEZIA: 0

## 2023-02-16 ASSESSMENT — ACTIVITIES OF DAILY LIVING (ADL): CURRENT_FUNCTION: NO ASSISTANCE NEEDED

## 2023-02-21 ENCOUNTER — OFFICE VISIT (OUTPATIENT)
Dept: FAMILY MEDICINE | Facility: CLINIC | Age: 78
End: 2023-02-21
Payer: COMMERCIAL

## 2023-02-21 VITALS
HEIGHT: 67 IN | SYSTOLIC BLOOD PRESSURE: 136 MMHG | TEMPERATURE: 98.1 F | HEART RATE: 64 BPM | WEIGHT: 199 LBS | OXYGEN SATURATION: 97 % | DIASTOLIC BLOOD PRESSURE: 68 MMHG | RESPIRATION RATE: 16 BRPM | BODY MASS INDEX: 31.23 KG/M2

## 2023-02-21 DIAGNOSIS — E11.9 TYPE 2 DIABETES MELLITUS WITHOUT COMPLICATION, WITHOUT LONG-TERM CURRENT USE OF INSULIN (H): ICD-10-CM

## 2023-02-21 DIAGNOSIS — I10 BENIGN ESSENTIAL HYPERTENSION: ICD-10-CM

## 2023-02-21 DIAGNOSIS — Z00.00 MEDICARE ANNUAL WELLNESS VISIT, SUBSEQUENT: Primary | ICD-10-CM

## 2023-02-21 DIAGNOSIS — Z12.5 SCREENING FOR PROSTATE CANCER: ICD-10-CM

## 2023-02-21 DIAGNOSIS — D50.8 IRON DEFICIENCY ANEMIA SECONDARY TO INADEQUATE DIETARY IRON INTAKE: ICD-10-CM

## 2023-02-21 PROBLEM — H25.812 COMBINED FORMS OF AGE-RELATED CATARACT OF LEFT EYE: Status: RESOLVED | Noted: 2022-06-01 | Resolved: 2023-02-21

## 2023-02-21 PROBLEM — H25.811 COMBINED FORMS OF AGE-RELATED CATARACT OF RIGHT EYE: Status: RESOLVED | Noted: 2022-05-18 | Resolved: 2023-02-21

## 2023-02-21 LAB
ANION GAP SERPL CALCULATED.3IONS-SCNC: 12 MMOL/L (ref 7–15)
BUN SERPL-MCNC: 16.8 MG/DL (ref 8–23)
CALCIUM SERPL-MCNC: 10.2 MG/DL (ref 8.8–10.2)
CHLORIDE SERPL-SCNC: 104 MMOL/L (ref 98–107)
CREAT SERPL-MCNC: 0.78 MG/DL (ref 0.67–1.17)
DEPRECATED HCO3 PLAS-SCNC: 26 MMOL/L (ref 22–29)
ERYTHROCYTE [DISTWIDTH] IN BLOOD BY AUTOMATED COUNT: 12.4 % (ref 10–15)
GFR SERPL CREATININE-BSD FRML MDRD: >90 ML/MIN/1.73M2
GLUCOSE SERPL-MCNC: 195 MG/DL (ref 70–99)
HBA1C MFR BLD: 7.3 % (ref 0–5.6)
HCT VFR BLD AUTO: 42.1 % (ref 40–53)
HGB BLD-MCNC: 14 G/DL (ref 13.3–17.7)
MCH RBC QN AUTO: 29.6 PG (ref 26.5–33)
MCHC RBC AUTO-ENTMCNC: 33.3 G/DL (ref 31.5–36.5)
MCV RBC AUTO: 89 FL (ref 78–100)
PLATELET # BLD AUTO: 154 10E3/UL (ref 150–450)
POTASSIUM SERPL-SCNC: 4.1 MMOL/L (ref 3.4–5.3)
PSA SERPL-MCNC: 1.71 NG/ML (ref 0–6.5)
RBC # BLD AUTO: 4.73 10E6/UL (ref 4.4–5.9)
SODIUM SERPL-SCNC: 142 MMOL/L (ref 136–145)
WBC # BLD AUTO: 5.9 10E3/UL (ref 4–11)

## 2023-02-21 PROCEDURE — 99207 PR FOOT EXAM NO CHARGE: CPT | Mod: 25 | Performed by: FAMILY MEDICINE

## 2023-02-21 PROCEDURE — 85027 COMPLETE CBC AUTOMATED: CPT | Performed by: FAMILY MEDICINE

## 2023-02-21 PROCEDURE — 99214 OFFICE O/P EST MOD 30 MIN: CPT | Mod: 25 | Performed by: FAMILY MEDICINE

## 2023-02-21 PROCEDURE — 80048 BASIC METABOLIC PNL TOTAL CA: CPT | Performed by: FAMILY MEDICINE

## 2023-02-21 PROCEDURE — 83036 HEMOGLOBIN GLYCOSYLATED A1C: CPT | Performed by: FAMILY MEDICINE

## 2023-02-21 PROCEDURE — 36415 COLL VENOUS BLD VENIPUNCTURE: CPT | Performed by: FAMILY MEDICINE

## 2023-02-21 PROCEDURE — G0439 PPPS, SUBSEQ VISIT: HCPCS | Performed by: FAMILY MEDICINE

## 2023-02-21 PROCEDURE — G0103 PSA SCREENING: HCPCS | Performed by: FAMILY MEDICINE

## 2023-02-21 RX ORDER — METFORMIN HCL 500 MG
TABLET, EXTENDED RELEASE 24 HR ORAL
Qty: 270 TABLET | Refills: 3 | Status: SHIPPED | OUTPATIENT
Start: 2023-02-21

## 2023-02-21 ASSESSMENT — PAIN SCALES - GENERAL: PAINLEVEL: NO PAIN (0)

## 2023-02-21 ASSESSMENT — ENCOUNTER SYMPTOMS
JOINT SWELLING: 0
HEMATOCHEZIA: 0
WEAKNESS: 0
COUGH: 0
HEMATURIA: 0
NERVOUS/ANXIOUS: 0
ABDOMINAL PAIN: 0
HEADACHES: 0
DYSURIA: 0
FREQUENCY: 1
SHORTNESS OF BREATH: 0
SORE THROAT: 0
PALPITATIONS: 0
CHILLS: 0
NAUSEA: 0
DIZZINESS: 0
EYE PAIN: 0
ARTHRALGIAS: 1
CONSTIPATION: 0
FEVER: 0
PARESTHESIAS: 0
DIARRHEA: 0
HEARTBURN: 0
MYALGIAS: 1

## 2023-02-21 ASSESSMENT — PATIENT HEALTH QUESTIONNAIRE - PHQ9
SUM OF ALL RESPONSES TO PHQ QUESTIONS 1-9: 0
10. IF YOU CHECKED OFF ANY PROBLEMS, HOW DIFFICULT HAVE THESE PROBLEMS MADE IT FOR YOU TO DO YOUR WORK, TAKE CARE OF THINGS AT HOME, OR GET ALONG WITH OTHER PEOPLE: NOT DIFFICULT AT ALL
SUM OF ALL RESPONSES TO PHQ QUESTIONS 1-9: 0

## 2023-02-21 ASSESSMENT — ACTIVITIES OF DAILY LIVING (ADL): CURRENT_FUNCTION: NO ASSISTANCE NEEDED

## 2023-02-21 NOTE — PROGRESS NOTES
"SUBJECTIVE:   Turner is a 77 year old who presents for Preventive Visit.  Patient has been advised of split billing requirements and indicates understanding: Yes  Are you in the first 12 months of your Medicare coverage?  No    Healthy Habits:     In general, how would you rate your overall health?  Good    Frequency of exercise:  None    Do you usually eat at least 4 servings of fruit and vegetables a day, include whole grains    & fiber and avoid regularly eating high fat or \"junk\" foods?  Yes    Taking medications regularly:  Yes    Ability to successfully perform activities of daily living:  No assistance needed    Home Safety:  No safety concerns identified    Hearing Impairment:  Difficulty following a conversation in a noisy restaurant or crowded room and need to ask people to speak up or repeat themselves    In the past 6 months, have you been bothered by leaking of urine?  No    In general, how would you rate your overall mental or emotional health?  Good      PHQ-2 Total Score: 0    Additional concerns today:  No      Have you ever done Advance Care Planning? (For example, a Health Directive, POLST, or a discussion with a medical provider or your loved ones about your wishes): Yes, patient states has an Advance Care Planning document and will bring a copy to the clinic.    Hearing Aids   Fall risk  Fallen 2 or more times in the past year?: No  Any fall with injury in the past year?: No    Cognitive Screening   1) Repeat 3 items (Leader, Season, Table)    2) Clock draw: NORMAL  3) 3 item recall: Recalls 2 objects   Results: NORMAL clock, 1-2 items recalled: COGNITIVE IMPAIRMENT LESS LIKELY    Mini-CogTM Copyright LINDA Paulino. Licensed by the author for use in [facility]; reprinted with permission (lucian@.[facility]). All rights reserved.      Do you have sleep apnea, excessive snoring or daytime drowsiness?: no    Reviewed and updated as needed this visit by clinical staff   Tobacco  Allergies  Meds   "            Reviewed and updated as needed this visit by Provider                 Social History     Tobacco Use     Smoking status: Former     Types: Cigarettes     Quit date: 3/1/1979     Years since quittin.0     Smokeless tobacco: Never   Substance Use Topics     Alcohol use: Not Currently     If you drink alcohol do you typically have >3 drinks per day or >7 drinks per week? No    Alcohol Use 2023   Prescreen: >3 drinks/day or >7 drinks/week? Not Applicable           Diabetes Follow-up      How often are you checking your blood sugar? Not at all    What concerns do you have today about your diabetes? None     Do you have any of these symptoms? (Select all that apply)  No numbness or tingling in feet.  No redness, sores or blisters on feet.  No complaints of excessive thirst.  No reports of blurry vision.  No significant changes to weight.    Have you had a diabetic eye exam in the last 12 months? Yes- Date of last eye exam: 2022,  Location: [facility]      Hyperlipidemia Follow-Up      Are you regularly taking any medication or supplement to lower your cholesterol?   Yes- statin    Are you having muscle aches or other side effects that you think could be caused by your cholesterol lowering medication?  No    Hypertension Follow-up      Do you check your blood pressure regularly outside of the clinic? Yes     Are you following a low salt diet? Yes    Are your blood pressures ever more than 140 on the top number (systolic) OR more   than 90 on the bottom number (diastolic), for example 140/90? No    BP Readings from Last 2 Encounters:   23 136/68   09/15/22 134/60     Hemoglobin A1C   Date Value   09/15/2022 6.9 % (H)   2021 5.5     LDL Cholesterol Calculated   Date Value   09/15/2022 66 mg/dL   2021 60     LDL Cholesterol Direct (mg/dL)   Date Value   2017 62   2016 94         Current providers sharing in care for this patient include:   Patient Care Team:  Shira  MD Dexter as PCP - General  Dexter Silva MD as Assigned PCP    The following health maintenance items are reviewed in Epic and correct as of today:  Health Maintenance   Topic Date Due     HF ACTION PLAN  Never done     DIABETIC FOOT EXAM  Never done     SPIROMETRY  Never done     COPD ACTION PLAN  Never done     DEPRESSION ACTION PLAN  Never done     EYE EXAM  Never done     ZOSTER IMMUNIZATION (2 of 3) 02/26/2010     DTAP/TDAP/TD IMMUNIZATION (5 - Td or Tdap) 01/01/2019     MEDICARE ANNUAL WELLNESS VISIT  10/23/2021     BMP  11/17/2022     A1C  12/15/2022     PHQ-9  08/21/2023     ALT  09/15/2023     LIPID  09/15/2023     MICROALBUMIN  09/15/2023     TSH W/FREE T4 REFLEX  09/15/2023     URINE DRUG SCREEN  09/15/2023     ANNUAL REVIEW OF HM ORDERS  09/15/2023     CBC  09/15/2023     FALL RISK ASSESSMENT  02/21/2024     ADVANCE CARE PLANNING  03/10/2027     HEPATITIS C SCREENING  Completed     INFLUENZA VACCINE  Completed     Pneumococcal Vaccine: 65+ Years  Completed     COVID-19 Vaccine  Completed     IPV IMMUNIZATION  Aged Out     MENINGITIS IMMUNIZATION  Aged Out     COLORECTAL CANCER SCREENING  Discontinued               Review of Systems   Constitutional: Negative for chills and fever.   HENT: Negative for congestion, ear pain, hearing loss and sore throat.    Eyes: Negative for pain and visual disturbance.   Respiratory: Negative for cough and shortness of breath.    Cardiovascular: Positive for peripheral edema. Negative for chest pain and palpitations.   Gastrointestinal: Negative for abdominal pain, constipation, diarrhea, heartburn, hematochezia and nausea.   Genitourinary: Positive for frequency and urgency. Negative for dysuria, genital sores, hematuria, impotence and penile discharge.   Musculoskeletal: Positive for arthralgias and myalgias. Negative for joint swelling.   Skin: Negative for rash.   Neurological: Negative for dizziness, weakness, headaches and paresthesias.  "  Psychiatric/Behavioral: Negative for mood changes. The patient is not nervous/anxious.          OBJECTIVE:   /68 (BP Location: Right arm, Patient Position: Sitting)   Pulse 64   Temp 98.1  F (36.7  C) (Tympanic)   Resp 16   Ht 1.708 m (5' 7.25\")   Wt 90.3 kg (199 lb)   SpO2 97%   BMI 30.94 kg/m   Estimated body mass index is 30.94 kg/m  as calculated from the following:    Height as of this encounter: 1.708 m (5' 7.25\").    Weight as of this encounter: 90.3 kg (199 lb).  Physical Exam  GENERAL: healthy, alert and no distress  NECK: no adenopathy, no asymmetry, masses, or scars and thyroid normal to palpation  RESP: lungs clear to auscultation - no rales, rhonchi or wheezes  CV: regular rate and rhythm, normal S1 S2, no S3 or S4, no murmur, click or rub, no peripheral edema and peripheral pulses strong  ABDOMEN: soft, nontender, no hepatosplenomegaly, no masses and bowel sounds normal  MS: no gross musculoskeletal defects noted, no edema  Sensory exam of the foot is normal, tested with the monofilament. Good pulses, no lesions or ulcers, good peripheral pulses.        ASSESSMENT / PLAN:       ICD-10-CM    1. Medicare annual wellness visit, subsequent  Z00.00       2. Type 2 diabetes mellitus without complication, without long-term current use of insulin (H)   Controlled by prescription medication prescribed by me and up to date.   E11.9 HEMOGLOBIN A1C     metFORMIN (GLUCOPHAGE XR) 500 MG 24 hr tablet     FOOT EXAM      3. Benign essential hypertension   Controlled by prescription medication prescribed by me and up to date.   I10 BASIC METABOLIC PANEL      4. Screening for prostate cancer  Z12.5 PSA, screen      5. Iron deficiency anemia secondary to inadequate dietary iron intake   Controlled by prescription medication prescribed by me and up to date.   D50.8 CBC with platelets          Patient has been advised of split billing requirements and indicates understanding: Yes      COUNSELING:  Reviewed " "preventive health counseling, as reflected in patient instructions       Consider AAA screening for ages 65-75 and smoking history       Regular exercise       Healthy diet/nutrition       Vision screening       Hearing screening      BMI:   Estimated body mass index is 30.94 kg/m  as calculated from the following:    Height as of this encounter: 1.708 m (5' 7.25\").    Weight as of this encounter: 90.3 kg (199 lb).         He reports that he quit smoking about 44 years ago. His smoking use included cigarettes. He has never used smokeless tobacco.      Appropriate preventive services were discussed with this patient, including applicable screening as appropriate for cardiovascular disease, diabetes, osteopenia/osteoporosis, and glaucoma.  As appropriate for age/gender, discussed screening for colorectal cancer, prostate cancer, breast cancer, and cervical cancer. Checklist reviewing preventive services available has been given to the patient.    Reviewed patients plan of care and provided an AVS. The Basic Care Plan (routine screening as documented in Health Maintenance) for Prashant meets the Care Plan requirement. This Care Plan has been established and reviewed with the Patient.      Dexter Silva MD  [facility]    Identified Health Risks:  Answers for HPI/ROS submitted by the patient on 2/21/2023  If you checked off any problems, how difficult have these problems made it for you to do your work, take care of things at home, or get along with other people?: Not difficult at all  PHQ9 TOTAL SCORE: 0      "

## 2023-06-04 ENCOUNTER — HEALTH MAINTENANCE LETTER (OUTPATIENT)
Age: 78
End: 2023-06-04

## 2023-07-07 DIAGNOSIS — E11.9 TYPE 2 DIABETES MELLITUS WITHOUT COMPLICATION, WITHOUT LONG-TERM CURRENT USE OF INSULIN (H): ICD-10-CM

## 2023-07-07 RX ORDER — FLASH GLUCOSE SENSOR
KIT MISCELLANEOUS
Qty: 2 EACH | Refills: 5 | Status: SHIPPED | OUTPATIENT
Start: 2023-07-07 | End: 2023-12-11

## 2023-07-07 NOTE — TELEPHONE ENCOUNTER
Last office visit: 2/21/2023     Future Appointments 7/7/2023 - 1/3/2024      Date Visit Type Length Department Provider     9/5/2023 10:30 AM OFFICE VISIT 30 min [facility] FP/ANGI/Dexter Madden MD    Location Instructions:     [facility] is located at [address] [place] [address], one block north of [facility] and the [facility]. The clinic shares a building with the [facility] [employer]; use the clinic s parking lot and entrance on the building s south side.                    Requested Prescriptions   Pending Prescriptions Disp Refills     Continuous Blood Gluc Sensor (FREESTYLE MARILOU 14 DAY SENSOR) MISC [Pharmacy Med Name: FreeStyle Marilou 14 Day Sensor kit]  5     Sig: CHANGE sensor every 14 DAYS       There is no refill protocol information for this order

## 2023-08-20 DIAGNOSIS — E78.00 PURE HYPERCHOLESTEROLEMIA: ICD-10-CM

## 2023-08-20 RX ORDER — ATORVASTATIN CALCIUM 40 MG/1
TABLET, FILM COATED ORAL
Qty: 90 TABLET | Refills: 0 | Status: SHIPPED | OUTPATIENT
Start: 2023-08-20

## 2023-08-20 NOTE — TELEPHONE ENCOUNTER
"Last Written Prescription Date:  11/14/22  Last Fill Quantity: 90,  # refills: 2   Last office visit provider:   2/21/23    Requested Prescriptions   Pending Prescriptions Disp Refills    atorvastatin (LIPITOR) 40 MG tablet [Pharmacy Med Name: Atorvastatin Calcium 40 MG Oral Tablet] 90 tablet 3     Sig: TAKE 1 TABLET BY MOUTH DAILY       Statins Protocol Passed - 8/20/2023  8:58 AM        Passed - LDL on file in past 12 months     Recent Labs   Lab Test 09/15/22  1059   LDL 66             Passed - No abnormal creatine kinase in past 12 months     No lab results found.             Passed - Recent (12 mo) or future (30 days) visit within the authorizing provider's specialty     Patient has had an office visit with the authorizing provider or a provider within the authorizing providers department within the previous 12 mos or has a future within next 30 days. See \"Patient Info\" tab in inbasket, or \"Choose Columns\" in Meds & Orders section of the refill encounter.              Passed - Medication is active on med list        Passed - Patient is age 18 or older             Deana Go RN 08/20/23 2:26 PM  "

## 2023-09-30 DIAGNOSIS — I25.118 CORONARY ARTERY DISEASE OF NATIVE ARTERY OF NATIVE HEART WITH STABLE ANGINA PECTORIS (H): ICD-10-CM

## 2023-10-03 RX ORDER — NITROGLYCERIN 0.4 MG/1
TABLET SUBLINGUAL
Qty: 100 TABLET | Refills: 3 | Status: SHIPPED | OUTPATIENT
Start: 2023-10-03

## 2023-10-21 DIAGNOSIS — I10 BENIGN ESSENTIAL HYPERTENSION: ICD-10-CM

## 2023-10-21 DIAGNOSIS — I25.118 CORONARY ARTERY DISEASE OF NATIVE ARTERY OF NATIVE HEART WITH STABLE ANGINA PECTORIS (H): ICD-10-CM

## 2023-10-21 DIAGNOSIS — F33.1 MODERATE EPISODE OF RECURRENT MAJOR DEPRESSIVE DISORDER (H): ICD-10-CM

## 2023-10-21 DIAGNOSIS — R35.0 BENIGN PROSTATIC HYPERPLASIA WITH URINARY FREQUENCY: ICD-10-CM

## 2023-10-21 DIAGNOSIS — N40.1 BENIGN PROSTATIC HYPERPLASIA WITH URINARY FREQUENCY: ICD-10-CM

## 2023-10-22 ENCOUNTER — HEALTH MAINTENANCE LETTER (OUTPATIENT)
Age: 78
End: 2023-10-22

## 2023-10-23 RX ORDER — ISOSORBIDE MONONITRATE 30 MG/1
30 TABLET, EXTENDED RELEASE ORAL DAILY
Qty: 90 TABLET | Refills: 0 | Status: SHIPPED | OUTPATIENT
Start: 2023-10-23

## 2023-10-23 RX ORDER — LISINOPRIL 10 MG/1
10 TABLET ORAL DAILY
Qty: 90 TABLET | Refills: 0 | Status: SHIPPED | OUTPATIENT
Start: 2023-10-23

## 2023-10-24 RX ORDER — DOXAZOSIN 2 MG/1
TABLET ORAL
Qty: 90 TABLET | Refills: 3 | Status: SHIPPED | OUTPATIENT
Start: 2023-10-24

## 2023-10-24 RX ORDER — FLUOXETINE 40 MG/1
40 CAPSULE ORAL DAILY
Qty: 90 CAPSULE | Refills: 3 | Status: SHIPPED | OUTPATIENT
Start: 2023-10-24

## 2023-12-02 DIAGNOSIS — E78.00 PURE HYPERCHOLESTEROLEMIA: ICD-10-CM

## 2023-12-04 RX ORDER — ATORVASTATIN CALCIUM 40 MG/1
TABLET, FILM COATED ORAL
Qty: 90 TABLET | Refills: 3 | OUTPATIENT
Start: 2023-12-04

## 2023-12-10 DIAGNOSIS — E11.9 TYPE 2 DIABETES MELLITUS WITHOUT COMPLICATION, WITHOUT LONG-TERM CURRENT USE OF INSULIN (H): ICD-10-CM

## 2023-12-11 RX ORDER — FLASH GLUCOSE SENSOR
KIT MISCELLANEOUS
Qty: 2 EACH | Refills: 5 | Status: SHIPPED | OUTPATIENT
Start: 2023-12-11 | End: 2024-05-06

## 2024-03-10 ENCOUNTER — HEALTH MAINTENANCE LETTER (OUTPATIENT)
Age: 79
End: 2024-03-10

## 2024-05-05 DIAGNOSIS — E11.9 TYPE 2 DIABETES MELLITUS WITHOUT COMPLICATION, WITHOUT LONG-TERM CURRENT USE OF INSULIN (H): ICD-10-CM

## 2024-05-06 RX ORDER — FLASH GLUCOSE SENSOR
KIT MISCELLANEOUS
Qty: 2 EACH | Refills: 5 | Status: SHIPPED | OUTPATIENT
Start: 2024-05-06

## 2024-05-18 ENCOUNTER — HEALTH MAINTENANCE LETTER (OUTPATIENT)
Age: 79
End: 2024-05-18

## 2024-07-27 ENCOUNTER — HEALTH MAINTENANCE LETTER (OUTPATIENT)
Age: 79
End: 2024-07-27

## 2024-10-20 DIAGNOSIS — E11.9 TYPE 2 DIABETES MELLITUS WITHOUT COMPLICATION, WITHOUT LONG-TERM CURRENT USE OF INSULIN (H): ICD-10-CM

## 2024-10-22 RX ORDER — FLASH GLUCOSE SENSOR
KIT MISCELLANEOUS
Qty: 1 EACH | Refills: 5 | Status: SHIPPED | OUTPATIENT
Start: 2024-10-22

## 2024-10-22 NOTE — TELEPHONE ENCOUNTER
Patient states goes to the [facility] in [place] now.    Elo Renee on 10/22/2024 at 11:53 AM

## 2024-12-14 ENCOUNTER — HEALTH MAINTENANCE LETTER (OUTPATIENT)
Age: 79
End: 2024-12-14

## 2025-01-12 DIAGNOSIS — E11.9 TYPE 2 DIABETES MELLITUS WITHOUT COMPLICATION, WITHOUT LONG-TERM CURRENT USE OF INSULIN (H): ICD-10-CM

## 2025-01-13 RX ORDER — FLASH GLUCOSE SENSOR
KIT MISCELLANEOUS
Qty: 1 EACH | Refills: 5 | Status: SHIPPED | OUTPATIENT
Start: 2025-01-13

## 2025-03-16 ENCOUNTER — HEALTH MAINTENANCE LETTER (OUTPATIENT)
Age: 80
End: 2025-03-16

## 2025-04-07 DIAGNOSIS — E11.9 TYPE 2 DIABETES MELLITUS WITHOUT COMPLICATION, WITHOUT LONG-TERM CURRENT USE OF INSULIN (H): ICD-10-CM

## 2025-04-07 RX ORDER — FLASH GLUCOSE SENSOR
KIT MISCELLANEOUS
Qty: 6 EACH | Refills: 5 | Status: SHIPPED | OUTPATIENT
Start: 2025-04-07

## 2025-06-08 ENCOUNTER — HEALTH MAINTENANCE LETTER (OUTPATIENT)
Age: 80
End: 2025-06-08

## 2025-06-29 ENCOUNTER — HEALTH MAINTENANCE LETTER (OUTPATIENT)
Age: 80
End: 2025-06-29